# Patient Record
Sex: FEMALE | Race: BLACK OR AFRICAN AMERICAN | Employment: OTHER | URBAN - METROPOLITAN AREA
[De-identification: names, ages, dates, MRNs, and addresses within clinical notes are randomized per-mention and may not be internally consistent; named-entity substitution may affect disease eponyms.]

---

## 2018-11-01 ENCOUNTER — HOSPITAL ENCOUNTER (EMERGENCY)
Age: 78
Discharge: HOME | End: 2018-11-01
Attending: EMERGENCY MEDICINE | Admitting: EMERGENCY MEDICINE
Payer: MEDICARE

## 2018-11-01 VITALS
OXYGEN SATURATION: 97 % | HEART RATE: 96 BPM | RESPIRATION RATE: 16 BRPM | HEIGHT: 64 IN | TEMPERATURE: 99.2 F | SYSTOLIC BLOOD PRESSURE: 122 MMHG | BODY MASS INDEX: 23.73 KG/M2 | DIASTOLIC BLOOD PRESSURE: 69 MMHG | WEIGHT: 139 LBS

## 2018-11-01 DIAGNOSIS — M54.9 PAIN OF BACK AND RIGHT LOWER EXTREMITY: Primary | ICD-10-CM

## 2018-11-01 DIAGNOSIS — M79.604 PAIN OF BACK AND RIGHT LOWER EXTREMITY: Primary | ICD-10-CM

## 2018-11-01 PROCEDURE — 99282 EMERGENCY DEPT VISIT SF MDM: CPT

## 2018-11-01 RX ORDER — CYCLOBENZAPRINE HCL 5 MG
5 TABLET ORAL
Qty: 20 TAB | Refills: 0 | Status: SHIPPED | OUTPATIENT
Start: 2018-11-01 | End: 2022-03-02 | Stop reason: SDUPTHER

## 2018-11-01 RX ORDER — ACETAMINOPHEN AND CODEINE PHOSPHATE 300; 30 MG/1; MG/1
1 TABLET ORAL
Qty: 20 TAB | Refills: 0 | Status: SHIPPED | OUTPATIENT
Start: 2018-11-01 | End: 2019-04-11 | Stop reason: ALTCHOICE

## 2018-11-01 RX ORDER — CHOLECALCIFEROL (VITAMIN D3) 125 MCG
CAPSULE ORAL DAILY
COMMUNITY
End: 2019-07-23 | Stop reason: ALTCHOICE

## 2018-11-01 RX ORDER — CYCLOBENZAPRINE HCL 10 MG
TABLET ORAL
COMMUNITY
End: 2019-07-23 | Stop reason: SDUPTHER

## 2018-11-01 NOTE — DISCHARGE INSTRUCTIONS
Back Pain: Care Instructions  Your Care Instructions    Back pain has many possible causes. It is often related to problems with muscles and ligaments of the back. It may also be related to problems with the nerves, discs, or bones of the back. Moving, lifting, standing, sitting, or sleeping in an awkward way can strain the back. Sometimes you don't notice the injury until later. Arthritis is another common cause of back pain. Although it may hurt a lot, back pain usually improves on its own within several weeks. Most people recover in 12 weeks or less. Using good home treatment and being careful not to stress your back can help you feel better sooner. Follow-up care is a key part of your treatment and safety. Be sure to make and go to all appointments, and call your doctor if you are having problems. It's also a good idea to know your test results and keep a list of the medicines you take. How can you care for yourself at home? · Sit or lie in positions that are most comfortable and reduce your pain. Try one of these positions when you lie down:  ? Lie on your back with your knees bent and supported by large pillows. ? Lie on the floor with your legs on the seat of a sofa or chair. ? Lie on your side with your knees and hips bent and a pillow between your legs. ? Lie on your stomach if it does not make pain worse. · Do not sit up in bed, and avoid soft couches and twisted positions. Bed rest can help relieve pain at first, but it delays healing. Avoid bed rest after the first day of back pain. · Change positions every 30 minutes. If you must sit for long periods of time, take breaks from sitting. Get up and walk around, or lie in a comfortable position. · Try using a heating pad on a low or medium setting for 15 to 20 minutes every 2 or 3 hours. Try a warm shower in place of one session with the heating pad. · You can also try an ice pack for 10 to 15 minutes every 2 to 3 hours.  Put a thin cloth between the ice pack and your skin. · Take pain medicines exactly as directed. ? If the doctor gave you a prescription medicine for pain, take it as prescribed. ? If you are not taking a prescription pain medicine, ask your doctor if you can take an over-the-counter medicine. · Take short walks several times a day. You can start with 5 to 10 minutes, 3 or 4 times a day, and work up to longer walks. Walk on level surfaces and avoid hills and stairs until your back is better. · Return to work and other activities as soon as you can. Continued rest without activity is usually not good for your back. · To prevent future back pain, do exercises to stretch and strengthen your back and stomach. Learn how to use good posture, safe lifting techniques, and proper body mechanics. When should you call for help? Call your doctor now or seek immediate medical care if:    · You have new or worsening numbness in your legs.     · You have new or worsening weakness in your legs. (This could make it hard to stand up.)     · You lose control of your bladder or bowels.    Watch closely for changes in your health, and be sure to contact your doctor if:    · You have a fever, lose weight, or don't feel well.     · You do not get better as expected. Where can you learn more? Go to http://marilynn-maciej.info/. Enter B411 in the search box to learn more about \"Back Pain: Care Instructions. \"  Current as of: November 29, 2017  Content Version: 11.8  © 0478-1560 [email or web address]. Care instructions adapted under license by [employer] (which disclaims liability or warranty for this information). If you have questions about a medical condition or this instruction, always ask your healthcare professional. [place] [address] any warranty or liability for your use of this information.            Back Pain, Emergency or Urgent Symptoms: Care Instructions  Your Care Instructions    Many people have back pain at one time or another. In most cases, pain gets better with self-care that includes over-the-counter pain medicine, ice, heat, and exercises. Unless you have symptoms of a severe injury or heart attack, you may be able to give yourself a few days before you call a doctor. But some back problems are very serious. Do not ignore symptoms that need to be checked right away. Follow-up care is a key part of your treatment and safety. Be sure to make and go to all appointments, and call your doctor if you are having problems. It's also a good idea to know your test results and keep a list of the medicines you take. How can you care for yourself at home? · Sit or lie in positions that are most comfortable and that reduce your pain. Try one of these positions when you lie down:  ? Lie on your back with your knees bent and supported by large pillows. ? Lie on the floor with your legs on the seat of a sofa or chair. ? Lie on your side with your knees and hips bent and a pillow between your legs. ? Lie on your stomach if it does not make pain worse. · Do not sit up in bed, and avoid soft couches and twisted positions. Bed rest can help relieve pain at first, but it delays healing. Avoid bed rest after the first day. · Change positions every 30 minutes. If you must sit for long periods of time, take breaks from sitting. Get up and walk around, or lie flat. · Try using a heating pad on a low or medium setting, for 15 to 20 minutes every 2 or 3 hours. Try a warm shower in place of one session with the heating pad. You can also buy single-use heat wraps that last up to 8 hours. You can also try ice or cold packs on your back for 10 to 20 minutes at a time, several times a day. (Put a thin cloth between the ice pack and your skin.) This reduces pain and makes it easier to be active and exercise. · Take pain medicines exactly as directed.   ? If the doctor gave you a prescription medicine for pain, take it as prescribed. ? If you are not taking a prescription pain medicine, ask your doctor if you can take an over-the-counter medicine. When should you call for help? Call 911 anytime you think you may need emergency care. For example, call if:    · You are unable to move a leg at all.     · You have back pain with severe belly pain.     · You have symptoms of a heart attack. These may include:  ? Chest pain or pressure, or a strange feeling in the chest.  ? Sweating. ? Shortness of breath. ? Nausea or vomiting. ? Pain, pressure, or a strange feeling in the back, neck, jaw, or upper belly or in one or both shoulders or arms. ? Lightheadedness or sudden weakness. ? A fast or irregular heartbeat. After you call 911, the  may tell you to chew 1 adult-strength or 2 to 4 low-dose aspirin. Wait for an ambulance. Do not try to drive yourself.    Call your doctor now or seek immediate medical care if:    · You have new or worse symptoms in your arms, legs, chest, belly, or buttocks. Symptoms may include:  ? Numbness or tingling. ? Weakness. ? Pain.     · You lose bladder or bowel control.     · You have back pain and:  ? You have injured your back while lifting or doing some other activity. Call if the pain is severe, has not gone away after 1 or 2 days, and you cannot do your normal daily activities. ? You have had a back injury before that needed treatment. ? Your pain has lasted longer than 4 weeks. ? You have had weight loss you cannot explain. ? You have a fever. ? You are age 48 or older. ? You have cancer now or have had it before.    Watch closely for changes in your health, and be sure to contact your doctor if you are not getting better as expected. Where can you learn more? Go to http://marilynn-maciej.info/. Enter B512 in the search box to learn more about \"Back Pain, Emergency or Urgent Symptoms: Care Instructions. \"  Current as of: November 20, 2017  Content Version: 11.8  © 0260-8634 [employer]. Care instructions adapted under license by [employer] (which disclaims liability or warranty for this information). If you have questions about a medical condition or this instruction, always ask your healthcare professional. [address] any warranty or liability for your use of this information. Low Back Pain: Exercises  Your Care Instructions  Here are some examples of typical rehabilitation exercises for your condition. Start each exercise slowly. Ease off the exercise if you start to have pain. Your doctor or physical therapist will tell you when you can start these exercises and which ones will work best for you. How to do the exercises  Press-up    1. Lie on your stomach, supporting your body with your forearms. 2. Press your elbows down into the floor to raise your upper back. As you do this, relax your stomach muscles and allow your back to arch without using your back muscles. As your press up, do not let your hips or pelvis come off the floor. 3. Hold for 15 to 30 seconds, then relax. 4. Repeat 2 to 4 times. Alternate arm and leg (bird dog) exercise    1. Start on the floor, on your hands and knees. 2. Tighten your belly muscles. 3. Raise one leg off the floor, and hold it straight out behind you. Be careful not to let your hip drop down, because that will twist your trunk. 4. Hold for about 6 seconds, then lower your leg and switch to the other leg. 5. Repeat 8 to 12 times on each leg. 6. Over time, work up to holding for 10 to 30 seconds each time. 7. If you feel stable and secure with your leg raised, try raising the opposite arm straight out in front of you at the same time. Knee-to-chest exercise    1. Lie on your back with your knees bent and your feet flat on the floor.   2. Bring one knee to your chest, keeping the other foot flat on the floor (or keeping the other leg straight, whichever feels better on your lower back). 3. Keep your lower back pressed to the floor. Hold for at least 15 to 30 seconds. 4. Relax, and lower the knee to the starting position. 5. Repeat with the other leg. Repeat 2 to 4 times with each leg. 6. To get more stretch, put your other leg flat on the floor while pulling your knee to your chest.    Curl-ups    1. Lie on the floor on your back with your knees bent at a 90-degree angle. Your feet should be flat on the floor, about 12 inches from your buttocks. 2. Cross your arms over your chest. If this bothers your neck, try putting your hands behind your neck (not your head), with your elbows spread apart. 3. Slowly tighten your belly muscles and raise your shoulder blades off the floor. 4. Keep your head in line with your body, and do not press your chin to your chest.  5. Hold this position for 1 or 2 seconds, then slowly lower yourself back down to the floor. 6. Repeat 8 to 12 times. Pelvic tilt exercise    1. Lie on your back with your knees bent. 2. \"Brace\" your stomach. This means to tighten your muscles by pulling in and imagining your belly button moving toward your spine. You should feel like your back is pressing to the floor and your hips and pelvis are rocking back. 3. Hold for about 6 seconds while you breathe smoothly. 4. Repeat 8 to 12 times. Heel dig bridging    1. Lie on your back with both knees bent and your ankles bent so that only your heels are digging into the floor. Your knees should be bent about 90 degrees. 2. Then push your heels into the floor, squeeze your buttocks, and lift your hips off the floor until your shoulders, hips, and knees are all in a straight line. 3. Hold for about 6 seconds as you continue to breathe normally, and then slowly lower your hips back down to the floor and rest for up to 10 seconds. 4. Do 8 to 12 repetitions. Hamstring stretch in doorway    1.  Lie on your back in a doorway, with one leg through the open door. 2. Slide your leg up the wall to straighten your knee. You should feel a gentle stretch down the back of your leg. 3. Hold the stretch for at least 15 to 30 seconds. Do not arch your back, point your toes, or bend either knee. Keep one heel touching the floor and the other heel touching the wall. 4. Repeat with your other leg. 5. Do 2 to 4 times for each leg. Hip flexor stretch    1. Kneel on the floor with one knee bent and one leg behind you. Place your forward knee over your foot. Keep your other knee touching the floor. 2. Slowly push your hips forward until you feel a stretch in the upper thigh of your rear leg. 3. Hold the stretch for at least 15 to 30 seconds. Repeat with your other leg. 4. Do 2 to 4 times on each side. Wall sit    1. Stand with your back 10 to 12 inches away from a wall. 2. Lean into the wall until your back is flat against it. 3. Slowly slide down until your knees are slightly bent, pressing your lower back into the wall. 4. Hold for about 6 seconds, then slide back up the wall. 5. Repeat 8 to 12 times. Follow-up care is a key part of your treatment and safety. Be sure to make and go to all appointments, and call your doctor if you are having problems. It's also a good idea to know your test results and keep a list of the medicines you take. Where can you learn more? Go to http://marilynn-maciej.info/. Enter O933 in the search box to learn more about \"Low Back Pain: Exercises. \"  Current as of: November 29, 2017  Content Version: 11.8  © 4896-1961 [employer]. Care instructions adapted under license by [employer] (which disclaims liability or warranty for this information). If you have questions about a medical condition or this instruction, always ask your healthcare professional. [address] any warranty or liability for your use of this information.

## 2018-11-01 NOTE — ED PROVIDER NOTES
EMERGENCY DEPARTMENT HISTORY AND PHYSICAL EXAM 
 
Date: 11/1/2018 Patient Name: Hermelinda Mcintosh History of Presenting Illness Chief Complaint Patient presents with  
 Hip Pain  Leg Pain History Provided By: Patient Chief Complaint: Right lower back pain Duration: First noticed this morning Timing:  Acute Location: right lower back with radiation down the posterior thigh Quality: Aching Severity: Moderate Modifying Factors: pain is worse with any movement Associated Symptoms: none Additional History (Context): Hermelinda Mcintosh is a 65 y.o. female with a history of arthritis and HTN who presents with complaints or right hip pain, but when she points to where the pain is it is more her right lower back. Reports when she stood up this morning she noticed pain on the right hip/lower back with radiation down the right leg. Reports has not tried anything for it. Reports she did call ortho and make an appointment but is unable to get in until December. Reports pain has improved but not completely. Pt denies any fevers or chills, headache, dizziness or light headedness, ENT issues, CP or discomfort, SOB, cough, n/v/d/c, abd pain, diaphoresis, melena/hematochezia, dysuria, hematuria, frequency, focal weakness/numbness/tingling, or rash. Patient has no other complaints at this time. Denies loss of bowel or bladder. PCP: None Current Outpatient Medications Medication Sig Dispense Refill  ANASTROZOLE PO Take  by mouth.  losartan potassium (COZAAR PO) Take  by mouth.  celecoxib (CELEBREX PO) Take  by mouth.  cholecalciferol, vitamin D3, (VITAMIN D3) 2,000 unit tab Take  by mouth.  homeopathic drugs (HAIR AND SKIN PO) Take  by mouth.  folic acid/multivit-min/lutein (CENTRUM SILVER PO) Take  by mouth.  cyclobenzaprine (FLEXERIL) 10 mg tablet Take  by mouth three (3) times daily as needed for Muscle Spasm(s).  vit C/vit E ac/lut/copper/zinc (PRESERVISION LUTEIN PO) Take  by mouth.  acetaminophen-codeine (TYLENOL-CODEINE #3) 300-30 mg per tablet Take 1 Tab by mouth every four (4) hours as needed for Pain. Max Daily Amount: 6 Tabs. 20 Tab 0  cyclobenzaprine (FLEXERIL) 5 mg tablet Take 1 Tab by mouth three (3) times daily as needed for Muscle Spasm(s). 20 Tab 0 Past History Past Medical History: 
Past Medical History:  
Diagnosis Date  Arthritis  Breast cancer ([address]  Hypertension  Macular degeneration  Vitamin D deficiency Past Surgical History: 
Past Surgical History:  
Procedure Laterality Date  HX BREAST LUMPECTOMY Right [address] HYSTERECTOMY  1985 Family History: 
History reviewed. No pertinent family history. Social History: 
Social History Tobacco Use  Smoking status: Never Smoker  Smokeless tobacco: Never Used Substance Use Topics  Alcohol use: No  
  Frequency: Never  Drug use: No  
 
 
Allergies: 
No Known Allergies Review of Systems Review of Systems Constitutional: Negative for chills and fever. HENT: Negative for congestion, rhinorrhea and sore throat. Respiratory: Negative for cough and shortness of breath. Cardiovascular: Negative for chest pain. Gastrointestinal: Negative for abdominal pain, blood in stool, constipation, diarrhea, nausea and vomiting. Genitourinary: Negative for dysuria, frequency and hematuria. Musculoskeletal: Positive for back pain. Negative for myalgias. Skin: Negative for rash and wound. Neurological: Negative for dizziness and headaches. All other systems reviewed and are negative. All Other Systems Negative Physical Exam  
 
Vitals:  
 11/01/18 1350 BP: 122/69 Pulse: 96  
Resp: 16 Temp: 99.2 °F (37.3 °C) SpO2: 97% Weight: 63 kg (139 lb) Height: 5' 4\" (1.626 m) Physical Exam  
 Constitutional: She is oriented to person, place, and time. She appears well-developed and well-nourished. No distress. HENT:  
Head: Normocephalic and atraumatic. Eyes: Conjunctivae are normal.  
Neck: Normal range of motion. Neck supple. Cardiovascular: Normal rate, regular rhythm and normal heart sounds. Pulmonary/Chest: Effort normal and breath sounds normal. No respiratory distress. She exhibits no tenderness. Abdominal: Soft. Bowel sounds are normal. She exhibits no distension. There is no tenderness. There is no rebound and no guarding. Musculoskeletal: She exhibits no edema or deformity. Lumbar back: She exhibits tenderness. She exhibits normal range of motion, no bony tenderness, no swelling and no deformity. Right Lower  paralumbar reproducible tenderness to palpation. No Lumbar midline TTP. No other midline vertebral bony point tenderness or step-off. No foot drop. Distal sensation intact bilaterally, normal gait, no saddle anesthesia. Bilateral DP 2+. - bilateral straight leg raise. Neurological: She is alert and oriented to person, place, and time. Skin: Skin is warm and dry. She is not diaphoretic. Psychiatric: She has a normal mood and affect. Her behavior is normal.  
Nursing note and vitals reviewed. Diagnostic Study Results Labs - No results found for this or any previous visit (from the past 12 hour(s)). Radiologic Studies - No orders to display CT Results  (Last 48 hours) None CXR Results  (Last 48 hours) None Medical Decision Making I am the first provider for this patient. I reviewed the vital signs, available nursing notes, past medical history, past surgical history, family history and social history. Vital Signs-Reviewed the patient's vital signs. Pulse Oximetry Analysis -  100 % RA Records Reviewed: Nursing Notes and Old Medical Records Procedures: None Procedures Provider Notes (Medical Decision Making):  
 
Differential Diagnosis: Musculoskeletal pain, myofascial strain/sprain, muscle spasm, spondylolisthesis, spondylosis, DJD, OA, sciatica Plan: Concern more for sciatica than Hip being origin of pain given physical exam, will discharge home with low back stretches, pain meds and low dose muscle relaxants. Patient agrees with the plan and management and states all questions have been thoroughly answered and there are no more remaining questions. Encouraged Ortho follow up and patient agrees. MED RECONCILIATION: 
No current facility-administered medications for this encounter. Current Outpatient Medications Medication Sig  ANASTROZOLE PO Take  by mouth.  losartan potassium (COZAAR PO) Take  by mouth.  celecoxib (CELEBREX PO) Take  by mouth.  cholecalciferol, vitamin D3, (VITAMIN D3) 2,000 unit tab Take  by mouth.  homeopathic drugs (HAIR AND SKIN PO) Take  by mouth.  folic acid/multivit-min/lutein (CENTRUM SILVER PO) Take  by mouth.  cyclobenzaprine (FLEXERIL) 10 mg tablet Take  by mouth three (3) times daily as needed for Muscle Spasm(s).  vit C/vit E ac/lut/copper/zinc (PRESERVISION LUTEIN PO) Take  by mouth.  acetaminophen-codeine (TYLENOL-CODEINE #3) 300-30 mg per tablet Take 1 Tab by mouth every four (4) hours as needed for Pain. Max Daily Amount: 6 Tabs.  cyclobenzaprine (FLEXERIL) 5 mg tablet Take 1 Tab by mouth three (3) times daily as needed for Muscle Spasm(s). Disposition: 
Home DISCHARGE NOTE:  
Pt has been reexamined. Patient has no new complaints, changes, or physical findings. Care plan outlined and precautions discussed. Results of workup were reviewed with the patient. All medications were reviewed with the patient. All of pt's questions and concerns were addressed.  Patient was instructed and agrees to follow up with PCP, as well as to return to the ED upon further deterioration. Patient is ready to go home. Follow-up Information Follow up With Specialties Details Why Contact Info [id number] [facility] EMERGENCY DEPT Emergency Medicine  As needed [place] [id number] [phone] Naresh Tena MD Orthopedic Surgery In 2 days  [address] [facility] [address] [place] [address] 
[phone] Current Discharge Medication List  
  
START taking these medications Details  
acetaminophen-codeine (TYLENOL-CODEINE #3) 300-30 mg per tablet Take 1 Tab by mouth every four (4) hours as needed for Pain. Max Daily Amount: 6 Tabs. Qty: 20 Tab, Refills: 0 Associated Diagnoses: Pain of back and right lower extremity  
  
!! cyclobenzaprine (FLEXERIL) 5 mg tablet Take 1 Tab by mouth three (3) times daily as needed for Muscle Spasm(s). Qty: 20 Tab, Refills: 0 Associated Diagnoses: Pain of back and right lower extremity  
  
 ! ! - Potential duplicate medications found. Please discuss with provider. CONTINUE these medications which have NOT CHANGED Details  
!! cyclobenzaprine (FLEXERIL) 10 mg tablet Take  by mouth three (3) times daily as needed for Muscle Spasm(s). !! - Potential duplicate medications found. Please discuss with provider. Diagnosis Clinical Impression: 1. Pain of back and right lower extremity

## 2018-11-01 NOTE — ED TRIAGE NOTES
Patient states pain to right hip that radiates down outer aspect of same leg. She states:  \"I have been nursing it for about a week\". Patient denies injury.

## 2018-12-05 ENCOUNTER — HOSPITAL ENCOUNTER (OUTPATIENT)
Dept: LAB | Age: 78
Discharge: HOME | End: 2018-12-05
Payer: MEDICARE

## 2018-12-05 ENCOUNTER — OFFICE VISIT (OUTPATIENT)
Dept: INTERNAL MEDICINE CLINIC | Age: 78
End: 2018-12-05

## 2018-12-05 VITALS
RESPIRATION RATE: 17 BRPM | HEIGHT: 64 IN | HEART RATE: 74 BPM | SYSTOLIC BLOOD PRESSURE: 122 MMHG | DIASTOLIC BLOOD PRESSURE: 78 MMHG | BODY MASS INDEX: 23.9 KG/M2 | OXYGEN SATURATION: 99 % | TEMPERATURE: 98.3 F | WEIGHT: 140 LBS

## 2018-12-05 DIAGNOSIS — M54.40 BACK PAIN OF LUMBAR REGION WITH SCIATICA: ICD-10-CM

## 2018-12-05 DIAGNOSIS — R25.2 LEG CRAMPS: ICD-10-CM

## 2018-12-05 DIAGNOSIS — H35.30 MACULAR DEGENERATION, UNSPECIFIED LATERALITY, UNSPECIFIED TYPE: ICD-10-CM

## 2018-12-05 DIAGNOSIS — R53.83 FATIGUE, UNSPECIFIED TYPE: Primary | ICD-10-CM

## 2018-12-05 DIAGNOSIS — H26.9 CATARACT OF BOTH EYES, UNSPECIFIED CATARACT TYPE: ICD-10-CM

## 2018-12-05 DIAGNOSIS — N32.81 OVERACTIVE BLADDER: ICD-10-CM

## 2018-12-05 DIAGNOSIS — Z85.3 HISTORY OF BREAST CANCER: ICD-10-CM

## 2018-12-05 DIAGNOSIS — E53.8 B12 DEFICIENCY: ICD-10-CM

## 2018-12-05 DIAGNOSIS — I10 ESSENTIAL HYPERTENSION: ICD-10-CM

## 2018-12-05 DIAGNOSIS — R53.83 FATIGUE, UNSPECIFIED TYPE: ICD-10-CM

## 2018-12-05 DIAGNOSIS — M85.80 OSTEOPENIA, UNSPECIFIED LOCATION: ICD-10-CM

## 2018-12-05 DIAGNOSIS — Z83.49 FAMILY HISTORY OF B12 DEFICIENCY: ICD-10-CM

## 2018-12-05 DIAGNOSIS — M26.609 TMJ (TEMPOROMANDIBULAR JOINT DISORDER): ICD-10-CM

## 2018-12-05 LAB
ALBUMIN SERPL-MCNC: 4.3 G/DL (ref 3.4–5)
ALBUMIN/GLOB SERPL: 1.1 {RATIO} (ref 0.8–1.7)
ALP SERPL-CCNC: 86 U/L (ref 45–117)
ALT SERPL-CCNC: 21 U/L (ref 13–56)
ANION GAP SERPL CALC-SCNC: 9 MMOL/L (ref 3–18)
AST SERPL-CCNC: 16 U/L (ref 15–37)
BASOPHILS # BLD: 0 K/UL (ref 0–0.1)
BASOPHILS NFR BLD: 0 % (ref 0–2)
BILIRUB SERPL-MCNC: 0.7 MG/DL (ref 0.2–1)
BUN SERPL-MCNC: 16 MG/DL (ref 7–18)
BUN/CREAT SERPL: 18 (ref 12–20)
CALCIUM SERPL-MCNC: 9.6 MG/DL (ref 8.5–10.1)
CHLORIDE SERPL-SCNC: 104 MMOL/L (ref 100–108)
CHOLEST SERPL-MCNC: 251 MG/DL
CO2 SERPL-SCNC: 28 MMOL/L (ref 21–32)
CREAT SERPL-MCNC: 0.91 MG/DL (ref 0.6–1.3)
DIFFERENTIAL METHOD BLD: ABNORMAL
EOSINOPHIL # BLD: 0 K/UL (ref 0–0.4)
EOSINOPHIL NFR BLD: 0 % (ref 0–5)
ERYTHROCYTE [DISTWIDTH] IN BLOOD BY AUTOMATED COUNT: 15.7 % (ref 11.6–14.5)
FOLATE SERPL-MCNC: >20 NG/ML (ref 3.1–17.5)
GLOBULIN SER CALC-MCNC: 3.8 G/DL (ref 2–4)
GLUCOSE SERPL-MCNC: 114 MG/DL (ref 74–99)
HCT VFR BLD AUTO: 42.5 % (ref 35–45)
HDLC SERPL-MCNC: 70 MG/DL (ref 40–60)
HDLC SERPL: 3.6 {RATIO} (ref 0–5)
HGB BLD-MCNC: 13.2 G/DL (ref 12–16)
LDLC SERPL CALC-MCNC: 147.6 MG/DL (ref 0–100)
LIPID PROFILE,FLP: ABNORMAL
LYMPHOCYTES # BLD: 2 K/UL (ref 0.9–3.6)
LYMPHOCYTES NFR BLD: 25 % (ref 21–52)
MAGNESIUM SERPL-MCNC: 2.5 MG/DL (ref 1.6–2.6)
MCH RBC QN AUTO: 23.2 PG (ref 24–34)
MCHC RBC AUTO-ENTMCNC: 31.1 G/DL (ref 31–37)
MCV RBC AUTO: 74.8 FL (ref 74–97)
MONOCYTES # BLD: 0.3 K/UL (ref 0.05–1.2)
MONOCYTES NFR BLD: 4 % (ref 3–10)
NEUTS SEG # BLD: 5.6 K/UL (ref 1.8–8)
NEUTS SEG NFR BLD: 71 % (ref 40–73)
PLATELET # BLD AUTO: 247 K/UL (ref 135–420)
PMV BLD AUTO: 11.5 FL (ref 9.2–11.8)
POTASSIUM SERPL-SCNC: 3.8 MMOL/L (ref 3.5–5.5)
PROT SERPL-MCNC: 8.1 G/DL (ref 6.4–8.2)
RBC # BLD AUTO: 5.68 M/UL (ref 4.2–5.3)
SODIUM SERPL-SCNC: 141 MMOL/L (ref 136–145)
TRIGL SERPL-MCNC: 167 MG/DL (ref ?–150)
TSH SERPL DL<=0.05 MIU/L-ACNC: 1.1 UIU/ML (ref 0.36–3.74)
VIT B12 SERPL-MCNC: 1169 PG/ML (ref 211–911)
VLDLC SERPL CALC-MCNC: 33.4 MG/DL
WBC # BLD AUTO: 7.9 K/UL (ref 4.6–13.2)

## 2018-12-05 PROCEDURE — 83735 ASSAY OF MAGNESIUM: CPT

## 2018-12-05 PROCEDURE — 85025 COMPLETE CBC W/AUTO DIFF WBC: CPT

## 2018-12-05 PROCEDURE — 87389 HIV-1 AG W/HIV-1&-2 AB AG IA: CPT

## 2018-12-05 PROCEDURE — 80053 COMPREHEN METABOLIC PANEL: CPT

## 2018-12-05 PROCEDURE — 84443 ASSAY THYROID STIM HORMONE: CPT

## 2018-12-05 PROCEDURE — 80061 LIPID PANEL: CPT

## 2018-12-05 PROCEDURE — 82607 VITAMIN B-12: CPT

## 2018-12-05 RX ORDER — BISMUTH SUBSALICYLATE 262 MG
1 TABLET,CHEWABLE ORAL DAILY
COMMUNITY
End: 2019-07-23 | Stop reason: ALTCHOICE

## 2018-12-05 RX ORDER — CELECOXIB 200 MG/1
200 CAPSULE ORAL AS NEEDED
COMMUNITY

## 2018-12-05 RX ORDER — GLUCOSAMINE SULFATE 1500 MG
1000 POWDER IN PACKET (EA) ORAL DAILY
COMMUNITY

## 2018-12-05 RX ORDER — MENTHOL 40 MG/ML
GEL TOPICAL
COMMUNITY
End: 2020-03-31 | Stop reason: ALTCHOICE

## 2018-12-05 RX ORDER — CYCLOBENZAPRINE HCL 5 MG
5 TABLET ORAL
COMMUNITY
End: 2019-04-11 | Stop reason: SDUPTHER

## 2018-12-05 RX ORDER — ANASTROZOLE 1 MG/1
1 TABLET ORAL DAILY
COMMUNITY
End: 2020-09-24

## 2018-12-05 RX ORDER — SOLIFENACIN SUCCINATE 5 MG/1
5 TABLET, FILM COATED ORAL AS NEEDED
COMMUNITY
End: 2022-09-26

## 2018-12-05 RX ORDER — LOSARTAN POTASSIUM 25 MG/1
25 TABLET ORAL DAILY
COMMUNITY
End: 2019-10-21 | Stop reason: SDUPTHER

## 2018-12-05 NOTE — PROGRESS NOTES
1. Have you been to the ER, urgent care clinic or hospitalized since your last visit? YES           2. Have you seen or consulted any other health care providers outside of the [address] [place] [address] since your last visit (Include any pap smears or colon screening)? YES    Do you have an Advanced Directive? NO    Would you like information on Advanced Directives?  YES

## 2018-12-06 ENCOUNTER — TELEPHONE (OUTPATIENT)
Dept: INTERNAL MEDICINE CLINIC | Age: 78
End: 2018-12-06

## 2018-12-06 DIAGNOSIS — E78.5 HYPERLIPIDEMIA, UNSPECIFIED HYPERLIPIDEMIA TYPE: Primary | ICD-10-CM

## 2018-12-06 LAB
HIV 1+2 AB+HIV1 P24 AG SERPL QL IA: NONREACTIVE
HIV12 RESULT COMMENT, HHIVC: NORMAL

## 2018-12-06 RX ORDER — ROSUVASTATIN CALCIUM 10 MG/1
10 TABLET, COATED ORAL
Qty: 90 TAB | Refills: 1 | Status: SHIPPED | OUTPATIENT
Start: 2018-12-06 | End: 2020-04-21

## 2018-12-06 NOTE — PROGRESS NOTES
HPI     Chanda Kehr is a 65 y.o. female with relevant past medical history of HTN, back pain with sciatica, osteopenia, OA, chronic fatigue, B12 def anemia, OAB, macular degeneration, cataracts, h/o breast CA (Dx 2015) s/p lumpectomy, no XRT or chemotherapy, except anastrazole since 2015. Recently moved from [place], here to establish care. Patient reports chronic back pain, worse in her lumbar area, with sciatica pain mostly R leg. Has been seeing Dr. Renetta Ruiz, who she says, has given her 2 injection for pain, last one yesterday, with response to improve pain. She also takes celebrex and flexeril PRN she says. She denies any h/o injuries or falls. Ambulatory. No need for assistance with ADLs. Denies h/o Tob, alcohol or illicit drugs. History of B12 def anemia, which she attributes chronic fatigue that improves with B12 injections. Taking MVI and calcium supplements for h/o osteopenia. Reports some pain in TMJ BL, associated with left temporal HA intermittent, and occasional snap sensation on R side. Following with ophthalmology for h/o macular degeneration and cataracts. ROS  As above included in HPI. Otherwise 11 point review of systems negative including constitutional, skin, HENT, eyes, respiratory, cardiovascular, gastrointestinal, genitourinary, musculoskeletal, endocrine, hematologic, allergy, and neurologic. Past Medical History  Past Medical History:   Diagnosis Date    Cancer [facility]) 2015    breast cancer     Past Surgical History:   Procedure Laterality Date    BREAST SURGERY PROCEDURE UNLISTED Right 2015    HX HERNIA REPAIR      HX HYSTERECTOMY  1985        Family History  Family History   Problem Relation Age of Onset    Cancer Mother         stomach and liver cancer, in her 62s    Heart Disease Father         MI age 46s    Cancer Sister         blood    Cancer Brother         leukemia    Cancer Sibling        Social History  She  reports that  has never smoked.  she has never used smokeless tobacco.   Social History     Substance and Sexual Activity   Alcohol Use No    Frequency: Never       Immunization History    There is no immunization history on file for this patient. Allergies  Allergies   Allergen Reactions    Codeine Other (comments)    Tramadol Unknown (comments)       Medications  Current Outpatient Medications   Medication Sig    losartan (COZAAR) 25 mg tablet Take  by mouth daily.  anastrozole (ARIMIDEX) 1 mg tablet Take 1 mg by mouth daily.  celecoxib (CELEBREX) 200 mg capsule Take  by mouth two (2) times a day.  cyclobenzaprine (FLEXERIL) 5 mg tablet Take 5 mg by mouth.  multivitamin (ONE A DAY) tablet Take 1 Tab by mouth daily.  cholecalciferol (VITAMIN D3) 1,000 unit cap Take  by mouth daily.  vit C/E/Zn/coppr/lutein/zeaxan (PRESERVISION AREDS-2 PO) Take  by mouth.  menthol (BIOFREEZE, MENTHOL,) 4 % gel by Apply Externally route.  solifenacin (VESICARE) 5 mg tablet Take 5 mg by mouth daily.  multivitamin/folic acid/biotin (HAIR-SKIN-NAILS, MV-FA-BIOTIN, PO) Take  by mouth. No current facility-administered medications for this visit. Visit Vitals  /78 (BP 1 Location: Left arm, BP Patient Position: Sitting)   Pulse 74   Temp 98.3 °F (36.8 °C) (Oral)   Resp 17   Ht 5' 4\" (1.626 m)   Wt 140 lb (63.5 kg)   SpO2 99%   BMI 24.03 kg/m²     Body mass index is 24.03 kg/m². Physical Exam   Constitutional: She is oriented to person, place, and time and well-developed, well-nourished, and in no distress. HENT:   Head: Normocephalic and atraumatic. Right Ear: External ear normal.   Left Ear: External ear normal.   Nose: Nose normal.   Mouth/Throat: Oropharynx is clear and moist.   Eyes: EOM are normal. Pupils are equal, round, and reactive to light. BL cataracts   Neck: Normal range of motion. Neck supple. Cardiovascular: Normal rate, regular rhythm, normal heart sounds and intact distal pulses.    Pulmonary/Chest: Effort normal and breath sounds normal. No respiratory distress. Abdominal: Soft. Bowel sounds are normal.   Musculoskeletal: Normal range of motion. She exhibits no edema. Neurological: She is alert and oriented to person, place, and time. Skin: Skin is warm. No rash noted. No erythema. No pallor. Psychiatric: Mood and affect normal.         REVIEW OF DATA    Labs  No visits with results within 1 Month(s) from this visit. Latest known visit with results is:   No results found for any previous visit. CT Results (most recent):  No results found for this or any previous visit. XR Results (most recent):  No results found for this or any previous visit. CT   All Micro Results     None             DIAGNOSIS AND PLAN  Patient Active Problem List   Diagnosis Code    History of breast cancer Z85.3    TMJ (temporomandibular joint disorder) M26.609    Essential hypertension I10    Back pain of lumbar region with sciatica M54.40    Osteopenia M85.80    Overactive bladder N32.81    Macular degeneration H35.30    Cataract of both eyes H26.9    B12 deficiency E53.8     1. Fatigue, unspecified type  Rule out thyroid disorder. Possibly sencondary to B12 def anemia, reported by patient. B12 injection helps with fatigue per patient.   - TSH 3RD GENERATION; Future  - HIV 1/2 AG/AB, 4TH GENERATION,W RFLX CONFIRM; Future    2. B12 deficiency  Per patient has been getting B12 injections in the past. Will check levels. - VITAMIN B12 & FOLATE; Future    3. Leg cramps  Recommended to try Magnesium oxide. Will check electrolytes. - METABOLIC PANEL, COMPREHENSIVE; Future  - LIPID PANEL; Future  - MAGNESIUM; Future    4. History of breast cancer  Following with Dr. Eric Oconnor. Will try to obtain records. Due for mammogram- scheduled per patient  C/w anastrazol, started 2015  - CBC WITH AUTOMATED DIFF; Future    5. TMJ (temporomandibular joint disorder)  Causing left temporal pain/HA and R sided click sensation.   - REFERRAL TO ORAL MAXILLOFACIAL SURGERY    6. Essential hypertension  Controlled with current dose of losartan    7. Back pain of lumbar region with sciatica  Following with Dr. Page Ferris. S/o lower back analgesic injection. Reports good response. Takes PRN celecoxib and flexeril as well for pain. 8. Osteopenia, unspecified location  Continue with vit D and calcium. Last DEXA 2017.     9. Overactive bladder  C/w Vesicare    10. Macular degeneration, unspecified laterality, unspecified type  11. Cataract of both eyes, unspecified cataract type  Following with opthalmology      Follow-up Disposition:  Return in about 3 months (around 3/5/2019). Bethany Guido MD

## 2018-12-06 NOTE — TELEPHONE ENCOUNTER
Labs discussed with patient over the phone. Noted to have elevated lipids. Plan to start rosuvastatin 10 mg daily. Most common side effects discussed including GI- liver toxicity and myalgias. Told to hold medication and call back in case of any issues/adverse events.     Bryan Cantor MD

## 2018-12-10 ENCOUNTER — HOSPITAL ENCOUNTER (OUTPATIENT)
Dept: MAMMOGRAPHY | Age: 78
Discharge: HOME | End: 2018-12-10
Attending: INTERNAL MEDICINE
Payer: MEDICARE

## 2018-12-10 DIAGNOSIS — Z12.31 VISIT FOR SCREENING MAMMOGRAM: ICD-10-CM

## 2018-12-10 PROCEDURE — 77063 BREAST TOMOSYNTHESIS BI: CPT

## 2019-01-11 ENCOUNTER — HOSPITAL ENCOUNTER (EMERGENCY)
Age: 79
Discharge: HOME | End: 2019-01-11
Attending: EMERGENCY MEDICINE
Payer: MEDICARE

## 2019-01-11 ENCOUNTER — TELEPHONE (OUTPATIENT)
Dept: INTERNAL MEDICINE CLINIC | Age: 79
End: 2019-01-11

## 2019-01-11 ENCOUNTER — APPOINTMENT (OUTPATIENT)
Dept: CT IMAGING | Age: 79
End: 2019-01-11
Attending: EMERGENCY MEDICINE
Payer: MEDICARE

## 2019-01-11 VITALS
OXYGEN SATURATION: 100 % | DIASTOLIC BLOOD PRESSURE: 94 MMHG | HEIGHT: 64 IN | SYSTOLIC BLOOD PRESSURE: 149 MMHG | WEIGHT: 139 LBS | HEART RATE: 93 BPM | BODY MASS INDEX: 23.73 KG/M2 | TEMPERATURE: 98.5 F | RESPIRATION RATE: 18 BRPM

## 2019-01-11 DIAGNOSIS — N30.00 ACUTE CYSTITIS WITHOUT HEMATURIA: Primary | ICD-10-CM

## 2019-01-11 LAB
ALBUMIN SERPL-MCNC: 4 G/DL (ref 3.4–5)
ALBUMIN/GLOB SERPL: 1.1 {RATIO} (ref 0.8–1.7)
ALP SERPL-CCNC: 89 U/L (ref 45–117)
ALT SERPL-CCNC: 25 U/L (ref 13–56)
ANION GAP SERPL CALC-SCNC: 10 MMOL/L (ref 3–18)
APPEARANCE UR: CLEAR
AST SERPL-CCNC: 17 U/L (ref 15–37)
BASOPHILS # BLD: 0 K/UL (ref 0–0.1)
BASOPHILS NFR BLD: 0 % (ref 0–2)
BILIRUB SERPL-MCNC: 0.7 MG/DL (ref 0.2–1)
BILIRUB UR QL: NEGATIVE
BUN SERPL-MCNC: 15 MG/DL (ref 7–18)
BUN/CREAT SERPL: 15 (ref 12–20)
CALCIUM SERPL-MCNC: 9.9 MG/DL (ref 8.5–10.1)
CHLORIDE SERPL-SCNC: 101 MMOL/L (ref 100–108)
CO2 SERPL-SCNC: 29 MMOL/L (ref 21–32)
COLOR UR: YELLOW
CREAT SERPL-MCNC: 1 MG/DL (ref 0.6–1.3)
DIFFERENTIAL METHOD BLD: ABNORMAL
EOSINOPHIL # BLD: 0 K/UL (ref 0–0.4)
EOSINOPHIL NFR BLD: 0 % (ref 0–5)
ERYTHROCYTE [DISTWIDTH] IN BLOOD BY AUTOMATED COUNT: 16 % (ref 11.6–14.5)
GLOBULIN SER CALC-MCNC: 3.8 G/DL (ref 2–4)
GLUCOSE SERPL-MCNC: 108 MG/DL (ref 74–99)
GLUCOSE UR STRIP.AUTO-MCNC: NEGATIVE MG/DL
HCT VFR BLD AUTO: 38.3 % (ref 35–45)
HGB BLD-MCNC: 12.1 G/DL (ref 12–16)
HGB UR QL STRIP: NEGATIVE
KETONES UR QL STRIP.AUTO: NEGATIVE MG/DL
LEUKOCYTE ESTERASE UR QL STRIP.AUTO: ABNORMAL
LYMPHOCYTES # BLD: 3.4 K/UL (ref 0.9–3.6)
LYMPHOCYTES NFR BLD: 42 % (ref 21–52)
MCH RBC QN AUTO: 23.4 PG (ref 24–34)
MCHC RBC AUTO-ENTMCNC: 31.6 G/DL (ref 31–37)
MCV RBC AUTO: 74.2 FL (ref 74–97)
MONOCYTES # BLD: 0.5 K/UL (ref 0.05–1.2)
MONOCYTES NFR BLD: 6 % (ref 3–10)
NEUTS SEG # BLD: 4.2 K/UL (ref 1.8–8)
NEUTS SEG NFR BLD: 52 % (ref 40–73)
NITRITE UR QL STRIP.AUTO: NEGATIVE
PH UR STRIP: 5.5 [PH] (ref 5–8)
PLATELET # BLD AUTO: 272 K/UL (ref 135–420)
PMV BLD AUTO: 10.8 FL (ref 9.2–11.8)
POTASSIUM SERPL-SCNC: 3.9 MMOL/L (ref 3.5–5.5)
PROT SERPL-MCNC: 7.8 G/DL (ref 6.4–8.2)
PROT UR STRIP-MCNC: NEGATIVE MG/DL
RBC # BLD AUTO: 5.16 M/UL (ref 4.2–5.3)
RBC #/AREA URNS HPF: NORMAL /HPF (ref 0–5)
SODIUM SERPL-SCNC: 140 MMOL/L (ref 136–145)
SP GR UR REFRACTOMETRY: 1.02 (ref 1–1.03)
UROBILINOGEN UR QL STRIP.AUTO: 0.2 EU/DL (ref 0.2–1)
WBC # BLD AUTO: 8 K/UL (ref 4.6–13.2)
WBC URNS QL MICRO: NORMAL /HPF (ref 0–4)

## 2019-01-11 PROCEDURE — 85025 COMPLETE CBC W/AUTO DIFF WBC: CPT

## 2019-01-11 PROCEDURE — 74176 CT ABD & PELVIS W/O CONTRAST: CPT

## 2019-01-11 PROCEDURE — 99283 EMERGENCY DEPT VISIT LOW MDM: CPT

## 2019-01-11 PROCEDURE — 80053 COMPREHEN METABOLIC PANEL: CPT

## 2019-01-11 PROCEDURE — 81001 URINALYSIS AUTO W/SCOPE: CPT

## 2019-01-11 RX ORDER — PHENAZOPYRIDINE HYDROCHLORIDE 200 MG/1
200 TABLET, FILM COATED ORAL 3 TIMES DAILY
Qty: 6 TAB | Refills: 0 | Status: SHIPPED | OUTPATIENT
Start: 2019-01-11 | End: 2019-01-13

## 2019-01-11 RX ORDER — SULFAMETHOXAZOLE AND TRIMETHOPRIM 800; 160 MG/1; MG/1
1 TABLET ORAL 2 TIMES DAILY
Qty: 6 TAB | Refills: 0 | Status: SHIPPED | OUTPATIENT
Start: 2019-01-11 | End: 2019-01-14

## 2019-01-11 NOTE — TELEPHONE ENCOUNTER
Pt calling asking to speak to Dr. Francis Quevedo nurse, says she has a pain in her lower left side for several days and today it is even worse. Wants to know if Dr. Francis Quevedo will see her today. Advised no openings today but says she can't stand it any longer and wants to know what to do. Please call her and advise.

## 2019-01-11 NOTE — TELEPHONE ENCOUNTER
Patient recommended to go the ED/urgent care for evaluation, rule out UTI, and/or hernia. C/w severe pain in inguinal area and lower abdominal pain. Please assist with follow up visit after ED visit. Contact pt on Monday to schedule.  Thanks

## 2019-01-12 NOTE — DISCHARGE INSTRUCTIONS
Patient Education        High-Fiber Diet: Care Instructions  Your Care Instructions    A high-fiber diet may help you relieve constipation and feel less bloated. Your doctor and dietitian will help you make a high-fiber eating plan based on your personal needs. The plan will include the things you like to eat. It will also make sure that you get 30 grams of fiber a day. Before you make changes to the way you eat, be sure to talk with your doctor or dietitian. Follow-up care is a key part of your treatment and safety. Be sure to make and go to all appointments, and call your doctor if you are having problems. It's also a good idea to know your test results and keep a list of the medicines you take. How can you care for yourself at home? · You can increase how much fiber you get if you eat more of certain foods. These foods include:  ? Whole-grain breads and cereals. ? Fruits, such as pears, apples, and peaches. Eat the skins, peels, and seeds, if you can.  ? Vegetables, such as broccoli, cabbage, spinach, carrots, asparagus, and squash. ? Starchy vegetables. These include potatoes with skins, kidney beans, and lima beans. · Take a fiber supplement every day if your doctor recommends it. Examples are Benefiber, Citrucel, FiberCon, and Metamucil. Ask your doctor how much to take. · Drink plenty of fluids, enough so that your urine is light yellow or clear like water. If you have kidney, heart, or liver disease and have to limit fluids, talk with your doctor before you increase the amount of fluids you drink. · Get some exercise every day. Exercise helps stool move through the colon. It also helps prevent constipation. · Keep a food diary. Try to notice and write down what foods cause gas, pain, or other symptoms. Then you can avoid these foods. Where can you learn more? Go to http://marilynn-maciej.info/.   Enter N287 in the search box to learn more about \"High-Fiber Diet: Care Instructions. \"  Current as of: March 29, 2018  Content Version: 11.8  © 5073-9766 [employer]. Care instructions adapted under license by [employer] (which disclaims liability or warranty for this information). If you have questions about a medical condition or this instruction, always ask your healthcare professional. [address] any warranty or liability for your use of this information. Patient Education        Urinary Tract Infection in Women: Care Instructions  Your Care Instructions    A urinary tract infection, or UTI, is a general term for an infection anywhere between the kidneys and the urethra (where urine comes out). Most UTIs are bladder infections. They often cause pain or burning when you urinate. UTIs are caused by bacteria and can be cured with antibiotics. Be sure to complete your treatment so that the infection goes away. Follow-up care is a key part of your treatment and safety. Be sure to make and go to all appointments, and call your doctor if you are having problems. It's also a good idea to know your test results and keep a list of the medicines you take. How can you care for yourself at home? · Take your antibiotics as directed. Do not stop taking them just because you feel better. You need to take the full course of antibiotics. · Drink extra water and other fluids for the next day or two. This may help wash out the bacteria that are causing the infection. (If you have kidney, heart, or liver disease and have to limit fluids, talk with your doctor before you increase your fluid intake.)  · Avoid drinks that are carbonated or have caffeine. They can irritate the bladder. · Urinate often. Try to empty your bladder each time. · To relieve pain, take a hot bath or lay a heating pad set on low over your lower belly or genital area. Never go to sleep with a heating pad in place. To prevent UTIs  · Drink plenty of water each day.  This helps you urinate often, which clears bacteria from your system. (If you have kidney, heart, or liver disease and have to limit fluids, talk with your doctor before you increase your fluid intake.)  · Urinate when you need to. · Urinate right after you have sex. · Change sanitary pads often. · Avoid douches, bubble baths, feminine hygiene sprays, and other feminine hygiene products that have deodorants. · After going to the bathroom, wipe from front to back. When should you call for help? Call your doctor now or seek immediate medical care if:    · Symptoms such as fever, chills, nausea, or vomiting get worse or appear for the first time.     · You have new pain in your back just below your rib cage. This is called flank pain.     · There is new blood or pus in your urine.     · You have any problems with your antibiotic medicine.    Watch closely for changes in your health, and be sure to contact your doctor if:    · You are not getting better after taking an antibiotic for 2 days.     · Your symptoms go away but then come back. Where can you learn more? Go to http://marilynn-maciej.info/. Enter S809 in the search box to learn more about \"Urinary Tract Infection in Women: Care Instructions. \"  Current as of: March 21, 2018  Content Version: 11.8  © 9878-7204 [employer]. Care instructions adapted under license by [employer] (which disclaims liability or warranty for this information). If you have questions about a medical condition or this instruction, always ask your healthcare professional. [address] any warranty or liability for your use of this information.

## 2019-01-12 NOTE — ED PROVIDER NOTES
EMERGENCY DEPARTMENT HISTORY AND PHYSICAL EXAM 
 
8:38 PM 
 
 
Date: 1/11/2019 Patient Name: Marcelino Dong History of Presenting Illness Chief Complaint Patient presents with  Urinary Frequency  (LUTS) Lower Urinary Tract Symptoms  Back Pain History Provided By: Patient and Patient's Daughter Chief Complaint: urinary frequency Duration:  1 week Timing:  Acute and Worsening Location: urinary Quality: n/a Severity: Moderate Modifying Factors: n/a Associated Symptoms: left flank and groin pain, \"hot flashes\", constipation Additional History (Context): Marcelino Dong is a 77 y.o. female with hypertension who presents with acute, worsening, moderate urinary frequency which began 1 week PTA and is associated with left flank and groin pain which radiates across the abdomen. Pt denies Hx of diabetes, kidney stones, Afib, smoking, fever, or N/V/D. Pt reports nml appetite. Her pain was bothering her so much that she contacted her orthopedic for an injection thinking that her back was the source of her Sx, though it did not help and the pain is worsening. Her daughter expresses concern for how constipated the pt has been, who reports having a BM today PTA, as well has having prior \"impactions\" from \"not drinking enough fluid\". No other concerns or Sx at this time. PCP: Eliot Villarreal MD 
 
Current Outpatient Medications Medication Sig Dispense Refill  trimethoprim-sulfamethoxazole (BACTRIM DS) 160-800 mg per tablet Take 1 Tab by mouth two (2) times a day for 3 days. 6 Tab 0  phenazopyridine (PYRIDIUM) 200 mg tablet Take 1 Tab by mouth three (3) times daily for 2 days. 6 Tab 0  
 rosuvastatin (CRESTOR) 10 mg tablet Take 1 Tab by mouth nightly. 90 Tab 1  
 losartan (COZAAR) 25 mg tablet Take  by mouth daily.  anastrozole (ARIMIDEX) 1 mg tablet Take 1 mg by mouth daily.  celecoxib (CELEBREX) 200 mg capsule Take  by mouth two (2) times a day.  cyclobenzaprine (FLEXERIL) 5 mg tablet Take 5 mg by mouth.  multivitamin (ONE A DAY) tablet Take 1 Tab by mouth daily.  cholecalciferol (VITAMIN D3) 1,000 unit cap Take  by mouth daily.  vit C/E/Zn/coppr/lutein/zeaxan (PRESERVISION AREDS-2 PO) Take  by mouth.  menthol (BIOFREEZE, MENTHOL,) 4 % gel by Apply Externally route.  solifenacin (VESICARE) 5 mg tablet Take 5 mg by mouth daily.  multivitamin/folic acid/biotin (HAIR-SKIN-NAILS, MV-FA-BIOTIN, PO) Take  by mouth.  ANASTROZOLE PO Take  by mouth.  losartan potassium (COZAAR PO) Take  by mouth.  celecoxib (CELEBREX PO) Take  by mouth.  homeopathic drugs (HAIR AND SKIN PO) Take  by mouth.  folic acid/multivit-min/lutein (CENTRUM SILVER PO) Take  by mouth.  cyclobenzaprine (FLEXERIL) 10 mg tablet Take  by mouth three (3) times daily as needed for Muscle Spasm(s).  vit C/vit E ac/lut/copper/zinc (PRESERVISION LUTEIN PO) Take  by mouth.  cyclobenzaprine (FLEXERIL) 5 mg tablet Take 1 Tab by mouth three (3) times daily as needed for Muscle Spasm(s). 20 Tab 0  cholecalciferol, vitamin D3, (VITAMIN D3) 2,000 unit tab Take  by mouth.  acetaminophen-codeine (TYLENOL-CODEINE #3) 300-30 mg per tablet Take 1 Tab by mouth every four (4) hours as needed for Pain. Max Daily Amount: 6 Tabs. 20 Tab 0 Past History Past Medical History: 
Past Medical History:  
Diagnosis Date  Arthritis  Breast cancer ([facility] [address]  Cancer [facility]) 2015  
 breast cancer  Hypertension  Macular degeneration  Vitamin D deficiency Past Surgical History: 
Past Surgical History:  
Procedure Laterality Date  BREAST SURGERY PROCEDURE UNLISTED Right 2015  HX BREAST LUMPECTOMY Right [address] HYSTERECTOMY  1985 Family History: 
Family History Problem Relation Age of Onset  Cancer Mother stomach and liver cancer, in her 62s  Heart Disease Father MI age 46s  Cancer Sister   
     blood  Cancer Brother   
     leukemia  Cancer Sibling Social History: 
Social History Tobacco Use  Smoking status: Never Smoker  Smokeless tobacco: Never Used Substance Use Topics  Alcohol use: No  
  Frequency: Never  Drug use: No  
 
 
Allergies: Allergies Allergen Reactions  Codeine Other (comments)  Tramadol Unknown (comments) Review of Systems Review of Systems Constitutional: Negative. Negative for chills, diaphoresis and fever.  
     (+) \"hot flashes\" HENT: Negative. Negative for congestion, rhinorrhea and sore throat. Eyes: Negative. Negative for pain, discharge and redness. Respiratory: Negative. Negative for cough, chest tightness, shortness of breath and wheezing. Cardiovascular: Negative. Negative for chest pain. Gastrointestinal: Positive for constipation. Negative for abdominal pain, diarrhea, nausea and vomiting. Genitourinary: Positive for flank pain (radiating to left groin and across abdomen) and frequency. Negative for dysuria, hematuria and urgency. Musculoskeletal: Negative for back pain and neck pain. Skin: Negative. Negative for rash. Neurological: Negative. Negative for syncope, weakness, numbness and headaches. Psychiatric/Behavioral: Negative. All other systems reviewed and are negative. Physical Exam  
 
Visit Vitals BP (!) 149/94 (BP 1 Location: Left arm, BP Patient Position: At rest) Pulse 93 Temp 98.5 °F (36.9 °C) Resp 18 Ht 5' 4\" (1.626 m) Wt 63 kg (139 lb) SpO2 100% BMI 23.86 kg/m² Physical Exam  
Constitutional: She appears well-developed and well-nourished. Non-toxic appearance. She does not have a sickly appearance. She does not appear ill. No distress. HENT:  
Head: Normocephalic and atraumatic. Mouth/Throat: Oropharynx is clear and moist. No oropharyngeal exudate. Eyes: Conjunctivae and EOM are normal. Pupils are equal, round, and reactive to light. No scleral icterus. Neck: Normal range of motion. Neck supple. No hepatojugular reflux and no JVD present. No tracheal deviation present. No thyromegaly present. Cardiovascular: Normal rate, regular rhythm, S1 normal, S2 normal, normal heart sounds, intact distal pulses and normal pulses. Exam reveals no gallop, no S3 and no S4. No murmur heard. Pulses: 
     Radial pulses are 2+ on the right side, and 2+ on the left side. Dorsalis pedis pulses are 2+ on the right side, and 2+ on the left side. Pulmonary/Chest: Effort normal and breath sounds normal. No respiratory distress. She has no decreased breath sounds. She has no wheezes. She has no rhonchi. She has no rales. Abdominal: Soft. Normal appearance and bowel sounds are normal. She exhibits no distension and no mass. There is no hepatosplenomegaly. There is tenderness in the suprapubic area. There is no rigidity, no rebound, no guarding, no CVA tenderness, no tenderness at McBurney's point and negative Saul's sign. Musculoskeletal: Normal range of motion. Strength 5/5 throughout Lymphadenopathy:  
     Head (right side): No submental, no submandibular, no preauricular and no occipital adenopathy present. Head (left side): No submental, no submandibular, no preauricular and no occipital adenopathy present. She has no cervical adenopathy. Right: No supraclavicular adenopathy present. Left: No supraclavicular adenopathy present. Neurological: She is alert. She has normal strength and normal reflexes. She is not disoriented. No cranial nerve deficit or sensory deficit. Coordination and gait normal. GCS eye subscore is 4. GCS verbal subscore is 5. GCS motor subscore is 6. Grossly intact Skin: Skin is warm, dry and intact. No rash noted. She is not diaphoretic. Psychiatric: She has a normal mood and affect. Her speech is normal and behavior is normal. Judgment and thought content normal. Cognition and memory are normal.  
Nursing note and vitals reviewed. Diagnostic Study Results Labs - Recent Results (from the past 12 hour(s)) CBC WITH AUTOMATED DIFF Collection Time: 01/11/19  8:42 PM  
Result Value Ref Range WBC 8.0 4.6 - 13.2 K/uL  
 RBC 5.16 4.20 - 5.30 M/uL  
 HGB 12.1 12.0 - 16.0 g/dL HCT 38.3 35.0 - 45.0 % MCV 74.2 74.0 - 97.0 FL  
 MCH 23.4 (L) 24.0 - 34.0 PG  
 MCHC 31.6 31.0 - 37.0 g/dL  
 RDW 16.0 (H) 11.6 - 14.5 % PLATELET 219 022 - 238 K/uL MPV 10.8 9.2 - 11.8 FL  
 NEUTROPHILS 52 40 - 73 % LYMPHOCYTES 42 21 - 52 % MONOCYTES 6 3 - 10 % EOSINOPHILS 0 0 - 5 % BASOPHILS 0 0 - 2 %  
 ABS. NEUTROPHILS 4.2 1.8 - 8.0 K/UL  
 ABS. LYMPHOCYTES 3.4 0.9 - 3.6 K/UL  
 ABS. MONOCYTES 0.5 0.05 - 1.2 K/UL  
 ABS. EOSINOPHILS 0.0 0.0 - 0.4 K/UL  
 ABS. BASOPHILS 0.0 0.0 - 0.1 K/UL  
 DF AUTOMATED METABOLIC PANEL, COMPREHENSIVE Collection Time: 01/11/19  8:42 PM  
Result Value Ref Range Sodium 140 136 - 145 mmol/L Potassium 3.9 3.5 - 5.5 mmol/L Chloride 101 100 - 108 mmol/L  
 CO2 29 21 - 32 mmol/L Anion gap 10 3.0 - 18 mmol/L Glucose 108 (H) 74 - 99 mg/dL BUN 15 7.0 - 18 MG/DL Creatinine 1.00 0.6 - 1.3 MG/DL  
 BUN/Creatinine ratio 15 12 - 20 GFR est AA >60 >60 ml/min/1.73m2 GFR est non-AA 53 (L) >60 ml/min/1.73m2 Calcium 9.9 8.5 - 10.1 MG/DL Bilirubin, total 0.7 0.2 - 1.0 MG/DL  
 ALT (SGPT) 25 13 - 56 U/L  
 AST (SGOT) 17 15 - 37 U/L Alk. phosphatase 89 45 - 117 U/L Protein, total 7.8 6.4 - 8.2 g/dL Albumin 4.0 3.4 - 5.0 g/dL Globulin 3.8 2.0 - 4.0 g/dL A-G Ratio 1.1 0.8 - 1.7 URINALYSIS W/ RFLX MICROSCOPIC Collection Time: 01/11/19  8:42 PM  
Result Value Ref Range Color YELLOW Appearance CLEAR Specific gravity 1.022 1.005 - 1.030    
 pH (UA) 5.5 5.0 - 8.0 Protein NEGATIVE  NEG mg/dL Glucose NEGATIVE  NEG mg/dL Ketone NEGATIVE  NEG mg/dL Bilirubin NEGATIVE  NEG Blood NEGATIVE  NEG Urobilinogen 0.2 0.2 - 1.0 EU/dL Nitrites NEGATIVE  NEG Leukocyte Esterase MODERATE (A) NEG URINE MICROSCOPIC ONLY Collection Time: 01/11/19  8:42 PM  
Result Value Ref Range WBC 4 to 10 0 - 4 /hpf  
 RBC 0 to 3 0 - 5 /hpf Radiologic Studies -  
CT ABD PELV WO CONT Final Result IMPRESSION:   
 4 x 5 mm pulmonary nodule in the left lower lobe and additional mild  
groundglass changes. Medical Decision Making Provider Notes (Medical Decision Making): MDM Number of Diagnoses or Management Options Acute cystitis without hematuria:  
 
 
I am the first provider for this patient. I reviewed the vital signs, available nursing notes, past medical history, past surgical history, family history and social history. Vital Signs-Reviewed the patient's vital signs. Records Reviewed: Nursing Notes and Old Medical Records (Time of Review: 8:38 PM) ED Course: Progress Notes, Reevaluation, and Consults: 
 
8:38 PM 1/11/2019 Labs essentially normal. UA shows moderate amount of leukocytes. CT shows: 
Limitations: The evaluation of the solid organs is limited due to the lack of IV 
contrast. 
  
Lower Chest: In the left lower lobe, there is a 4 x 5 mm pulmonary nodule. Sagittal nodular changes are noted. The heart and pericardium are unremarkable. 
  
Peritoneum: No free air appreciated. No free fluid present. No fluid collections 
present. 
  
Liver: No focal lesion appreciated. 
  
Biliary/Gallbladder: No intrahepatic or extrahepatic biliary ductal dilatation 
appreciated. The gallbladder is unremarkable. No pericholecystic fluid or 
inflammation. 
  
Spleen: Unremarkable. 
  
Pancreas: No focal lesion appreciated.  The pancreatic duct is unremarkable. No 
peripancreatic inflammation or adenopathy. 
  
: The adrenal glands are unremarkable. No urolithiasis. No perinephric fat 
stranding appreciated. No hydroureteronephrosis seen. No acute pathology in the 
bladder. 
  
GI: The stomach is unremarkable. The small bowel is without evidence of 
obstruction. No small bowel wall thickening. The mesentery is without 
inflammation or adenopathy. The appendix is unremarkable. No pericolonic 
inflammation or wall thickening appreciated. 
  
Aorta and retroperitoneum: No aortic aneurysm seen. No periaortic adenopathy 
seen. No fluid collections in the retroperitoneum appreciated. 
  
Abdominal wall/Inguinal: Unremarkable  
  
Musculoskeletal: Multilevel degenerative disc disease and facet arthropathy are 
noted. A loose body is noted in the left hip. 
  
IMPRESSION IMPRESSION:  
 4 x 5 mm pulmonary nodule in the left lower lobe and additional mild 
groundglass changes. 
  
Diagnosis I have reassessed the patient. Patient is feeling better. Patient will be prescribed Bactrim and Pyridium. Patient was discharged in stable condition. Patient is to return to emergency department if any new or worsening condition. Clinical Impression: 1. Acute cystitis without hematuria Disposition: discharge Follow-up Information Follow up With Specialties Details Why Contact Info Alida Martínez MD Internal Medicine Schedule an appointment as soon as possible for a visit in 2 days For Emergency Department follow up [facility] [address] [facility] 
[phone] [address] [facility] EMERGENCY DEPT Emergency Medicine Go to As needed, If symptoms worsen  Cindy Valladares [id number] 
[phone]  
  
  
 
_______________________________ Attestations: 
Scribe Attestation [employer] acting as a scribe for and in the presence of [address] [place], [place] [address]Pierre DO     
January 11, 2019 at 8:38 PM 
    
 Provider Attestation:     
I personally performed the services described in the documentation, reviewed the documentation, as recorded by the scribe in my presence, and it accurately and completely records my words and actions. January 11, 2019 at 8:38 PM - Murray Campbell DO   
_______________________________

## 2019-01-12 NOTE — ED TRIAGE NOTES
Pt. Reports lower back pain moving into her groin and reports increased frequency with urination and a squeezing feeling after voiding. She also reports a recent injection in the left lower back at ortho. She states these symptoms have occurred for about a week.

## 2019-01-12 NOTE — ED NOTES
I performed a brief evaluation, including history and physical, of the patient here in triage and I have determined that pt will need further treatment and evaluation from the main side ER physician. I have placed initial orders to help in expediting patients care. January 11, 2019 at 8:21 PM - Sony Cook

## 2019-01-14 ENCOUNTER — TELEPHONE (OUTPATIENT)
Dept: INTERNAL MEDICINE CLINIC | Age: 79
End: 2019-01-14

## 2019-01-16 ENCOUNTER — OFFICE VISIT (OUTPATIENT)
Dept: INTERNAL MEDICINE CLINIC | Age: 79
End: 2019-01-16

## 2019-01-16 ENCOUNTER — HOSPITAL ENCOUNTER (OUTPATIENT)
Dept: LAB | Age: 79
Discharge: HOME | End: 2019-01-16
Payer: MEDICARE

## 2019-01-16 VITALS
SYSTOLIC BLOOD PRESSURE: 142 MMHG | DIASTOLIC BLOOD PRESSURE: 90 MMHG | RESPIRATION RATE: 18 BRPM | OXYGEN SATURATION: 97 % | WEIGHT: 139 LBS | HEIGHT: 64 IN | BODY MASS INDEX: 23.73 KG/M2 | TEMPERATURE: 98.2 F | HEART RATE: 76 BPM

## 2019-01-16 DIAGNOSIS — M54.50 LEFT-SIDED LOW BACK PAIN WITHOUT SCIATICA, UNSPECIFIED CHRONICITY: ICD-10-CM

## 2019-01-16 DIAGNOSIS — N39.0 URINARY TRACT INFECTION WITHOUT HEMATURIA, SITE UNSPECIFIED: Primary | ICD-10-CM

## 2019-01-16 DIAGNOSIS — E78.5 HYPERLIPIDEMIA, UNSPECIFIED HYPERLIPIDEMIA TYPE: ICD-10-CM

## 2019-01-16 DIAGNOSIS — N32.81 OVERACTIVE BLADDER: ICD-10-CM

## 2019-01-16 DIAGNOSIS — N39.0 URINARY TRACT INFECTION WITHOUT HEMATURIA, SITE UNSPECIFIED: ICD-10-CM

## 2019-01-16 LAB
APPEARANCE UR: CLEAR
BACTERIA URNS QL MICRO: ABNORMAL /HPF
BILIRUB UR QL: ABNORMAL
COLOR UR: ABNORMAL
EPITH CASTS URNS QL MICRO: ABNORMAL /LPF (ref 0–5)
GLUCOSE UR STRIP.AUTO-MCNC: NEGATIVE MG/DL
HGB UR QL STRIP: NEGATIVE
KETONES UR QL STRIP.AUTO: NEGATIVE MG/DL
LEUKOCYTE ESTERASE UR QL STRIP.AUTO: ABNORMAL
NITRITE UR QL STRIP.AUTO: NEGATIVE
PH UR STRIP: 5 [PH] (ref 5–8)
PROT UR STRIP-MCNC: NEGATIVE MG/DL
RBC #/AREA URNS HPF: 0 /HPF (ref 0–5)
SP GR UR REFRACTOMETRY: 1.02 (ref 1–1.03)
UROBILINOGEN UR QL STRIP.AUTO: 1 EU/DL (ref 0.2–1)
WBC URNS QL MICRO: ABNORMAL /HPF (ref 0–4)

## 2019-01-16 PROCEDURE — 81001 URINALYSIS AUTO W/SCOPE: CPT

## 2019-01-16 PROCEDURE — 87086 URINE CULTURE/COLONY COUNT: CPT

## 2019-01-16 RX ORDER — LEVOFLOXACIN 500 MG/1
500 TABLET, FILM COATED ORAL DAILY
Qty: 7 TAB | Refills: 0 | Status: SHIPPED | OUTPATIENT
Start: 2019-01-16 | End: 2019-01-18 | Stop reason: ALTCHOICE

## 2019-01-16 RX ORDER — HYDROCODONE BITARTRATE AND ACETAMINOPHEN 5; 325 MG/1; MG/1
TABLET ORAL
Qty: 14 TAB | Refills: 0 | Status: SHIPPED | OUTPATIENT
Start: 2019-01-16 | End: 2019-01-30

## 2019-01-16 NOTE — PROGRESS NOTES
HPI     Jayme Guidry is a 77 y.o. female with relevant past medical history of HTN, back pain with sciatica, osteopenia, OA, chronic fatigue, B12 def anemia, OAB, macular degeneration, cataracts, h/o breast CA (Dx 2015) s/p lumpectomy, no XRT or chemotherapy, except anastrazole since 2015. Recently moved from [place], here to establish care. Patient reports chronic back pain, worse in her lumbar area, with sciatica pain mostly R leg. Has been seeing Dr. Antonio Toribio, who she says, has given her 2 injection for pain, last one yesterday, with response to improve pain. She also takes celebrex and flexeril PRN she says. She denies any h/o injuries or falls. Ambulatory. No need for assistance with ADLs. Denies h/o Tob, alcohol or illicit drugs. History of B12 def anemia, which she attributes chronic fatigue that improves with B12 injections. Taking MVI and calcium supplements for h/o osteopenia. Reports some pain in TMJ BL, associated with left temporal HA intermittent, and occasional snap sensation on R side. Following with ophthalmology for h/o macular degeneration and cataracts. ROS  As above included in HPI. Otherwise 11 point review of systems negative including constitutional, skin, HENT, eyes, respiratory, cardiovascular, gastrointestinal, genitourinary, musculoskeletal, endocrine, hematologic, allergy, and neurologic.     Past Medical History  Past Medical History:   Diagnosis Date    Arthritis     [facility] ([facility]     Cancer ([facility] [facility] 2015    breast cancer    Hypertension     Macular degeneration     Vitamin D deficiency      Past Surgical History:   Procedure Laterality Date    BREAST SURGERY PROCEDURE UNLISTED Right 2015    HX BREAST LUMPECTOMY Right     HX HERNIA REPAIR      HX HYSTERECTOMY  1985        Family History  Family History   Problem Relation Age of Onset    Cancer Mother         stomach and liver cancer, in her 62s    Heart Disease Father         MI age 46s   [facility] Cancer Sister blood    Cancer Brother         leukemia    Cancer Sibling        Social History  She  reports that  has never smoked. she has never used smokeless tobacco.   Social History     Substance and Sexual Activity   Alcohol Use No    Frequency: Never       Immunization History    There is no immunization history on file for this patient. Allergies  No Known Allergies    Medications  Current Outpatient Medications   Medication Sig    levoFLOXacin (LEVAQUIN) 500 mg tablet Take 1 Tab by mouth daily for 7 days.  HYDROcodone-acetaminophen (NORCO) 5-325 mg per tablet Take 1 Tab by mouth every twelve (12) hours as needed for Pain for 7 days, THEN 1 Tab every twelve (12) hours as needed for up to 7 days. Max Daily Amount: 2 Tabs.  rosuvastatin (CRESTOR) 10 mg tablet Take 1 Tab by mouth nightly.  celecoxib (CELEBREX) 200 mg capsule Take  by mouth two (2) times a day.  cyclobenzaprine (FLEXERIL) 5 mg tablet Take 5 mg by mouth.  multivitamin (ONE A DAY) tablet Take 1 Tab by mouth daily.  cholecalciferol (VITAMIN D3) 1,000 unit cap Take  by mouth daily.  vit C/E/Zn/coppr/lutein/zeaxan (PRESERVISION AREDS-2 PO) Take  by mouth.  multivitamin/folic acid/biotin (HAIR-SKIN-NAILS, MV-FA-BIOTIN, PO) Take  by mouth.  losartan potassium (COZAAR PO) Take  by mouth.  cholecalciferol, vitamin D3, (VITAMIN D3) 2,000 unit tab Take  by mouth.  homeopathic drugs (HAIR AND SKIN PO) Take  by mouth.  folic acid/multivit-min/lutein (CENTRUM SILVER PO) Take  by mouth.  cyclobenzaprine (FLEXERIL) 10 mg tablet Take  by mouth three (3) times daily as needed for Muscle Spasm(s).  vit C/vit E ac/lut/copper/zinc (PRESERVISION LUTEIN PO) Take  by mouth.  cyclobenzaprine (FLEXERIL) 5 mg tablet Take 1 Tab by mouth three (3) times daily as needed for Muscle Spasm(s).  losartan (COZAAR) 25 mg tablet Take  by mouth daily.  anastrozole (ARIMIDEX) 1 mg tablet Take 1 mg by mouth daily.     menthol (BIOFREEZE, MENTHOL,) 4 % gel by Apply Externally route.  solifenacin (VESICARE) 5 mg tablet Take 5 mg by mouth daily.  ANASTROZOLE PO Take  by mouth.  celecoxib (CELEBREX PO) Take  by mouth.  acetaminophen-codeine (TYLENOL-CODEINE #3) 300-30 mg per tablet Take 1 Tab by mouth every four (4) hours as needed for Pain. Max Daily Amount: 6 Tabs. No current facility-administered medications for this visit. Visit Vitals  /90 (BP 1 Location: Left arm, BP Patient Position: Sitting)   Pulse 76   Temp 98.2 °F (36.8 °C) (Oral)   Resp 18   Ht 5' 4\" (1.626 m)   Wt 139 lb (63 kg)   SpO2 97%   BMI 23.86 kg/m²     Body mass index is 23.86 kg/m². Physical Exam   Constitutional: She is oriented to person, place, and time and well-developed, well-nourished, and in no distress. HENT:   Head: Normocephalic and atraumatic. Right Ear: External ear normal.   Left Ear: External ear normal.   Nose: Nose normal.   Mouth/Throat: Oropharynx is clear and moist.   Eyes: EOM are normal. Pupils are equal, round, and reactive to light. BL cataracts   Neck: Normal range of motion. Neck supple. Cardiovascular: Normal rate, regular rhythm, normal heart sounds and intact distal pulses. Pulmonary/Chest: Effort normal and breath sounds normal. No respiratory distress. Abdominal: Soft. Bowel sounds are normal.   Musculoskeletal: Normal range of motion. She exhibits no edema. Neurological: She is alert and oriented to person, place, and time. Skin: Skin is warm. No rash noted. No erythema. No pallor.    Psychiatric: Mood and affect normal.         REVIEW OF DATA    Labs  Admission on 01/11/2019, Discharged on 01/11/2019   Component Date Value Ref Range Status    WBC 01/11/2019 8.0  4.6 - 13.2 K/uL Final    RBC 01/11/2019 5.16  4.20 - 5.30 M/uL Final    HGB 01/11/2019 12.1  12.0 - 16.0 g/dL Final    HCT 01/11/2019 38.3  35.0 - 45.0 % Final    MCV 01/11/2019 74.2  74.0 - 97.0 FL Final    MCH 01/11/2019 23.4* 24.0 - 34.0 PG Final    MCHC 01/11/2019 31.6  31.0 - 37.0 g/dL Final    RDW 01/11/2019 16.0* 11.6 - 14.5 % Final    PLATELET 02/19/5188 245  135 - 420 K/uL Final    MPV 01/11/2019 10.8  9.2 - 11.8 FL Final    NEUTROPHILS 01/11/2019 52  40 - 73 % Final    LYMPHOCYTES 01/11/2019 42  21 - 52 % Final    MONOCYTES 01/11/2019 6  3 - 10 % Final    EOSINOPHILS 01/11/2019 0  0 - 5 % Final    BASOPHILS 01/11/2019 0  0 - 2 % Final    ABS. NEUTROPHILS 01/11/2019 4.2  1.8 - 8.0 K/UL Final    ABS. LYMPHOCYTES 01/11/2019 3.4  0.9 - 3.6 K/UL Final    ABS. MONOCYTES 01/11/2019 0.5  0.05 - 1.2 K/UL Final    ABS. EOSINOPHILS 01/11/2019 0.0  0.0 - 0.4 K/UL Final    ABS. BASOPHILS 01/11/2019 0.0  0.0 - 0.1 K/UL Final    DF 01/11/2019 AUTOMATED    Final    Sodium 01/11/2019 140  136 - 145 mmol/L Final    Potassium 01/11/2019 3.9  3.5 - 5.5 mmol/L Final    Chloride 01/11/2019 101  100 - 108 mmol/L Final    CO2 01/11/2019 29  21 - 32 mmol/L Final    Anion gap 01/11/2019 10  3.0 - 18 mmol/L Final    Glucose 01/11/2019 108* 74 - 99 mg/dL Final    BUN 01/11/2019 15  7.0 - 18 MG/DL Final    Creatinine 01/11/2019 1.00  0.6 - 1.3 MG/DL Final    BUN/Creatinine ratio 01/11/2019 15  12 - 20   Final    GFR est AA 01/11/2019 >60  >60 ml/min/1.73m2 Final    GFR est non-AA 01/11/2019 53* >60 ml/min/1.73m2 Final    Calcium 01/11/2019 9.9  8.5 - 10.1 MG/DL Final    Bilirubin, total 01/11/2019 0.7  0.2 - 1.0 MG/DL Final    ALT (SGPT) 01/11/2019 25  13 - 56 U/L Final    AST (SGOT) 01/11/2019 17  15 - 37 U/L Final    Alk.  phosphatase 01/11/2019 89  45 - 117 U/L Final    Protein, total 01/11/2019 7.8  6.4 - 8.2 g/dL Final    Albumin 01/11/2019 4.0  3.4 - 5.0 g/dL Final    Globulin 01/11/2019 3.8  2.0 - 4.0 g/dL Final    A-G Ratio 01/11/2019 1.1  0.8 - 1.7   Final    Color 01/11/2019 YELLOW    Final    Appearance 01/11/2019 CLEAR    Final    Specific gravity 01/11/2019 1.022  1.005 - 1.030   Final    pH (UA) 01/11/2019 5.5  5.0 - 8.0   Final    Protein 01/11/2019 NEGATIVE   NEG mg/dL Final    Glucose 01/11/2019 NEGATIVE   NEG mg/dL Final    Ketone 01/11/2019 NEGATIVE   NEG mg/dL Final    Bilirubin 01/11/2019 NEGATIVE   NEG   Final    Blood 01/11/2019 NEGATIVE   NEG   Final    Urobilinogen 01/11/2019 0.2  0.2 - 1.0 EU/dL Final    Nitrites 01/11/2019 NEGATIVE   NEG   Final    Leukocyte Esterase 01/11/2019 MODERATE* NEG   Final    WBC 01/11/2019 4 to 10  0 - 4 /hpf Final    RBC 01/11/2019 0 to 3  0 - 5 /hpf Final         CT Results (most recent):  Results from Hospital Encounter encounter on 01/11/19   CT ABD PELV WO CONT    Narrative EXAM: CT of the Abdomen and Pelvis without IV contrast    CLINICAL INDICATION: Low back pain and groin pain    TECHNIQUE: CT of the abdomen and pelvis. Sagittal and coronal reformations    All CT scans at this facility are performed using dose optimization technique as  appropriate to a performed exam, to include automated exposure control,  adjustment of the mA and/or kV according to patient size (including appropriate  matching for site specific examination) or use of iterative reconstruction  technique. IV CONTRAST: None    ENTERIC CONTRAST: None    COMPARISON: None    FINDINGS:   Limitations: The evaluation of the solid organs is limited due to the lack of IV  contrast.    Lower Chest: In the left lower lobe, there is a 4 x 5 mm pulmonary nodule. Sagittal nodular changes are noted. The heart and pericardium are unremarkable. Peritoneum: No free air appreciated. No free fluid present. No fluid collections  present. Liver: No focal lesion appreciated. Biliary/Gallbladder: No intrahepatic or extrahepatic biliary ductal dilatation  appreciated. The gallbladder is unremarkable. No pericholecystic fluid or  inflammation. Spleen: Unremarkable. Pancreas: No focal lesion appreciated. The pancreatic duct is unremarkable. No  peripancreatic inflammation or adenopathy.     : The adrenal glands are unremarkable. No urolithiasis. No perinephric fat  stranding appreciated. No hydroureteronephrosis seen. No acute pathology in the  bladder. GI: The stomach is unremarkable. The small bowel is without evidence of  obstruction. No small bowel wall thickening. The mesentery is without  inflammation or adenopathy. The appendix is unremarkable. No pericolonic  inflammation or wall thickening appreciated. Aorta and retroperitoneum: No aortic aneurysm seen. No periaortic adenopathy  seen. No fluid collections in the retroperitoneum appreciated. Abdominal wall/Inguinal: Unremarkable     Musculoskeletal: Multilevel degenerative disc disease and facet arthropathy are  noted. A loose body is noted in the left hip. Impression IMPRESSION:    4 x 5 mm pulmonary nodule in the left lower lobe and additional mild  groundglass changes. XR Results (most recent):  No results found for this or any previous visit. CT   All Micro Results     Procedure Component Value Units Date/Time    CULTURE, URINE [[id number]] Collected:  01/16/19 1447    Order Status:  No result Specimen:  Urine              DIAGNOSIS AND PLAN  Patient Active Problem List   Diagnosis Code    History of breast cancer Z85.3    TMJ (temporomandibular joint disorder) M26.609    Essential hypertension I10    Back pain of lumbar region with sciatica M54.40    Osteopenia M85.80    Overactive bladder N32.81    Macular degeneration H35.30    Cataract of both eyes H26.9    B12 deficiency E53.8     1. UTI  2. Left sided low back pain without sciatica  3. Overactive bladder  Recently seen in the emergency room, found to have a UTI based on symptoms and UA, no urine culture sent or found in the system. Recommended to take Bactrim for 3 days. The patient completed antibiotic about 3 days ago, persisted with symptoms. Repeat UA and send for urine culture with low yield for growth.   We will treat empirically again with antibiotics, levofloxacin recommended for 7 days. Does report diarrhea with fluoroquinolones, recommended to take probiotics during therapy. Call her back in case cultures indicate modifying antibiotic. Pain management for left lumbar pain likely secondary to UTI, with Norco as needed. 4. Hyperlipidemia  Thin started since last visit, reports tolerance and compliance. Will monitor lipid panel    5. Lung nodule  Follow up CT in 6-12months  Follow-up Disposition:  Return in about 3 months (around 4/16/2019). Bethany Kumar MD

## 2019-01-16 NOTE — PROGRESS NOTES
Coordination of Care Questionaire:   1. Have you been to the ER, urgent care clinic since your last visit? Hospitalized since your last visit? YES 1/11/19 Pt said she was dx with UTI by ER. 2. Have you seen or consulted any other health care providers outside of the [address] [place] [address] since your last visit? Include any pap smears or colon screening. NO    Advanced Directive:   1. Do you have an Advanced Directive? NO    2. Would you like information on Advanced Directives?  NO

## 2019-01-17 ENCOUNTER — TELEPHONE (OUTPATIENT)
Dept: INTERNAL MEDICINE CLINIC | Age: 79
End: 2019-01-17

## 2019-01-17 DIAGNOSIS — N39.0 URINARY TRACT INFECTION WITHOUT HEMATURIA, SITE UNSPECIFIED: Primary | ICD-10-CM

## 2019-01-17 NOTE — TELEPHONE ENCOUNTER
PT advised Per Dr Shaquille Canales- she is waiting for the culture to come back so she knows what other antibiotic to give her- she will call her in the morning

## 2019-01-17 NOTE — TELEPHONE ENCOUNTER
Pt can't tolerate the antibiotic-took 1 pill levoflaxin 500 mg- made her vomit and she is very nauseous- can you prescribe something else?

## 2019-01-18 LAB
BACTERIA SPEC CULT: NORMAL
SERVICE CMNT-IMP: NORMAL

## 2019-01-18 RX ORDER — CEFPODOXIME PROXETIL 100 MG/1
100 TABLET, FILM COATED ORAL 2 TIMES DAILY
Qty: 14 TAB | Refills: 0 | Status: SHIPPED | OUTPATIENT
Start: 2019-01-18 | End: 2019-01-25

## 2019-01-18 NOTE — TELEPHONE ENCOUNTER
Patient calling for culture result and what new medicine will be called it since can't tolerate Levaquin (put in her chart she can't take this, had diarrhea, nausea, very sick). Call pt at [phone] or [phone] (sister's phone). Pt still having pain on left side.

## 2019-01-18 NOTE — TELEPHONE ENCOUNTER
Called the patient to discuss treatment for her UTI, UC neg, UA suggestive of persistent infection, the patient says she cannot tolerate fluoroquinolones, tried 1 dose of levofloxacin and felt very nauseous. Antibiotic changed to cefpodoxime, to take 100 mg twice daily for 7 days. E prescribed to her pharmacy on file. I called the patient to discuss the above information, no answer so left a voice message, please try to reach her again with the information in this message. thank you so much.     Michael Julio MD

## 2019-03-11 ENCOUNTER — OFFICE VISIT (OUTPATIENT)
Dept: INTERNAL MEDICINE CLINIC | Age: 79
End: 2019-03-11

## 2019-03-11 VITALS
SYSTOLIC BLOOD PRESSURE: 134 MMHG | OXYGEN SATURATION: 99 % | DIASTOLIC BLOOD PRESSURE: 70 MMHG | RESPIRATION RATE: 14 BRPM | HEART RATE: 76 BPM | BODY MASS INDEX: 24.34 KG/M2 | HEIGHT: 64 IN | WEIGHT: 142.6 LBS | TEMPERATURE: 97.8 F

## 2019-03-11 DIAGNOSIS — R05.9 COUGH: ICD-10-CM

## 2019-03-11 DIAGNOSIS — J30.89 ENVIRONMENTAL AND SEASONAL ALLERGIES: Primary | ICD-10-CM

## 2019-03-11 DIAGNOSIS — R91.1 LUNG NODULE: ICD-10-CM

## 2019-03-11 RX ORDER — LORATADINE 10 MG/1
10 TABLET ORAL
Qty: 90 TAB | Refills: 3 | Status: SHIPPED | OUTPATIENT
Start: 2019-03-11 | End: 2022-09-26

## 2019-03-11 NOTE — PROGRESS NOTES
1. Have you been to the ER, urgent care clinic or hospitalized since your last visit? NO.     2. Have you seen or consulted any other health care providers outside of the [address] [place] [address] since your last visit (Include any pap smears or colon screening)? NO      Do you have an Advanced Directive? NO    Would you like information on Advanced Directives?  NO

## 2019-03-12 NOTE — PROGRESS NOTES
HPI     Silverio Riggins is a 77 y.o. female with relevant past medical history of HTN, back pain with sciatica, osteopenia, OA, chronic fatigue, B12 def anemia, OAB, macular degeneration, cataracts, h/o breast CA (Dx 2015) s/p lumpectomy, no XRT or chemotherapy, except anastrazole since 2015, lung nodule. Here for evaluation of cough. The patient reports her cough has been present for a few weeks now, it is dry, non-productive. Denies any wheezing, SOB, CP, heartburn, pruritus, N/V/D, sputum production, sore throat, ear pain, headache, nasal congestion. Does report occasional clear rhinorrhea on and off. Denies any Fever, chills, diaphoresis, malaise. ROS  As above included in HPI. Otherwise 11 point review of systems negative including constitutional, skin, HENT, eyes, respiratory, cardiovascular, gastrointestinal, genitourinary, musculoskeletal, endocrine, hematologic, allergy, and neurologic. Past Medical History  Past Medical History:   Diagnosis Date    Arthritis     [facility] ([facility] [address]     Cancer ([facility] [address] 2015    breast cancer    Hypertension     Macular degeneration     Vitamin D deficiency      Past Surgical History:   Procedure Laterality Date    BREAST SURGERY PROCEDURE UNLISTED Right 2015    HX BREAST LUMPECTOMY Right     HX HERNIA REPAIR      HX HYSTERECTOMY  1985        Family History  Family History   Problem Relation Age of Onset    Cancer Mother         stomach and liver cancer, in her 62s    Heart Disease Father         MI age 46s    Cancer Sister         blood    Cancer Brother         leukemia    Cancer Sibling        Social History  She  reports that  has never smoked. she has never used smokeless tobacco.   Social History     Substance and Sexual Activity   Alcohol Use No    Frequency: Never       Immunization History    There is no immunization history on file for this patient.     Allergies  No Known Allergies    Medications  Current Outpatient Medications   Medication Sig    loratadine (CLARITIN) 10 mg tablet Take 1 Tab by mouth daily as needed for Allergies. Cough, runny nose, itching    rosuvastatin (CRESTOR) 10 mg tablet Take 1 Tab by mouth nightly.  losartan (COZAAR) 25 mg tablet Take  by mouth daily.  anastrozole (ARIMIDEX) 1 mg tablet Take 1 mg by mouth daily.  celecoxib (CELEBREX) 200 mg capsule Take  by mouth as needed.  cyclobenzaprine (FLEXERIL) 5 mg tablet Take 5 mg by mouth.  multivitamin (ONE A DAY) tablet Take 1 Tab by mouth daily.  cholecalciferol (VITAMIN D3) 1,000 unit cap Take  by mouth daily.  vit C/E/Zn/coppr/lutein/zeaxan (PRESERVISION AREDS-2 PO) Take  by mouth.  menthol (BIOFREEZE, MENTHOL,) 4 % gel by Apply Externally route.  solifenacin (VESICARE) 5 mg tablet Take 5 mg by mouth as needed.  multivitamin/folic acid/biotin (HAIR-SKIN-NAILS, MV-FA-BIOTIN, PO) Take  by mouth.  ANASTROZOLE PO Take  by mouth.  losartan potassium (COZAAR PO) Take  by mouth.  cholecalciferol, vitamin D3, (VITAMIN D3) 2,000 unit tab Take  by mouth.  homeopathic drugs (HAIR AND SKIN PO) Take  by mouth.  folic acid/multivit-min/lutein (CENTRUM SILVER PO) Take  by mouth.  cyclobenzaprine (FLEXERIL) 10 mg tablet Take  by mouth three (3) times daily as needed for Muscle Spasm(s).  vit C/vit E ac/lut/copper/zinc (PRESERVISION LUTEIN PO) Take  by mouth.  cyclobenzaprine (FLEXERIL) 5 mg tablet Take 1 Tab by mouth three (3) times daily as needed for Muscle Spasm(s).  acetaminophen-codeine (TYLENOL-CODEINE #3) 300-30 mg per tablet Take 1 Tab by mouth every four (4) hours as needed for Pain. Max Daily Amount: 6 Tabs. No current facility-administered medications for this visit.           Visit Vitals  /70 (BP 1 Location: Left arm, BP Patient Position: Sitting)   Pulse 76   Temp 97.8 °F (36.6 °C) (Oral)   Resp 14   Ht 5' 4\" (1.626 m)   Wt 142 lb 9.6 oz (64.7 kg)   SpO2 99%   BMI 24.48 kg/m²     Body mass index is 24.48 kg/m². Physical Exam   Constitutional: She is well-developed, well-nourished, and in no distress. HENT:   Head: Normocephalic and atraumatic. Right Ear: External ear normal.   Left Ear: External ear normal.   Nose: Nose normal.   Mouth/Throat: Oropharynx is clear and moist. No oropharyngeal exudate. Mild oropharynx congestion, no exudates     Eyes: Conjunctivae are normal.   Neck: Neck supple. Cardiovascular: Normal rate, regular rhythm and normal heart sounds. Pulmonary/Chest: Effort normal and breath sounds normal. No respiratory distress. She has no rales. She exhibits no tenderness. Abdominal: Soft. Nursing note and vitals reviewed. REVIEW OF DATA    Labs  No visits with results within 1 Month(s) from this visit.    Latest known visit with results is:   Hospital Outpatient Visit on 01/16/2019   Component Date Value Ref Range Status    Special Requests: 01/16/2019 NO SPECIAL REQUESTS    Final    Culture result: 01/16/2019 NO GROWTH 1 DAY    Final    Color 01/16/2019 DARK YELLOW    Final    Appearance 01/16/2019 CLEAR    Final    Specific gravity 01/16/2019 1.025  1.005 - 1.030   Final    pH (UA) 01/16/2019 5.0  5.0 - 8.0   Final    Protein 01/16/2019 NEGATIVE   NEG mg/dL Final    Glucose 01/16/2019 NEGATIVE   NEG mg/dL Final    Ketone 01/16/2019 NEGATIVE   NEG mg/dL Final    Bilirubin 01/16/2019 SMALL* NEG   Final    Blood 01/16/2019 NEGATIVE   NEG   Final    Urobilinogen 01/16/2019 1.0  0.2 - 1.0 EU/dL Final    Nitrites 01/16/2019 NEGATIVE   NEG   Final    Leukocyte Esterase 01/16/2019 SMALL* NEG   Final    WBC 01/16/2019 6 to 8  0 - 4 /hpf Final    RBC 01/16/2019 0  0 - 5 /hpf Final    Epithelial cells 01/16/2019 FEW  0 - 5 /lpf Final    Bacteria 01/16/2019 FEW* NEG /hpf Final         CT Results (most recent):  Results from Hospital Encounter encounter on 01/11/19   CT ABD PELV WO CONT    Narrative EXAM: CT of the Abdomen and Pelvis without IV contrast    CLINICAL INDICATION: Low back pain and groin pain    TECHNIQUE: CT of the abdomen and pelvis. Sagittal and coronal reformations    All CT scans at this facility are performed using dose optimization technique as  appropriate to a performed exam, to include automated exposure control,  adjustment of the mA and/or kV according to patient size (including appropriate  matching for site specific examination) or use of iterative reconstruction  technique. IV CONTRAST: None    ENTERIC CONTRAST: None    COMPARISON: None    FINDINGS:   Limitations: The evaluation of the solid organs is limited due to the lack of IV  contrast.    Lower Chest: In the left lower lobe, there is a 4 x 5 mm pulmonary nodule. Sagittal nodular changes are noted. The heart and pericardium are unremarkable. Peritoneum: No free air appreciated. No free fluid present. No fluid collections  present. Liver: No focal lesion appreciated. Biliary/Gallbladder: No intrahepatic or extrahepatic biliary ductal dilatation  appreciated. The gallbladder is unremarkable. No pericholecystic fluid or  inflammation. Spleen: Unremarkable. Pancreas: No focal lesion appreciated. The pancreatic duct is unremarkable. No  peripancreatic inflammation or adenopathy. : The adrenal glands are unremarkable. No urolithiasis. No perinephric fat  stranding appreciated. No hydroureteronephrosis seen. No acute pathology in the  bladder. GI: The stomach is unremarkable. The small bowel is without evidence of  obstruction. No small bowel wall thickening. The mesentery is without  inflammation or adenopathy. The appendix is unremarkable. No pericolonic  inflammation or wall thickening appreciated. Aorta and retroperitoneum: No aortic aneurysm seen. No periaortic adenopathy  seen. No fluid collections in the retroperitoneum appreciated.     Abdominal wall/Inguinal: Unremarkable     Musculoskeletal: Multilevel degenerative disc disease and facet arthropathy are  noted. A loose body is noted in the left hip. Impression IMPRESSION:    4 x 5 mm pulmonary nodule in the left lower lobe and additional mild  groundglass changes. XR Results (most recent):  No results found for this or any previous visit. CT   All Micro Results     None                DIAGNOSIS AND PLAN  Patient Active Problem List   Diagnosis Code    History of breast cancer Z85.3    TMJ (temporomandibular joint disorder) M26.609    Essential hypertension I10    Back pain of lumbar region with sciatica M54.40    Osteopenia M85.80    Overactive bladder N32.81    Macular degeneration H35.30    Cataract of both eyes H26.9    B12 deficiency E53.8     1. Environmental and seasonal allergies  2. Cough  Cough most likely secondary to allergies. Trial of loratadine. 3. Lung nodule   Will monitor with CT 6-12 months from last done Jan 2019      Follow-up Disposition:  Return in about 3 months (around 6/11/2019), or if symptoms worsen or fail to improve. Bethany Muñoz MD

## 2019-03-15 ENCOUNTER — CLINICAL SUPPORT (OUTPATIENT)
Dept: INTERNAL MEDICINE CLINIC | Age: 79
End: 2019-03-15

## 2019-03-15 ENCOUNTER — PATIENT OUTREACH (OUTPATIENT)
Dept: INTERNAL MEDICINE CLINIC | Age: 79
End: 2019-03-15

## 2019-03-15 DIAGNOSIS — Z71.89 COUNSELING REGARDING ADVANCED CARE PLANNING AND GOALS OF CARE: Primary | ICD-10-CM

## 2019-03-16 NOTE — ACP (ADVANCE CARE PLANNING)
[address]  ====[employer] Advance Care Planning Conversation====    Date of Conversation: 3/15/19    Location: [facility] [address] [place] [address]    [employer] ACP Facilitator: Yvette Rhodes RN    []Yes [x]No  Patient has prior advance directive? [x]Yes []No  Agent Present (in person or by phone)    Meeting Outcomes:   [x] ACP discussion completed   [x] Advance directive form completed   [] Review & validation of existing AMD completed: N/A [x] Original of completed advance directive given to patient   [x] Copy given to/for healthcare agent    [x] Copy scanned to electronic medical record   [x] Updated Decision Maker info in Epic's ACP Navigator    [x] Recommended to review AMD annually or upon change in health status      Primary Agent's Name: Joseyessy Lexi  Relationship to Patient: other relative      Secondary Agent's Name: Xenia Ceballos  Relationship to Patient: niece    (See Healthcare Decision Maker section of ACP Navigator for more information)    Agent confirms Willingness to:  [x]Yes []No   Accept role  [x]Yes []No   Talk with individual about his/her goals, values, & preferences  [x]Yes []No   Follow pt's decisions, even if disagrees with these decisions  [x]Yes  []No   Make decisions in difficult moments    Patient has learned the following from prior experiences with serious illness:N/A    Identifies the following as important for quality of life/living well: A relaxing day, being comfortable and paying bills, and living in my new place. I want my family around, be comfortable and good nursing care. Brenda Shoulders would be an unacceptable quality of life for you?  I don't want to suffer. \"What else would want your loved ones or medical team to know about how you would want to be cared for? \" I want to be kept alive as long as possible but I don't want to suffer. I want to be kept from having bed sores. I want to be kept warm especially.  My family always teases me about it; keep [facility] feet warm. I don't want to be put in a nursing home. CPR Intro for Patients Without Chronic Illness:  [] N/A  [x] Yes  [] No   Educated patient regarding differences between AMD and DDNR  [x] Yes  [] No   Provided CPR Fact Sheet for additional information     Topics for Chronic Illness (if applicable):  [x] N/A    \"What do you understand about your (insert name of illness) and the complications that may occur? \"  Patient response:      \"What would be an unacceptable outcome of CPR to you? \" Patient response:Not being myself.     [x] Yes  [] No   Patient requests attempts at CPR if heart/breathing stops    [x] Yes  [] No   Educated patient regarding differences between AMD and DDNR  [] Yes  [] No  [x] N/A   If patient requested DDNR order, referred to provider for follow-up    If ACP discussion not completed, last interview topic discussed: N/A     Follow-up plan:     [] Schedule follow-up conversation to continue planning   [] Referred individual to provider for additional questions/concerns    [] Individual will invite agent/prospective agent to next ACP discussion      [x] This note routed to one or more involved healthcare providers

## 2019-03-16 NOTE — PROGRESS NOTES
Attended advance care planning appt for Mert Aguirre (other patient of this office) for which this patient was present. At this time, this patient voiced she would like to complete an advance medical directive. During planning patient contacted her niece, William Clement to discuss niece being a HCA. Niece agreed and accepted. AMD completed (see ACP note).

## 2019-03-27 ENCOUNTER — TELEPHONE (OUTPATIENT)
Dept: INTERNAL MEDICINE CLINIC | Age: 79
End: 2019-03-27

## 2019-03-27 NOTE — TELEPHONE ENCOUNTER
Since it has been going on for a couple of weeks I would reassure her it is possible to wait until next week for evaluation. If the pain is severe or worsening, I would recommend then to go for evaluation via urgent care.  Thanks

## 2019-03-27 NOTE — TELEPHONE ENCOUNTER
Patient called to get an appt this week. Stating she is having a pain on left side of head. There's an area that is raised but she has not hit her head. This has been going on for a couple of weeks. She has been taking Asprin with no relief. I offered her an appt for next week. No openings with anyone this week. None of the times I offered her worked because she states she has to depend on her daughter to bring her. She wants to know if Dr. Bustamante can \"squeeze her in\" or advise what she should do.

## 2019-03-28 NOTE — TELEPHONE ENCOUNTER
PT given message- she made an appt for 04/05 but was advised if this gets worse to go to urgent care

## 2019-04-05 ENCOUNTER — OFFICE VISIT (OUTPATIENT)
Dept: INTERNAL MEDICINE CLINIC | Age: 79
End: 2019-04-05

## 2019-04-11 ENCOUNTER — OFFICE VISIT (OUTPATIENT)
Dept: INTERNAL MEDICINE CLINIC | Age: 79
End: 2019-04-11

## 2019-04-11 VITALS
DIASTOLIC BLOOD PRESSURE: 80 MMHG | OXYGEN SATURATION: 98 % | SYSTOLIC BLOOD PRESSURE: 134 MMHG | RESPIRATION RATE: 15 BRPM | HEART RATE: 90 BPM | TEMPERATURE: 98.7 F | WEIGHT: 144 LBS | HEIGHT: 64 IN | BODY MASS INDEX: 24.59 KG/M2

## 2019-04-11 DIAGNOSIS — Z00.00 WELLNESS EXAMINATION: ICD-10-CM

## 2019-04-11 DIAGNOSIS — J01.00 SUBACUTE MAXILLARY SINUSITIS: Primary | ICD-10-CM

## 2019-04-11 DIAGNOSIS — N89.8 VAGINAL DRYNESS: ICD-10-CM

## 2019-04-11 DIAGNOSIS — K04.7 TOOTH INFECTION: ICD-10-CM

## 2019-04-11 RX ORDER — ACETAMINOPHEN 325 MG/1
TABLET, FILM COATED ORAL
COMMUNITY
End: 2020-03-31 | Stop reason: CLARIF

## 2019-04-11 RX ORDER — FLUTICASONE PROPIONATE 50 MCG
2 SPRAY, SUSPENSION (ML) NASAL DAILY
Qty: 1 BOTTLE | Refills: 2 | Status: SHIPPED | OUTPATIENT
Start: 2019-04-11 | End: 2019-08-16 | Stop reason: SDUPTHER

## 2019-04-11 RX ORDER — AMOXICILLIN AND CLAVULANATE POTASSIUM 875; 125 MG/1; MG/1
1 TABLET, FILM COATED ORAL EVERY 12 HOURS
Qty: 20 TAB | Refills: 0 | Status: SHIPPED | OUTPATIENT
Start: 2019-04-11 | End: 2019-07-23 | Stop reason: ALTCHOICE

## 2019-04-11 NOTE — PROGRESS NOTES
1. Have you been to the ER, urgent care clinic or hospitalized since your last visit? NO.     2. Have you seen or consulted any other health care providers outside of the [address] [place] [address] since your last visit (Include any pap smears or colon screening)? YES  Eye doctor     Do you have an Advanced Directive? YES    Would you like information on Advanced Directives?  NO

## 2019-04-19 ENCOUNTER — HOSPITAL ENCOUNTER (OUTPATIENT)
Dept: LAB | Age: 79
Discharge: HOME | End: 2019-04-19
Payer: MEDICARE

## 2019-04-19 ENCOUNTER — OFFICE VISIT (OUTPATIENT)
Dept: INTERNAL MEDICINE CLINIC | Age: 79
End: 2019-04-19

## 2019-04-19 VITALS
WEIGHT: 143 LBS | TEMPERATURE: 98.5 F | BODY MASS INDEX: 24.41 KG/M2 | HEIGHT: 64 IN | HEART RATE: 95 BPM | DIASTOLIC BLOOD PRESSURE: 64 MMHG | OXYGEN SATURATION: 99 % | SYSTOLIC BLOOD PRESSURE: 118 MMHG | RESPIRATION RATE: 16 BRPM

## 2019-04-19 DIAGNOSIS — R51.9 TEMPORAL PAIN: ICD-10-CM

## 2019-04-19 DIAGNOSIS — G44.229 CHRONIC TENSION-TYPE HEADACHE, NOT INTRACTABLE: ICD-10-CM

## 2019-04-19 DIAGNOSIS — G44.229 CHRONIC TENSION-TYPE HEADACHE, NOT INTRACTABLE: Primary | ICD-10-CM

## 2019-04-19 LAB — ERYTHROCYTE [SEDIMENTATION RATE] IN BLOOD: 18 MM/HR (ref 0–30)

## 2019-04-19 PROCEDURE — 36415 COLL VENOUS BLD VENIPUNCTURE: CPT

## 2019-04-19 PROCEDURE — 85652 RBC SED RATE AUTOMATED: CPT

## 2019-04-30 ENCOUNTER — TELEPHONE (OUTPATIENT)
Dept: INTERNAL MEDICINE CLINIC | Age: 79
End: 2019-04-30

## 2019-05-01 NOTE — TELEPHONE ENCOUNTER
Please let her know her ESR (sed rate) was normal, indicating less concern for an inflammatory condition or her artery, would not recommend further work up at this time.  Thanks

## 2019-06-10 NOTE — PROGRESS NOTES
HPI     Earnestine Shepherd is a 77 y.o. female with relevant past medical history of  HTN, back pain with sciatica, osteopenia, OA, chronic fatigue, B12 def anemia, OAB, macular degeneration, cataracts, h/o breast CA (Dx 2015) s/p lumpectomy, no XRT or chemotherapy, except anastrazole since 2015, lung nodule. Here for evaluation of headache. Reports a few days with left sided facial/malar-temporal pain to TMJ, with some tenderness to touch, sometimes uncomfortable chewing, and sometimes radiated to temporal area of head. The patient denies any visual symptoms, including visual changes or decreased vision. She denies any balance issues, tinnitus, decreased hearing or dizziness associated. Denies any fevers, chills, diaphoresis or malaise. Has noticed increased nasal congestion and stuffiness, but no significant rhinorrhea, cough, SOB, wheezing, CP, palpitations, leg swelling, anxiety or other symptoms. Reports vaginal dryness, would like to see GYN. ROS  As above included in HPI. Otherwise 11 point review of systems negative including constitutional, skin, HENT, eyes, respiratory, cardiovascular, gastrointestinal, genitourinary, musculoskeletal, endocrine, hematologic, allergy, and neurologic. Past Medical History  Past Medical History:   Diagnosis Date    Arthritis     [facility] [facility]     Cancer ([facility] [address] 2015    breast cancer    Hypertension     Macular degeneration     Vitamin D deficiency      Past Surgical History:   Procedure Laterality Date    BREAST SURGERY PROCEDURE UNLISTED Right 2015    HX BREAST LUMPECTOMY Right     HX HERNIA REPAIR      HX HYSTERECTOMY  1985        Family History  Family History   Problem Relation Age of Onset    Cancer Mother         stomach and liver cancer, in her 62s    Heart Disease Father         MI age 46s    Cancer Sister         blood    Cancer Brother         leukemia    Cancer Sibling        Social History  She  reports that she has never smoked. She has never used smokeless tobacco.   Social History     Substance and Sexual Activity   Alcohol Use No    Frequency: Never       Immunization History    There is no immunization history on file for this patient. Allergies  No Known Allergies    Medications  Current Outpatient Medications   Medication Sig    acetaminophen (TYLENOL) 325 mg tablet Take  by mouth every four (4) hours as needed for Pain.  amoxicillin-clavulanate (AUGMENTIN) 875-125 mg per tablet Take 1 Tab by mouth every twelve (12) hours.  fluticasone propionate (FLONASE) 50 mcg/actuation nasal spray 2 Sprays by Both Nostrils route daily.  loratadine (CLARITIN) 10 mg tablet Take 1 Tab by mouth daily as needed for Allergies. Cough, runny nose, itching    rosuvastatin (CRESTOR) 10 mg tablet Take 1 Tab by mouth nightly.  losartan (COZAAR) 25 mg tablet Take  by mouth daily.  anastrozole (ARIMIDEX) 1 mg tablet Take 1 mg by mouth daily.  celecoxib (CELEBREX) 200 mg capsule Take  by mouth as needed.  multivitamin (ONE A DAY) tablet Take 1 Tab by mouth daily.  cholecalciferol (VITAMIN D3) 1,000 unit cap Take  by mouth daily.  vit C/E/Zn/coppr/lutein/zeaxan (PRESERVISION AREDS-2 PO) Take  by mouth.  menthol (BIOFREEZE, MENTHOL,) 4 % gel by Apply Externally route.  solifenacin (VESICARE) 5 mg tablet Take 5 mg by mouth as needed.  multivitamin/folic acid/biotin (HAIR-SKIN-NAILS, MV-FA-BIOTIN, PO) Take  by mouth.  ANASTROZOLE PO Take  by mouth.  losartan potassium (COZAAR PO) Take  by mouth.  cholecalciferol, vitamin D3, (VITAMIN D3) 2,000 unit tab Take  by mouth.  homeopathic drugs (HAIR AND SKIN PO) Take  by mouth.  folic acid/multivit-min/lutein (CENTRUM SILVER PO) Take  by mouth.  cyclobenzaprine (FLEXERIL) 10 mg tablet Take  by mouth three (3) times daily as needed for Muscle Spasm(s).  vit C/vit E ac/lut/copper/zinc (PRESERVISION LUTEIN PO) Take  by mouth.     cyclobenzaprine (FLEXERIL) 5 mg tablet Take 1 Tab by mouth three (3) times daily as needed for Muscle Spasm(s). No current facility-administered medications for this visit. Visit Vitals  /80 (BP 1 Location: Left arm, BP Patient Position: Sitting)   Pulse 90   Temp 98.7 °F (37.1 °C) (Oral)   Resp 15   Ht 5' 4\" (1.626 m)   Wt 144 lb (65.3 kg)   SpO2 98%   BMI 24.72 kg/m²     Body mass index is 24.72 kg/m². Physical Exam   Constitutional: She is well-developed, well-nourished, and in no distress. No distress. HENT:   Head: Normocephalic and atraumatic. Right Ear: External ear normal.   Left Ear: External ear normal.   Mouth/Throat: Oropharynx is clear and moist. No oropharyngeal exudate. Maxillary left tender to pressure on exam  Nasopharynx mildly congested, no exudates  Tooth decay, tender upper molar  No temporal artery tortuosity or tenderness   Eyes: Conjunctivae and EOM are normal.   Neck: Neck supple. Cardiovascular: Normal rate, regular rhythm and normal heart sounds. Pulmonary/Chest: Effort normal and breath sounds normal.   Musculoskeletal: Normal range of motion. She exhibits no edema. Lymphadenopathy:     She has no cervical adenopathy. Skin: Skin is warm. She is not diaphoretic. Nursing note and vitals reviewed. REVIEW OF DATA    Labs  No visits with results within 1 Month(s) from this visit.    Latest known visit with results is:   Hospital Outpatient Visit on 01/16/2019   Component Date Value Ref Range Status    Special Requests: 01/16/2019 NO SPECIAL REQUESTS    Final    Culture result: 01/16/2019 NO GROWTH 1 DAY    Final    Color 01/16/2019 DARK YELLOW    Final    Appearance 01/16/2019 CLEAR    Final    Specific gravity 01/16/2019 1.025  1.005 - 1.030   Final    pH (UA) 01/16/2019 5.0  5.0 - 8.0   Final    Protein 01/16/2019 NEGATIVE   NEG mg/dL Final    Glucose 01/16/2019 NEGATIVE   NEG mg/dL Final    Ketone 01/16/2019 NEGATIVE   NEG mg/dL Final    Bilirubin 01/16/2019 SMALL* NEG Final    Blood 01/16/2019 NEGATIVE   NEG   Final    Urobilinogen 01/16/2019 1.0  0.2 - 1.0 EU/dL Final    Nitrites 01/16/2019 NEGATIVE   NEG   Final    Leukocyte Esterase 01/16/2019 SMALL* NEG   Final    WBC 01/16/2019 6 to 8  0 - 4 /hpf Final    RBC 01/16/2019 0  0 - 5 /hpf Final    Epithelial cells 01/16/2019 FEW  0 - 5 /lpf Final    Bacteria 01/16/2019 FEW* NEG /hpf Final         CT Results (most recent):  Results from Hospital Encounter encounter on 01/11/19   CT ABD PELV WO CONT    Narrative EXAM: CT of the Abdomen and Pelvis without IV contrast    CLINICAL INDICATION: Low back pain and groin pain    TECHNIQUE: CT of the abdomen and pelvis. Sagittal and coronal reformations    All CT scans at this facility are performed using dose optimization technique as  appropriate to a performed exam, to include automated exposure control,  adjustment of the mA and/or kV according to patient size (including appropriate  matching for site specific examination) or use of iterative reconstruction  technique. IV CONTRAST: None    ENTERIC CONTRAST: None    COMPARISON: None    FINDINGS:   Limitations: The evaluation of the solid organs is limited due to the lack of IV  contrast.    Lower Chest: In the left lower lobe, there is a 4 x 5 mm pulmonary nodule. Sagittal nodular changes are noted. The heart and pericardium are unremarkable. Peritoneum: No free air appreciated. No free fluid present. No fluid collections  present. Liver: No focal lesion appreciated. Biliary/Gallbladder: No intrahepatic or extrahepatic biliary ductal dilatation  appreciated. The gallbladder is unremarkable. No pericholecystic fluid or  inflammation. Spleen: Unremarkable. Pancreas: No focal lesion appreciated. The pancreatic duct is unremarkable. No  peripancreatic inflammation or adenopathy. : The adrenal glands are unremarkable. No urolithiasis. No perinephric fat  stranding appreciated.   No hydroureteronephrosis seen. No acute pathology in the  bladder. GI: The stomach is unremarkable. The small bowel is without evidence of  obstruction. No small bowel wall thickening. The mesentery is without  inflammation or adenopathy. The appendix is unremarkable. No pericolonic  inflammation or wall thickening appreciated. Aorta and retroperitoneum: No aortic aneurysm seen. No periaortic adenopathy  seen. No fluid collections in the retroperitoneum appreciated. Abdominal wall/Inguinal: Unremarkable     Musculoskeletal: Multilevel degenerative disc disease and facet arthropathy are  noted. A loose body is noted in the left hip. Impression IMPRESSION:    4 x 5 mm pulmonary nodule in the left lower lobe and additional mild  groundglass changes. XR Results (most recent):  No results found for this or any previous visit. CT   All Micro Results     None                  DIAGNOSIS AND PLAN  Patient Active Problem List   Diagnosis Code    History of breast cancer Z85.3    TMJ (temporomandibular joint disorder) M26.609    Essential hypertension I10    Back pain of lumbar region with sciatica M54.40    Osteopenia M85.80    Overactive bladder N32.81    Macular degeneration H35.30    Cataract of both eyes H26.9    B12 deficiency E53.8     1. Subacute maxillary sinusitis  Reassured most likely cause of HA and clinical picture. Trial of the following with close follow up and recommended to seek care if symptoms worsen, do not improve or in case of any adverse events from medications ordered. - amoxicillin-clavulanate (AUGMENTIN) 875-125 mg per tablet; Take 1 Tab by mouth every twelve (12) hours. Dispense: 20 Tab; Refill: 0  - fluticasone propionate (FLONASE) 50 mcg/actuation nasal spray; 2 Sprays by Both Nostrils route daily. Dispense: 1 Bottle; Refill: 2    2. Tooth infection  - amoxicillin-clavulanate (AUGMENTIN) 875-125 mg per tablet; Take 1 Tab by mouth every twelve (12) hours. Dispense: 20 Tab;  Refill: 0    3. Wellness examination  4. Vaginal dryness  Consider topical estrogen cream, patient would like to consult OB/GYN first  - REFERRAL TO OBSTETRICS AND GYNECOLOGY          Follow-up and Dispositions    · Return if symptoms worsen or fail to improve. Bethany Flores MD

## 2019-06-10 NOTE — PROGRESS NOTES
HPI     Azam Kovacs is a 77 y.o. female with relevant past medical history of  HTN, back pain with sciatica, osteopenia, OA, chronic fatigue, B12 def anemia, OAB, macular degeneration, cataracts, h/o breast CA (Dx 2015) s/p lumpectomy, no XRT or chemotherapy, except anastrazole since 2015, lung nodule. Here for follow up of headache, temporal left sided. Recently treated for left maxillary sinusitis and left upper molar infection with a 10 day course of amox-clav. The pain has significantly improved, but it persists mildly in temporal area. Says she was seen by optometry recently and told her there was no concern with her eyes. The patient remains concerned about her temporal/TMJ discomfort. Reports a few days with left sided facial/malar-temporal pain to TMJ, with some tenderness to touch, sometimes uncomfortable chewing, and sometimes radiated to temporal area of head. The patient denies any visual symptoms, including visual changes or decreased vision. She denies any balance issues, tinnitus, decreased hearing or dizziness associated. Denies any fevers, chills, diaphoresis or malaise. Has noticed increased nasal congestion and stuffiness, but no significant rhinorrhea, cough, SOB, wheezing, CP, palpitations, leg swelling, anxiety or other symptoms. Reports vaginal dryness, would like to see GYN. ROS  As above included in HPI. Otherwise 11 point review of systems negative including constitutional, skin, HENT, eyes, respiratory, cardiovascular, gastrointestinal, genitourinary, musculoskeletal, endocrine, hematologic, allergy, and neurologic.     Past Medical History  Past Medical History:   Diagnosis Date    Arthritis     [facility] ([facility] [address]     Cancer ([facility] [address] 2015    breast cancer    Hypertension     Macular degeneration     Vitamin D deficiency      Past Surgical History:   Procedure Laterality Date    BREAST SURGERY PROCEDURE UNLISTED Right 2015    HX BREAST LUMPECTOMY Right     HX HERNIA REPAIR      HX HYSTERECTOMY  1985        Family History  Family History   Problem Relation Age of Onset    Cancer Mother         stomach and liver cancer, in her 62s    Heart Disease Father         MI age 46s    Cancer Sister         blood    Cancer Brother         leukemia    Cancer Sibling        Social History  She  reports that she has never smoked. She has never used smokeless tobacco.   Social History     Substance and Sexual Activity   Alcohol Use No    Frequency: Never       Immunization History    There is no immunization history on file for this patient. Allergies  No Known Allergies    Medications  Current Outpatient Medications   Medication Sig    acetaminophen (TYLENOL) 325 mg tablet Take  by mouth every four (4) hours as needed for Pain.  amoxicillin-clavulanate (AUGMENTIN) 875-125 mg per tablet Take 1 Tab by mouth every twelve (12) hours.  fluticasone propionate (FLONASE) 50 mcg/actuation nasal spray 2 Sprays by Both Nostrils route daily.  loratadine (CLARITIN) 10 mg tablet Take 1 Tab by mouth daily as needed for Allergies. Cough, runny nose, itching    rosuvastatin (CRESTOR) 10 mg tablet Take 1 Tab by mouth nightly.  losartan (COZAAR) 25 mg tablet Take  by mouth daily.  anastrozole (ARIMIDEX) 1 mg tablet Take 1 mg by mouth daily.  celecoxib (CELEBREX) 200 mg capsule Take  by mouth as needed.  multivitamin (ONE A DAY) tablet Take 1 Tab by mouth daily.  cholecalciferol (VITAMIN D3) 1,000 unit cap Take  by mouth daily.  vit C/E/Zn/coppr/lutein/zeaxan (PRESERVISION AREDS-2 PO) Take  by mouth.  menthol (BIOFREEZE, MENTHOL,) 4 % gel by Apply Externally route.  solifenacin (VESICARE) 5 mg tablet Take 5 mg by mouth as needed.  multivitamin/folic acid/biotin (HAIR-SKIN-NAILS, MV-FA-BIOTIN, PO) Take  by mouth.  ANASTROZOLE PO Take  by mouth.  losartan potassium (COZAAR PO) Take  by mouth.     cholecalciferol, vitamin D3, (VITAMIN D3) 2,000 unit tab Take  by mouth.  homeopathic drugs (HAIR AND SKIN PO) Take  by mouth.  folic acid/multivit-min/lutein (CENTRUM SILVER PO) Take  by mouth.  cyclobenzaprine (FLEXERIL) 10 mg tablet Take  by mouth three (3) times daily as needed for Muscle Spasm(s).  vit C/vit E ac/lut/copper/zinc (PRESERVISION LUTEIN PO) Take  by mouth.  cyclobenzaprine (FLEXERIL) 5 mg tablet Take 1 Tab by mouth three (3) times daily as needed for Muscle Spasm(s). No current facility-administered medications for this visit. Visit Vitals  /64 (BP 1 Location: Left arm, BP Patient Position: Sitting)   Pulse 95   Temp 98.5 °F (36.9 °C) (Oral)   Resp 16   Ht 5' 4\" (1.626 m)   Wt 143 lb (64.9 kg)   SpO2 99%   BMI 24.55 kg/m²     Body mass index is 24.55 kg/m². Physical Exam   Constitutional: She is well-developed, well-nourished, and in no distress. No distress. HENT:   Head: Normocephalic and atraumatic. Right Ear: External ear normal.   Left Ear: External ear normal.   Mouth/Throat: Oropharynx is clear and moist. No oropharyngeal exudate. Maxillary left tender to pressure on exam improved  Mild tenderness on left temporal area  No temporal artery tortuosity or tenderness   Eyes: Conjunctivae and EOM are normal.   Neck: Neck supple. Cardiovascular: Normal rate, regular rhythm and normal heart sounds. Pulmonary/Chest: Effort normal and breath sounds normal.   Musculoskeletal: Normal range of motion. She exhibits no edema. Lymphadenopathy:     She has no cervical adenopathy. Skin: Skin is warm. She is not diaphoretic. Nursing note and vitals reviewed. REVIEW OF DATA    Labs  No visits with results within 1 Month(s) from this visit.    Latest known visit with results is:   Hospital Outpatient Visit on 01/16/2019   Component Date Value Ref Range Status    Special Requests: 01/16/2019 NO SPECIAL REQUESTS    Final    Culture result: 01/16/2019 NO GROWTH 1 DAY    Final    Color 01/16/2019 DARK YELLOW Final    Appearance 01/16/2019 CLEAR    Final    Specific gravity 01/16/2019 1.025  1.005 - 1.030   Final    pH (UA) 01/16/2019 5.0  5.0 - 8.0   Final    Protein 01/16/2019 NEGATIVE   NEG mg/dL Final    Glucose 01/16/2019 NEGATIVE   NEG mg/dL Final    Ketone 01/16/2019 NEGATIVE   NEG mg/dL Final    Bilirubin 01/16/2019 SMALL* NEG   Final    Blood 01/16/2019 NEGATIVE   NEG   Final    Urobilinogen 01/16/2019 1.0  0.2 - 1.0 EU/dL Final    Nitrites 01/16/2019 NEGATIVE   NEG   Final    Leukocyte Esterase 01/16/2019 SMALL* NEG   Final    WBC 01/16/2019 6 to 8  0 - 4 /hpf Final    RBC 01/16/2019 0  0 - 5 /hpf Final    Epithelial cells 01/16/2019 FEW  0 - 5 /lpf Final    Bacteria 01/16/2019 FEW* NEG /hpf Final         CT Results (most recent):  Results from Hospital Encounter encounter on 01/11/19   CT ABD PELV WO CONT    Narrative EXAM: CT of the Abdomen and Pelvis without IV contrast    CLINICAL INDICATION: Low back pain and groin pain    TECHNIQUE: CT of the abdomen and pelvis. Sagittal and coronal reformations    All CT scans at this facility are performed using dose optimization technique as  appropriate to a performed exam, to include automated exposure control,  adjustment of the mA and/or kV according to patient size (including appropriate  matching for site specific examination) or use of iterative reconstruction  technique. IV CONTRAST: None    ENTERIC CONTRAST: None    COMPARISON: None    FINDINGS:   Limitations: The evaluation of the solid organs is limited due to the lack of IV  contrast.    Lower Chest: In the left lower lobe, there is a 4 x 5 mm pulmonary nodule. Sagittal nodular changes are noted. The heart and pericardium are unremarkable. Peritoneum: No free air appreciated. No free fluid present. No fluid collections  present. Liver: No focal lesion appreciated. Biliary/Gallbladder: No intrahepatic or extrahepatic biliary ductal dilatation  appreciated.  The gallbladder is unremarkable. No pericholecystic fluid or  inflammation. Spleen: Unremarkable. Pancreas: No focal lesion appreciated. The pancreatic duct is unremarkable. No  peripancreatic inflammation or adenopathy. : The adrenal glands are unremarkable. No urolithiasis. No perinephric fat  stranding appreciated. No hydroureteronephrosis seen. No acute pathology in the  bladder. GI: The stomach is unremarkable. The small bowel is without evidence of  obstruction. No small bowel wall thickening. The mesentery is without  inflammation or adenopathy. The appendix is unremarkable. No pericolonic  inflammation or wall thickening appreciated. Aorta and retroperitoneum: No aortic aneurysm seen. No periaortic adenopathy  seen. No fluid collections in the retroperitoneum appreciated. Abdominal wall/Inguinal: Unremarkable     Musculoskeletal: Multilevel degenerative disc disease and facet arthropathy are  noted. A loose body is noted in the left hip. Impression IMPRESSION:    4 x 5 mm pulmonary nodule in the left lower lobe and additional mild  groundglass changes. XR Results (most recent):  No results found for this or any previous visit. CT   All Micro Results     None                  DIAGNOSIS AND PLAN  Patient Active Problem List   Diagnosis Code    History of breast cancer Z85.3    TMJ (temporomandibular joint disorder) M26.609    Essential hypertension I10    Back pain of lumbar region with sciatica M54.40    Osteopenia M85.80    Overactive bladder N32.81    Macular degeneration H35.30    Cataract of both eyes H26.9    B12 deficiency E53.8     1. Tension headache  2. Temporal pain  Patient's residual headache likely temporal, mild local tenderness, patient has been under a lot of stress. No physical exam findings to suggest GCA, recent eye exam unremarkable according to patient. Will check ESR to assess, make sure no need to do further work up. Patient reassured.  May take PRN acetaminophen for headache. Follow-up and Dispositions    · Return in about 1 month (around 5/17/2019). Bethany Burns MD

## 2019-06-11 ENCOUNTER — OFFICE VISIT (OUTPATIENT)
Dept: INTERNAL MEDICINE CLINIC | Age: 79
End: 2019-06-11

## 2019-06-11 ENCOUNTER — DOCUMENTATION ONLY (OUTPATIENT)
Dept: INTERNAL MEDICINE CLINIC | Age: 79
End: 2019-06-11

## 2019-06-11 VITALS
TEMPERATURE: 98.5 F | OXYGEN SATURATION: 99 % | WEIGHT: 140 LBS | DIASTOLIC BLOOD PRESSURE: 69 MMHG | SYSTOLIC BLOOD PRESSURE: 134 MMHG | BODY MASS INDEX: 23.9 KG/M2 | RESPIRATION RATE: 16 BRPM | HEART RATE: 79 BPM | HEIGHT: 64 IN

## 2019-06-11 DIAGNOSIS — Z85.3 HISTORY OF BREAST CANCER: ICD-10-CM

## 2019-06-11 DIAGNOSIS — R42 DIZZINESS: ICD-10-CM

## 2019-06-11 DIAGNOSIS — Z87.898 HISTORY OF CHEST PAIN: ICD-10-CM

## 2019-06-11 DIAGNOSIS — I10 ESSENTIAL HYPERTENSION: ICD-10-CM

## 2019-06-11 DIAGNOSIS — E78.2 MIXED HYPERLIPIDEMIA: ICD-10-CM

## 2019-06-11 DIAGNOSIS — R91.1 PULMONARY NODULE: Primary | ICD-10-CM

## 2019-06-11 DIAGNOSIS — N32.81 OVERACTIVE BLADDER: ICD-10-CM

## 2019-06-11 PROBLEM — J30.9 ALLERGIC RHINITIS: Status: ACTIVE | Noted: 2019-06-11

## 2019-06-11 PROBLEM — M51.9 LUMBAR DISC DISEASE: Status: ACTIVE | Noted: 2019-06-11

## 2019-06-11 PROBLEM — M54.40 BACK PAIN OF LUMBAR REGION WITH SCIATICA: Status: RESOLVED | Noted: 2018-12-05 | Resolved: 2019-06-11

## 2019-06-11 NOTE — PROGRESS NOTES
Farzad Montero presents today for   Chief Complaint   Patient presents with    Abnormal CT Scan     Patient states that there was a pulm nodule found on CT scan in Jan-2019   Verlinda Smoker Referral Request     Patient requesting referral to Cardiology as she was seeing one in [place]. Depression Screening:  3 most recent PHQ Screens 3/11/2019   Little interest or pleasure in doing things Not at all   Feeling down, depressed, irritable, or hopeless Not at all   Total Score PHQ 2 0       Learning Assessment:  Learning Assessment 6/11/2019   PRIMARY LEARNER Patient   HIGHEST LEVEL OF EDUCATION - PRIMARY LEARNER  SOME COLLEGE   BARRIERS PRIMARY LEARNER NONE   CO-LEARNER CAREGIVER No   PRIMARY LANGUAGE ENGLISH   LEARNER PREFERENCE PRIMARY DEMONSTRATION   ANSWERED BY patient   RELATIONSHIP SELF       Abuse Screening:  Abuse Screening Questionnaire 3/11/2019   Do you ever feel afraid of your partner? N   Are you in a relationship with someone who physically or mentally threatens you? N   Is it safe for you to go home? Y       Fall Risk  Fall Risk Assessment, last 12 mths 3/11/2019   Able to walk? Yes   Fall in past 12 months? No           Coordination of Care:  1. Have you been to the ER, urgent care clinic since your last visit? Hospitalized since your last visit? no    2. Have you seen or consulted any other health care providers outside of the [address] [place] [address] since your last visit? Include any pap smears or colon screening. no      Advance Directive:  1. Do you have an advance directive in place? Patient Reply:yes      2. Per patient no changes to their ACP contact no.

## 2019-06-11 NOTE — PROGRESS NOTES
INTERNISTS [facility]:  6/11/2019, MRN: [id number]      Jocelyne Paredes is a 77 y.o. female and presents to clinic for Abnormal CT Scan (Patient states that there was a pulm nodule found on CT scan in Jan-2019) and Referral Request (Patient requesting referral to Cardiology as she was seeing one in [place]. )    Subjective: The patient is a 76-year-old female with history of breast cancer, hypertension, HLD, lumbar disc disease, pulmonary nodule, allergic rhinitis osteopenia per EHR, macular degeneration per EHR, OAB, GERD, cataracts, and B12 deficiency. 1.  Hypertension: Present for over 6 months. On losartan. No adverse side effects with taking this medication. BP is 134/69 today. She used to see a Cardiologist. She was told that \"my left ventricle was narrowing. \" She has never been diagnosed with CHF or had a cardiac catheterization. She had stress tests that were \"normal.\" She was \"put on the monitor\" for \"some time. \" She would get a \"check up\" every 6 months. She has a h/o heartburn and it seems that this was the original reason she was seeing Cardiology in the past. They had to rule out heart pathologies of her chest discomfort sx. Nexium relieved her sx. She takes Nexium prn.    2. HLD: On Crestor. No adverse side effects with taking this medication. 3.  OAB: Treated with Vesicare. No adverse side effects with taking this medication. She is symptom-free when taking this medicine. She used to see a Urologist in the past. She takes vesicare prn.     4.  History of Breast Cancer: On Arimidex since 2015. She has jt pain that she associates with taking this rx. She is in remission. S/p LN removal and a lumpectomy. No XRT was obtained. No IV chemotherapy. No breast pain/masses. 5. Pulmonary Nodule: She had an abdominal CT study earlier this year. Findings are listed below. There is an interval finding of a pulmonary nodule. She denies shortness of breath and chest pain symptoms.   She smokes cigarettes for a couple of years off and on when she was a teenager. 1/11/19 Abdomen/Pelvis CT:  4 x 5 mm pulmonary nodule in the left lower lobe and additional mild groundglass changes. Patient Active Problem List    Diagnosis Date Noted    Lumbar disc disease 06/11/2019    Allergic rhinitis 06/11/2019    History of breast cancer 12/05/2018    TMJ (temporomandibular joint disorder) 12/05/2018    Essential hypertension 12/05/2018    Osteopenia 12/05/2018    Overactive bladder 12/05/2018    Macular degeneration 12/05/2018    Cataract of both eyes 12/05/2018    B12 deficiency 12/05/2018       Current Outpatient Medications   Medication Sig Dispense Refill    acetaminophen (TYLENOL) 325 mg tablet Take  by mouth every four (4) hours as needed for Pain.  fluticasone propionate (FLONASE) 50 mcg/actuation nasal spray 2 Sprays by Both Nostrils route daily. 1 Bottle 2    loratadine (CLARITIN) 10 mg tablet Take 1 Tab by mouth daily as needed for Allergies. Cough, runny nose, itching 90 Tab 3    rosuvastatin (CRESTOR) 10 mg tablet Take 1 Tab by mouth nightly. 90 Tab 1    losartan (COZAAR) 25 mg tablet Take  by mouth daily.  anastrozole (ARIMIDEX) 1 mg tablet Take 1 mg by mouth daily.  celecoxib (CELEBREX) 200 mg capsule Take  by mouth as needed.  multivitamin (ONE A DAY) tablet Take 1 Tab by mouth daily.  cholecalciferol (VITAMIN D3) 1,000 unit cap Take  by mouth daily.  vit C/E/Zn/coppr/lutein/zeaxan (PRESERVISION AREDS-2 PO) Take  by mouth.  solifenacin (VESICARE) 5 mg tablet Take 5 mg by mouth as needed.  multivitamin/folic acid/biotin (HAIR-SKIN-NAILS, MV-FA-BIOTIN, PO) Take  by mouth.  ANASTROZOLE PO Take  by mouth.  losartan potassium (COZAAR PO) Take  by mouth.  cholecalciferol, vitamin D3, (VITAMIN D3) 2,000 unit tab Take  by mouth.  homeopathic drugs (HAIR AND SKIN PO) Take  by mouth.       folic acid/multivit-min/lutein (CENTRUM SILVER PO) Take  by mouth.  cyclobenzaprine (FLEXERIL) 5 mg tablet Take 1 Tab by mouth three (3) times daily as needed for Muscle Spasm(s). 20 Tab 0    amoxicillin-clavulanate (AUGMENTIN) 875-125 mg per tablet Take 1 Tab by mouth every twelve (12) hours. 20 Tab 0    menthol (BIOFREEZE, MENTHOL,) 4 % gel by Apply Externally route.  cyclobenzaprine (FLEXERIL) 10 mg tablet Take  by mouth three (3) times daily as needed for Muscle Spasm(s).  vit C/vit E ac/lut/copper/zinc (PRESERVISION LUTEIN PO) Take  by mouth. No Known Allergies    Past Medical History:   Diagnosis Date    Arthritis     Back pain of lumbar region with sciatica 12/5/2018    [facility] ([facility] [address]     Cancer ([facility] [address] 2015    breast cancer    Hypertension     Macular degeneration     Vitamin D deficiency        Past Surgical History:   Procedure Laterality Date    BREAST SURGERY PROCEDURE UNLISTED Right 2015    HX BREAST LUMPECTOMY Right     HX HERNIA REPAIR      HX HYSTERECTOMY  1985       Family History   Problem Relation Age of Onset    Cancer Mother         stomach and liver cancer, in her 62s    Heart Disease Father         MI age 46s    Cancer Sister         blood    Cancer Brother         leukemia    Cancer Sibling        Social History     Tobacco Use    Smoking status: Never Smoker    Smokeless tobacco: Never Used   Substance Use Topics    Alcohol use: No     Frequency: Never       ROS   Review of Systems   Constitutional: Negative for chills and fever. HENT: Negative for ear pain and sore throat. Eyes: Negative for blurred vision and pain. Respiratory: Negative for cough and shortness of breath. Cardiovascular: Negative for chest pain. Gastrointestinal: Negative for abdominal pain, blood in stool and melena. Genitourinary: Negative for dysuria and hematuria. Musculoskeletal: Negative for joint pain and myalgias. Skin: Negative for rash.    Neurological: Positive for dizziness (with changes in position). Negative for tingling, focal weakness and headaches. Endo/Heme/Allergies: Does not bruise/bleed easily. Psychiatric/Behavioral: Negative for substance abuse. Objective     Vitals:    06/11/19 1236   BP: 134/69   Pulse: 79   Resp: 16   Temp: 98.5 °F (36.9 °C)   TempSrc: Oral   SpO2: 99%   Weight: 140 lb (63.5 kg)   Height: 5' 4\" (1.626 m)   PainSc:   4   PainLoc: Leg       Physical Exam   Constitutional: She is oriented to person, place, and time and well-developed, well-nourished, and in no distress. HENT:   Head: Normocephalic and atraumatic. Right Ear: External ear normal.   Left Ear: External ear normal.   Nose: Nose normal.   Mouth/Throat: Oropharynx is clear and moist. No oropharyngeal exudate. Eyes: Conjunctivae and EOM are normal. Right eye exhibits no discharge. Left eye exhibits no discharge. No scleral icterus. Neck: Neck supple. Cardiovascular: Normal rate, regular rhythm, normal heart sounds and intact distal pulses. Exam reveals no gallop and no friction rub. No murmur heard. Pulmonary/Chest: Effort normal and breath sounds normal. No respiratory distress. She has no wheezes. She has no rales. Abdominal: Soft. Bowel sounds are normal. She exhibits no distension. There is no tenderness. There is no rebound and no guarding. Musculoskeletal: She exhibits no edema or tenderness (BUE). Lymphadenopathy:     She has no cervical adenopathy. Neurological: She is alert and oriented to person, place, and time. She exhibits normal muscle tone. Gait normal.   Skin: Skin is warm and dry. No erythema. Psychiatric: Affect normal.   Nursing note and vitals reviewed.       LABS   Data Review:   Lab Results   Component Value Date/Time    WBC 8.0 01/11/2019 08:42 PM    HGB 12.1 01/11/2019 08:42 PM    HCT 38.3 01/11/2019 08:42 PM    PLATELET 183 77/76/9583 08:42 PM    MCV 74.2 01/11/2019 08:42 PM       Lab Results   Component Value Date/Time    Sodium 140 01/11/2019 08:42 PM Potassium 3.9 01/11/2019 08:42 PM    Chloride 101 01/11/2019 08:42 PM    CO2 29 01/11/2019 08:42 PM    Anion gap 10 01/11/2019 08:42 PM    Glucose 108 (H) 01/11/2019 08:42 PM    BUN 15 01/11/2019 08:42 PM    Creatinine 1.00 01/11/2019 08:42 PM    BUN/Creatinine ratio 15 01/11/2019 08:42 PM    GFR est AA >60 01/11/2019 08:42 PM    GFR est non-AA 53 (L) 01/11/2019 08:42 PM    Calcium 9.9 01/11/2019 08:42 PM       Lab Results   Component Value Date/Time    Cholesterol, total 251 (H) 12/05/2018 03:04 PM    HDL Cholesterol 70 (H) 12/05/2018 03:04 PM    LDL, calculated 147.6 (H) 12/05/2018 03:04 PM    VLDL, calculated 33.4 12/05/2018 03:04 PM    Triglyceride 167 (H) 12/05/2018 03:04 PM    CHOL/HDL Ratio 3.6 12/05/2018 03:04 PM       No results found for: HBA1C, HGBE8, QHM4UYRJ, VCX4ZHRU, CRP1XXJU    Assessment/Plan:   1. Pulmonary Nodule Hx: She has a remote history of tobacco use in her teens. +H/o breast cancer. Her last mammogram was reassuring in December 2018.  -Placing a referral to Pulmonology. Will defer imaging recommendations to that team.    ORDERS:  - REFERRAL TO PULMONARY DISEASE    2. Essential hypertension: Stable. -Continue with Rx as prescribed. -Return to clinic for BP check. 3. Overactive bladder: Stable. -Continue with Rx.    4. History of breast cancer: In remission. On Arimidex.  -Continue with Rx as prescribed. - Requesting records. 5. Mixed hyperlipidemia: Stable. - Continue with Rx as prescribed. 6. History of chest pain: +Dizziness. +H/o GERD.  -EKG was done today. Findings are reassuring.  -I encouraged her to take her time when changing positions. I suspect her dizzy symptoms are from orthostatic hypotension  -Okay to continue with PPI therapy. - Requesting her previous Cardiology records. ORDERS:  - AMB POC EKG ROUTINE W/ 12 LEADS, INTER & REP    7.   Health Maintenance:  -We will do a Medicare wellness visit at her follow-up appointment.  -She will need labs ordered for later this year at her follow-up appointment. Health Maintenance Due   Topic Date Due    DTaP/Tdap/Td series (1 - Tdap) 12/28/1960    Shingrix Vaccine Age 50> (1 of 2) 12/28/1989    GLAUCOMA SCREENING Q2Y  12/28/2004    Bone Densitometry (Dexa) Screening  12/28/2004    Pneumococcal 65+ years (1 of 2 - PCV13) 12/28/2004    MEDICARE YEARLY EXAM  12/05/2018     Lab review: labs are reviewed in the EHR    I have discussed the diagnosis with the patient and the intended plan as seen in the above orders. The patient has received an after-visit summary and questions were answered concerning future plans. I have discussed medication side effects and warnings with the patient as well. I have reviewed the plan of care with the patient, accepted their input and they are in agreement with the treatment goals. All questions were answered. The patient understands the plan of care. Handouts provided today with above information. Pt instructed if symptoms worsen to call the office or report to the ED for continued care. Greater than 50% of the visit time was spent in counseling and/or coordination of care. Voice recognition was used to generate this report, which may have resulted in some phonetic based errors in grammar and contents. Even though attempts were made to correct all the mistakes, some may have been missed, and remained in the body of the document.           Sofiya Groves MD

## 2019-06-11 NOTE — PATIENT INSTRUCTIONS

## 2019-07-09 ENCOUNTER — TELEPHONE (OUTPATIENT)
Dept: INTERNAL MEDICINE CLINIC | Age: 79
End: 2019-07-09

## 2019-07-18 ENCOUNTER — HOSPITAL ENCOUNTER (OUTPATIENT)
Dept: BONE DENSITY | Age: 79
Discharge: HOME | End: 2019-07-18
Attending: INTERNAL MEDICINE
Payer: MEDICARE

## 2019-07-18 DIAGNOSIS — M81.0 OSTEOPOROSIS: ICD-10-CM

## 2019-07-18 PROCEDURE — 77080 DXA BONE DENSITY AXIAL: CPT

## 2019-07-23 ENCOUNTER — OFFICE VISIT (OUTPATIENT)
Dept: INTERNAL MEDICINE CLINIC | Age: 79
End: 2019-07-23

## 2019-07-23 ENCOUNTER — HOSPITAL ENCOUNTER (OUTPATIENT)
Dept: LAB | Age: 79
Discharge: HOME | End: 2019-07-23
Payer: MEDICARE

## 2019-07-23 ENCOUNTER — TELEPHONE (OUTPATIENT)
Dept: INTERNAL MEDICINE CLINIC | Age: 79
End: 2019-07-23

## 2019-07-23 VITALS
OXYGEN SATURATION: 97 % | HEIGHT: 64 IN | HEART RATE: 88 BPM | SYSTOLIC BLOOD PRESSURE: 121 MMHG | WEIGHT: 136.8 LBS | TEMPERATURE: 97.8 F | BODY MASS INDEX: 23.35 KG/M2 | RESPIRATION RATE: 12 BRPM | DIASTOLIC BLOOD PRESSURE: 65 MMHG

## 2019-07-23 DIAGNOSIS — E53.8 B12 DEFICIENCY: ICD-10-CM

## 2019-07-23 DIAGNOSIS — I10 ESSENTIAL HYPERTENSION: Primary | ICD-10-CM

## 2019-07-23 DIAGNOSIS — I10 ESSENTIAL HYPERTENSION: ICD-10-CM

## 2019-07-23 DIAGNOSIS — R73.9 HYPERGLYCEMIA: ICD-10-CM

## 2019-07-23 DIAGNOSIS — Z85.3 HISTORY OF BREAST CANCER: ICD-10-CM

## 2019-07-23 DIAGNOSIS — E78.2 MIXED HYPERLIPIDEMIA: ICD-10-CM

## 2019-07-23 DIAGNOSIS — Z00.00 INITIAL MEDICARE ANNUAL WELLNESS VISIT: ICD-10-CM

## 2019-07-23 DIAGNOSIS — Z71.89 ADVANCE CARE PLANNING: ICD-10-CM

## 2019-07-23 DIAGNOSIS — N30.00 ACUTE CYSTITIS WITHOUT HEMATURIA: ICD-10-CM

## 2019-07-23 DIAGNOSIS — R91.1 PULMONARY NODULE: ICD-10-CM

## 2019-07-23 LAB
ALBUMIN SERPL-MCNC: 4 G/DL (ref 3.4–5)
ALBUMIN/GLOB SERPL: 1.2 {RATIO} (ref 0.8–1.7)
ALP SERPL-CCNC: 92 U/L (ref 45–117)
ALT SERPL-CCNC: 24 U/L (ref 13–56)
ANION GAP SERPL CALC-SCNC: 3 MMOL/L (ref 3–18)
AST SERPL-CCNC: 13 U/L (ref 10–38)
BILIRUB SERPL-MCNC: 0.8 MG/DL (ref 0.2–1)
BUN SERPL-MCNC: 15 MG/DL (ref 7–18)
BUN/CREAT SERPL: 15 (ref 12–20)
CALCIUM SERPL-MCNC: 9.5 MG/DL (ref 8.5–10.1)
CHLORIDE SERPL-SCNC: 105 MMOL/L (ref 100–111)
CHOLEST SERPL-MCNC: 230 MG/DL
CO2 SERPL-SCNC: 31 MMOL/L (ref 21–32)
CREAT SERPL-MCNC: 0.99 MG/DL (ref 0.6–1.3)
GLOBULIN SER CALC-MCNC: 3.4 G/DL (ref 2–4)
GLUCOSE SERPL-MCNC: 85 MG/DL (ref 74–99)
HDLC SERPL-MCNC: 55 MG/DL (ref 40–60)
HDLC SERPL: 4.2 {RATIO} (ref 0–5)
LDLC SERPL CALC-MCNC: 132.6 MG/DL (ref 0–100)
LIPID PROFILE,FLP: ABNORMAL
POTASSIUM SERPL-SCNC: 4.2 MMOL/L (ref 3.5–5.5)
PROT SERPL-MCNC: 7.4 G/DL (ref 6.4–8.2)
SODIUM SERPL-SCNC: 139 MMOL/L (ref 136–145)
TRIGL SERPL-MCNC: 212 MG/DL (ref ?–150)
VIT B12 SERPL-MCNC: 1081 PG/ML (ref 211–911)
VLDLC SERPL CALC-MCNC: 42.4 MG/DL

## 2019-07-23 PROCEDURE — 36415 COLL VENOUS BLD VENIPUNCTURE: CPT

## 2019-07-23 PROCEDURE — 80053 COMPREHEN METABOLIC PANEL: CPT

## 2019-07-23 PROCEDURE — 83036 HEMOGLOBIN GLYCOSYLATED A1C: CPT

## 2019-07-23 PROCEDURE — 80061 LIPID PANEL: CPT

## 2019-07-23 PROCEDURE — 82607 VITAMIN B-12: CPT

## 2019-07-23 RX ORDER — NITROFURANTOIN (MACROCRYSTALS) 100 MG/1
100 CAPSULE ORAL 2 TIMES DAILY
COMMUNITY
End: 2019-08-16 | Stop reason: ALTCHOICE

## 2019-07-23 NOTE — PROGRESS NOTES
INTERNISTS OF [facility]:  7/23/2019, MRN: [id number]      Jairo Metcalf is a 77 y.o. female and presents to clinic for Annual Wellness Visit (ROOM  2)    Subjective: The patient is a 59-year-old female with history of breast cancer (on arimidex since 2015), hypertension, HLD, lumbar disc disease, pulmonary nodule, allergic rhinitis osteopenia per EHR, macular degeneration per EHR, OAB, GERD, cataracts, and B12 deficiency. 1.  Hypertension: On losartan. No adverse side effects of taking this medicine. BP is 121/65 today. Followed by Cardiology in the past.  I never received her previous Cardiology records. She states that in the past, she was told that her ventricle was narrowed. She stated that she has never had CHF or had a cardiac catheterization. She has had multiple stress test that were normal per her history. She states that she also had a Holter study but we do not have records. No chest pain symptoms since her last appointment. She has a history of an elevated blood glucose noted on a random BMP. 2. Pulmonary Nodule Hx: She was referred to Pulmonology at her last appointment given history of pulmonary nodules. She also has remote history of tobacco use in her teens. She is in remission for underlying breast cancer. Since her last appointment, she scheduled an apt to see him next months.     1/11/19 Abdomen/Pelvis CT:  4 x 5 mm pulmonary nodule in the left lower lobe and additional mild groundglass changes. 3. HLD: Present for over 6 months. On Crestor. +Myalgias off/on. Her LDL was in the 140s when last checked. 4. UTI: She was seen by her Oncologist a wk ago. She told her that she had dysuria - burning with urination. She was given a course of macrobid. No adverse side effects with this rx. No dysuria. No fever/chills or abdominal pain. 5.  B12 Deficiency: She has a history of B12 deficiency. She has not had any B12 since earlier this year. She is to get injections.   Her last level was elevated. Patient Active Problem List    Diagnosis Date Noted    Lumbar disc disease 06/11/2019    Allergic rhinitis 06/11/2019    History of breast cancer 12/05/2018    TMJ (temporomandibular joint disorder) 12/05/2018    Essential hypertension 12/05/2018    Osteopenia 12/05/2018    Overactive bladder 12/05/2018    Macular degeneration 12/05/2018    Cataract of both eyes 12/05/2018    B12 deficiency 12/05/2018       Current Outpatient Medications   Medication Sig Dispense Refill    acetaminophen (TYLENOL) 325 mg tablet Take  by mouth every four (4) hours as needed for Pain.  fluticasone propionate (FLONASE) 50 mcg/actuation nasal spray 2 Sprays by Both Nostrils route daily. 1 Bottle 2    loratadine (CLARITIN) 10 mg tablet Take 1 Tab by mouth daily as needed for Allergies. Cough, runny nose, itching 90 Tab 3    rosuvastatin (CRESTOR) 10 mg tablet Take 1 Tab by mouth nightly. 90 Tab 1    losartan (COZAAR) 25 mg tablet Take 25 mg by mouth daily.  anastrozole (ARIMIDEX) 1 mg tablet Take 1 mg by mouth daily.  celecoxib (CELEBREX) 200 mg capsule Take 200 mg by mouth as needed.  cholecalciferol (VITAMIN D3) 1,000 unit cap Take 1,000 Units by mouth daily.  menthol (BIOFREEZE, MENTHOL,) 4 % gel by Apply Externally route daily as needed.  solifenacin (VESICARE) 5 mg tablet Take 5 mg by mouth as needed.  homeopathic drugs (HAIR AND SKIN PO) Take  by mouth daily.  folic acid/multivit-min/lutein (CENTRUM SILVER PO) Take  by mouth daily.  vit C/vit E ac/lut/copper/zinc (PRESERVISION LUTEIN PO) Take  by mouth daily.  cyclobenzaprine (FLEXERIL) 5 mg tablet Take 1 Tab by mouth three (3) times daily as needed for Muscle Spasm(s). 20 Tab 0    nitrofurantoin (MACRODANTIN) 100 mg capsule Take 100 mg by mouth two (2) times a day.          No Known Allergies    Past Medical History:   Diagnosis Date    Arthritis     Back pain of lumbar region with sciatica 12/5/2018    [facility] [address]     Cancer ([facility] [address] 2015    breast cancer    Hypertension     Macular degeneration     Vitamin D deficiency        Past Surgical History:   Procedure Laterality Date    BREAST SURGERY PROCEDURE UNLISTED Right 2015    HX BREAST LUMPECTOMY Right     HX HERNIA REPAIR      HX HYSTERECTOMY  1985       Family History   Problem Relation Age of Onset    Cancer Mother         stomach and liver cancer, in her 62s    Heart Disease Father         MI age 46s    Cancer Sister         blood    Cancer Brother         leukemia    Cancer Sibling        Social History     Tobacco Use    Smoking status: Never Smoker    Smokeless tobacco: Never Used   Substance Use Topics    Alcohol use: No     Frequency: Never       ROS   Review of Systems   Constitutional: Negative for chills and fever. HENT: Negative for ear pain and sore throat. Eyes: Negative for blurred vision and pain. Respiratory: Negative for cough and shortness of breath. Cardiovascular: Negative for chest pain. Gastrointestinal: Negative for abdominal pain, blood in stool and melena. Genitourinary: Negative for dysuria and hematuria. Musculoskeletal: Positive for joint pain. Negative for myalgias. Skin: Negative for rash. Neurological: Negative for tingling, focal weakness and headaches. Endo/Heme/Allergies: Does not bruise/bleed easily. Psychiatric/Behavioral: Negative for substance abuse. Objective     Vitals:    07/23/19 1410   BP: 121/65   Pulse: 88   Resp: 12   Temp: 97.8 °F (36.6 °C)   TempSrc: Oral   SpO2: 97%   Weight: 136 lb 12.8 oz (62.1 kg)   Height: 5' 4\" (1.626 m)   PainSc:   6   PainLoc: Back       Physical Exam   Constitutional: She is oriented to person, place, and time and well-developed, well-nourished, and in no distress. HENT:   Head: Normocephalic and atraumatic.    Right Ear: External ear normal.   Left Ear: External ear normal.   Nose: Nose normal.   Mouth/Throat: Oropharynx is clear and moist. No oropharyngeal exudate. Eyes: Conjunctivae and EOM are normal. Right eye exhibits no discharge. Left eye exhibits no discharge. No scleral icterus. Neck: Neck supple. Cardiovascular: Normal rate, regular rhythm, normal heart sounds and intact distal pulses. Exam reveals no gallop and no friction rub. No murmur heard. Pulmonary/Chest: Effort normal and breath sounds normal. No respiratory distress. She has no wheezes. She has no rales. Abdominal: Soft. Bowel sounds are normal. She exhibits no distension. There is no tenderness. There is no rebound and no guarding. Musculoskeletal: She exhibits no edema or tenderness (Bue). Lymphadenopathy:     She has no cervical adenopathy. Neurological: She is alert and oriented to person, place, and time. She exhibits normal muscle tone. Gait normal.   Skin: Skin is warm and dry. No erythema. Psychiatric: Affect normal.   Nursing note and vitals reviewed.       LABS   Data Review:   Lab Results   Component Value Date/Time    WBC 8.0 01/11/2019 08:42 PM    HGB 12.1 01/11/2019 08:42 PM    HCT 38.3 01/11/2019 08:42 PM    PLATELET 180 59/29/0754 08:42 PM    MCV 74.2 01/11/2019 08:42 PM       Lab Results   Component Value Date/Time    Sodium 140 01/11/2019 08:42 PM    Potassium 3.9 01/11/2019 08:42 PM    Chloride 101 01/11/2019 08:42 PM    CO2 29 01/11/2019 08:42 PM    Anion gap 10 01/11/2019 08:42 PM    Glucose 108 (H) 01/11/2019 08:42 PM    BUN 15 01/11/2019 08:42 PM    Creatinine 1.00 01/11/2019 08:42 PM    BUN/Creatinine ratio 15 01/11/2019 08:42 PM    GFR est AA >60 01/11/2019 08:42 PM    GFR est non-AA 53 (L) 01/11/2019 08:42 PM    Calcium 9.9 01/11/2019 08:42 PM       Lab Results   Component Value Date/Time    Cholesterol, total 251 (H) 12/05/2018 03:04 PM    HDL Cholesterol 70 (H) 12/05/2018 03:04 PM    LDL, calculated 147.6 (H) 12/05/2018 03:04 PM    VLDL, calculated 33.4 12/05/2018 03:04 PM    Triglyceride 167 (H) 12/05/2018 03:04 PM    CHOL/HDL Ratio 3.6 12/05/2018 03:04 PM       No results found for: HBA1C, HGBE8, DSR2ZUBT, IZN0IGAR, MRD9RJLT    Assessment/Plan:   1. Essential hypertension  -Continue with Rx as prescribed. -Return to clinic for BP check.  -Checking a CMP, lipid panel, A1c, and a urine protein screen. ORDERS:  - METABOLIC PANEL, COMPREHENSIVE; Future  - LIPID PANEL; Future  - HEMOGLOBIN A1C W/O EAG; Future  - MICROALBUMIN, UR, RAND W/ MICROALB/CREAT RATIO; Future    2. Mixed hyperlipidemia:   -Continue with Crestor. We discussed how Crestor can cause myalgias. In order to know for sure if Crestor is causing myalgias - is to have her stop this medicine and see if her myalgias resolved. If her myalgias do not resolve, she was instructed to restart this medicine and to discuss Arimidex with her Oncololgy team.  This was all discussed with her today.  -Checking a CMP and a lipid panel. ORDERS:  - METABOLIC PANEL, COMPREHENSIVE; Future  - LIPID PANEL; Future    3. B12 Deficiency: Her last level was normal.  -Checking a B12 level per patient request.    ORDERS:  - VITAMIN B12; Future    4. UTI: Asymptomatic. Status post Macrobid course. Observation. 5.  Pulmonary Nodule:  -I encouraged her to follow-up with Pulmonology      Health Maintenance Due   Topic Date Due    DTaP/Tdap/Td series (1 - Tdap) 12/28/1960    Shingrix Vaccine Age 50> (1 of 2) 12/28/1989    GLAUCOMA SCREENING Q2Y  12/28/2004    Pneumococcal 65+ years (1 of 2 - PCV13) 12/28/2004    MEDICARE YEARLY EXAM  12/05/2018     Lab review: labs are reviewed in the EHR    I have discussed the diagnosis with the patient and the intended plan as seen in the above orders. The patient has received an after-visit summary and questions were answered concerning future plans. I have discussed medication side effects and warnings with the patient as well.  I have reviewed the plan of care with the patient, accepted their input and they are in agreement with the treatment goals. All questions were answered. The patient understands the plan of care. Handouts provided today with above information. Pt instructed if symptoms worsen to call the office or report to the ED for continued care. Greater than 50% of the visit time was spent in counseling and/or coordination of care. Voice recognition was used to generate this report, which may have resulted in some phonetic based errors in grammar and contents. Even though attempts were made to correct all the mistakes, some may have been missed, and remained in the body of the document.           Rob Dash MD

## 2019-07-23 NOTE — PROGRESS NOTES
Chief Complaint   Patient presents with    Annual Wellness Visit     ROOM  2       1. Have you been to the ER, urgent care clinic since your last visit? Hospitalized since your last visit? No    2. Have you seen or consulted any other health care providers outside of the [address] [place] [address] since your last visit? Include any pap smears or colon screening. No      Health Maintenance Due   Topic Date Due    DTaP/Tdap/Td series (1 - Tdap) 12/28/1960    Shingrix Vaccine Age 50> (1 of 2) 12/28/1989    GLAUCOMA SCREENING Q2Y  12/28/2004    Pneumococcal 65+ years (1 of 2 - PCV13) 12/28/2004    MEDICARE YEARLY EXAM  12/05/2018     This is the Initial Medicare Annual Wellness Exam    I have reviewed the patient's medical history in detail and updated the computerized patient record. History   The patient is a 41-year-old female with history of breast cancer, hypertension, HLD, lumbar disc disease, pulmonary nodule, allergic rhinitis osteopenia per EHR, macular degeneration per EHR, OAB, GERD, cataracts, and B12 deficiency. Health Maintenance History  Immunizations reviewed: Tdap over-due   Pneumovax: over-due   Flu: flu season is over at this time. Zoster: over-due      There is no immunization history on file for this patient. Colonoscopy: No hematochezia/melena. Requesting her last colonoscopy    Eye exam: Overdue per our records. No vision issues. Requesting her last eye exam    Mammo: Up to date. +H/o breast cancer, in remission.     Dexascan: Up to date    Pelvic/Pap: No vaginal bleeding          Past Medical History:   Diagnosis Date    Arthritis     Back pain of lumbar region with sciatica 12/5/2018    [facility] [address]     Cancer ([facility] [address] 2015    breast cancer    Hypertension     Macular degeneration     Vitamin D deficiency       Past Surgical History:   Procedure Laterality Date    BREAST SURGERY PROCEDURE UNLISTED Right 2015    HX BREAST LUMPECTOMY Right     HX HERNIA REPAIR      HX HYSTERECTOMY  1985     Current Outpatient Medications   Medication Sig Dispense Refill    acetaminophen (TYLENOL) 325 mg tablet Take  by mouth every four (4) hours as needed for Pain.  fluticasone propionate (FLONASE) 50 mcg/actuation nasal spray 2 Sprays by Both Nostrils route daily. 1 Bottle 2    loratadine (CLARITIN) 10 mg tablet Take 1 Tab by mouth daily as needed for Allergies. Cough, runny nose, itching 90 Tab 3    rosuvastatin (CRESTOR) 10 mg tablet Take 1 Tab by mouth nightly. 90 Tab 1    losartan (COZAAR) 25 mg tablet Take 25 mg by mouth daily.  anastrozole (ARIMIDEX) 1 mg tablet Take 1 mg by mouth daily.  celecoxib (CELEBREX) 200 mg capsule Take 200 mg by mouth as needed.  cholecalciferol (VITAMIN D3) 1,000 unit cap Take 1,000 Units by mouth daily.  menthol (BIOFREEZE, MENTHOL,) 4 % gel by Apply Externally route daily as needed.  solifenacin (VESICARE) 5 mg tablet Take 5 mg by mouth as needed.  homeopathic drugs (HAIR AND SKIN PO) Take  by mouth daily.  folic acid/multivit-min/lutein (CENTRUM SILVER PO) Take  by mouth daily.  vit C/vit E ac/lut/copper/zinc (PRESERVISION LUTEIN PO) Take  by mouth daily.  cyclobenzaprine (FLEXERIL) 5 mg tablet Take 1 Tab by mouth three (3) times daily as needed for Muscle Spasm(s). 20 Tab 0    nitrofurantoin (MACRODANTIN) 100 mg capsule Take 100 mg by mouth two (2) times a day.        No Known Allergies  Family History   Problem Relation Age of Onset    Cancer Mother         stomach and liver cancer, in her 62s    Heart Disease Father         MI age 46s    Cancer Sister         blood    Cancer Brother         leukemia    Cancer Sibling      Social History     Tobacco Use    Smoking status: Never Smoker    Smokeless tobacco: Never Used   Substance Use Topics    Alcohol use: No     Frequency: Never     Patient Active Problem List   Diagnosis Code    History of breast cancer Z85.3    TMJ (temporomandibular joint disorder) M26.609    Essential hypertension I10    Osteopenia M85.80    Overactive bladder N32.81    Macular degeneration H35.30    Cataract of both eyes H26.9    B12 deficiency E53.8    Lumbar disc disease M51.9    Allergic rhinitis J30.9       Depression Risk Factor Screening:     3 most recent PHQ Screens 3/11/2019   Little interest or pleasure in doing things Not at all   Feeling down, depressed, irritable, or hopeless Not at all   Total Score PHQ 2 0     Alcohol Risk Factor Screening:   Patient does not drink alcohol    Functional Ability and Level of Safety:   Hearing Loss  Hearing is good. Activities of Daily Living  The home contains: no safety equipment. Patient does total self care   She does not need assistance with ADLs/IADLs. Fall Risk  Fall Risk Assessment, last 12 mths 3/11/2019   Able to walk? Yes   Fall in past 12 months? No       Abuse Screen  Patient is not abused    ROS:  Gen: No fever/chlls  HEENT: No sore throat, eye pain, ear pain, or congestion. No HA  CV: No CP  Resp: No cough/SOB  GI: No abdominal pain  : No hematuria/dysuria  Derm: No rash  Neuro: No new paresthesias/weakness  Musc: +Myalgias off/on  Psych: No depression sx      Cognitive Screening   Evaluation of Cognitive Function:  Has your family/caregiver stated any concerns about your memory: no  Normal    Visit Vitals  /65 (BP 1 Location: Left arm, BP Patient Position: Sitting)   Pulse 88   Temp 97.8 °F (36.6 °C) (Oral)   Resp 12   Ht 5' 4\" (1.626 m)   Wt 136 lb 12.8 oz (62.1 kg)   SpO2 97%   BMI 23.48 kg/m²     General:  Alert, cooperative, no distress   Head:  Normocephalic, without obvious abnormality, atraumatic. Eyes:  Conjunctivae clear   Ears:  Clear external auditory canals   Nose: Nares normal. Septum midline. Mucosa normal. No drainage or sinus tenderness. Throat: Lips, mucosa, and tongue normal. Unremarkable oropharynx   Neck: Supple, symmetrical, trachea midline, no adenopathy.        Lungs: Clear to auscultation bilaterally. Heart:  Regular rate and rhythm, S1, S2 normal, no murmur, click, rub or gallop. Abdomen:   Soft, non-tender. Bowel sounds normal. No masses. Extremities: Extremities normal no edema. Pulses: +2 radial pulses b/l   Skin:  No rashes or lesions. Lymph nodes: Cervical LN normal.   Neurologic:  Psych: Stable gait  Euthymic     3/3 short item recall  Unremarkable clock drawing exercise      Patient Care Team   Patient Care Team:  Chloe Davis MD as PCP - General (Family Practice)  None    Assessment/Plan   Education and counseling provided:  Are appropriate based on today's review and evaluation    Diagnoses and all orders for this visit:    1. Hyperglycemia  -     METABOLIC PANEL, COMPREHENSIVE; Future  -     HEMOGLOBIN A1C W/O EAG; Future    2. Essential hypertension  -     METABOLIC PANEL, COMPREHENSIVE; Future  -     LIPID PANEL; Future  -     HEMOGLOBIN A1C W/O EAG; Future  -     MICROALBUMIN, UR, RAND W/ MICROALB/CREAT RATIO; Future    3. Mixed hyperlipidemia  -     METABOLIC PANEL, COMPREHENSIVE; Future  -     LIPID PANEL; Future        Health Maintenance Due   Topic Date Due    DTaP/Tdap/Td series (1 - Tdap) 12/28/1960    Shingrix Vaccine Age 50> (1 of 2) 12/28/1989    GLAUCOMA SCREENING Q2Y  12/28/2004    Pneumococcal 65+ years (1 of 2 - PCV13) 12/28/2004    MEDICARE YEARLY EXAM  12/05/2018     Health Maintenance:  - I encouraged her to get all recommended vaccinations. - She declined the pneumococcal vaccine (PCV 13)  - I encouraged her to get a formal eye exam every 2 yrs. Requesting her last formal eye exam.      Advance Care Planning (ACP) Provider Conversation Snapshot    Date of ACP Conversation: 07/23/19  Persons included in Conversation:  patient  Length of ACP Conversation in minutes:  <16 minutes (Non-Billable)    Authorized Decision Maker (if patient is incapable of making informed decisions):    This person is:   Healthcare Agent/Medical Power of  under Advance Directive - see below      Primary Decision Maker: Catrachita Mae - Other Relative - [phone]    Secondary Decision Maker: Danyel Calderon - Next of [facility] - [phone]        For Patients with Decision Making Capacity:   Values/Goals: Exploration of values, goals, and preferences if recovery is not expected, even with continued medical treatment in the event of:  Imminent death  Severe, permanent brain injury    Conversation Outcomes / Follow-Up Plan:   Reviewed existing Advance Directive . She has no changes to make to her pre-existing advance directive at this time.       Dr. Eri Henao  [facility]ists of [address], [address] [place] [address] Str.  Phone: [phone]  Fax: [phone]

## 2019-07-23 NOTE — PATIENT INSTRUCTIONS
Health Maintenance Due   Topic Date Due    DTaP/Tdap/Td series (1 - Tdap) 12/28/1960    Shingrix Vaccine Age 50> (1 of 2) 12/28/1989    GLAUCOMA SCREENING Q2Y  12/28/2004    Pneumococcal 65+ years (1 of 2 - PCV13) 12/28/2004    MEDICARE YEARLY EXAM  12/05/2018       Medicare Wellness Visit, Female     The best way to live healthy is to have a lifestyle where you eat a well-balanced diet, exercise regularly, limit alcohol use, and quit all forms of tobacco/nicotine, if applicable. Regular preventive services are another way to keep healthy. Preventive services (vaccines, screening tests, monitoring & exams) can help personalize your care plan, which helps you manage your own care. Screening tests can find health problems at the earliest stages, when they are easiest to treat. Abel Holguin follows the current, evidence-based guidelines published by the [facility] Jayme Barksdale (USPSTF) when recommending preventive services for our patients. Because we follow these guidelines, sometimes recommendations change over time as research supports it. (For example, mammograms used to be recommended annually. Even though Medicare will still pay for an annual mammogram, the newer guidelines recommend a mammogram every two years for women of average risk.)  Of course, you and your doctor may decide to screen more often for some diseases, based on your risk and your health status. Preventive services for you include:  - Medicare offers their members a free annual wellness visit, which is time for you and your primary care provider to discuss and plan for your preventive service needs. Take advantage of this benefit every year!  -All adults over the age of 72 should receive the recommended pneumonia vaccines. Current USPSTF guidelines recommend a series of two vaccines for the best pneumonia protection.   -All adults should have a flu vaccine yearly and a tetanus vaccine every 10 years.  All adults age 61 and older should receive a shingles vaccine once in their lifetime.    -A bone mass density test is recommended when a woman turns 65 to screen for osteoporosis. This test is only recommended one time, as a screening. Some providers will use this same test as a disease monitoring tool if you already have osteoporosis. -All adults age 38-68 who are overweight should have a diabetes screening test once every three years.   -Other screening tests and preventive services for persons with diabetes include: an eye exam to screen for diabetic retinopathy, a kidney function test, a foot exam, and stricter control over your cholesterol.   -Cardiovascular screening for adults with routine risk involves an electrocardiogram (ECG) at intervals determined by your doctor.   -Colorectal cancer screenings should be done for adults age 54-65 with no increased risk factors for colorectal cancer. There are a number of acceptable methods of screening for this type of cancer. Each test has its own benefits and drawbacks. Discuss with your doctor what is most appropriate for you during your annual wellness visit. The different tests include: colonoscopy (considered the best screening method), a fecal occult blood test, a fecal DNA test, and sigmoidoscopy. -Breast cancer screenings are recommended every other year for women of normal risk, age 54-69.  -Cervical cancer screenings for women over age 72 are only recommended with certain risk factors.   -All adults born between [facility] should be screened once for Hepatitis C.      Here is a list of your current Health Maintenance items (your personalized list of preventive services) with a due date:  Health Maintenance Due   Topic Date Due    DTaP/Tdap/Td  (1 - Tdap) 12/28/1960    Shingles Vaccine (1 of 2) 12/28/1989    Glaucoma Screening   12/28/2004    Pneumococcal Vaccine (1 of 2 - PCV13) 12/28/2004    Annual Well Visit  12/05/2018         Medicare Part B Preventive Services Limitations Recommendation Scheduled   Bone Mass Measurement  (age 72 & older, biennial) Requires diagnosis related to osteoporosis or estrogen deficiency. Biennial benefit unless patient has history of long-term glucocorticoid tx or baseline is needed because initial test was by other method This should be done at age 72 and again if on osteoporosis medication at 2-3 year intervals. Up to date   Cardiovascular Screening Blood Tests (every 5 years)  Total cholesterol, HDL, Triglycerides Order as a panel if possible We should check your cholesterol panel at least once every 5 years. Up to date   Colorectal Cancer Screening  -Fecal occult blood test (annual)  -Flexible sigmoidoscopy (5y)  -Screening colonoscopy (10y)  -Barium Enema  Due per your Gastroenterologist's recommendations. Discussed today   Counseling to Prevent Tobacco Use (up to 8 sessions per year)  - Counseling greater than 3 and up to 10 minutes  - Counseling greater than 10 minutes Patients must be asymptomatic of tobacco-related conditions to receive as preventive service Continue with smoking cessation    Diabetes Screening Tests (at least every 3 years, Medicare covers annually or at 6-month intervals for prediabetic patients)    Fasting blood sugar (FBS) or glucose tolerance test (GTT) Patient must be diagnosed with one of the following:  -Hypertension, Dyslipidemia, obesity, previous impaired FBS or GTT  Or any two of the following: overweight, FH of diabetes, age ? 72, history of gestational diabetes, birth of baby weighing more than 9 pounds Should be done yearly Up to date   Diabetes Self-Management Training (DSMT) (no USPSTF recommendation) Requires referral by treating physician for patient with diabetes or renal disease. 10 hours of initial DSMT session of no less than 30 minutes each in a continuous 12-month period. 2 hours of follow-up DSMT in subsequent years.  Not applicable Not applicable   Glaucoma Screening (no USPSTF recommendation) Diabetes mellitus, family history, , age 48 or over,  American, age 72 or over Continue with annual eye exams. Overdue per our records   Human Immunodeficiency Virus (HIV) Screening (annually for increased risk patients)  HIV-1 and HIV-2 by EIA, TREY, rapid antibody test, or oral mucosa transudate Patient must be at increased risk for HIV infection per USPSTF guidelines or pregnant. Tests covered annually for patients at increased risk. Pregnant patients may receive up to 3 test during pregnancy. Not applicable Not applicable   Medical Nutrition Therapy (MNT) (for diabetes or renal disease not recommended schedule) Requires referral by treating physician for patient with diabetes or renal disease. Can be provided in same year as diabetes self-management training (DSMT), and CMS recommends medical nutrition therapy take place after DSMT. Up to 3 hours for initial year and 2 hours in subsequent years. Not applicable Not applicable   Shingles Vaccination A shingles vaccine is also recommended once in a lifetime after age 61 Vaccination recommended for shingles vaccination. Overdue   Seasonal Influenza Vaccination (annually)  Continue with yearly \"flu\" shot annually Flu season has yet to begin this year   Pneumococcal Vaccination (once after 65)  Please receive this vaccination at age 72. You declined this vaccine today   Hepatitis B Vaccinations (if medium/high risk) Medium/high risk factors:  End-stage renal disease,  Hemophiliacs who received Factor VIII or IX concentrates, Clients of institutions for the mentally retarded, Persons who live in the same house as a HepB virus carrier, Homosexual men, Illicit injectable drug abusers. Not applicable Not applicable   Screening Mammography (biennial age 54-69) Annually (age 36 or over) You need a mammogram yearly to screen for breast cancer.  Up to date   Screening Pap Tests and Pelvic Examination (up to age 79 and after 79 if unknown history or abnormal study last 10 years) Every 24 months except high risk You need no Pap smear at this time. Not warranted at this time. Ultrasound Screening for Abdominal Aortic Aneurysm (AAA) (once) Patient must be referred through IPPE and not have had a screening for abdominal aortic aneurysm before under Medicare. Limited to patients who meet one of the following criteria:  - Men who are 73-68 years old and have smoked more than 100 cigarettes in their lifetime.  -Anyone with a FH of AAA  -Anyone recommended for screening by USPSTF Not applicable Not applicable          Body Mass Index: Care Instructions  Your Care Instructions    Body mass index (BMI) can help you see if your weight is raising your risk for health problems. It uses a formula to compare how much you weigh with how tall you are. · A BMI lower than 18.5 is considered underweight. · A BMI between 18.5 and 24.9 is considered healthy. · A BMI between 25 and 29.9 is considered overweight. A BMI of 30 or higher is considered obese. If your BMI is in the normal range, it means that you have a lower risk for weight-related health problems. If your BMI is in the overweight or obese range, you may be at increased risk for weight-related health problems, such as high blood pressure, heart disease, stroke, arthritis or joint pain, and diabetes. If your BMI is in the underweight range, you may be at increased risk for health problems such as fatigue, lower protection (immunity) against illness, muscle loss, bone loss, hair loss, and hormone problems. BMI is just one measure of your risk for weight-related health problems. You may be at higher risk for health problems if you are not active, you eat an unhealthy diet, or you drink too much alcohol or use tobacco products. Follow-up care is a key part of your treatment and safety. Be sure to make and go to all appointments, and call your doctor if you are having problems.  It's also a good idea to know your test results and keep a list of the medicines you take. How can you care for yourself at home? · Practice healthy eating habits. This includes eating plenty of fruits, vegetables, whole grains, lean protein, and low-fat dairy. · If your doctor recommends it, get more exercise. Walking is a good choice. Bit by bit, increase the amount you walk every day. Try for at least 30 minutes on most days of the week. · Do not smoke. Smoking can increase your risk for health problems. If you need help quitting, talk to your doctor about stop-smoking programs and medicines. These can increase your chances of quitting for good. · Limit alcohol to 2 drinks a day for men and 1 drink a day for women. Too much alcohol can cause health problems. If you have a BMI higher than 25  · Your doctor may do other tests to check your risk for weight-related health problems. This may include measuring the distance around your waist. A waist measurement of more than 40 inches in men or 35 inches in women can increase the risk of weight-related health problems. · Talk with your doctor about steps you can take to stay healthy or improve your health. You may need to make lifestyle changes to lose weight and stay healthy, such as changing your diet and getting regular exercise. If you have a BMI lower than 18.5  · Your doctor may do other tests to check your risk for health problems. · Talk with your doctor about steps you can take to stay healthy or improve your health. You may need to make lifestyle changes to gain or maintain weight and stay healthy, such as getting more healthy foods in your diet and doing exercises to build muscle. Where can you learn more? Go to http://marilynn-maciej.info/. Enter S176 in the search box to learn more about \"Body Mass Index: Care Instructions. \"  Current as of: March 28, 2019  Content Version: 12.1  © 6334-6899 Healthwise, Incorporated.  Care instructions adapted under license by [facility] [place] [facility] (which disclaims liability or warranty for this information). If you have questions about a medical condition or this instruction, always ask your healthcare professional. [address] any warranty or liability for your use of this information. Medicare Wellness Visit, Female     The best way to live healthy is to have a lifestyle where you eat a well-balanced diet, exercise regularly, limit alcohol use, and quit all forms of tobacco/nicotine, if applicable. Regular preventive services are another way to keep healthy. Preventive services (vaccines, screening tests, monitoring & exams) can help personalize your care plan, which helps you manage your own care. Screening tests can find health problems at the earliest stages, when they are easiest to treat. Abel Holguin follows the current, evidence-based guidelines published by the [facility] Jayme Barksdale ([facility]) when recommending preventive services for our patients. Because we follow these guidelines, sometimes recommendations change over time as research supports it. (For example, mammograms used to be recommended annually. Even though Medicare will still pay for an annual mammogram, the newer guidelines recommend a mammogram every two years for women of average risk.)  Of course, you and your doctor may decide to screen more often for some diseases, based on your risk and your health status. Preventive services for you include:  - Medicare offers their members a free annual wellness visit, which is time for you and your primary care provider to discuss and plan for your preventive service needs. Take advantage of this benefit every year!  -All adults over the age of 72 should receive the recommended pneumonia vaccines.  Current USPSTF guidelines recommend a series of two vaccines for the best pneumonia protection.   -All adults should have a flu vaccine yearly and a tetanus vaccine every 10 years. All adults age 61 and older should receive a shingles vaccine once in their lifetime.    -A bone mass density test is recommended when a woman turns 65 to screen for osteoporosis. This test is only recommended one time, as a screening. Some providers will use this same test as a disease monitoring tool if you already have osteoporosis. -All adults age 38-68 who are overweight should have a diabetes screening test once every three years.   -Other screening tests and preventive services for persons with diabetes include: an eye exam to screen for diabetic retinopathy, a kidney function test, a foot exam, and stricter control over your cholesterol.   -Cardiovascular screening for adults with routine risk involves an electrocardiogram (ECG) at intervals determined by your doctor.   -Colorectal cancer screenings should be done for adults age 54-65 with no increased risk factors for colorectal cancer. There are a number of acceptable methods of screening for this type of cancer. Each test has its own benefits and drawbacks. Discuss with your doctor what is most appropriate for you during your annual wellness visit. The different tests include: colonoscopy (considered the best screening method), a fecal occult blood test, a fecal DNA test, and sigmoidoscopy. -Breast cancer screenings are recommended every other year for women of normal risk, age 54-69.  -Cervical cancer screenings for women over age 72 are only recommended with certain risk factors.   -All adults born between [facility] should be screened once for Hepatitis C.      Here is a list of your current Health Maintenance items (your personalized list of preventive services) with a due date:  Health Maintenance Due   Topic Date Due    DTaP/Tdap/Td  (1 - Tdap) 12/28/1960    Shingles Vaccine (1 of 2) 12/28/1989    Glaucoma Screening   12/28/2004    Pneumococcal Vaccine (1 of 2 - PCV13) 12/28/2004    Annual Well Visit  12/05/2018

## 2019-07-23 NOTE — ACP (ADVANCE CARE PLANNING)
Advance Care Planning (ACP) Provider Conversation Snapshot    Date of ACP Conversation: 07/23/19  Persons included in Conversation:  patient  Length of ACP Conversation in minutes:  <16 minutes (Non-Billable)    Authorized Decision Maker (if patient is incapable of making informed decisions): This person is:   Healthcare Agent/Medical Power of  under Advance Directive - see below      Primary Decision Maker: Catrachita Mae - Other Relative - [phone]    Secondary Decision Maker: Jeaneth Abdi - Next [facility] - [phone]        For Patients with Decision Making Capacity:   Values/Goals: Exploration of values, goals, and preferences if recovery is not expected, even with continued medical treatment in the event of:  Imminent death  Severe, permanent brain injury    Conversation Outcomes / Follow-Up Plan:   Reviewed existing Advance Directive . She has no changes to make to her pre-existing advance directive at this time.       Dr. Logan Her  [facility] [address], [address] [place] [address] Str.  Phone: [phone]  Fax: [phone]

## 2019-07-24 LAB — HBA1C MFR BLD: 6.1 % (ref 4.2–5.6)

## 2019-08-04 PROBLEM — R73.02 IMPAIRED GLUCOSE TOLERANCE: Status: ACTIVE | Noted: 2019-08-04

## 2019-08-04 NOTE — PROGRESS NOTES
Her A1c is 6.1 and indicative of prediabetes. This is a new diagnosis. Please have her reduce her carb intake to prevent progression to type 2 diabetes. Please let her know that her kidney and liver function labs were normal.  Her total cholesterol was 230. Her triglycerides are 212. Her HDL was 55. Her LDL is 132. Her LDL was 147. Her B12 level is still elevated at 1081. She should stop OTC vitamin B12 level.      Dr. Shanon Schmidt  [facility] [address], [address] [place] [address] Str.  Phone: [phone]  Fax: [phone]

## 2019-08-05 ENCOUNTER — TELEPHONE (OUTPATIENT)
Dept: INTERNAL MEDICINE CLINIC | Age: 79
End: 2019-08-05

## 2019-08-05 NOTE — TELEPHONE ENCOUNTER
----- Message from Noam Omalley MD sent at 8/4/2019  5:34 PM EDT -----  Her A1c is 6.1 and indicative of prediabetes. This is a new diagnosis. Please have her reduce her carb intake to prevent progression to type 2 diabetes. Please let her know that her kidney and liver function labs were normal.  Her total cholesterol was 230. Her triglycerides are 212. Her HDL was 55. Her LDL is 132. Her LDL was 147. Her B12 level is still elevated at 1081. She should stop OTC vitamin B12 level.      Dr. Gagandeep Prince  [facility], [address] [place] [address], [facility] [address]  Phone: [phone]  Fax: [phone]

## 2019-08-16 ENCOUNTER — OFFICE VISIT (OUTPATIENT)
Dept: PULMONOLOGY | Age: 79
End: 2019-08-16

## 2019-08-16 VITALS
DIASTOLIC BLOOD PRESSURE: 58 MMHG | TEMPERATURE: 97.6 F | OXYGEN SATURATION: 99 % | HEART RATE: 82 BPM | WEIGHT: 136.5 LBS | HEIGHT: 64 IN | SYSTOLIC BLOOD PRESSURE: 122 MMHG | RESPIRATION RATE: 18 BRPM | BODY MASS INDEX: 23.31 KG/M2

## 2019-08-16 DIAGNOSIS — T17.310S: ICD-10-CM

## 2019-08-16 DIAGNOSIS — R05.3 CHRONIC COUGH: ICD-10-CM

## 2019-08-16 DIAGNOSIS — R12 HEARTBURN: ICD-10-CM

## 2019-08-16 DIAGNOSIS — R91.1 LUNG NODULE: Primary | ICD-10-CM

## 2019-08-16 DIAGNOSIS — J30.1 SEASONAL ALLERGIC RHINITIS DUE TO POLLEN: ICD-10-CM

## 2019-08-16 DIAGNOSIS — T17.908S ASPIRATION INTO AIRWAY, SEQUELA: ICD-10-CM

## 2019-08-16 RX ORDER — AZELASTINE 1 MG/ML
1 SPRAY, METERED NASAL 2 TIMES DAILY
Qty: 1 BOTTLE | Refills: 11 | Status: SHIPPED | OUTPATIENT
Start: 2019-08-16 | End: 2022-09-26

## 2019-08-16 RX ORDER — FLUTICASONE PROPIONATE 50 MCG
2 SPRAY, SUSPENSION (ML) NASAL DAILY
Qty: 1 BOTTLE | Refills: 11 | Status: SHIPPED | OUTPATIENT
Start: 2019-08-16 | End: 2022-09-26

## 2019-08-16 NOTE — LETTER
8/19/19 Patient: Zheng Jaime YOB: 1940 Date of Visit: 8/16/2019 [facility] [address] [address] [place] [address] VIA In Basket Dear Nikita Loya MD, Thank you for referring Ms. Deny Mendez to [address] [place] [address] for evaluation. My notes for this consultation are attached. If you have questions, please do not hesitate to call me. I look forward to following your patient along with you. Sincerely, Peter Draper MD

## 2019-08-16 NOTE — PROGRESS NOTES
Magi De La Torre presents today for   Chief Complaint   Patient presents with    Lung Nodule       Is someone accompanying this pt? Sister Lucho Mcgee    Is the patient using any DME equipment during OV? No     -DME Company no     Depression Screening:  3 most recent PHQ Screens 3/11/2019   Little interest or pleasure in doing things Not at all   Feeling down, depressed, irritable, or hopeless Not at all   Total Score PHQ 2 0       Learning Assessment:  Learning Assessment 6/11/2019   PRIMARY LEARNER Patient   HIGHEST LEVEL OF EDUCATION - PRIMARY LEARNER  SOME COLLEGE   BARRIERS PRIMARY LEARNER NONE   CO-LEARNER CAREGIVER No   PRIMARY LANGUAGE ENGLISH   LEARNER PREFERENCE PRIMARY DEMONSTRATION   ANSWERED BY patient   RELATIONSHIP SELF       Abuse Screening:  Abuse Screening Questionnaire 3/11/2019   Do you ever feel afraid of your partner? N   Are you in a relationship with someone who physically or mentally threatens you? N   Is it safe for you to go home? Y       Fall Risk  Fall Risk Assessment, last 12 mths 7/23/2019   Able to walk? Yes   Fall in past 12 months? No         Coordination of Care:  1. Have you been to the ER, urgent care clinic since your last visit? Hospitalized since your last visit? No    2. Have you seen or consulted any other health care providers outside of the [address] [place] [address] since your last visit? Include any pap smears or colon screening.  No     Medication variance in dosage/sig per patient as follows: no change

## 2019-08-16 NOTE — PROGRESS NOTES
[address] [facility], [address] [facility]  Pulmonary, Critical Care, and Sleep Medicine    Pulmonary Office Initial Consultation  Name: Karen Ramirez 77 y.o. female  MRN: [id number]  : 1940  Service Date: 19    Referring Provider: Oli Pereira [facility] [address]  [address] [facility], [facility] [address]  Chief Complaint:   Chief Complaint   Patient presents with    Lung Nodule     History of Present Illness:  (Patient presents with sister provides some of history)  Karen Ramirez is a 77 y.o. female, who presents to Pulmonary clinic referred for pulmonary nodule by her PCP. Patient reports that she saw her PCP back in January and was complaining of dysuria, and had symptoms of a UTI, had to go to the ER. Patient was given 3 days of Bactrim. CT abd was done to rule out pyelonephritis. It showed an incidental pulmonary nodule at that time. Patient was then referred to pulmonary for further work-up by her new PCP. Patient reports breast cancer diagnosed in  by mammogram.  Pt was treated with lumpectomy and sentinel lymph node bx. Pt since then is on Arimidex. Pt follows with Oncology - Dr. Narinder Anne. Pt reports an intentional 4lbs wt loss. Pt denies any dyspnea  Pt reports a cough that occurs in the morning. Intermediately productive - mostly dry. Pt also reports some cough when she eats or drinks on occasionally - a few times per week (per sister). Pt reports runny nose and itchy eyes, worse in the spring and summer. Pt takes PRN flonase and loratatine. Pt reports hx of heart burn -- she takes PRN OTC antacid. No other modifying factors. Reports some progressive worsening over the last year.   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 Past Medical Hx: I have personally reviewed medical hx  Past Medical History:   Diagnosis Date    Arthritis     Back pain of lumbar region with sciatica 12/5/2018    [facility] [address]     Cancer ([facility] [address] 2015    breast cancer    Hypertension     Macular degeneration     Vitamin D deficiency        Past Surgical Hx: I have personally reviewed surgical hx  Past Surgical History:   Procedure Laterality Date    BREAST SURGERY PROCEDURE UNLISTED Right 2015    HX BREAST LUMPECTOMY Right     HX HERNIA REPAIR      HX HYSTERECTOMY  1985       Family Hx: I have personally reviewed the family hx  Family History   Problem Relation Age of Onset    Cancer Mother         stomach and liver cancer, in her 62s    Heart Disease Father         MI age 46s    Cancer Sister         blood    Cancer Brother         leukemia    Cancer Sibling        Social Hx: I have personally reviewed the social hx.   Social History     Socioeconomic History    Marital status:      Spouse name: Not on file    Number of children: Not on file    Years of education: Not on file    Highest education level: Not on file   Occupational History    Not on file   Social Needs    Financial resource strain: Not on file    Food insecurity:     Worry: Not on file     Inability: Not on file    Transportation needs:     Medical: Not on file     Non-medical: Not on file   Tobacco Use    Smoking status: Never Smoker    Smokeless tobacco: Never Used   Substance and Sexual Activity    Alcohol use: No     Frequency: Never    Drug use: No    Sexual activity: Not Currently     Partners: Male   Lifestyle    Physical activity:     Days per week: Not on file     Minutes per session: Not on file    Stress: Not on file   Relationships    Social connections:     Talks on phone: Not on file     Gets together: Not on file     Attends Muslim service: Not on file     Active member of club or organization: Not on file     Attends meetings of clubs or organizations: Not on file     Relationship status: Not on file    Intimate partner violence:     Fear of current or ex partner: Not on file     Emotionally abused: Not on file     Physically abused: Not on file     Forced sexual activity: Not on file   Other Topics Concern    Not on file   Social History Narrative    ** Merged History Encounter **            Allergies: I have reviewed the allergy hx  No Known Allergies    Medications:  I have reviewed the patient's medications  Prior to Admission medications    Medication Sig Start Date End Date Taking? Authorizing Provider   acetaminophen (TYLENOL) 325 mg tablet Take  by mouth every four (4) hours as needed for Pain. Yes Provider, Historical   fluticasone propionate (FLONASE) 50 mcg/actuation nasal spray 2 Sprays by Both Nostrils route daily. 4/11/19  Yes Ryder Jackson MD   loratadine (CLARITIN) 10 mg tablet Take 1 Tab by mouth daily as needed for Allergies. Cough, runny nose, itching 3/11/19  Yes Rory Hills MD   rosuvastatin (CRESTOR) 10 mg tablet Take 1 Tab by mouth nightly. 12/6/18  Yes Rory Hills MD   losartan (COZAAR) 25 mg tablet Take 25 mg by mouth daily. Yes Provider, Historical   anastrozole (ARIMIDEX) 1 mg tablet Take 1 mg by mouth daily. Yes Provider, Historical   celecoxib (CELEBREX) 200 mg capsule Take 200 mg by mouth as needed. Yes Provider, Historical   cholecalciferol (VITAMIN D3) 1,000 unit cap Take 1,000 Units by mouth daily. Yes Provider, Historical   menthol (BIOFREEZE, MENTHOL,) 4 % gel by Apply Externally route daily as needed. Yes Provider, Historical   solifenacin (VESICARE) 5 mg tablet Take 5 mg by mouth as needed. Yes Provider, Historical   homeopathic drugs (HAIR AND SKIN PO) Take  by mouth daily. Yes Other, MD Phil   vit C/vit E ac/lut/copper/zinc (PRESERVISION LUTEIN PO) Take  by mouth daily.    Yes Other, MD Phil cyclobenzaprine (FLEXERIL) 5 mg tablet Take 1 Tab by mouth three (3) times daily as needed for Muscle Spasm(s). 11/1/18  Yes Chelsi Barnett PA   folic acid/multivit-min/lutein (CENTRUM SILVER PO) Take  by mouth daily. Other, MD Phil       Immunizations:  I have reviewed the patient's immunizations    There is no immunization history on file for this patient. Review of Systems:  A complete review of systems was performed as stated in the HPI, all others are negative. Objective:    Physical Exam:  /58 (BP 1 Location: Right arm, BP Patient Position: Sitting)   Pulse 82   Temp 97.6 °F (36.4 °C) (Oral)   Resp 18   Ht 5' 4\" (1.626 m)   Wt 61.9 kg (136 lb 8 oz)   SpO2 99%   BMI 23.43 kg/m²   Vitals were personally reviewed  Gen: no acute distress, pleasant and cooperative, sitting up in chair, able to climb to exam table w/o difficulty  HEENT: normocephalic/atraumatic, PERRLA, EOMI, no scleral icterus, nasal turbinates have no erythema, no nasal polyps, no oral lesions, fair dentition, Mallampati III  Neck: supple, trachea midline, no JVD, no cervical and supraclavicular adenopathy  Chest: no lesions, with even rise and fall, no pectus excavatum or flail chest  CVS: regular rate rhythm, S1/S2, no murmurs/rubs/gallops  Lungs: good air entry B/L, CTABL, no wheezes/rales/rhonchi  Back: no kyphosis or scoliosis  Abdomen: soft, nontender, bowel sounds present, no hepatosplenomegaly  Ext: no pitting edema B/L, no peripheral cyanosis or clubbing  Neuro: grossly normal, AAOx3, normal strength and coordination grossly, no focal deficits  Skin: no rashes, erythema, lesions  Psych: normal memory, thought content, and processing    Labs:   I have reviewed the patient's available labs  Lab Results   Component Value Date/Time    WBC 8.0 01/11/2019 08:42 PM    HGB 12.1 01/11/2019 08:42 PM    HCT 38.3 01/11/2019 08:42 PM    PLATELET 260 19/17/5271 08:42 PM    MCV 74.2 01/11/2019 08:42 PM     Lab Results Component Value Date/Time    Sodium 139 07/23/2019 03:04 PM    Potassium 4.2 07/23/2019 03:04 PM    Chloride 105 07/23/2019 03:04 PM    CO2 31 07/23/2019 03:04 PM    Anion gap 3 07/23/2019 03:04 PM    Glucose 85 07/23/2019 03:04 PM    BUN 15 07/23/2019 03:04 PM    Creatinine 0.99 07/23/2019 03:04 PM    BUN/Creatinine ratio 15 07/23/2019 03:04 PM    GFR est AA >60 07/23/2019 03:04 PM    GFR est non-AA 54 (L) 07/23/2019 03:04 PM    Calcium 9.5 07/23/2019 03:04 PM    Bilirubin, total 0.8 07/23/2019 03:04 PM    AST (SGOT) 13 07/23/2019 03:04 PM    Alk. phosphatase 92 07/23/2019 03:04 PM    Protein, total 7.4 07/23/2019 03:04 PM    Albumin 4.0 07/23/2019 03:04 PM    Globulin 3.4 07/23/2019 03:04 PM    A-G Ratio 1.2 07/23/2019 03:04 PM    ALT (SGPT) 24 07/23/2019 03:04 PM       Imaging:  I have personally reviewed patient's imaging as follows--CT abdomen/pelvis without contrast from 1/11/2019 shows a small subcentimeter nodule in the left lower lobe along the periphery. Otherwise lung bases are clear bilaterally, without mass, effusion, infiltrate. PFTs: None on file    TTE:  I have reviewed the patient's TTE results  No results found for this or any previous visit. Assessment and Plan:  77 y.o. female with:    Impression:  1. Incidental pulmonary nodule: Seen on CT abdomen pelvis from 1/11/2019. Patient is a lifelong non-smoker which makes her low risk. Patient does have a history of breast cancer diagnosed in 2015, currently on a room next therapy. No other lesions seen in the lung bases. 2.  Chronic cough: Etiology likely multifactorial, patient does have cough secondary to aspiration. Patient also likely has cough due to allergic rhinitis. 3.  Aspiration/dysphagia: Patient sister reports that patient developed coughing when eating or drinking. 4.  Allergic rhinitis  5. GERD/LPR    Plan:  -Reviewed CT abdomen with the patient and her sister in the visit today. Reviewed potential etiologies. Reviewed Fleischner Society criteria, indicating that patient is low risk, and she may consider optional CT scan in 1 year. However we first need to obtain full CT chest to rule out other lesions.  -Start patient on Flonase 2 sprays to each nostril once daily, Astelin nasal spray 1 spray to each nostril twice daily. Continue loratidine 10 mg daily  -Offered patient work-up for dysphagia, will start with modified barium swallow.  -Due to symptoms of aspiration, will refer patient to gastroenterology  -Counseled patient regarding aspiration lifestyle precautions  -Counseled patient remain active    Follow-up and Dispositions    · Return in about 3 months (around 11/16/2019).        Orders Placed This Encounter    CT CHEST WO CONT    XR SWALLOW FUNC VIDEO    REFERRAL TO GASTROENTEROLOGY    azelastine (ASTELIN) 137 mcg (0.1 %) nasal spray    fluticasone propionate (FLONASE) 50 mcg/actuation nasal spray       Juliette Echols MD/MPH     Pulmonary, Critical Care Medicine  [facility]

## 2019-08-27 ENCOUNTER — HOSPITAL ENCOUNTER (OUTPATIENT)
Dept: CT IMAGING | Age: 79
Discharge: HOME | End: 2019-08-27
Attending: INTERNAL MEDICINE
Payer: MEDICARE

## 2019-08-27 DIAGNOSIS — R91.1 LUNG NODULE: ICD-10-CM

## 2019-08-27 DIAGNOSIS — R05.3 CHRONIC COUGH: ICD-10-CM

## 2019-08-27 PROCEDURE — 71250 CT THORAX DX C-: CPT

## 2019-09-09 ENCOUNTER — HOSPITAL ENCOUNTER (OUTPATIENT)
Dept: GENERAL RADIOLOGY | Age: 79
Discharge: HOME | End: 2019-09-09
Attending: INTERNAL MEDICINE
Payer: MEDICARE

## 2019-09-09 DIAGNOSIS — R05.3 CHRONIC COUGH: ICD-10-CM

## 2019-09-09 DIAGNOSIS — T17.310S: ICD-10-CM

## 2019-09-09 DIAGNOSIS — T17.908S ASPIRATION INTO AIRWAY, SEQUELA: ICD-10-CM

## 2019-09-09 PROCEDURE — 92611 MOTION FLUOROSCOPY/SWALLOW: CPT

## 2019-09-09 PROCEDURE — 74230 X-RAY XM SWLNG FUNCJ C+: CPT

## 2019-09-09 PROCEDURE — 74011000255 HC RX REV CODE- 255: Performed by: INTERNAL MEDICINE

## 2019-09-09 RX ADMIN — BARIUM SULFATE 30 ML: 400 PASTE ORAL at 13:38

## 2019-09-09 RX ADMIN — BARIUM SULFATE 700 MG: 700 TABLET ORAL at 13:38

## 2019-09-09 RX ADMIN — BARIUM SULFATE 30 G: 960 POWDER, FOR SUSPENSION ORAL at 13:37

## 2019-09-09 NOTE — PROGRESS NOTES
Outpatient Modified Barium Swallow Evaluation    Patient: Mima Alejo (89 y.o. female)  Date: 9/9/2019  Primary Diagnosis: Gastric contents in larynx causing asphyxiation, sequela [T17.310S]  Chronic cough [R05]  Aspiration into airway, sequela [T17.908S]  Precautions: Aspiration       Recommend  Regular diet with thin liquids  Meds one at a time (whole in applesauce or pudding for comfort)   General safe swallow precautions     Videofluoroscopy Results  MBS completed with pt seated upright in flouro chair. Pt reporting \"burning in throat\" with swallowing at times. Oropharyngeal swallow mechanics within functional limits. Pt tolerating all consistencies presented (thin +/- straw, pudding, regular solids and 13 mm Ba pill with thin liquid wash) with positive airway protection noted across multiple trials. Pt demo adequate oral prep phase, timely swallow initiation, positive laryngeal elevation/adduction, positive epiglottic tilt and positive pharyngeal clearance across all trials. Pt became anxious with pill administration with eventual ejection of bolus. Pt safe for regular diet with thin liquids with meds as tolerated. May benefit from meds one at a time and whole in pudding or applesauce for improved comfort as needed. Results/recommendations discussed with pt with video feedback. Pt able to verbalize understanding. Video Flouroscopic Procedures  [x] Lateral View   [] A-P View [] Scanned to level of Sternum    [x] Seated at 90 deg.   [] Other:    Presentation:    [x] Spoon   [x] Cup   [x] Straw   [] Syringe   [x] Consecutive Swallows  [] Other:     Consistencies:   [x] Ba+ liquid    [] Ba+ liquid (nectar)    [] Ba+ liquid (honey)    [x] Ba+ pudding  [x] Ba+ cookie  [x] 13 mm Ba pill with thin Ba wash    Treatment Techniques Attempted: n/a  [] Head Turn: [] Right [] Left     [] Head Tilt: [] Right [] Left     [] Chin Down:  [] Small Sips/bites:  [] Effortful swallow:  [] Double swallow:  [] Other:    Results  Dysphagia Present:     [] Yes  [x] No    Aspiration:   [] Yes    [x] No  [] At Risk     Cough: [] Yes      [] No     Penetration:   [] Yes    [x] No     Cough: [] Yes      [] No   [] Flash/trace   [] Mod   [] To Chords          Motility Problems with: n/a  [] Lip Closure:    [] Mastication:   [] Bolus Formation/control:   [] A-P Transport:  [] Tongue Base Retraction:  [] Swallow Response (delayed):  [] Velopharyngeal Closure:  [] Pharyngeal Aspirations:  [] Laryngeal Elevation/adduction:  [] Epiglottic Inversion:  [] Pharyngeal motility/sensation:  [] Cricopharyngeal Relaxation:  [] Esophageal Peristalsis:  [] Other:    Timing of Aspiration/Penetration: n/a  [] Before Swallow:  [] During Swallow:  [] After Swallow:    Transit Time Delay:  [x] >1 Second  Oral (with pill; however, suspect related to anxiety)   [] >1 Second Pharyngeal  [] >20 Second Esophageal     Residuals: n/a  [] Vallecula:    [] Mild  [] Mod  [] Severe  [] Pyriform Sinus:   [] Mild  [] Mod  [] Severe  [] Posterior Pharyngeal Wall:  [] Mild  [] Mod  [] Severe    Thank you for this referral,   Matilda Bentley M.S., [address] [facility]  Speech-Language Pathologist

## 2019-10-21 RX ORDER — LOSARTAN POTASSIUM 25 MG/1
25 TABLET ORAL DAILY
Qty: 90 TAB | Refills: 2 | Status: SHIPPED | OUTPATIENT
Start: 2019-10-21 | End: 2021-01-14 | Stop reason: SDUPTHER

## 2019-10-21 NOTE — TELEPHONE ENCOUNTER
Last Visit: 7/23/19 with MD Wanda Roman  Next Appointment: 1/29/20 with MD Wanda Roman    Requested Prescriptions     Pending Prescriptions Disp Refills    losartan (COZAAR) 25 mg tablet 90 Tab 2     Sig: Take 1 Tab by mouth daily.

## 2019-11-20 ENCOUNTER — OFFICE VISIT (OUTPATIENT)
Dept: PULMONOLOGY | Age: 79
End: 2019-11-20

## 2019-11-20 VITALS
BODY MASS INDEX: 23.05 KG/M2 | SYSTOLIC BLOOD PRESSURE: 144 MMHG | WEIGHT: 135 LBS | HEIGHT: 64 IN | HEART RATE: 81 BPM | TEMPERATURE: 98.5 F | DIASTOLIC BLOOD PRESSURE: 73 MMHG | RESPIRATION RATE: 16 BRPM | OXYGEN SATURATION: 99 %

## 2019-11-20 DIAGNOSIS — R05.3 CHRONIC COUGH: ICD-10-CM

## 2019-11-20 DIAGNOSIS — R91.1 LUNG NODULE: Primary | ICD-10-CM

## 2019-11-20 DIAGNOSIS — J31.0 CHRONIC RHINITIS: ICD-10-CM

## 2019-11-20 DIAGNOSIS — K21.9 LARYNGOPHARYNGEAL REFLUX (LPR): ICD-10-CM

## 2019-11-20 NOTE — LETTER
11/22/19 Patient: Michele Araujo YOB: 1940 Date of Visit: 11/20/2019 Sobia Harmon [facility] [address] [address] [address] VIA In Basket Dear Sobia Harmon MD, Thank you for referring Ms. Otilia Lombardi to [address] [place] [address] for evaluation. My notes for this consultation are attached. If you have questions, please do not hesitate to call me. I look forward to following your patient along with you. Sincerely, Ganesh Collins MD

## 2019-11-20 NOTE — PROGRESS NOTES
[address] [place] [address], [facility] [address] [address] [facility]  Pulmonary, Critical Care, and Sleep Medicine    Pulmonary Office Followup  Name: Alonso Martinez 77 y.o. female  MRN: [id number]  : 1940  Service Date: 19  Chief Complaint:   Chief Complaint   Patient presents with    Results      Chest CT      History of Present Illness:  Alonso Martinez is a 77 y.o. female, who presents to Pulmonary clinic for followup of cough. Pt was last seen on 19. In the interval, pt rpeorts her cough decreased some after completing the allergy medications. Pt reports cough came back some but not as intense, nonproductive, no other modifying factors, no temporal factors. Pt reports ongoing heartburn which happens a few times per month. Alleviated with OTC antacids, no other modifying factors. Modified barium swallow was negative. Denies fevers, chills, night sweats, nausea, vomiting, diarrhea, angina, hemoptysis, LE swelling.     Past Medical History:   Diagnosis Date    Arthritis     Back pain of lumbar region with sciatica 2018    [facility]     Cancer ([facility] [address] [facility] 2015    breast cancer    Hypertension     Macular degeneration     Vitamin D deficiency      Past Surgical History:   Procedure Laterality Date    BREAST SURGERY PROCEDURE UNLISTED Right 2015    HX BREAST LUMPECTOMY Right     HX HERNIA REPAIR      HX HYSTERECTOMY  1985     Family History   Problem Relation Age of Onset    Cancer Mother         stomach and liver cancer, in her 62s    Heart Disease Father         MI age 46s    Cancer Sister         blood    Cancer Brother         leukemia    Cancer Sibling      Social History     Socioeconomic History    Marital status:      Spouse name: Not on file    Number of children: Not on file    Years of education: Not on file    Highest education level: Not on file   Occupational History    Not on file   Social Needs    Financial resource strain: Not on file    Food insecurity:     Worry: Not on file     Inability: Not on file    Transportation needs:     Medical: Not on file     Non-medical: Not on file   Tobacco Use    Smoking status: Never Smoker    Smokeless tobacco: Never Used   Substance and Sexual Activity    Alcohol use: No     Frequency: Never    Drug use: No    Sexual activity: Not Currently     Partners: Male   Lifestyle    Physical activity:     Days per week: Not on file     Minutes per session: Not on file    Stress: Not on file   Relationships    Social connections:     Talks on phone: Not on file     Gets together: Not on file     Attends Baptism service: Not on file     Active member of club or organization: Not on file     Attends meetings of clubs or organizations: Not on file     Relationship status: Not on file    Intimate partner violence:     Fear of current or ex partner: Not on file     Emotionally abused: Not on file     Physically abused: Not on file     Forced sexual activity: Not on file   Other Topics Concern    Not on file   Social History Narrative    ** Merged History Encounter **            Allergies: I have reviewed the allergy hx  No Known Allergies    Medications:  I have reviewed the patient's medications  Prior to Admission medications    Medication Sig Start Date End Date Taking? Authorizing Provider   losartan (COZAAR) 25 mg tablet Take 1 Tab by mouth daily. 10/21/19  Yes Yaneth Proctor MD   acetaminophen (TYLENOL) 325 mg tablet Take  by mouth every four (4) hours as needed for Pain. Yes Provider, Historical   rosuvastatin (CRESTOR) 10 mg tablet Take 1 Tab by mouth nightly. 12/6/18  Yes Ritu Banegas, Mary Cardoza MD   anastrozole (ARIMIDEX) 1 mg tablet Take 1 mg by mouth daily. Yes Provider, Historical   cholecalciferol (VITAMIN D3) 1,000 unit cap Take 1,000 Units by mouth daily. Yes Provider, Historical   homeopathic drugs (HAIR AND SKIN PO) Take  by mouth daily. Yes Other, MD Phil   folic acid/multivit-min/lutein (CENTRUM SILVER PO) Take  by mouth daily. Yes Other, MD Phil   vit C/vit E ac/lut/copper/zinc (PRESERVISION LUTEIN PO) Take  by mouth daily. Yes Other, MD Phil   cyclobenzaprine (FLEXERIL) 5 mg tablet Take 1 Tab by mouth three (3) times daily as needed for Muscle Spasm(s). 11/1/18  Yes Mando Barnett PA   azelastine (ASTELIN) 137 mcg (0.1 %) nasal spray 1 [place] by Both Nostrils route two (2) times a day. Use in each nostril as directed 8/16/19   Carlie Palomino MD   fluticasone propionate (FLONASE) 50 mcg/actuation nasal spray 2 Sprays by Both Nostrils route daily. 8/16/19   Carlie Palomino MD   loratadine (CLARITIN) 10 mg tablet Take 1 Tab by mouth daily as needed for Allergies. Cough, runny nose, itching 3/11/19   Rudolph Bahena MD   celecoxib (CELEBREX) 200 mg capsule Take 200 mg by mouth as needed. Provider, Historical   menthol (BIOFREEZE, MENTHOL,) 4 % gel by Apply Externally route daily as needed. Provider, Historical   solifenacin (VESICARE) 5 mg tablet Take 5 mg by mouth as needed. Provider, Historical       Immunizations:  I have reviewed the patient's immunizations    There is no immunization history on file for this patient. Review of Systems:  A complete review of systems was performed as stated in the HPI, all others are negative.       Objective:    Physical Exam:  /73 (BP 1 Location: Left arm, BP Patient Position: Sitting)   Pulse 81   Temp 98.5 °F (36.9 °C) (Oral)   Resp 16   Ht 5' 4\" (1.626 m)   Wt 61.2 kg (135 lb)   SpO2 99% Comment: RA Rest  BMI 23.17 kg/m²   Vitals were personally reviewed  Gen: no acute distress, pleasant and cooperative, sitting up in chair, able to climb to exam table w/o difficulty  HEENT: normocephalic/atraumatic, PERRLA, EOMI, no scleral icterus, no oral lesions, fair dentition, Mallampati III  Neck: supple, trachea midline, no JVD, no cervical and supraclavicular adenopathy  CVS: regular rate rhythm, S1/S2, no murmurs/rubs/gallops  Lungs: good air entry B/L, CTABL, no wheezes/rales/rhonchi  Abdomen: soft, nontender, bowel sounds present, no hepatosplenomegaly  Ext: no pitting edema B/L, no peripheral cyanosis or clubbing  Neuro: grossly normal, AAOx3, normal strength and coordination grossly, no focal deficits  Skin: no rashes, erythema, lesions  Psych: normal memory, thought content, and processing    Labs: I have reviewed the patient's available labs  Lab Results   Component Value Date/Time    WBC 8.0 01/11/2019 08:42 PM    HGB 12.1 01/11/2019 08:42 PM    HCT 38.3 01/11/2019 08:42 PM    PLATELET 660 08/42/0749 08:42 PM    MCV 74.2 01/11/2019 08:42 PM     Lab Results   Component Value Date/Time    Sodium 139 07/23/2019 03:04 PM    Potassium 4.2 07/23/2019 03:04 PM    Chloride 105 07/23/2019 03:04 PM    CO2 31 07/23/2019 03:04 PM    Anion gap 3 07/23/2019 03:04 PM    Glucose 85 07/23/2019 03:04 PM    BUN 15 07/23/2019 03:04 PM    Creatinine 0.99 07/23/2019 03:04 PM    BUN/Creatinine ratio 15 07/23/2019 03:04 PM    GFR est AA >60 07/23/2019 03:04 PM    GFR est non-AA 54 (L) 07/23/2019 03:04 PM    Calcium 9.5 07/23/2019 03:04 PM    Bilirubin, total 0.8 07/23/2019 03:04 PM    AST (SGOT) 13 07/23/2019 03:04 PM    Alk. phosphatase 92 07/23/2019 03:04 PM    Protein, total 7.4 07/23/2019 03:04 PM    Albumin 4.0 07/23/2019 03:04 PM    Globulin 3.4 07/23/2019 03:04 PM    A-G Ratio 1.2 07/23/2019 03:04 PM    ALT (SGPT) 24 07/23/2019 03:04 PM       Imaging:  I have personally reviewed patient's imaging as follows--CT chest from 8/27/19 shows clear lung fields B/L with one subcentimeter nodule, well circumscribed, no other masses, effusions, infiltrates, consolidations.   Official report per Radiology:  CT Results (most recent):  Results from Hospital Encounter encounter on 08/27/19   CT CHEST WO CONT    Narrative EXAM:  CT Chest without Contrast.             CLINICAL INDICATION:  Lung nodules. Chronic cough. COMPARISON:  No prior chest CTs available. CT abdomen-pelvis 01/11/19. TECHNIQUE:      - Helically scanned volumetric axial sections of the chest are obtained without  IV contrast administration. Coronal and sagittal multiplanar reconstruction  images are generated for improved anatomic delineation.  - Radiation dose optimization techniques are utilized as appropriate to the  exam, with combination of automated exposure control, adjustment of the mA  and/or kV according to patient's size (Including appropriate matching for  site-specific examinations), or use of iterative reconstruction technique. FINDINGS:     Lungs:     - 0.4 x 0.2 cm left lower lobe nodule (axial #30). 0.7 x 0.4 cm on 01/11/19.  - Scarring or subsegmental atelectatic changes in the lower lobe bilaterally and  in the right middle lobe. - No dominant lung mass or suspicious new lung nodule is detected in both lungs. - No infiltrate or consolidation is identified. Pleura:  No significant pleural effusion is detected bilaterally. Mediastinum, Therese:  No adenopathy. Base of Neck, Axillae:  No adenopathy. Esophagus:  Unremarkable. Abdomen: The visualized portions of the upper abdomen are unremarkable. Bones:  No acute findings. Impression IMPRESSION:              1.  Left lower lobe nodule, distinct interval decrease in size compared to  01/11/19.  2.  Scarring or atelectatic changes in the lower lung zones bilaterally. PFTs: None on file    TTE:  I have reviewed the patient's TTE results  No results found for this or any previous visit. Assessment and Plan:  77 y.o. female with:    Impression:  1. Incidental pulmonary nodule: Seen on CT abdomen pelvis from 1/11/2019. Decreased in size on repeat CT on 8/27/19. Patient is a lifelong non-smoker which makes her low risk. Patient does have a history of breast cancer diagnosed in 2015, currently on Arimidex therapy.   No other lesions seen in the lung bases. 2.  Chronic cough: Etiology likely multifactorial, allergies vs LPR. 3.  Allergic rhinitis  4. GERD/LPR    Plan:  -Reviewed CT chest with patient. Pt would like repeat surveillance since nodule has persisted although it has decreased in size. Will repeat in 12 months. If unchanged, then no further followup required.   -Restart Flonase 2 sprays to each nostril once daily, Astelin nasal spray 1 spray to each nostril twice daily.  -Continue loratidine 10 mg daily  -Advised to followup with GI with regards to LPR  -Counseled patient regarding aspiration lifestyle precautions  -Counseled patient remain active    Orders Placed This Encounter    CT CHEST WO EMELIA Geller MD/MP[facility]     Pulmonary, [address] [place] [address] [facility]

## 2019-11-20 NOTE — PROGRESS NOTES
Bishop Boucher presents today for   Chief Complaint   Patient presents with    Results      Chest CT        Is someone accompanying this pt? No    Is the patient using any DME equipment during OV? No    -DME Company None    Depression Screening:  3 most recent PHQ Screens 8/16/2019   Little interest or pleasure in doing things Not at all   Feeling down, depressed, irritable, or hopeless Not at all   Total Score PHQ 2 0       Learning Assessment:  Learning Assessment 6/11/2019   PRIMARY LEARNER Patient   HIGHEST LEVEL OF EDUCATION - PRIMARY LEARNER  SOME COLLEGE   BARRIERS PRIMARY LEARNER NONE   CO-LEARNER CAREGIVER No   PRIMARY LANGUAGE ENGLISH   LEARNER PREFERENCE PRIMARY DEMONSTRATION   ANSWERED BY patient   RELATIONSHIP SELF       Abuse Screening:  Abuse Screening Questionnaire 3/11/2019   Do you ever feel afraid of your partner? N   Are you in a relationship with someone who physically or mentally threatens you? N   Is it safe for you to go home? Y       Fall Risk  Fall Risk Assessment, last 12 mths 8/16/2019   Able to walk? Yes   Fall in past 12 months? Yes   Fall with injury? No   Number of falls in past 12 months 1   Fall Risk Score 1         Coordination of Care:  1. Have you been to the ER, urgent care clinic since your last visit? Hospitalized since your last visit? No    2. Have you seen or consulted any other health care providers outside of the [address] [place] [address] since your last visit? Include any pap smears or colon screening.  No

## 2019-12-09 ENCOUNTER — HOSPITAL ENCOUNTER (OUTPATIENT)
Age: 79
Discharge: HOME | End: 2019-12-09
Attending: PHYSICIAN ASSISTANT
Payer: MEDICARE

## 2019-12-09 DIAGNOSIS — M54.16 LUMBAR RADICULOPATHY: ICD-10-CM

## 2019-12-09 PROCEDURE — 72148 MRI LUMBAR SPINE W/O DYE: CPT

## 2019-12-10 ENCOUNTER — OFFICE VISIT (OUTPATIENT)
Dept: CARDIOLOGY CLINIC | Age: 79
End: 2019-12-10

## 2019-12-10 VITALS
WEIGHT: 137 LBS | BODY MASS INDEX: 23.39 KG/M2 | SYSTOLIC BLOOD PRESSURE: 110 MMHG | HEIGHT: 64 IN | HEART RATE: 72 BPM | DIASTOLIC BLOOD PRESSURE: 60 MMHG | OXYGEN SATURATION: 97 %

## 2019-12-10 DIAGNOSIS — R06.02 SOB (SHORTNESS OF BREATH): ICD-10-CM

## 2019-12-10 DIAGNOSIS — I51.7 LEFT VENTRICULAR HYPERTROPHY: ICD-10-CM

## 2019-12-10 DIAGNOSIS — E78.2 MIXED HYPERLIPIDEMIA: ICD-10-CM

## 2019-12-10 DIAGNOSIS — I10 ESSENTIAL HYPERTENSION: ICD-10-CM

## 2019-12-10 DIAGNOSIS — R00.2 HEART PALPITATIONS: ICD-10-CM

## 2019-12-10 DIAGNOSIS — R94.31 ABNORMAL EKG: Primary | ICD-10-CM

## 2019-12-10 RX ORDER — GABAPENTIN 100 MG/1
100 CAPSULE ORAL
Refills: 0 | COMMUNITY
Start: 2019-10-14 | End: 2020-05-12 | Stop reason: ALTCHOICE

## 2019-12-10 NOTE — PATIENT INSTRUCTIONS
If you have not heard from the central scheduler to schedule your testing in 48 hours, please call [phone].

## 2019-12-10 NOTE — PROGRESS NOTES
Rosmery Chino presents today for   Chief Complaint   Patient presents with    New Patient     establish care        Rosmery Chino preferred language for health care discussion is english/other. Is someone accompanying this pt? no    Is the patient using any DME equipment during [address]? no    Depression Screening:  3 most recent PHQ Screens 12/10/2019   Little interest or pleasure in doing things Not at all   Feeling down, depressed, irritable, or hopeless Not at all   Total Score PHQ 2 0       Learning Assessment:  Learning Assessment 6/11/2019   PRIMARY LEARNER Patient   HIGHEST LEVEL OF EDUCATION - PRIMARY LEARNER  SOME COLLEGE   BARRIERS PRIMARY LEARNER NONE   CO-LEARNER CAREGIVER No   PRIMARY LANGUAGE ENGLISH   LEARNER PREFERENCE PRIMARY DEMONSTRATION   ANSWERED BY patient   RELATIONSHIP SELF       Abuse Screening:  Abuse Screening Questionnaire 3/11/2019   Do you ever feel afraid of your partner? N   Are you in a relationship with someone who physically or mentally threatens you? N   Is it safe for you to go home? Y       Fall Risk  Fall Risk Assessment, last 12 mths 12/10/2019   Able to walk? Yes   Fall in past 12 months? No   Fall with injury? -   Number of falls in past 12 months -   Fall Risk Score -       Pt currently taking Anticoagulant therapy? no    Coordination of Care:  1. Have you been to the ER, urgent care clinic since your last visit? Hospitalized since your last visit? no    2. Have you seen or consulted any other health care providers outside of the [address] [place] [address] since your last visit? Include any pap smears or colon screening.  no

## 2019-12-10 NOTE — PROGRESS NOTES
HISTORY OF PRESENT ILLNESS  aCrlie Cristina is a 77 y.o. female. HPI    Patient presents for a new office visit. She was referred here by her PCP to establish care with a local cardiologist.  The patient recently relocated to the area from [place], [place]. She states she was being followed by a cardiologist for heart palpitations and what sounds like left ventricular hypertrophy. She does not have a prior history of hypertension. She does have a history of dyslipidemia, right-sided breast cancer, status post lumpectomy without radiation or chemotherapy. She has been maintained on an aromatase inhibitor. Patient reports that she underwent noninvasive cardiac testing 3 to 4 years ago when she was told she had an enlarged heart with normal function. She was started on losartan at that time by her cardiologist.  She denies any worsening shortness of breath, though she admits that she does get somewhat winded with heavy exertional activity. She is never had any chest pain or pressure. She does complain of intermittent heart palpitations which have been chronic in nature, but only occurring several times a month lasting less than a minute at most in duration. She is never had any associated dizziness, diaphoresis or syncope. She denies any orthopnea, PND or significant leg swelling. Past Medical History:   Diagnosis Date    Arthritis     Back pain of lumbar region with sciatica 12/5/2018    Breast [facility] ([facility] [address] [facility]     Cancer ([facility] [address] 2015    breast cancer    Dyslipidemia     Heart palpitations     Hypertension     Left ventricular hypertrophy     Macular degeneration     Vitamin D deficiency      Current Outpatient Medications   Medication Sig Dispense Refill    gabapentin (NEURONTIN) 100 mg capsule Take 50 mg by mouth daily as needed. 0    losartan (COZAAR) 25 mg tablet Take 1 Tab by mouth daily.  90 Tab 2    azelastine (ASTELIN) 137 mcg (0.1 %) nasal spray 1 [place] by Both Nostrils route two (2) times a day. Use in each nostril as directed 1 Bottle 11    fluticasone propionate (FLONASE) 50 mcg/actuation nasal spray 2 Sprays by Both Nostrils route daily. 1 Bottle 11    acetaminophen (TYLENOL) 325 mg tablet Take  by mouth every four (4) hours as needed for Pain.  loratadine (CLARITIN) 10 mg tablet Take 1 Tab by mouth daily as needed for Allergies. Cough, runny nose, itching 90 Tab 3    rosuvastatin (CRESTOR) 10 mg tablet Take 1 Tab by mouth nightly. 90 Tab 1    anastrozole (ARIMIDEX) 1 mg tablet Take 1 mg by mouth daily.  celecoxib (CELEBREX) 200 mg capsule Take 200 mg by mouth as needed.  cholecalciferol (VITAMIN D3) 1,000 unit cap Take 1,000 Units by mouth daily.  menthol (BIOFREEZE, MENTHOL,) 4 % gel by Apply Externally route daily as needed.  solifenacin (VESICARE) 5 mg tablet Take 5 mg by mouth as needed.  homeopathic drugs (HAIR AND SKIN PO) Take  by mouth daily.  folic acid/multivit-min/lutein (CENTRUM SILVER PO) Take  by mouth daily.  vit C/vit E ac/lut/copper/zinc (PRESERVISION LUTEIN PO) Take  by mouth daily.  cyclobenzaprine (FLEXERIL) 5 mg tablet Take 1 Tab by mouth three (3) times daily as needed for Muscle Spasm(s). 20 Tab 0     No Known Allergies     Social History     Tobacco Use    Smoking status: Never Smoker    Smokeless tobacco: Never Used   Substance Use Topics    Alcohol use: No     Frequency: Never    Drug use: No     Family History   Problem Relation Age of Onset    Cancer Mother         stomach and liver cancer, in her 62s    Heart Disease Father         MI age 46s    Cancer Sister         blood    Cancer Brother         leukemia    Cancer Sibling          Review of Systems   Constitutional: Negative for chills, fever and weight loss. HENT: Negative for nosebleeds. Eyes: Negative for blurred vision and double vision. Respiratory: Negative for cough, shortness of breath and wheezing.     Cardiovascular: Positive for palpitations. Negative for chest pain, orthopnea, claudication, leg swelling and PND. Gastrointestinal: Negative for abdominal pain, heartburn, nausea and vomiting. Genitourinary: Negative for dysuria and hematuria. Musculoskeletal: Negative for falls and myalgias. Skin: Negative for rash. Neurological: Negative for dizziness, focal weakness and headaches. Endo/Heme/Allergies: Does not bruise/bleed easily. Psychiatric/Behavioral: Negative for substance abuse. Visit Vitals  /60   Pulse 72   Ht 5' 4\" (1.626 m)   Wt 62.1 kg (137 lb)   SpO2 97%   BMI 23.52 kg/m²       Physical Exam   Constitutional: She is oriented to person, place, and time. She appears well-developed and well-nourished. HENT:   Head: Normocephalic and atraumatic. Eyes: Conjunctivae are normal.   Neck: Neck supple. No JVD present. Carotid bruit is not present. Cardiovascular: Normal rate, regular rhythm, S1 normal, S2 normal and normal pulses. Exam reveals distant heart sounds. Exam reveals no gallop. No murmur heard. Pulmonary/Chest: Effort normal and breath sounds normal. She has no wheezes. She has no rales. Abdominal: Soft. Bowel sounds are normal. There is no tenderness. Musculoskeletal:         General: No tenderness, deformity or edema. Neurological: She is alert and oriented to person, place, and time. Skin: Skin is warm and dry. Psychiatric: She has a normal mood and affect. Her behavior is normal. Thought content normal.     EKG: Normal sinus rhythm, normal axis, normal QTc interval, low voltage throughout, no ST-T wave changes concerning for ischemia. Compared to the previous EKG from June 2019, low voltage is now more pronounced. ASSESSMENT and PLAN    Abnormal EKG. Patient does have low voltage on her EKG today which has been present in the past but appears to be more pronounced today.   Given her history of what sounds like left ventricular hypertrophy and possible cardiomyopathy, I am somewhat concerned  about her diagnosis for cardiac amyloidosis. I have recommended obtaining copies of her prior noninvasive studies including her stress test and echocardiogram from her prior cardiologist.  Of also recommended a repeat echocardiogram at our office. Heart palpitations. Patient states she did have an extensive work-up for this in the past and an arrhythmia was never diagnosed. I would like to obtain a copy of her prior work-up from her previous cardiologist in [place]. Patient will sign a paper to release her records. Dyslipidemia. Patient has been managed on Crestor long-term. Is now being followed by her PCP. Breast cancer. According to the patient's was early-stage and was treated with surgery alone with adjuvant oral therapy with an aromatase inhibitor. She was never treated with an Adriamycin-based chemotherapeutic regimen. As long as her echocardiogram is unremarkable, the patient can follow-up in our office annually.

## 2019-12-16 ENCOUNTER — HOSPITAL ENCOUNTER (OUTPATIENT)
Dept: NON INVASIVE DIAGNOSTICS | Age: 79
Discharge: HOME | End: 2019-12-16
Attending: INTERNAL MEDICINE
Payer: MEDICARE

## 2019-12-16 VITALS
HEIGHT: 64 IN | SYSTOLIC BLOOD PRESSURE: 110 MMHG | DIASTOLIC BLOOD PRESSURE: 60 MMHG | WEIGHT: 137 LBS | BODY MASS INDEX: 23.39 KG/M2

## 2019-12-16 DIAGNOSIS — R06.02 SOB (SHORTNESS OF BREATH): ICD-10-CM

## 2019-12-16 LAB
ECHO AO ROOT DIAM: 2.42 CM
ECHO LA AREA 4C: 10.2 CM2
ECHO LA VOL 2C: 31.83 ML (ref 22–52)
ECHO LA VOL 4C: 19.24 ML (ref 22–52)
ECHO LA VOL BP: 27.48 ML (ref 22–52)
ECHO LA VOL/BSA BIPLANE: 16.5 ML/M2 (ref 16–28)
ECHO LA VOLUME INDEX A2C: 19.11 ML/M2 (ref 16–28)
ECHO LA VOLUME INDEX A4C: 11.55 ML/M2 (ref 16–28)
ECHO LV E' LATERAL VELOCITY: 9.4 CM/S
ECHO LV E' SEPTAL VELOCITY: 7.39 CM/S
ECHO LV EDV TEICHHOLZ: 34.35 ML
ECHO LV ESV TEICHHOLZ: 11.45 ML
ECHO LV INTERNAL DIMENSION DIASTOLIC: 3.68 CM (ref 3.9–5.3)
ECHO LV INTERNAL DIMENSION SYSTOLIC: 2.35 CM
ECHO LV IVSD: 1.19 CM (ref 0.6–0.9)
ECHO LV MASS 2D: 155.8 G (ref 67–162)
ECHO LV MASS INDEX 2D: 93.5 G/M2 (ref 43–95)
ECHO LV POSTERIOR WALL DIASTOLIC: 1.1 CM (ref 0.6–0.9)
ECHO LVOT DIAM: 1.73 CM
ECHO LVOT PEAK GRADIENT: 5.4 MMHG
ECHO LVOT PEAK VELOCITY: 116.19 CM/S
ECHO LVOT SV: 61.9 ML
ECHO LVOT VTI: 26.23 CM
ECHO MV A VELOCITY: 89.78 CM/S
ECHO MV E DECELERATION TIME (DT): 183.2 MS
ECHO MV E VELOCITY: 91.98 CM/S
ECHO MV E/A RATIO: 1.02
ECHO MV E/E' LATERAL: 9.79
ECHO MV E/E' RATIO (AVERAGED): 11.12
ECHO MV E/E' SEPTAL: 12.45
ECHO RV TAPSE: 1.67 CM (ref 1.5–2)
ECHO TV REGURGITANT MAX VELOCITY: 234.72 CM/S
ECHO TV REGURGITANT PEAK GRADIENT: 22 MMHG
LVFS 2D: 36.14 %
LVOT MG: 3.3 MMHG
LVOT MV: 0.87 CM/S
LVSV (TEICH): 22.9 ML
MV DEC SLOPE: 5.02

## 2019-12-16 PROCEDURE — 93306 TTE W/DOPPLER COMPLETE: CPT

## 2019-12-17 NOTE — PROGRESS NOTES
Per your last note\"   Abnormal EKG. Patient does have low voltage on her EKG today which has been present in the past but appears to be more pronounced today. Given her history of what sounds like left ventricular hypertrophy and possible cardiomyopathy, I am somewhat concerned  about her diagnosis for cardiac amyloidosis. I have recommended obtaining copies of her prior noninvasive studies including her stress test and echocardiogram from her prior cardiologist.  Of also recommended a repeat echocardiogram at our office.     Heart palpitations. Patient states she did have an extensive work-up for this in the past and an arrhythmia was never diagnosed. I would like to obtain a copy of her prior work-up from her previous cardiologist in [place]. Patient will sign a paper to release her records.

## 2020-01-03 ENCOUNTER — HOSPITAL ENCOUNTER (OUTPATIENT)
Dept: MAMMOGRAPHY | Age: 80
Discharge: HOME | End: 2020-01-03
Attending: INTERNAL MEDICINE
Payer: MEDICARE

## 2020-01-03 DIAGNOSIS — Z12.31 VISIT FOR SCREENING MAMMOGRAM: ICD-10-CM

## 2020-01-03 PROCEDURE — 77063 BREAST TOMOSYNTHESIS BI: CPT

## 2020-01-08 ENCOUNTER — TELEPHONE (OUTPATIENT)
Dept: CARDIOLOGY CLINIC | Age: 80
End: 2020-01-08

## 2020-01-08 NOTE — TELEPHONE ENCOUNTER
----- Message from Camelia Chang MD sent at 12/17/2019  3:20 PM EST -----  Please let the patient know that her echocardiogram is unremarkable and essentially normal.  ----- Message -----  From: Jayesh AELXEY Samaniego  Sent: 21/14/4512   8:13 AM EST  To: Camelia Chang MD    Per your last note\"   Abnormal EKG. Patient does have low voltage on her EKG today which has been present in the past but appears to be more pronounced today. Given her history of what sounds like left ventricular hypertrophy and possible cardiomyopathy, I am somewhat concerned  about her diagnosis for cardiac amyloidosis. I have recommended obtaining copies of her prior noninvasive studies including her stress test and echocardiogram from her prior cardiologist.  Of also recommended a repeat echocardiogram at our office.     Heart palpitations. Patient states she did have an extensive work-up for this in the past and an arrhythmia was never diagnosed. I would like to obtain a copy of her prior work-up from her previous cardiologist in [place].   Patient will sign a paper to release her records.

## 2020-01-24 ENCOUNTER — OFFICE VISIT (OUTPATIENT)
Dept: INTERNAL MEDICINE CLINIC | Age: 80
End: 2020-01-24

## 2020-01-24 VITALS
TEMPERATURE: 98.2 F | WEIGHT: 136.4 LBS | OXYGEN SATURATION: 100 % | BODY MASS INDEX: 23.29 KG/M2 | RESPIRATION RATE: 12 BRPM | HEIGHT: 64 IN | DIASTOLIC BLOOD PRESSURE: 62 MMHG | HEART RATE: 74 BPM | SYSTOLIC BLOOD PRESSURE: 121 MMHG

## 2020-01-24 DIAGNOSIS — I10 ESSENTIAL HYPERTENSION: ICD-10-CM

## 2020-01-24 DIAGNOSIS — R73.02 IMPAIRED GLUCOSE TOLERANCE: ICD-10-CM

## 2020-01-24 DIAGNOSIS — M51.9 LUMBAR DISC DISEASE: Primary | ICD-10-CM

## 2020-01-24 RX ORDER — DULOXETIN HYDROCHLORIDE 60 MG/1
60 CAPSULE, DELAYED RELEASE ORAL DAILY
Qty: 30 CAP | Refills: 5 | Status: SHIPPED | OUTPATIENT
Start: 2020-01-24 | End: 2020-01-30 | Stop reason: SDUPTHER

## 2020-01-24 NOTE — PROGRESS NOTES
[facility]:  1/24/2020, MRN: [id number]      Alexis Burden is a [de-identified] y.o. female and presents to clinic for Hypertension (follow up  ROOM 12)    Subjective: The patient is a 39-year-old female with history of breast cancer (on arimidex since 2015), hypertension, HLD, lumbar disc disease, pulmonary nodule, allergic rhinitis osteopenia per EHR, macular degeneration per EHR, OAB, GERD, cataracts, and B12 deficiency. 1. HTN:  BP is 121/62. Taking her BP meds: losartan. 2. Lower Back Pain: +H/o lumbar disc disease. She has paresthesias in BLE (left side is worse than her right side). S/p lower back injection yesterday. She reports incredible improvement in her lower back pain s/p her recent injection but still continues to have pain in her legs/lower back. Lower back pain radiates down her BLE. S/p PT. She is taking gabapentin 100mg daily at night 2/2 drowsiness side effects. 3. Prediabetes: \"Diet is good. \" Diagnosed in 2019. \"I love sweets. \"       Patient Active Problem List    Diagnosis Date Noted    Abnormal EKG 12/10/2019    Impaired glucose tolerance 08/04/2019    Mixed hyperlipidemia 07/23/2019    Pulmonary nodule 07/23/2019    Lumbar disc disease 06/11/2019    Allergic rhinitis 06/11/2019    History of breast cancer 12/05/2018    TMJ (temporomandibular joint disorder) 12/05/2018    Essential hypertension 12/05/2018    Osteopenia 12/05/2018    Overactive bladder 12/05/2018    Macular degeneration 12/05/2018    Cataract of both eyes 12/05/2018    B12 deficiency 12/05/2018       Current Outpatient Medications   Medication Sig Dispense Refill    prasterone, dhea, (INTRAROSA) 6.5 mg inst Insert  into vagina every seven (7) days.  gabapentin (NEURONTIN) 100 mg capsule Take 100 mg by mouth daily as needed. 0    losartan (COZAAR) 25 mg tablet Take 1 Tab by mouth daily. 90 Tab 2    acetaminophen (TYLENOL) 325 mg tablet Take  by mouth every four (4) hours as needed for Pain.  rosuvastatin (CRESTOR) 10 mg tablet Take 1 Tab by mouth nightly. 90 Tab 1    anastrozole (ARIMIDEX) 1 mg tablet Take 1 mg by mouth daily.  celecoxib (CELEBREX) 200 mg capsule Take 200 mg by mouth as needed.  cholecalciferol (VITAMIN D3) 1,000 unit cap Take 1,000 Units by mouth daily.  menthol (BIOFREEZE, MENTHOL,) 4 % gel by Apply Externally route daily as needed.  solifenacin (VESICARE) 5 mg tablet Take 5 mg by mouth as needed.  homeopathic drugs (HAIR AND SKIN PO) Take  by mouth daily.  folic acid/multivit-min/lutein (CENTRUM SILVER PO) Take  by mouth daily.  vit C/vit E ac/lut/copper/zinc (PRESERVISION LUTEIN PO) Take  by mouth daily.  azelastine (ASTELIN) 137 mcg (0.1 %) nasal spray 1 [place] by Both Nostrils route two (2) times a day. Use in each nostril as directed 1 Bottle 11    fluticasone propionate (FLONASE) 50 mcg/actuation nasal spray 2 Sprays by Both Nostrils route daily. 1 Bottle 11    loratadine (CLARITIN) 10 mg tablet Take 1 Tab by mouth daily as needed for Allergies. Cough, runny nose, itching 90 Tab 3    cyclobenzaprine (FLEXERIL) 5 mg tablet Take 1 Tab by mouth three (3) times daily as needed for Muscle Spasm(s).  20 Tab 0       No Known Allergies    Past Medical History:   Diagnosis Date    Arthritis     Back pain of lumbar region with sciatica 12/5/2018    [facility]     Cancer ([facility] 2015    breast cancer    Dyslipidemia     Heart palpitations     Hypertension     Left ventricular hypertrophy     Macular degeneration     Vitamin D deficiency        Past Surgical History:   Procedure Laterality Date    BREAST SURGERY PROCEDURE UNLISTED Right 2015    HX BREAST LUMPECTOMY Right     HX HERNIA REPAIR      HX HYSTERECTOMY  1985       Family History   Problem Relation Age of Onset    Cancer Mother         stomach and liver cancer, in her 62s    Heart Disease Father         MI age 55s    Cancer Sister         blood    Cancer Brother         leukemia    Cancer Sibling        Social History     Tobacco Use    Smoking status: Never Smoker    Smokeless tobacco: Never Used   Substance Use Topics    Alcohol use: No     Frequency: Never       ROS   Review of Systems   Constitutional: Negative for chills and fever. HENT: Negative for ear pain and sore throat. Eyes: Negative for blurred vision and pain. Respiratory: Negative for cough and shortness of breath. Cardiovascular: Negative for chest pain. Gastrointestinal: Negative for abdominal pain, blood in stool and melena. Genitourinary: Negative for dysuria and hematuria. Musculoskeletal: Positive for back pain and joint pain. Negative for myalgias. Skin: Negative for rash. Neurological: Positive for tingling. Negative for focal weakness and headaches. Endo/Heme/Allergies: Does not bruise/bleed easily. Psychiatric/Behavioral: Negative for substance abuse. Objective     Vitals:    01/24/20 0933   BP: 121/62   Pulse: 74   Resp: 12   Temp: 98.2 °F (36.8 °C)   TempSrc: Oral   SpO2: 100%   Weight: 136 lb 6.4 oz (61.9 kg)   Height: 5' 4\" (1.626 m)   PainSc:   6   PainLoc: Hip       Physical Exam  Vitals signs and nursing note reviewed. HENT:      Head: Normocephalic and atraumatic. Right Ear: External ear normal.      Left Ear: External ear normal.      Nose: Nose normal.      Mouth/Throat:      Pharynx: No oropharyngeal exudate. Eyes:      General: No scleral icterus. Right eye: No discharge. Left eye: No discharge. Conjunctiva/sclera: Conjunctivae normal.   Neck:      Musculoskeletal: Neck supple. Cardiovascular:      Rate and Rhythm: Normal rate and regular rhythm. Heart sounds: Normal heart sounds. No murmur. No friction rub. No gallop. Pulmonary:      Effort: Pulmonary effort is normal. No respiratory distress. Breath sounds: Normal breath sounds. No wheezing or rales. Chest:      Chest wall: No tenderness. Abdominal:      General: Bowel sounds are normal. There is no distension. Palpations: Abdomen is soft. There is no mass. Tenderness: There is no abdominal tenderness. There is no guarding or rebound. Musculoskeletal:         General: No swelling (Bue) or tenderness (Bue). Comments: Lumbar spinous processes are mildly TTP. No paraspinal muscles are TTP   Lymphadenopathy:      Cervical: No cervical adenopathy. Skin:     General: Skin is warm and dry. Findings: No erythema. Neurological:      Mental Status: She is alert and oriented to person, place, and time. Motor: No abnormal muscle tone. Gait: Gait is intact. Gait normal.   Psychiatric:         Mood and Affect: Mood and affect normal.         LABS   Data Review:   Lab Results   Component Value Date/Time    WBC 8.0 01/11/2019 08:42 PM    HGB 12.1 01/11/2019 08:42 PM    HCT 38.3 01/11/2019 08:42 PM    PLATELET 226 89/23/8387 08:42 PM    MCV 74.2 01/11/2019 08:42 PM       Lab Results   Component Value Date/Time    Sodium 139 07/23/2019 03:04 PM    Potassium 4.2 07/23/2019 03:04 PM    Chloride 105 07/23/2019 03:04 PM    CO2 31 07/23/2019 03:04 PM    Anion gap 3 07/23/2019 03:04 PM    Glucose 85 07/23/2019 03:04 PM    BUN 15 07/23/2019 03:04 PM    Creatinine 0.99 07/23/2019 03:04 PM    BUN/Creatinine ratio 15 07/23/2019 03:04 PM    GFR est AA >60 07/23/2019 03:04 PM    GFR est non-AA 54 (L) 07/23/2019 03:04 PM    Calcium 9.5 07/23/2019 03:04 PM       Lab Results   Component Value Date/Time    Cholesterol, total 230 (H) 07/23/2019 03:04 PM    HDL Cholesterol 55 07/23/2019 03:04 PM    LDL, calculated 132.6 (H) 07/23/2019 03:04 PM    VLDL, calculated 42.4 07/23/2019 03:04 PM    Triglyceride 212 (H) 07/23/2019 03:04 PM    CHOL/HDL Ratio 4.2 07/23/2019 03:04 PM       Lab Results   Component Value Date/Time    Hemoglobin A1c 6.1 (H) 07/23/2019 03:04 PM       Assessment/Plan:   1.  Lower Back Pain:   - We will wait to see how well she responds to her recent injection  - Activity as tolerated  - Continue to f/u with [facility] [address] to c/w gabapentin. Could consider cymbalta if she does not respond to the injection. Referral to our clinical pharmacy team for rx recommendations, given her age. 2. HTN: Stable. - C/w rx as prescribed. 3. Prediabetes:   - Low carb diet recommended      Health Maintenance Due   Topic Date Due    DTaP/Tdap/Td series (1 - Tdap) 12/28/1950    Shingrix Vaccine Age 50> (1 of 2) 12/28/1989     Lab review: labs are reviewed in the EHR and ordered as mentioned above    I have discussed the diagnosis with the patient and the intended plan as seen in the above orders. The patient has received an after-visit summary and questions were answered concerning future plans. I have discussed medication side effects and warnings with the patient as well. I have reviewed the plan of care with the patient, accepted their input and they are in agreement with the treatment goals. All questions were answered. The patient understands the plan of care. Handouts provided today with above information. Pt instructed if symptoms worsen to call the office or report to the ED for continued care. Greater than 50% of the visit time was spent in counseling and/or coordination of care. Voice recognition was used to generate this report, which may have resulted in some phonetic based errors in grammar and contents. Even though attempts were made to correct all the mistakes, some may have been missed, and remained in the body of the document.           Darcy Aguillon MD

## 2020-01-24 NOTE — PROGRESS NOTES
Chana Espinoza presents today for   Chief Complaint   Patient presents with    Hypertension     follow up  ROOM 12     The patient stated she was 5 minutes past her appointment when arriving today, and was not sure if she was going to be seen, and I reminded her to be here 15 minutes prior to her scheduled time to be seen, so that the  staff can process her into the system, and we can Triage her to be ready on time to see Dr Johnnie Salinas. There was no apology given for her being late. The patient states she understands all. Is someone accompanying this pt? no    Is the patient using any DME equipment during [address]? no    Depression Screening:  3 most recent PHQ Screens 1/24/2020   Little interest or pleasure in doing things Not at all   Feeling down, depressed, irritable, or hopeless Not at all   Total Score PHQ 2 0       Learning Assessment:  Learning Assessment 1/24/2020   PRIMARY LEARNER Patient   HIGHEST LEVEL OF EDUCATION - PRIMARY LEARNER  SOME COLLEGE   BARRIERS PRIMARY LEARNER NONE   CO-LEARNER CAREGIVER No   PRIMARY LANGUAGE ENGLISH   LEARNER PREFERENCE PRIMARY DEMONSTRATION   ANSWERED BY patient   RELATIONSHIP SELF       Abuse Screening:  Abuse Screening Questionnaire 1/24/2020   Do you ever feel afraid of your partner? N   Are you in a relationship with someone who physically or mentally threatens you? N   Is it safe for you to go home? Y       Fall Risk  Fall Risk Assessment, last 12 mths 1/24/2020   Able to walk? Yes   Fall in past 12 months? No   Fall with injury? -   Number of falls in past 12 months -   Fall Risk Score -       Health Maintenance reviewed and discussed and ordered per Provider. Health Maintenance Due   Topic Date Due    DTaP/Tdap/Td series (1 - Tdap) 12/28/1950    Shingrix Vaccine Age 50> (1 of 2) 12/28/1989   . Coordination of Care:  1. Have you been to the ER, urgent care clinic since your last visit? Hospitalized since your last visit? no    2.  Have you seen or consulted any other health care providers outside of the [address] [place] [address] since your last visit? Include any pap smears or colon screening.  no

## 2020-01-24 NOTE — PATIENT INSTRUCTIONS
Health Maintenance Due   Topic Date Due    DTaP/Tdap/Td series (1 - Tdap) 12/28/1950    Shingrix Vaccine Age 50> (1 of 2) 12/28/1989     Duloxetine (By mouth)   Duloxetine (doo-LOX-[id number]-teen)  Treats depression, anxiety, diabetic peripheral neuropathy, fibromyalgia, and chronic muscle or bone pain. This medicine is an SSNRI. Brand Name(s): Cymbalta, DermacinRx Lizzette Chaparro   There may be other brand names for this medicine. When This Medicine Should Not Be Used: This medicine is not right for everyone. Do not use it if you had an allergic reaction to duloxetine. How to Use This Medicine:   Capsule, Delayed Release Capsule  · Take your medicine as directed. Your dose may need to be changed several times to find what works best for you. · Delayed-release capsule: Swallow the capsule whole. Do not crush, chew, break, or open it. · This medicine should come with a Medication Guide. Ask your pharmacist for a copy if you do not have one. · Missed dose: Take a dose as soon as you remember. If it is almost time for your next dose, wait until then and take a regular dose. Do not take extra medicine to make up for a missed dose. · Store the medicine in a closed container at room temperature, away from heat, moisture, and direct light. Drugs and Foods to Avoid:   Ask your doctor or pharmacist before using any other medicine, including over-the-counter medicines, vitamins, and herbal products. · Do not take duloxetine if you have used an MAO inhibitor (MAOI) within the past 14 days. Do not start taking an MAO inhibitor within 5 days of stopping duloxetine. · Some medicines can affect how duloxetine works.  Tell your doctor if you are using any of the following:  ¨ Buspirone, cimetidine, ciprofloxacin, enoxacin, fentanyl, lithium, Richard's wort, theophylline, tramadol, tryptophan, or warfarin  ¨ Amphetamines  ¨ Blood pressure medicine  ¨ Diuretic (water pill)  ¨ Medicine for heart rhythm problems (including flecainide, propafenone, quinidine)  ¨ Medicine to treat migraine headaches (including triptans)  ¨ NSAID pain or arthritis medicine (including aspirin, celecoxib, diclofenac, ibuprofen, naproxen)  ¨ Other medicine to treat depression or mood disorders (including amitriptyline, desipramine, fluoxetine, imipramine, nortriptyline, paroxetine)  ¨ Phenothiazine medicine (including thioridazine)  · Tell your doctor if you use anything else that makes you sleepy. Some examples are allergy medicine, narcotic pain medicine, and alcohol. · Do not drink alcohol while you are using this medicine. Warnings While Using This Medicine:   · Tell your doctor if you are pregnant or breastfeeding, or if you have kidney disease, liver disease, diabetes, digestion problems, glaucoma, heart disease, high or low blood pressure, or problems with urination. Tell your doctor if you smoke or you have a history of seizures, or drug or alcohol addiction. · This medicine may cause the following problems:   ¨ Serious liver problems  ¨ Serotonin syndrome (more likely when used with certain other medicines)  ¨ Increased risk of bleeding problems  ¨ Serious skin reactions  ¨ Low sodium levels in the blood  · This medicine can increase thoughts of suicide. Tell your doctor right away if you start to feel depressed and have thoughts about hurting yourself. · This medicine can cause changes in your blood pressure. This may make you dizzy or drowsy. Do not drive or do anything that could be dangerous until you know how this medicine affects you. Stand up slowly to avoid falls. · Do not stop using this medicine suddenly. Your doctor will need to slowly decrease your dose before you stop it completely. · Your doctor will check your progress and the effects of this medicine at regular visits. Keep all appointments. · Keep all medicine out of the reach of children. Never share your medicine with anyone.   Possible Side Effects While Using This Medicine:   Call your doctor right away if you notice any of these side effects:  · Allergic reaction: Itching or hives, swelling in your face or hands, swelling or tingling in your mouth or throat, chest tightness, trouble breathing  · Anxiety, restlessness, fever, fast heartbeat, sweating, muscle spasms, diarrhea, seeing or hearing things that are not there  · Blistering, peeling, red skin rash  · Confusion, weakness, muscle twitching  · Dark urine or pale stools, nausea, vomiting, loss of appetite, stomach pain, yellow skin or eyes  · Decrease in how much or how often you urinate  · Eye pain, vision changes, seeing halos around lights  · Feeling more energetic than usual  · Lightheadedness, dizziness, or fainting  · Unusual moods or behaviors, worsening depression, thoughts about hurting yourself, trouble sleeping  · Unusual bleeding or bruising  If you notice these less serious side effects, talk with your doctor:   · Decrease in appetite or weight  · Dry mouth, constipation, mild nausea  · Unusual drowsiness, sleepiness, or tiredness  If you notice other side effects that you think are caused by this medicine, tell your doctor. Call your doctor for medical advice about side effects. You may report side effects to FDA at 2-732-FDA-0284  © 2017 [facility] Information is for End User's use only and may not be sold, redistributed or otherwise used for commercial purposes. The above information is an  only. It is not intended as medical advice for individual conditions or treatments. Talk to your doctor, nurse or pharmacist before following any medical regimen to see if it is safe and effective for you.

## 2020-01-28 ENCOUNTER — TELEPHONE (OUTPATIENT)
Dept: FAMILY MEDICINE CLINIC | Age: 80
End: 2020-01-28

## 2020-01-28 NOTE — TELEPHONE ENCOUNTER
Patient called in to request a new medication to replace the DULoxetine. She states the cost is too expensive and she cannot afford it. Patient can be reached at [phone].

## 2020-01-29 ENCOUNTER — TELEPHONE (OUTPATIENT)
Dept: INTERNAL MEDICINE CLINIC | Age: 80
End: 2020-01-29

## 2020-01-29 NOTE — TELEPHONE ENCOUNTER
Does she have a deductible issue with her insurance? What rx would be best in this geriatric patient, that would be more affordable and less likely to cause drowsiness? Gabapentin is limited by drowsiness side effects she has with taking this rx.     Dr. Anaya Royal  [facility], [address] [place] [address], [address] [place] [address] Str.  Phone: [phone]  Fax: [phone]

## 2020-01-29 NOTE — TELEPHONE ENCOUNTER
Called patient to follow up on question about medication cost.    No answer, left voicemail requesting patient return call to PharmD. Will continue to attempt to reach patient.     Trenton Han PharmD  Clinical Pharmacist Specialist

## 2020-01-29 NOTE — TELEPHONE ENCOUNTER
Lul Williamson,     Can you have her get the rx at a [facility]? We will need to know which pharmacy to send her rx to. As always, thank you so very much for your help! It is much appreciated!       Respectfully,  Dr. Angel Riggins  [facility], [address] [place] [address], [address] [place] [address] Str.  Phone: [phone]  Fax: [phone]

## 2020-01-30 ENCOUNTER — TELEPHONE (OUTPATIENT)
Dept: INTERNAL MEDICINE CLINIC | Age: 80
End: 2020-01-30

## 2020-01-30 RX ORDER — DULOXETIN HYDROCHLORIDE 60 MG/1
60 CAPSULE, DELAYED RELEASE ORAL DAILY
Qty: 30 CAP | Refills: 5 | Status: SHIPPED | OUTPATIENT
Start: 2020-01-30 | End: 2020-03-10

## 2020-01-30 NOTE — TELEPHONE ENCOUNTER
Called patient to follow up on her question about medication cost.    Discussed that PharmD spoke with her [address], and the cost of her Cymbalta had gone up to $68 for 30 day supply since she has not met her deductible for her insurance plan for the year yet. Discussed that her [address] stated she has at least $400 left to pay to meet her deductible. Discussed with patient that she can get Cymbalta 60 mg tablet from [facility] for $15 for 30 day supply if she pays cash price/out of pocket. Discussed that if she gets it filled this way, the payments would not go towards her deductible. Discussed that other alternative options to Cymbalta would likely make patient sleepy and that Cymbalta dose would have to be tapered down to prevent withdrawal symptoms. Patient requested Cymbalta 60 mg prescription to be sent to [facility] in [place]. Will send prescription to Dr. Tonya Boyce for signing. Patient states she has enough of the rest of her medications currently, and her anastrozole was around $2 copay when she got it filled January 2020. Discussed with patient that this may be due to it being classified as a specialty medication under her plan. Instructed her to look out for any copay increases in her other medications, especially brand name medications such as Crestor and Celebrex. Instructed her to call PharmD and PCP's office if she has any trouble affording medications in the future. Patient expressed understanding and had no further questions. Thank you for allowing me to participate in the care of this patient.     Ty Dash, [place]  Clinical Pharmacist Specialist

## 2020-02-17 ENCOUNTER — TELEPHONE (OUTPATIENT)
Dept: INTERNAL MEDICINE CLINIC | Age: 80
End: 2020-02-17

## 2020-02-17 NOTE — TELEPHONE ENCOUNTER
Called patient to schedule appointment for medication management per referral from Dr. Keturah Bennett. No answer, left voicemail requesting patient return call to PharmD. Will continue to attempt to reach patient.     Eli Cisneros PharmD  Clinical Pharmacist Specialist

## 2020-02-20 ENCOUNTER — TELEPHONE (OUTPATIENT)
Dept: INTERNAL MEDICINE CLINIC | Age: 80
End: 2020-02-20

## 2020-02-20 NOTE — TELEPHONE ENCOUNTER
Called patient to schedule appointment for PharmD medication management per referral from Dr. Johnnie Salinas. No answer, left voicemail requesting patient return call to PharmD. Will continue to attempt to reach patient.     Coleman Callejas, GaleD  Clinical Pharmacist Specialist

## 2020-02-21 ENCOUNTER — TELEPHONE (OUTPATIENT)
Dept: INTERNAL MEDICINE CLINIC | Age: 80
End: 2020-02-21

## 2020-02-21 NOTE — TELEPHONE ENCOUNTER
Patient returned call to PharmD. Scheduled patient for PharmD appointment on 3/6/2020 at 1 PM at [facility] [address] [place] [address]. Instructed patient to bring medications to appointment. Patient expressed understanding and had no further questions.     Renella Burkitt, PharmD  Clinical Pharmacist Specialist

## 2020-03-10 ENCOUNTER — OFFICE VISIT (OUTPATIENT)
Dept: INTERNAL MEDICINE CLINIC | Age: 80
End: 2020-03-10

## 2020-03-10 VITALS
RESPIRATION RATE: 12 BRPM | TEMPERATURE: 98.4 F | BODY MASS INDEX: 23.32 KG/M2 | OXYGEN SATURATION: 100 % | SYSTOLIC BLOOD PRESSURE: 122 MMHG | HEART RATE: 73 BPM | WEIGHT: 136.6 LBS | HEIGHT: 64 IN | DIASTOLIC BLOOD PRESSURE: 58 MMHG

## 2020-03-10 DIAGNOSIS — R51.9 NONINTRACTABLE HEADACHE, UNSPECIFIED CHRONICITY PATTERN, UNSPECIFIED HEADACHE TYPE: Primary | ICD-10-CM

## 2020-03-10 DIAGNOSIS — I10 ESSENTIAL HYPERTENSION: ICD-10-CM

## 2020-03-10 DIAGNOSIS — E78.2 MIXED HYPERLIPIDEMIA: ICD-10-CM

## 2020-03-10 DIAGNOSIS — M26.609 TMJ (TEMPOROMANDIBULAR JOINT DISORDER): ICD-10-CM

## 2020-03-10 DIAGNOSIS — M79.10 MYALGIA: ICD-10-CM

## 2020-03-10 DIAGNOSIS — R73.02 IMPAIRED GLUCOSE TOLERANCE: ICD-10-CM

## 2020-03-10 DIAGNOSIS — R73.9 HYPERGLYCEMIA: ICD-10-CM

## 2020-03-10 RX ORDER — DULOXETIN HYDROCHLORIDE 20 MG/1
20 CAPSULE, DELAYED RELEASE ORAL DAILY
Qty: 30 CAP | Refills: 5 | Status: SHIPPED | OUTPATIENT
Start: 2020-03-10 | End: 2020-09-24 | Stop reason: SDUPTHER

## 2020-03-10 NOTE — PROGRESS NOTES
Brandon Horvath presents today for   Chief Complaint   Patient presents with    Hypertension     follow up  ROOM  11    LOW BACK PAIN     follow up       Is someone accompanying this pt? no    Is the patient using any DME equipment during OV? no    Depression Screening:  3 most recent PHQ Screens 1/24/2020   Little interest or pleasure in doing things Not at all   Feeling down, depressed, irritable, or hopeless Not at all   Total Score PHQ 2 0       Learning Assessment:  Learning Assessment 1/24/2020   PRIMARY LEARNER Patient   HIGHEST LEVEL OF EDUCATION - PRIMARY LEARNER  SOME COLLEGE   BARRIERS PRIMARY LEARNER NONE   CO-LEARNER CAREGIVER No   PRIMARY LANGUAGE ENGLISH   LEARNER PREFERENCE PRIMARY DEMONSTRATION   ANSWERED BY patient   RELATIONSHIP SELF       Abuse Screening:  Abuse Screening Questionnaire 1/24/2020   Do you ever feel afraid of your partner? N   Are you in a relationship with someone who physically or mentally threatens you? N   Is it safe for you to go home? Y       Fall Risk  Fall Risk Assessment, last 12 mths 1/24/2020   Able to walk? Yes   Fall in past 12 months? No   Fall with injury? -   Number of falls in past 12 months -   Fall Risk Score -       Health Maintenance reviewed and discussed and ordered per Provider. Health Maintenance Due   Topic Date Due    DTaP/Tdap/Td series (1 - Tdap) 12/28/1950    Shingrix Vaccine Age 50> (1 of 2) 12/28/1989         Coordination of Care:  1. Have you been to the ER, urgent care clinic since your last visit? Hospitalized since your last visit? no    2. Have you seen or consulted any other health care providers outside of the [address] [place] [address] since your last visit? Include any pap smears or colon screening.  no

## 2020-03-10 NOTE — PROGRESS NOTES
INTERNISTS [facility]:  3/10/2020, MRN: [id number]      Eileen Montgomery is a [de-identified] y.o. female and presents to clinic for Hypertension (follow up  ROOM  11) and LOW BACK PAIN (follow up)    Subjective: The patient is a [de-identified] female with history of breast cancer (on arimidex since 2015), hypertension, HLD, lumbar disc disease, pulmonary nodule, allergic rhinitis osteopenia per EHR, macular degeneration per EHR, OAB, GERD, cataracts, and B12 deficiency. 1.  Hypertension: BP is 122/58. Taking losartan. No adverse side effects from this rx. 2. Lower Back Pain: At her last appointment, she reported lower back pain that is associated with paresthesias in her leg. She had more paresthesias along her left leg first versus her right leg. She has had an injection along her back and completed PT in the past.  She is taking gabapentin 100 mg daily, unable to tolerate higher doses due to drowsiness. Since her last appointment, we ordered Cymbalta for better symptomatic relief. Since then, she reports: she stopped taking cymbalta after a wk 2/2 it making her \"feel woozy. \" Sx are not as bad since getting her injections. She is scheduled for one more injection. 2. HA: She has a left sided HA x 1 month. Sx are off/on. Triggers: clenching her jaw. Sx are worse during the day. No vision or hearing changes. No new paresthesias. +Rhinorrhea \"sometimes. \" She was diagnosed in the past with TMJ related pain. 3. HLD: She occasionally gets myalgias in her toes. +Taking Crestor. She gets cramps in her feet twice a wk. Myalgias lasts minutes and then resolve. +H/o prediabetes.        Patient Active Problem List    Diagnosis Date Noted    Abnormal EKG 12/10/2019    Impaired glucose tolerance 08/04/2019    Mixed hyperlipidemia 07/23/2019    Pulmonary nodule 07/23/2019    Lumbar disc disease 06/11/2019    Allergic rhinitis 06/11/2019    History of breast cancer 12/05/2018    TMJ (temporomandibular joint disorder) 12/05/2018    Essential hypertension 12/05/2018    Osteopenia 12/05/2018    Overactive bladder 12/05/2018    Macular degeneration 12/05/2018    Cataract of both eyes 12/05/2018    B12 deficiency 12/05/2018       Current Outpatient Medications   Medication Sig Dispense Refill    DULoxetine (CYMBALTA) 60 mg capsule Take 1 Cap by mouth daily. 30 Cap 5    prasterone, dhea, (INTRAROSA) 6.5 mg inst Insert  into vagina every seven (7) days. Indications: as needed      gabapentin (NEURONTIN) 100 mg capsule Take 100 mg by mouth daily as needed. 0    losartan (COZAAR) 25 mg tablet Take 1 Tab by mouth daily. 90 Tab 2    azelastine (ASTELIN) 137 mcg (0.1 %) nasal spray 1 [place] by Both Nostrils route two (2) times a day. Use in each nostril as directed 1 Bottle 11    fluticasone propionate (FLONASE) 50 mcg/actuation nasal spray 2 Sprays by Both Nostrils route daily. 1 Bottle 11    acetaminophen (TYLENOL) 325 mg tablet Take  by mouth every four (4) hours as needed for Pain.  rosuvastatin (CRESTOR) 10 mg tablet Take 1 Tab by mouth nightly. 90 Tab 1    anastrozole (ARIMIDEX) 1 mg tablet Take 1 mg by mouth daily.  celecoxib (CELEBREX) 200 mg capsule Take 200 mg by mouth as needed.  cholecalciferol (VITAMIN D3) 1,000 unit cap Take 1,000 Units by mouth daily.  menthol (BIOFREEZE, MENTHOL,) 4 % gel by Apply Externally route daily as needed.  solifenacin (VESICARE) 5 mg tablet Take 5 mg by mouth as needed.  homeopathic drugs (HAIR AND SKIN PO) Take  by mouth daily.  folic acid/multivit-min/lutein (CENTRUM SILVER PO) Take  by mouth daily.  vit C/vit E ac/lut/copper/zinc (PRESERVISION LUTEIN PO) Take  by mouth daily.  cyclobenzaprine (FLEXERIL) 5 mg tablet Take 1 Tab by mouth three (3) times daily as needed for Muscle Spasm(s). 20 Tab 0    loratadine (CLARITIN) 10 mg tablet Take 1 Tab by mouth daily as needed for Allergies.  Cough, runny nose, itching 90 Tab 3       No Known Allergies    Past Medical History:   Diagnosis Date    Arthritis     Back pain of lumbar region with sciatica 12/5/2018    [facility] [address] [address]     Cancer ([facility] [address] 2015    breast cancer    Dyslipidemia     Heart palpitations     Hypertension     Left ventricular hypertrophy     Macular degeneration     Vitamin D deficiency        Past Surgical History:   Procedure Laterality Date    BREAST SURGERY PROCEDURE UNLISTED Right 2015    HX BREAST LUMPECTOMY Right     HX HERNIA REPAIR      HX HYSTERECTOMY  1985       Family History   Problem Relation Age of Onset    Cancer Mother         stomach and liver cancer, in her 62s    Heart Disease Father         MI age 46s   [facility] Cancer Sister         blood    Cancer Brother         leukemia    Cancer Sibling        Social History     Tobacco Use    Smoking status: Never Smoker    Smokeless tobacco: Never Used   Substance Use Topics    Alcohol use: No     Frequency: Never       ROS   Review of Systems   Constitutional: Negative for chills and fever. HENT: Negative for ear pain and sore throat. Eyes: Negative for blurred vision and pain. Respiratory: Negative for cough and shortness of breath. Cardiovascular: Negative for chest pain. Gastrointestinal: Negative for abdominal pain, blood in stool and melena. Genitourinary: Negative for dysuria and hematuria. Musculoskeletal: Positive for back pain. Negative for myalgias. Skin: Negative for rash. Neurological: Positive for tingling and headaches. Negative for focal weakness. Endo/Heme/Allergies: Does not bruise/bleed easily. Psychiatric/Behavioral: Negative for substance abuse. Objective     Vitals:    03/10/20 1156   BP: 122/58   Pulse: 73   Resp: 12   Temp: 98.4 °F (36.9 °C)   TempSrc: Oral   SpO2: 100%   Weight: 136 lb 9.6 oz (62 kg)   Height: 5' 4\" (1.626 m)   PainSc:   4   PainLoc: Back       Physical Exam  Vitals signs and nursing note reviewed.    HENT:      Head: Normocephalic and atraumatic. Comments: She has mild TTP along her left TMJ jt     Right Ear: Tympanic membrane and external ear normal.      Left Ear: Tympanic membrane and external ear normal.      Nose: Nose normal.      Mouth/Throat:      Pharynx: No oropharyngeal exudate. Eyes:      General: No scleral icterus. Right eye: No discharge. Left eye: No discharge. Conjunctiva/sclera: Conjunctivae normal.   Neck:      Musculoskeletal: Neck supple. Cardiovascular:      Rate and Rhythm: Normal rate and regular rhythm. Heart sounds: Normal heart sounds. No murmur. No friction rub. No gallop. Pulmonary:      Effort: Pulmonary effort is normal. No respiratory distress. Breath sounds: Normal breath sounds. No wheezing or rales. Abdominal:      General: Bowel sounds are normal. There is no distension. Palpations: Abdomen is soft. There is no mass. Tenderness: There is no abdominal tenderness. There is no guarding or rebound. Musculoskeletal:         General: No swelling (BUE) or tenderness (BUE). Comments: All spinous processes are NTTP except along her lumbar spine   Lymphadenopathy:      Cervical: No cervical adenopathy. Skin:     General: Skin is warm and dry. Findings: No erythema. Neurological:      Mental Status: She is alert and oriented to person, place, and time. Motor: No abnormal muscle tone. Gait: Gait is intact.  Gait normal.   Psychiatric:         Mood and Affect: Mood and affect normal.         LABS   Data Review:   Lab Results   Component Value Date/Time    WBC 8.0 01/11/2019 08:42 PM    HGB 12.1 01/11/2019 08:42 PM    HCT 38.3 01/11/2019 08:42 PM    PLATELET 435 07/12/5896 08:42 PM    MCV 74.2 01/11/2019 08:42 PM       Lab Results   Component Value Date/Time    Sodium 139 07/23/2019 03:04 PM    Potassium 4.2 07/23/2019 03:04 PM    Chloride 105 07/23/2019 03:04 PM    CO2 31 07/23/2019 03:04 PM    Anion gap 3 07/23/2019 03:04 PM    Glucose 85 07/23/2019 03:04 PM    BUN 15 07/23/2019 03:04 PM    Creatinine 0.99 07/23/2019 03:04 PM    BUN/Creatinine ratio 15 07/23/2019 03:04 PM    GFR est AA >60 07/23/2019 03:04 PM    GFR est non-AA 54 (L) 07/23/2019 03:04 PM    Calcium 9.5 07/23/2019 03:04 PM       Lab Results   Component Value Date/Time    Cholesterol, total 230 (H) 07/23/2019 03:04 PM    HDL Cholesterol 55 07/23/2019 03:04 PM    LDL, calculated 132.6 (H) 07/23/2019 03:04 PM    VLDL, calculated 42.4 07/23/2019 03:04 PM    Triglyceride 212 (H) 07/23/2019 03:04 PM    CHOL/HDL Ratio 4.2 07/23/2019 03:04 PM       Lab Results   Component Value Date/Time    Hemoglobin A1c 6.1 (H) 07/23/2019 03:04 PM       Assessment/Plan:   1. Essential hypertension: Stable. - C/w rx as prescribed. - Checking labs. - RTC for a BP check    ORDERS:  - LIPID PANEL; Future  - METABOLIC PANEL, COMPREHENSIVE; Future  - MICROALBUMIN, UR, RAND W/ MICROALB/CREAT RATIO; Future    2. Impaired glucose tolerance:   - Checking labs. - I encouraged her to reduce her carbs. ORDERS:  - METABOLIC PANEL, COMPREHENSIVE; Future  - HEMOGLOBIN A1C W/O EAG; Future    3. HA: Likely from TMJ (temporomandibular joint disorder). Per PE findings. - Observation. Tylenol as needed. RTC if sx worsen or do not respond to tylenol.   - Checking labs. ORDERS:  - C REACTIVE PROTEIN, QT; Future  - METABOLIC PANEL, COMPREHENSIVE; Future  - CBC WITH AUTOMATED DIFF; Future    4. Lower Back Pain:  - Activity as tolerated. - Continue to f/u with Orthopedics.   - Adding Cymbalta as a medication intolerance. - Ok to c/w rx as prescribed. 5. HLD: +Myalgia  - Checking a lab. - She can stop Crestor to see if her myalgias resolve. If her myalgias resolve, then her myalgias are from the statin rx. If her sx do not change with holding Crestor then she should restart it as her myalgias are not from the statin rx. ORDERS:  - CK;  Future      Health Maintenance Due   Topic Date Due    DTaP/Tdap/Td series (1 - Tdap) 12/28/1951    Shingrix Vaccine Age 50> (1 of 2) 12/28/1989     Lab review: labs are reviewed in the EHR and ordered as mentioned above    I have discussed the diagnosis with the patient and the intended plan as seen in the above orders. The patient has received an after-visit summary and questions were answered concerning future plans. I have discussed medication side effects and warnings with the patient as well. I have reviewed the plan of care with the patient, accepted their input and they are in agreement with the treatment goals. All questions were answered. The patient understands the plan of care. Handouts provided today with above information. Pt instructed if symptoms worsen to call the office or report to the ED for continued care. Greater than 50% of the visit time was spent in counseling and/or coordination of care. Voice recognition was used to generate this report, which may have resulted in some phonetic based errors in grammar and contents. Even though attempts were made to correct all the mistakes, some may have been missed, and remained in the body of the document.           Stephania Olsen MD

## 2020-03-25 ENCOUNTER — TELEPHONE (OUTPATIENT)
Dept: INTERNAL MEDICINE CLINIC | Age: 80
End: 2020-03-25

## 2020-03-27 ENCOUNTER — TELEPHONE (OUTPATIENT)
Dept: INTERNAL MEDICINE CLINIC | Age: 80
End: 2020-03-27

## 2020-03-27 NOTE — TELEPHONE ENCOUNTER
Called patient to discuss upcoming PharmD appointment on 3/31/2020. Changed office visit to telephone visit on 3/31/2020 at 1 PM per her request. Instructed patient to have all medication bottles ready for telephone visit. Patient expressed understanding and had no further questions.     Barbara Fuller, PharmD  Clinical Pharmacist Specialist

## 2020-03-30 NOTE — PROGRESS NOTES
Pharmacy Note:  Medication Review    S/O: Ms. Abdias Cai is a [de-identified] y.o. female who was contacted today via telephone for a medication review per referral from Wilder Andujar MD.    Medication Adherence/Cost:  Patient reports non-adherent to prescribed medications with missed doses occurring daily. Regarding adherence perceived adverse effects appear to be the primary factors involved.  Medicare insurance is the primary source of paying for medications with little difficulty affording medications.   -States anastrazole will be finished in August per her oncologist  -States she takes Celebrex 200 mg around 5 days out of the week  -States she's no longer taking gabapentin since it made her too sleepy  -States she ran out of [place] a few months ago    Pain  -States she takes Tylenol 1,000 mg at night to help with joint pain in legs  -States she takes Flexeril 2.5 mg 2-3 times per week  -States she uses lidocaine 4% patch for 12 hours each day on legs and back - states don't help that much  -States she felt off-balance when she was on Cymbalta 60 mg daily   -States she is not having any of those symptoms on Cymbalta 20 mg daily  -States she is [place] problems with sciatica\"  -States she has pain in legs especially in the morning   -States she has tingling in her feet and custodial down her leg  -Stats she gets cramping in her toes  -States she has muscular back pain occasionally when she is active and leaning/bending  -States Cymbalta has helped a little with her back pain  -States she was getting steroid injections from ortho office in the past  -States she had her last epidural on 3/23  -States she is set up to go to physical therapy appointments    Vitals  Wt Readings from Last 3 Encounters:   03/10/20 136 lb 9.6 oz (62 kg)   01/24/20 136 lb 6.4 oz (61.9 kg)   12/16/19 137 lb (62.1 kg)     Temp Readings from Last 3 Encounters:   03/10/20 98.4 °F (36.9 °C) (Oral)   01/24/20 98.2 °F (36.8 °C) (Oral)   11/20/19 98.5 °F (36.9 °C) (Oral)     BP Readings from Last 3 Encounters:   03/10/20 122/58   01/24/20 121/62   12/16/19 110/60     Pulse Readings from Last 3 Encounters:   03/10/20 73   01/24/20 74   12/10/19 72       Past Medical History:   Diagnosis Date    Arthritis     Back pain of lumbar region with sciatica 12/5/2018    Breast cancer ([facility] [address]     Cancer ([facility] 2015    breast cancer    Dyslipidemia     Heart palpitations     Hypertension     Left ventricular hypertrophy     Macular degeneration     Vitamin D deficiency        No Known Allergies    Current Outpatient Medications   Medication Sig    DULoxetine (CYMBALTA) 20 mg capsule Take 1 Cap by mouth daily.  prasterone, dhea, (INTRAROSA) 6.5 mg inst Insert  into vagina every seven (7) days. Indications: as needed    gabapentin (NEURONTIN) 100 mg capsule Take 100 mg by mouth daily as needed.  losartan (COZAAR) 25 mg tablet Take 1 Tab by mouth daily.  azelastine (ASTELIN) 137 mcg (0.1 %) nasal spray 1 [place] by Both Nostrils route two (2) times a day. Use in each nostril as directed    fluticasone propionate (FLONASE) 50 mcg/actuation nasal spray 2 Sprays by Both Nostrils route daily.  acetaminophen (TYLENOL) 325 mg tablet Take  by mouth every four (4) hours as needed for Pain.  loratadine (CLARITIN) 10 mg tablet Take 1 Tab by mouth daily as needed for Allergies. Cough, runny nose, itching    rosuvastatin (CRESTOR) 10 mg tablet Take 1 Tab by mouth nightly.  anastrozole (ARIMIDEX) 1 mg tablet Take 1 mg by mouth daily.  celecoxib (CELEBREX) 200 mg capsule Take 200 mg by mouth as needed.  cholecalciferol (VITAMIN D3) 1,000 unit cap Take 1,000 Units by mouth daily.  menthol (BIOFREEZE, MENTHOL,) 4 % gel by Apply Externally route daily as needed.  solifenacin (VESICARE) 5 mg tablet Take 5 mg by mouth as needed.  homeopathic drugs (HAIR AND SKIN PO) Take  by mouth daily.     folic acid/multivit-min/lutein (CENTRUM SILVER PO) Take  by mouth daily.    vit C/vit E ac/lut/copper/zinc (PRESERVISION LUTEIN PO) Take  by mouth daily.  cyclobenzaprine (FLEXERIL) 5 mg tablet Take 1 Tab by mouth three (3) times daily as needed for Muscle Spasm(s). No current facility-administered medications for this visit. There are no discontinued medications. A/P:   1. Pain  -Discussed with patient that she can try Tylenol arthritis strength to help with pain - reiterated importance of not going over 3 grams total per day of acetaminophen  -States she is open to topical formulations - will discuss options (e.g. Voltaren gel) with PCP  -Will discuss patient's self-discontinuation of gabapentin due to side effects with PCP  -Reiterated importance of medication compliance, especially with duloxetine due to risk of side effects with abrupt discontinuation  -Discussed that Celebrex can increase risk of GI bleed, especially when used long-term    Medication reconciliation was completed during the visit. Patient verbalized understanding of the information presented and all of the patients questions were answered. Patient advised to call the office with any additional questions or concerns. Follow-up:3 week telephone call.  Notifications of recommendations will be sent to Dr. Lu Do MD for review    Thank you for the consult,  Rain Other, [facility]    Time Spent: 40 mins

## 2020-03-30 NOTE — PROGRESS NOTES
CC:  Diabetes management S/O: Jessica Martinez is a [de-identified] y.o. female referred by Karina Yoder MD for diabetes management. Past Medical History:  
Diagnosis Date  Arthritis  Back pain of lumbar region with sciatica 12/5/2018  Breast cancer ([address]  Cancer [facility]) 2015  
 breast cancer  Dyslipidemia  Heart palpitations  Hypertension  Left ventricular hypertrophy  Macular degeneration  Vitamin D deficiency Current diabetes regimen consists of the following: ***. Patient {Reports or denies:81392} the following signs/symptoms of diabetes: {symptoms; hyperglycemia:22738}. Patient {Reports or denies:97632} recent hypoglycemic symptoms, with *** hypoglycemic episodes in the last ***. Patient checks blood sugars *** times per day and readings run ***mg/dL. Most recent A1C was *** %. Lab Results Component Value Date/Time Hemoglobin A1c 6.1 (H) 07/23/2019 03:04 PM  
 
 
A typical days food intake is as follows: 
Breakfast: *** Lunch: *** Dinner: *** Beverages: *** Snacks: *** Exercise:*** 
 
Patient {Reports or denies:01060} adherence with {HIS/HER:24069} medications. Patient {Reports or denies:15768} adverse effects from their medications. Past Surgical History:  
Procedure Laterality Date  BREAST SURGERY PROCEDURE UNLISTED Right 2015  HX BREAST LUMPECTOMY Right [address] HYSTERECTOMY  1985 Family History Problem Relation Age of Onset  Cancer Mother   
     stomach and liver cancer, in her 62s  Heart Disease Father MI age 46s  Cancer Sister   
     blood  Cancer Brother   
     leukemia  Cancer Sibling Social History Socioeconomic History  Marital status:  Spouse name: Not on file  Number of children: Not on file  Years of education: Not on file  Highest education level: Not on file Tobacco Use  Smoking status: Never Smoker  Smokeless tobacco: Never Used Substance and Sexual Activity  Alcohol use: No  
  Frequency: Never  Drug use: No  
 Sexual activity: Not Currently Partners: Male Social History Narrative ** Merged History Encounter ** No Known Allergies Current Outpatient Medications Medication Sig  DULoxetine (CYMBALTA) 20 mg capsule Take 1 Cap by mouth daily.  prasterone, dhea, (INTRAROSA) 6.5 mg inst Insert  into vagina every seven (7) days. Indications: as needed  gabapentin (NEURONTIN) 100 mg capsule Take 100 mg by mouth daily as needed.  losartan (COZAAR) 25 mg tablet Take 1 Tab by mouth daily.  azelastine (ASTELIN) 137 mcg (0.1 %) nasal spray 1 [place] by Both Nostrils route two (2) times a day. Use in each nostril as directed  fluticasone propionate (FLONASE) 50 mcg/actuation nasal spray 2 Sprays by Both Nostrils route daily.  acetaminophen (TYLENOL) 325 mg tablet Take  by mouth every four (4) hours as needed for Pain.  loratadine (CLARITIN) 10 mg tablet Take 1 Tab by mouth daily as needed for Allergies. Cough, runny nose, itching  rosuvastatin (CRESTOR) 10 mg tablet Take 1 Tab by mouth nightly.  anastrozole (ARIMIDEX) 1 mg tablet Take 1 mg by mouth daily.  celecoxib (CELEBREX) 200 mg capsule Take 200 mg by mouth as needed.  cholecalciferol (VITAMIN D3) 1,000 unit cap Take 1,000 Units by mouth daily.  menthol (BIOFREEZE, MENTHOL,) 4 % gel by Apply Externally route daily as needed.  solifenacin (VESICARE) 5 mg tablet Take 5 mg by mouth as needed.  homeopathic drugs (HAIR AND SKIN PO) Take  by mouth daily.  folic acid/multivit-min/lutein (CENTRUM SILVER PO) Take  by mouth daily.  vit C/vit E ac/lut/copper/zinc (PRESERVISION LUTEIN PO) Take  by mouth daily.  cyclobenzaprine (FLEXERIL) 5 mg tablet Take 1 Tab by mouth three (3) times daily as needed for Muscle Spasm(s). No current facility-administered medications for this visit. There were no vitals taken for this visit. Lab Results Component Value Date/Time Cholesterol, total 230 (H) 07/23/2019 03:04 PM  
 HDL Cholesterol 55 07/23/2019 03:04 PM  
 LDL, calculated 132.6 (H) 07/23/2019 03:04 PM  
 VLDL, calculated 42.4 07/23/2019 03:04 PM  
 Triglyceride 212 (H) 07/23/2019 03:04 PM  
 CHOL/HDL Ratio 4.2 07/23/2019 03:04 PM  
 
Lab Results Component Value Date/Time ALT (SGPT) 24 07/23/2019 03:04 PM  
 AST (SGOT) 13 07/23/2019 03:04 PM  
 Alk. phosphatase 92 07/23/2019 03:04 PM  
 Bilirubin, total 0.8 07/23/2019 03:04 PM  
 
Lab Results Component Value Date/Time Creatinine 0.99 07/23/2019 03:04 PM  
 
No results found for: MCACR, MCA1, MCA2, MCA3, MCAU, MCAU2, MCALPOCT 
BP Readings from Last 3 Encounters:  
03/10/20 122/58  
01/24/20 121/62  
12/16/19 110/60 Wt Readings from Last 3 Encounters:  
03/10/20 136 lb 9.6 oz (62 kg) 01/24/20 136 lb 6.4 oz (61.9 kg) 12/16/19 137 lb (62.1 kg) Estimated body mass index is 23.45 kg/m² as calculated from the following: 
  Height as of 3/10/20: 5' 4\" (1.626 m). Weight as of 3/10/20: 136 lb 9.6 oz (62 kg). Screenings: 
No Diabetic HM Topics for this patient Assessment/Plan: 1. Diabetes: {controlled uncontrolled:32561677} with {d7wtzka:66791}. Blood glucose readings: high with *** readings the highest consistently. 
-Discussed pathophysiology of diabetes, long-term consequences of uncontrolled diabetes and blood sugars, importance of staying up to date with eye/foot/dental exams, signs/symptoms of hypo/hyperglycemia, and A1C and BG goals with patient today A. Medications: Stressed importance of medication adherence on overall diabetes control as well as with preventing complications of diabetes. Reviewed symptoms of hypoglycemia and how to treat. Instructed patient to continue to monitor blood sugars *** and call the office if:*** 
 
B.  Diet/lifestyle: Reinforced importance of regular activity/exercise to help improve insulin resistance and reviewed food labels/carbohydrates/general carb guidelines for meals (45-60 g) and snacks (goal of 15g). Patient education materials were provided and reviewed with the patient. C. Screenings/Prevention: 
Vaccinations: Patient is eligible for the following vaccinations: {diabetesvaccines:74671} Dilated eye exam (recommended at least every 2 years or annually if retinopathy present): next due *** Albumin-to creatinine ratio (recommended annually): next due *** Foot Exam (recommended annually): next due ***  
 
2.  Hypertension: Blood pressure {IS/IS NOT:32248} at goal of < 140/90 mm Hg per the ADA guidelines. Emphasized the importance of regular physical activity, restricting salt in diet and weight maintenance/loss on overall blood pressure control. 3.  Hyperlipidemia: LDL currently {IS/IS NOT:31194} <100 mg/dL. Current lipid treatment guidelines recommend at least moderate-intensity statin doses to decrease ASCVD risk. 4.  Medication reconciliation was completed during the visit. There are no discontinued medications. Patient verbalized understanding of the information presented and all of the patients questions were answered. AVS was handed to the patient and information reviewed. Patient advised to call the office with any additional questions or concerns. Follow-up: ***. Notification of recommendations will be sent to Dr. Jayce Ramirez MD for review. Thank you for the consult, Adriana Aguiar PharmD Clinical Pharmacist Specialist 
 
Time spent with the patient:  *** Time spent documenting: ***

## 2020-03-31 ENCOUNTER — VIRTUAL VISIT (OUTPATIENT)
Dept: INTERNAL MEDICINE CLINIC | Age: 80
End: 2020-03-31

## 2020-03-31 DIAGNOSIS — Z79.899 MEDICATION MANAGEMENT: Primary | ICD-10-CM

## 2020-03-31 RX ORDER — LIDOCAINE 4 G/100G
1 PATCH TOPICAL EVERY 24 HOURS
COMMUNITY
End: 2020-06-02 | Stop reason: ALTCHOICE

## 2020-03-31 RX ORDER — GLUCOSAMINE/CHONDR SU A SOD 750-600 MG
1 TABLET ORAL DAILY
COMMUNITY

## 2020-03-31 RX ORDER — ACETAMINOPHEN 500 MG
1000 TABLET ORAL
COMMUNITY

## 2020-04-01 ENCOUNTER — APPOINTMENT (OUTPATIENT)
Dept: INTERNAL MEDICINE CLINIC | Age: 80
End: 2020-04-01

## 2020-04-01 ENCOUNTER — HOSPITAL ENCOUNTER (OUTPATIENT)
Dept: LAB | Age: 80
Discharge: HOME | End: 2020-04-01
Payer: MEDICARE

## 2020-04-01 DIAGNOSIS — E78.2 MIXED HYPERLIPIDEMIA: ICD-10-CM

## 2020-04-01 DIAGNOSIS — R73.9 HYPERGLYCEMIA: ICD-10-CM

## 2020-04-01 DIAGNOSIS — I10 ESSENTIAL HYPERTENSION: ICD-10-CM

## 2020-04-01 DIAGNOSIS — R73.02 IMPAIRED GLUCOSE TOLERANCE: ICD-10-CM

## 2020-04-01 DIAGNOSIS — R51.9 NONINTRACTABLE HEADACHE, UNSPECIFIED CHRONICITY PATTERN, UNSPECIFIED HEADACHE TYPE: ICD-10-CM

## 2020-04-01 DIAGNOSIS — M79.10 MYALGIA: ICD-10-CM

## 2020-04-01 LAB
ALBUMIN SERPL-MCNC: 4 G/DL (ref 3.4–5)
ALBUMIN/GLOB SERPL: 1.3 {RATIO} (ref 0.8–1.7)
ALP SERPL-CCNC: 62 U/L (ref 45–117)
ALT SERPL-CCNC: 21 U/L (ref 13–56)
ANION GAP SERPL CALC-SCNC: 7 MMOL/L (ref 3–18)
AST SERPL-CCNC: 15 U/L (ref 10–38)
BASOPHILS # BLD: 0 K/UL (ref 0–0.1)
BASOPHILS NFR BLD: 0 % (ref 0–2)
BILIRUB SERPL-MCNC: 1 MG/DL (ref 0.2–1)
BUN SERPL-MCNC: 15 MG/DL (ref 7–18)
BUN/CREAT SERPL: 16 (ref 12–20)
CALCIUM SERPL-MCNC: 9.2 MG/DL (ref 8.5–10.1)
CHLORIDE SERPL-SCNC: 107 MMOL/L (ref 100–111)
CHOLEST SERPL-MCNC: 222 MG/DL
CK SERPL-CCNC: 107 U/L (ref 26–192)
CO2 SERPL-SCNC: 28 MMOL/L (ref 21–32)
CREAT SERPL-MCNC: 0.96 MG/DL (ref 0.6–1.3)
CREAT UR-MCNC: 88 MG/DL (ref 30–125)
CRP SERPL-MCNC: <0.3 MG/DL (ref 0–0.3)
DIFFERENTIAL METHOD BLD: ABNORMAL
EOSINOPHIL # BLD: 0 K/UL (ref 0–0.4)
EOSINOPHIL NFR BLD: 0 % (ref 0–5)
ERYTHROCYTE [DISTWIDTH] IN BLOOD BY AUTOMATED COUNT: 16 % (ref 11.6–14.5)
GLOBULIN SER CALC-MCNC: 3.1 G/DL (ref 2–4)
GLUCOSE SERPL-MCNC: 97 MG/DL (ref 74–99)
HBA1C MFR BLD: 5.7 % (ref 4.2–5.6)
HCT VFR BLD AUTO: 37.8 % (ref 35–45)
HDLC SERPL-MCNC: 68 MG/DL (ref 40–60)
HDLC SERPL: 3.3 {RATIO} (ref 0–5)
HGB BLD-MCNC: 11.9 G/DL (ref 12–16)
LDLC SERPL CALC-MCNC: 143 MG/DL (ref 0–100)
LIPID PROFILE,FLP: ABNORMAL
LYMPHOCYTES # BLD: 2.2 K/UL (ref 0.9–3.6)
LYMPHOCYTES NFR BLD: 42 % (ref 21–52)
MCH RBC QN AUTO: 23.7 PG (ref 24–34)
MCHC RBC AUTO-ENTMCNC: 31.5 G/DL (ref 31–37)
MCV RBC AUTO: 75.1 FL (ref 74–97)
MICROALBUMIN UR-MCNC: 0.81 MG/DL (ref 0–3)
MICROALBUMIN/CREAT UR-RTO: 9 MG/G (ref 0–30)
MONOCYTES # BLD: 0.3 K/UL (ref 0.05–1.2)
MONOCYTES NFR BLD: 5 % (ref 3–10)
NEUTS SEG # BLD: 2.7 K/UL (ref 1.8–8)
NEUTS SEG NFR BLD: 53 % (ref 40–73)
PLATELET # BLD AUTO: 221 K/UL (ref 135–420)
PMV BLD AUTO: 12 FL (ref 9.2–11.8)
POTASSIUM SERPL-SCNC: 4.3 MMOL/L (ref 3.5–5.5)
PROT SERPL-MCNC: 7.1 G/DL (ref 6.4–8.2)
RBC # BLD AUTO: 5.03 M/UL (ref 4.2–5.3)
SODIUM SERPL-SCNC: 142 MMOL/L (ref 136–145)
TRIGL SERPL-MCNC: 55 MG/DL (ref ?–150)
VLDLC SERPL CALC-MCNC: 11 MG/DL
WBC # BLD AUTO: 5.1 K/UL (ref 4.6–13.2)

## 2020-04-01 PROCEDURE — 83036 HEMOGLOBIN GLYCOSYLATED A1C: CPT

## 2020-04-01 PROCEDURE — 82043 UR ALBUMIN QUANTITATIVE: CPT

## 2020-04-01 PROCEDURE — 85025 COMPLETE CBC W/AUTO DIFF WBC: CPT

## 2020-04-01 PROCEDURE — 36415 COLL VENOUS BLD VENIPUNCTURE: CPT

## 2020-04-01 PROCEDURE — 80061 LIPID PANEL: CPT

## 2020-04-01 PROCEDURE — 80053 COMPREHEN METABOLIC PANEL: CPT

## 2020-04-01 PROCEDURE — 86140 C-REACTIVE PROTEIN: CPT

## 2020-04-01 PROCEDURE — 82550 ASSAY OF CK (CPK): CPT

## 2020-04-02 ENCOUNTER — TELEPHONE (OUTPATIENT)
Dept: INTERNAL MEDICINE CLINIC | Age: 80
End: 2020-04-02

## 2020-04-02 NOTE — PROGRESS NOTES
Her CRP is undetectable. There is no microalbuminuria. Her total cholesterol is 222. Triglycerides are 55, down from 212 about 8 months ago. Her HDL is 68. Her LDL is 143, up from 132 just 8 months ago. Kidney and liver function labs are normal.  Her A1c is 5.7, down from 6.1. Her CBC shows no significant abnormalities. Her hemoglobin mildly low at 11.9. MCV is normal.  RDW is mildly elevated at 16. Please have the patient know these results. She should continue taking all medication as prescribed. No additional studies are warranted for these findings. She needs to reduce her processed food and fatty food intake. If her cholesterol does not improve over the next 6 to 12 months, she will need additional medication to lower her cholesterol even further.     Dr. Osmel Landa  [facility] [address]  Phone: [phone]  Fax: [phone]

## 2020-04-02 NOTE — TELEPHONE ENCOUNTER
----- Message from Mellissa Dhillon MD sent at 4/2/2020 10:58 AM EDT -----  Her CRP is undetectable. There is no microalbuminuria. Her total cholesterol is 222. Triglycerides are 55, down from 212 about 8 months ago. Her HDL is 68. Her LDL is 143, up from 132 just 8 months ago. Kidney and liver function labs are normal.  Her A1c is 5.7, down from 6.1. Her CBC shows no significant abnormalities. Her hemoglobin mildly low at 11.9. MCV is normal.  RDW is mildly elevated at 16. Please have the patient know these results. She should continue taking all medication as prescribed. No additional studies are warranted for these findings. She needs to reduce her processed food and fatty food intake. If her cholesterol does not improve over the next 6 to 12 months, she will need additional medication to lower her cholesterol even further.     Dr. Emery Logan  [facility], [address] [place] [address], [address] [place] [address] Str.  Phone: [phone]  Fax: [phone]

## 2020-04-15 ENCOUNTER — HOSPITAL ENCOUNTER (EMERGENCY)
Age: 80
Discharge: HOME | End: 2020-04-15
Attending: EMERGENCY MEDICINE
Payer: MEDICARE

## 2020-04-15 VITALS
WEIGHT: 134 LBS | BODY MASS INDEX: 22.88 KG/M2 | HEIGHT: 64 IN | SYSTOLIC BLOOD PRESSURE: 139 MMHG | TEMPERATURE: 98.5 F | DIASTOLIC BLOOD PRESSURE: 72 MMHG | OXYGEN SATURATION: 98 % | RESPIRATION RATE: 18 BRPM | HEART RATE: 72 BPM

## 2020-04-15 DIAGNOSIS — N30.00 ACUTE CYSTITIS WITHOUT HEMATURIA: Primary | ICD-10-CM

## 2020-04-15 LAB
APPEARANCE UR: CLEAR
BILIRUB UR QL: NEGATIVE
COLOR UR: YELLOW
GLUCOSE UR STRIP.AUTO-MCNC: NEGATIVE MG/DL
HGB UR QL STRIP: NEGATIVE
KETONES UR QL STRIP.AUTO: NEGATIVE MG/DL
LEUKOCYTE ESTERASE UR QL STRIP.AUTO: ABNORMAL
NITRITE UR QL STRIP.AUTO: NEGATIVE
PH UR STRIP: 5 [PH] (ref 5–8)
PROT UR STRIP-MCNC: NEGATIVE MG/DL
SP GR UR REFRACTOMETRY: 1.01 (ref 1–1.03)
UROBILINOGEN UR QL STRIP.AUTO: 0.2 EU/DL (ref 0.2–1)
WBC URNS QL MICRO: NORMAL /HPF (ref 0–4)

## 2020-04-15 PROCEDURE — 74011250637 HC RX REV CODE- 250/637: Performed by: EMERGENCY MEDICINE

## 2020-04-15 PROCEDURE — 81001 URINALYSIS AUTO W/SCOPE: CPT

## 2020-04-15 PROCEDURE — 99283 EMERGENCY DEPT VISIT LOW MDM: CPT

## 2020-04-15 RX ORDER — PHENAZOPYRIDINE HYDROCHLORIDE 100 MG/1
200 TABLET, FILM COATED ORAL ONCE
Status: COMPLETED | OUTPATIENT
Start: 2020-04-15 | End: 2020-04-15

## 2020-04-15 RX ORDER — SULFAMETHOXAZOLE AND TRIMETHOPRIM 800; 160 MG/1; MG/1
1 TABLET ORAL
Status: COMPLETED | OUTPATIENT
Start: 2020-04-15 | End: 2020-04-15

## 2020-04-15 RX ORDER — SULFAMETHOXAZOLE AND TRIMETHOPRIM 800; 160 MG/1; MG/1
1 TABLET ORAL 2 TIMES DAILY
Qty: 20 TAB | Refills: 0 | Status: SHIPPED | OUTPATIENT
Start: 2020-04-15 | End: 2020-04-25

## 2020-04-15 RX ORDER — PHENAZOPYRIDINE HYDROCHLORIDE 200 MG/1
200 TABLET, FILM COATED ORAL 3 TIMES DAILY
Qty: 6 TAB | Refills: 0 | Status: SHIPPED | OUTPATIENT
Start: 2020-04-15 | End: 2020-04-17

## 2020-04-15 RX ADMIN — SULFAMETHOXAZOLE AND TRIMETHOPRIM 1 TABLET: 800; 160 TABLET ORAL at 23:00

## 2020-04-15 RX ADMIN — PHENAZOPYRIDINE 200 MG: 100 TABLET ORAL at 23:00

## 2020-04-16 NOTE — ED PROVIDER NOTES
HPI   Patient c/o frequency and dysuria for weeks, worsening today. No nausea, vomiting or diarrhea. Patient that she has seen her doctor 3-4 times for this discomfort and treated with antibiotics.     Past Medical History:   Diagnosis Date    Arthritis     Back pain of lumbar region with sciatica 12/5/2018    [facility]     Cancer ([facility] [address] [facility] 2015    breast cancer    Dyslipidemia     Heart palpitations     Hypertension     Left ventricular hypertrophy     Macular degeneration     Vitamin D deficiency        Past Surgical History:   Procedure Laterality Date    BREAST SURGERY PROCEDURE UNLISTED Right 2015    HX BREAST LUMPECTOMY Right     HX HERNIA REPAIR      HX HYSTERECTOMY  1985         Family History:   Problem Relation Age of Onset    Cancer Mother         stomach and liver cancer, in her 62s    Heart Disease Father         [place] age 46s   Manavanisa.Gera Cancer Sister         blood    Cancer Brother         leukemia    Cancer Sibling        Social History     Socioeconomic History    Marital status:      Spouse name: Not on file    Number of children: Not on file    Years of education: Not on file    Highest education level: Not on file   Occupational History    Not on file   Social Needs    Financial resource strain: Not on file    Food insecurity     Worry: Not on file     Inability: Not on file    Transportation needs     Medical: Not on file     Non-medical: Not on file   Tobacco Use    Smoking status: Never Smoker    Smokeless tobacco: Never Used   Substance and Sexual Activity    Alcohol use: No     Frequency: Never    Drug use: No    Sexual activity: Not Currently     Partners: Male   Lifestyle    Physical activity     Days per week: Not on file     Minutes per session: Not on file    Stress: Not on file   Relationships    Social connections     Talks on phone: Not on file     Gets together: Not on file     Attends Buddhism service: Not on file     Active member of club or organization: Not on file     Attends meetings of clubs or organizations: Not on file     Relationship status: Not on file    Intimate partner violence     Fear of current or ex partner: Not on file     Emotionally abused: Not on file     Physically abused: Not on file     Forced sexual activity: Not on file   Other Topics Concern    Not on file   Social History Narrative    ** Merged History Encounter **              ALLERGIES: Patient has no known allergies. Review of Systems   Constitutional: Negative. HENT: Negative. Eyes: Negative. Respiratory: Negative. Cardiovascular: Negative. Gastrointestinal: Negative. Endocrine: Negative. Genitourinary: Negative. Musculoskeletal: Negative. Skin: Negative. Allergic/Immunologic: Negative. Neurological: Negative. Hematological: Negative. Psychiatric/Behavioral: Negative. All other systems reviewed and are negative. Vitals:    04/15/20 1959 04/15/20 2258   BP: 137/86 139/72   Pulse: 72    Resp: 18 18   Temp: 98.5 °F (36.9 °C)    SpO2: 98% 98%   Weight: 60.8 kg (134 lb)    Height: 5' 4\" (1.626 m)             Physical Exam  Vitals signs and nursing note reviewed. Constitutional:       General: She is not in acute distress. Appearance: She is well-developed. She is not diaphoretic. HENT:      Head: Normocephalic. Right Ear: External ear normal.      Left Ear: External ear normal.      Mouth/Throat:      Pharynx: No oropharyngeal exudate. Eyes:      General: No scleral icterus. Right eye: No discharge. Left eye: No discharge. Conjunctiva/sclera: Conjunctivae normal.      Pupils: Pupils are equal, round, and reactive to light. Neck:      Musculoskeletal: Normal range of motion and neck supple. Thyroid: No thyromegaly. Vascular: No JVD. Trachea: No tracheal deviation. Cardiovascular:      Rate and Rhythm: Normal rate and regular rhythm. Heart sounds: Normal heart sounds. No murmur. No friction rub. No gallop. Pulmonary:      Effort: Pulmonary effort is normal. No respiratory distress. Breath sounds: Normal breath sounds. No stridor. No wheezing or rales. Chest:      Chest wall: No tenderness. Abdominal:      General: Bowel sounds are normal. There is no distension. Palpations: Abdomen is soft. There is no mass. Tenderness: There is no abdominal tenderness. There is no guarding or rebound. Musculoskeletal: Normal range of motion. General: No tenderness. Lymphadenopathy:      Cervical: No cervical adenopathy. Skin:     General: Skin is warm and dry. Coloration: Skin is not pale. Findings: No erythema or rash. Neurological:      Mental Status: She is alert and oriented to person, place, and time. Cranial Nerves: No cranial nerve deficit. Motor: No abnormal muscle tone. Coordination: Coordination normal.      Deep Tendon Reflexes: Reflexes normal.       MDM  Number of Diagnoses or Management Options  Acute cystitis without hematuria:          Procedures    Dx:  UTI    Disp: D/C, F/U with PCP or urologist within 5 days. Return to ER prn. Dictation disclaimer:  Please note that this dictation was completed with [employer], the [employer] voice recognition software. Quite often unanticipated grammatical, syntax, homophones, and other interpretive errors are inadvertently transcribed by the computer software. Please disregard these errors. Please excuse any errors that have escaped final proofreading.

## 2020-04-17 ENCOUNTER — TELEPHONE (OUTPATIENT)
Dept: INTERNAL MEDICINE CLINIC | Age: 80
End: 2020-04-17

## 2020-04-17 NOTE — TELEPHONE ENCOUNTER
Patient was in the ED at [facility]. Please see notes. She said she needs to get a CT Scan and consult with urologist.  Please advise.

## 2020-04-20 NOTE — TELEPHONE ENCOUNTER
A CT scan is no recommended by ED staff per my review of the ED records. Please schedule her for a virtual visit.     Dr. Duron President  [facility], [address] [place] [address], [facility]  Phone: [phone]  Fax: [phone]

## 2020-04-21 ENCOUNTER — VIRTUAL VISIT (OUTPATIENT)
Dept: INTERNAL MEDICINE CLINIC | Age: 80
End: 2020-04-21

## 2020-04-21 ENCOUNTER — TELEPHONE (OUTPATIENT)
Dept: INTERNAL MEDICINE CLINIC | Age: 80
End: 2020-04-21

## 2020-04-21 DIAGNOSIS — N30.00 ACUTE CYSTITIS WITHOUT HEMATURIA: ICD-10-CM

## 2020-04-21 DIAGNOSIS — K59.09 CHRONIC CONSTIPATION: Primary | ICD-10-CM

## 2020-04-21 DIAGNOSIS — E78.2 MIXED HYPERLIPIDEMIA: ICD-10-CM

## 2020-04-21 DIAGNOSIS — I10 ESSENTIAL HYPERTENSION: ICD-10-CM

## 2020-04-21 DIAGNOSIS — M26.609 TMJ (TEMPOROMANDIBULAR JOINT DISORDER): ICD-10-CM

## 2020-04-21 DIAGNOSIS — M51.9 LUMBAR DISC DISEASE: ICD-10-CM

## 2020-04-21 DIAGNOSIS — R73.02 IMPAIRED GLUCOSE TOLERANCE: ICD-10-CM

## 2020-04-21 RX ORDER — PRAVASTATIN SODIUM 40 MG/1
40 TABLET ORAL
Qty: 90 TAB | Refills: 3 | Status: SHIPPED | OUTPATIENT
Start: 2020-04-21 | End: 2020-04-21

## 2020-04-21 RX ORDER — PRAVASTATIN SODIUM 40 MG/1
40 TABLET ORAL
Qty: 90 TAB | Refills: 3 | Status: SHIPPED | OUTPATIENT
Start: 2020-04-21 | End: 2021-05-18

## 2020-04-21 NOTE — TELEPHONE ENCOUNTER
Called patient for PharmD followup today for medication management for chronic back pain. Patient states she spoke with Dr. Erwin Li for virtual appointment today. States she is focused on her UTI and sciatica is not bothering her that much currently. She requests we reschedule appointment. Rescheduled appointment to 5/5/2020 at 1 PM for telephone appointment per her request.    Patient expressed understanding and had no further questions.     My Eddy, PharmD  Clinical Pharmacist Specialist

## 2020-04-21 NOTE — PROGRESS NOTES
Alanna Diane presents today for   Chief Complaint   Patient presents with   [facility] ED Follow-up     Seen on 4-15-20 for UTI. Patient treated with antibiotics, but states that the medication causes diarrhea and weird taste in her mouth. Coordination of Care:  1. Have you been to the ER, urgent care clinic since your last visit? Hospitalized since your last visit? yes    2. Have you seen or consulted any other health care providers outside of the [address] [place] [address] since your last visit? Include any pap smears or colon screening. no      Advance Directive:  1. Do you have an advance directive in place? Patient Reply:yes        2. Per patient no changes to their ACP contact no.

## 2020-04-21 NOTE — PROGRESS NOTES
Yordan Constantino is a [de-identified] y.o. female who was seen by synchronous (real-time) audio-video technology on 4/21/2020. Assessment & Plan:   1. UTI:   - I encouraged her to complete the bactrim. I instructed her to notify me if her sx worsen (such as diarrhea). 2. Chronic Constipation:   - Referral to GI for evaluation/treatment per her request  - I highly recommended a high fiber diet and hydration with water and spent a considerable amt of time providing information on a high fiber diet. 3. TMJ Pain: Resolved. 4. Lower Back Pain:   - C/w as prescribed. - Activity as tolerated. 5. HTN: Stable. - C/w rx as prescribed. 6. HLD:  She had myalgias on Crestor.  - Ordering pravastatin in place of Crestor    7. Prediabetes: Improving.  - Observation. Lab review: labs are reviewed in the EHR     I have discussed the diagnosis with the patient and the intended plan as seen in the above orders. I have discussed medication side effects and warnings with the patient as well. I have reviewed the plan of care with the patient, accepted their input and they are in agreement with the treatment goals. All questions were answered. The patient understands the plan of care. Pt instructed if symptoms worsen to call the office or report to the ED for continued care. Greater than 50% of the visit time was spent in counseling and/or coordination of care. I spent 20 min speaking to the pt during this apt, 15 of which were spent counseling her as mentioned above. Voice recognition was used to generate this report, which may have resulted in some phonetic based errors in grammar and contents. Even though attempts were made to correct all the mistakes, some may have been missed, and remained in the body of the document. Subjective:   Yordan Constantino was seen for   Chief Complaint   Patient presents with   [facility] ED Follow-up     Seen on 4-15-20 for UTI.  Patient treated with antibiotics, but states that the medication causes diarrhea and weird taste in her mouth. The patient is a 78-year-old female with history of breast cancer (on arimidex since 2015), hypertension, HLD, lumbar disc disease, pulmonary nodule, allergic rhinitis osteopenia per EHR, macular degeneration per EHR, OAB, GERD, cataracts, and B12 deficiency. 1.  Hypertension: Taking losartan. No adverse side effects from this rx. Her most recent labs show normal electrolytes and renal function. No microalbuminuria.      2. Lower Back Pain: +H/o lumbar disc disease. +Chronic lower back pain that radites down her legs, associated with paresthesias despite PT and taking gabapentin 100mg daily (unable to tolerate any dose higher than this). She was unable to tolerate cymbalta as well. She reported some improvement of her chronic pain s/p injection. 3. HLD: At her last apt, she reported myalgias while on Crestor. I had her stop this rx to see if her myalgias would subside. She reports: myalgias \"calmed down\" since her last apt. She believes that her jt pains are from taking anastrozole. Myalgias improved after stopping Crestor. Her most recent labs show: Her total cholesterol is 222. Triglycerides are 55, down from 212 about 8 months ago. Her HDL is 68. Her LDL is 143, up from 132 just 8 months ago. 4. Prediabetes: Her most recent labs show that her A1C is down to 5.7 from 6.1.    5. TMJ Pain: At her last apt, she reported left sided jaw/facial pain x 1 month, triggered by clenching her jaw. She was told to take tylenol at her last apt for presumed TMJ related pain. Since then, she reports: sx resolved. .     6. Acute Cystitis: She was evaluated in the ED on 4/16/20 w/ c/o dysuria. A UA was obtained and positive for leukocyte esterase. She was prescribed bactrim for presumed UTI. She is taking this abx regimen but notes that the abx have caused some diarrhea and a weird taste in her mouth. Diarrhea: she is having 2 BMs per day.   Urinary sx since starting the abx: she still has some painful urination - but sx are improving. Urinary frequency has improved with abx. Fever/chills: none. She denies dysuria, hematuria, N/V, and abdominal pain today. 7. Chronic Constipation: Present for several months. Associated with straining and pink blood on the toilet tissue with wiping after straining. She has some associated bloating. She reports abdominal discomfort (lower) off/on. Sx improve with occasional use of an enema. She reports not consuming any green vegetables on a daily basis despite being instructed by myself to consume green vegetables as a means of increasing her fiber intake. She tries to stay hydrated drinking water. She had a CT of her abdomen/pelvis on 1/11/19. There were no abnormalities along her abdomen/pelvis. Prior to Admission medications    Medication Sig Start Date End Date Taking? Authorizing Provider   trimethoprim-sulfamethoxazole (Bactrim DS) 160-800 mg per tablet Take 1 Tab by mouth two (2) times a day for 10 days. 4/15/20 4/25/20 Yes Kelley Andrews MD   acetaminophen (TYLENOL) 500 mg tablet Take 1,000 mg by mouth every six (6) hours as needed for Pain. Yes Provider, Historical   OTHER [employer] Women's Multivitamin Gummy - 1 gummy daily   Yes Provider, Historical   Biotin 2,500 mcg cap Take 1 Cap by mouth daily. Yes Provider, Historical   psyllium husk (METAMUCIL PO) Take  by mouth. Yes Provider, Historical   lidocaine 4 % patch 1 Patch by TransDERmal route every twenty-four (24) hours. Yes Provider, Historical   prasterone, dhea, (INTRAROSA) 6.5 mg inst Insert  into vagina every seven (7) days. Indications: as needed   Yes Provider, Historical   losartan (COZAAR) 25 mg tablet Take 1 Tab by mouth daily. 10/21/19  Yes Wilder Andujar MD   azelastine (ASTELIN) 137 mcg (0.1 %) nasal spray 1 [place] by Both Nostrils route two (2) times a day.  Use in each nostril as directed 8/16/19  Yes Desi Khan MD fluticasone propionate (FLONASE) 50 mcg/actuation nasal spray 2 Sprays by Both Nostrils route daily. 8/16/19  Yes Constanza Ruano MD   loratadine (CLARITIN) 10 mg tablet Take 1 Tab by mouth daily as needed for Allergies. Cough, runny nose, itching 3/11/19  Yes Morena Sherman MD   rosuvastatin (CRESTOR) 10 mg tablet Take 1 Tab by mouth nightly. 12/6/18  Yes Carmencita Hernandez MD   anastrozole (ARIMIDEX) 1 mg tablet Take 1 mg by mouth daily. Yes Provider, Historical   celecoxib (CELEBREX) 200 mg capsule Take 200 mg by mouth as needed. Yes Provider, Historical   cholecalciferol (VITAMIN D3) 1,000 unit cap Take 1,000 Units by mouth daily. Yes Provider, Historical   solifenacin (VESICARE) 5 mg tablet Take 5 mg by mouth as needed. Yes Provider, Historical   vit C/vit E ac/lut/copper/zinc (PRESERVISION LUTEIN PO) Take 1 Tab by mouth daily. Yes Other, MD Phil   cyclobenzaprine (FLEXERIL) 5 mg tablet Take 1 Tab by mouth three (3) times daily as needed for Muscle Spasm(s). 11/1/18  Yes Juliann Zuleta PA   DULoxetine (CYMBALTA) 20 mg capsule Take 1 Cap by mouth daily. 3/10/20   Renetta Miller MD   gabapentin (NEURONTIN) 100 mg capsule Take 100 mg by mouth daily as needed.  10/14/19   Provider, Historical     No Known Allergies  Past Medical History:   Diagnosis Date    Arthritis     Back pain of lumbar region with sciatica 12/5/2018    [facility]     Cancer ([facility] [address] [facility] 2015    breast cancer    Dyslipidemia     Heart palpitations     Hypertension     Left ventricular hypertrophy     Macular degeneration     Vitamin D deficiency      Past Surgical History:   Procedure Laterality Date    BREAST SURGERY PROCEDURE UNLISTED Right 2015    HX BREAST LUMPECTOMY Right     HX HERNIA REPAIR      HX HYSTERECTOMY  1985     Family History   Problem Relation Age of Onset    Cancer Mother         stomach and liver cancer, in her 62s    Heart Disease Father         MI age 55s    Cancer Sister         blood    Cancer Brother         leukemia    Cancer Sibling      Social History     Socioeconomic History    Marital status:      Spouse name: Not on file    Number of children: Not on file    Years of education: Not on file    Highest education level: Not on file   Tobacco Use    Smoking status: Never Smoker    Smokeless tobacco: Never Used   Substance and Sexual Activity    Alcohol use: No     Frequency: Never    Drug use: No    Sexual activity: Not Currently     Partners: Male   Social History Narrative    ** Merged History Encounter **            ROS:  Gen: No fever/chills  HEENT: No sore throat, eye pain, ear pain, or congestion.  No HA  CV: No CP  Resp: No cough/SOB  GI: No abdominal pain today  : See HPI  Derm: No rash  Neuro: No new paresthesias/weakness  Musc: No new myalgias/jt pain  Psych: No depression sx      LABS:  Lab Results   Component Value Date/Time    Sodium 142 04/01/2020 10:42 AM    Potassium 4.3 04/01/2020 10:42 AM    Chloride 107 04/01/2020 10:42 AM    CO2 28 04/01/2020 10:42 AM    Anion gap 7 04/01/2020 10:42 AM    Glucose 97 04/01/2020 10:42 AM    BUN 15 04/01/2020 10:42 AM    Creatinine 0.96 04/01/2020 10:42 AM    BUN/Creatinine ratio 16 04/01/2020 10:42 AM    GFR est AA >60 04/01/2020 10:42 AM    GFR est non-AA 56 (L) 04/01/2020 10:42 AM    Calcium 9.2 04/01/2020 10:42 AM       Lab Results   Component Value Date/Time    Cholesterol, total 222 (H) 04/01/2020 10:42 AM    HDL Cholesterol 68 (H) 04/01/2020 10:42 AM    LDL, calculated 143 (H) 04/01/2020 10:42 AM    VLDL, calculated 11 04/01/2020 10:42 AM    Triglyceride 55 04/01/2020 10:42 AM    CHOL/HDL Ratio 3.3 04/01/2020 10:42 AM       Lab Results   Component Value Date/Time    WBC 5.1 04/01/2020 10:42 AM    HGB 11.9 (L) 04/01/2020 10:42 AM    HCT 37.8 04/01/2020 10:42 AM    PLATELET 018 44/66/0957 10:42 AM    MCV 75.1 04/01/2020 10:42 AM       Lab Results   Component Value Date/Time    Hemoglobin A1c 5.7 (H) 04/01/2020 10:42 AM       Lab Results   Component Value Date/Time    TSH 1.10 12/05/2018 03:04 PM           Due to this being a TeleHealth phone only evaluation, many elements of the physical examination are unable to be assessed. The pt was seen by synchronous (real-time) audio phone only  technology, and/or her healthcare decision maker, is aware that this patient-initiated, Telehealth encounter is a billable service, with coverage as determined by her insurance carrier. She is aware that she may receive a bill and has provided verbal consent to proceed: Yes. The pt is being evaluated by a phone only visit encounter for concerns as above. A caregiver was present when appropriate. Due to this being a TeleHealth encounter (During NAXKT-07 public health emergency), evaluation of the following organ systems was limited: Vitals/Constitutional/EENT/Resp/CV/GI//MS/Neuro/Skin/Heme-Lymph-Imm. Pursuant to the emergency declaration under the [address] [place] [address], [address] [place] [address] and the [employer]ometrics and [employer] Act, this Virtual phone only Visit was conducted, with patient's (and/or legal guardian's) consent, to reduce the patient's risk of exposure to COVID-19 and provide necessary medical care. Services were provided through a phone only synchronous discussion virtually to substitute for in-person clinic visit. Patient and provider were located at their individual homes. We discussed the expected course, resolution and complications of the diagnosis(es) in detail. Medication risks, benefits, costs, interactions, and alternatives were discussed as indicated. I advised her to contact the office if her condition worsens, changes or fails to improve as anticipated. She expressed understanding with the diagnosis(es) and plan.      Lucia Nicholson MD

## 2020-05-05 ENCOUNTER — TELEPHONE (OUTPATIENT)
Dept: INTERNAL MEDICINE CLINIC | Age: 80
End: 2020-05-05

## 2020-05-05 RX ORDER — CEPHALEXIN 500 MG/1
500 CAPSULE ORAL 2 TIMES DAILY
Qty: 14 CAP | Refills: 0 | Status: SHIPPED | OUTPATIENT
Start: 2020-05-05 | End: 2020-06-01

## 2020-05-05 NOTE — TELEPHONE ENCOUNTER
Called patient for PharmD telephone appointment scheduled for today. No answer, left voicemail on patient's home phone asking patient to return call to PharmD. Also spoke with patient's sister on mobile phone, who states patient is not home. Left message with patient's sister requesting patient return call to PharmD.     Beatriz Huizar, GaleD  Clinical Pharmacist Specialist

## 2020-05-05 NOTE — TELEPHONE ENCOUNTER
Patient returned call to PharmD. Rescheduled patient's PharmD telephone appointment to Friday 5/8 per her request.    States she has 8 tabs of Bactrim left (from 20 tabs). States she stopped Bactrim last week due to nausea and diarrhea. Patient states \"I cannot tolerate sulfa drugs and codeine well\"; states sulfa drugs bother her stomach. States she still has a little bit of burning sensation when she urinates. States urinary frequency has resolved. Denies back pain, fevers, chills. Denies any confusion or dizizness. States she is drinking plenty of water. Will route note to PCP for her recommendations regarding antibiotics therapy. Reiterated importance of medication adherence in the future with patient. Instructed her to monitor for worsening of urinary symptoms or any new symptoms of fever, chills, confusion, or dizziness. Instructed her to go to urgent care if symptoms worsen.     Abdulaziz Christensen, PharmD  Clinical Pharmacist Specialist

## 2020-05-05 NOTE — TELEPHONE ENCOUNTER
Patient reached and given message per DR. Dominick Castaneda. Patient verbalizes understanding and had no further questions.

## 2020-05-05 NOTE — TELEPHONE ENCOUNTER
Please let the pt know that she did not thoroughly complete treatment for her infection because she stopped taking the antibiotic. Ordering keflex. If she has problems with this abx, she will need to go to an urgent care for evaluation.     Dr. Denver Crosser  [facility], [address] [place] [address], [address] [place] [address] Str.  Phone: [phone]  Fax: [phone]

## 2020-05-12 ENCOUNTER — VIRTUAL VISIT (OUTPATIENT)
Dept: INTERNAL MEDICINE CLINIC | Age: 80
End: 2020-05-12

## 2020-05-12 DIAGNOSIS — Z79.899 MEDICATION MANAGEMENT: Primary | ICD-10-CM

## 2020-05-12 DIAGNOSIS — M51.9 LUMBAR DISC DISEASE: ICD-10-CM

## 2020-05-12 NOTE — PROGRESS NOTES
Pharmacy Note:  Medication Review    S/O:  Ms. Forrest Lopez is a [de-identified] y.o. female who was contacted via telephone today for a medication review. Referred by PCP Dr. Mauricio Ray. States she has 2 tabs of Keflex left. States bowels are a little loose, but not bothersome. Denies any discomfort/itching/burning with urination. States she woke up 2-3 times overnight to urinate. Denies any fever/chills/weakness. Medication Adherence/Cost:  Patient reports not adherent to prescribed medications with missed doses occurring daily. Regarding adherence general forgetfulness appear to be the primary factors involved.  Prescription insurance is the primary source of paying for medications with little difficulty affording medications.    -States sometimes she feels nauseous - thinks it might be from Peroneidajantie 9 she will be off anastrazole in August   -States it causes joint pain  -States she takes Celebrex around twice per week  -States she takes Tylenol 1000 mg around twice a week  -States takes Flexeril half tab around once a week  -States doesn't take gabapentin since it makes her drowsy and dizzy   -Worried about taking it since she lives alone  -Uses lidocaine around once or twice a month    -States she has joint pain down right side - states feels it on upper back and down buttocks   -States she has joint pain every morning when she gets up    -Reports 8/10 pain   -States Celebrex, Tylenol, capsicum patches (on upper back) help with pain   -States takes Flexeril to help get comfortable when she can't sleep   -States hot and cold packs help   -States weather exacerbates joint pain  -States she recently got 2 injections on right side of hip from ortho   -States this helped  -States she doesn't have much muscle cramping in feet recently   -States she is getting relief with Cymbalta and injections  -States feels \"legs are not as strong as they used to be\"    Vitals  Wt Readings from Last 3 Encounters:   04/15/20 134 lb (60.8 kg)   03/10/20 136 lb 9.6 oz (62 kg)   01/24/20 136 lb 6.4 oz (61.9 kg)     Temp Readings from Last 3 Encounters:   04/15/20 98.5 °F (36.9 °C)   03/10/20 98.4 °F (36.9 °C) (Oral)   01/24/20 98.2 °F (36.8 °C) (Oral)     BP Readings from Last 3 Encounters:   04/15/20 139/72   03/10/20 122/58   01/24/20 121/62     Pulse Readings from Last 3 Encounters:   04/15/20 72   03/10/20 73   01/24/20 74       Past Medical History:   Diagnosis Date    Arthritis     Back pain of lumbar region with sciatica 12/5/2018    [facility] [address]     [facility]    breast cancer    Dyslipidemia     Heart palpitations     Hypertension     Left ventricular hypertrophy     Macular degeneration     Vitamin D deficiency        Allergies   Allergen Reactions    Codeine Other (comments)     Reports constipation on this in past    Sulfa (Sulfonamide Antibiotics) Nausea Only       Current Outpatient Medications   Medication Sig    cephALEXin (Keflex) 500 mg capsule Take 1 Cap by mouth two (2) times a day.  pravastatin (PRAVACHOL) 40 mg tablet Take 1 Tab by mouth nightly.  acetaminophen (TYLENOL) 500 mg tablet Take 1,000 mg by mouth every six (6) hours as needed for Pain.  OTHER [employer] Women's Multivitamin Gummy - 1 gummy daily    Biotin 2,500 mcg cap Take 1 Cap by mouth daily.  psyllium husk (METAMUCIL PO) Take  by mouth.  lidocaine 4 % patch 1 Patch by TransDERmal route every twenty-four (24) hours.  DULoxetine (CYMBALTA) 20 mg capsule Take 1 Cap by mouth daily.  prasterone, dhea, (INTRAROSA) 6.5 mg inst Insert  into vagina every seven (7) days. Indications: as needed    gabapentin (NEURONTIN) 100 mg capsule Take 100 mg by mouth daily as needed.  losartan (COZAAR) 25 mg tablet Take 1 Tab by mouth daily.  azelastine (ASTELIN) 137 mcg (0.1 %) nasal spray 1 Secretary by Both Nostrils route two (2) times a day.  Use in each nostril as directed    fluticasone propionate (FLONASE) 50 mcg/actuation nasal spray 2 Sprays by Both Nostrils route daily.  loratadine (CLARITIN) 10 mg tablet Take 1 Tab by mouth daily as needed for Allergies. Cough, runny nose, itching    anastrozole (ARIMIDEX) 1 mg tablet Take 1 mg by mouth daily.  celecoxib (CELEBREX) 200 mg capsule Take 200 mg by mouth as needed.  cholecalciferol (VITAMIN D3) 1,000 unit cap Take 1,000 Units by mouth daily.  solifenacin (VESICARE) 5 mg tablet Take 5 mg by mouth as needed.  vit C/vit E ac/lut/copper/zinc (PRESERVISION LUTEIN PO) Take 1 Tab by mouth daily.  cyclobenzaprine (FLEXERIL) 5 mg tablet Take 1 Tab by mouth three (3) times daily as needed for Muscle Spasm(s). No current facility-administered medications for this visit. There are no discontinued medications. A/P:   1. Pain management  -Discussed that she can try Tylenol arthritis strength OTC to see if this helps with pain relief  -Will discuss trying Voltaren gel for pain relief with PCP  -Patient also requests Rx for Lidocaine 5% patches - will discuss with PCP  -Discuss with patient she can also try over the counter Lidocaine 4% patches for pain  -Patient states she does not want to try increase of Cymbalta dose due to concern over side effects    Medication reconciliation was completed during the visit. Patient verbalized understanding of the information presented and all of the patients questions were answered. AVS was handed to the patient. Patient advised to call the office with any additional questions or concerns.     Notifications of recommendations will be sent to Dr. Renetta Miller MD for review    Thank you for the consult,  Reza Madrigal, [facility]    Time Spent: 45 min    CLINICAL PHARMACY CONSULT: MED RECONCILIATION/REVIEW ADDENDUM    For Pharmacy Admin Tracking Only    PHSO: PHSO Patient?: Yes  Total # of Interventions Recommended: Count: 2  - New Order #: 2 New Medication Order Reason(s): Needs Additional Medication Therapy  Total Interventions Accepted: 2  Time Spent (min): [address], [facility], PharmD

## 2020-05-12 NOTE — Clinical Note
Lul Madrid,  I spoke with Ms. Donna Elias. She states she is still having bothersome joint pain that radiates down right side on upper back and down buttocks. She states Cymbalta 20 mg daily helps a little but she is worried about increasing dose due to risk of side effects. She states she does not want to take gabapentin (not currently taking) since it makes her dizzy and she lives alone. I wanted to ask if I can send Rxs for voltaren gel and lidocaine 5% patches to provide topical pain relief.   Oumar Mullins, PharmD Clinical Pharmacist Specialist

## 2020-06-01 ENCOUNTER — TELEPHONE (OUTPATIENT)
Dept: INTERNAL MEDICINE CLINIC | Age: 80
End: 2020-06-01

## 2020-06-01 ENCOUNTER — VIRTUAL VISIT (OUTPATIENT)
Dept: INTERNAL MEDICINE CLINIC | Age: 80
End: 2020-06-01

## 2020-06-01 DIAGNOSIS — K59.00 CONSTIPATION, UNSPECIFIED CONSTIPATION TYPE: ICD-10-CM

## 2020-06-01 DIAGNOSIS — R30.0 DYSURIA: ICD-10-CM

## 2020-06-01 DIAGNOSIS — R10.9 LEFT SIDED ABDOMINAL PAIN: Primary | ICD-10-CM

## 2020-06-01 DIAGNOSIS — K64.9 HEMORRHOIDS, UNSPECIFIED HEMORRHOID TYPE: ICD-10-CM

## 2020-06-01 RX ORDER — HYDROCORTISONE 25 MG/G
CREAM TOPICAL 4 TIMES DAILY
Qty: 30 G | Refills: 5 | Status: SHIPPED | OUTPATIENT
Start: 2020-06-01 | End: 2021-05-18

## 2020-06-01 RX ORDER — CEPHALEXIN 500 MG/1
500 CAPSULE ORAL 2 TIMES DAILY
Qty: 14 CAP | Refills: 0 | Status: SHIPPED | OUTPATIENT
Start: 2020-06-01 | End: 2020-06-03

## 2020-06-01 NOTE — PROGRESS NOTES
Christian Lazo is a [de-identified] y.o. female who was seen by synchronous (real-time) audio phone only technology on 6/1/2020. Assessment & Plan:   Abdominal Pain: Left sided. +Constipation. +Dysuria. +Hemorrhoidal pain. - Anusol rectal cream to use with OTC Preparation H.   - Linzess ordered but at a lower dose. - Abdomen ultrasound ordered. - I instructed her to f/u with GI given her persistent sx  - Keflex course prescribed for presumed UTI      Lab review: labs are reviewed in the EHR and ordered as mentioned above     I have discussed the diagnosis with the patient and the intended plan as seen in the above orders. I have discussed medication side effects and warnings with the patient as well. I have reviewed the plan of care with the patient, accepted their input and they are in agreement with the treatment goals. All questions were answered. The patient understands the plan of care. Pt instructed if symptoms worsen to call the office or report to the ED for continued care. Greater than 50% of the visit time was spent in counseling and/or coordination of care. I spent 20 min with the pt for this encounter. Voice recognition was used to generate this report, which may have resulted in some phonetic based errors in grammar and contents. Even though attempts were made to correct all the mistakes, some may have been missed, and remained in the body of the document. Subjective:   Christian Lazo was seen for   Chief Complaint   Patient presents with    Abdominal Pain     The patient is a [de-identified]year-old female with history of breast cancer (on arimidex since 2015), hypertension, HLD, lumbar disc disease, pulmonary nodule, allergic rhinitis osteopenia per EHR, macular degeneration per EHR, OAB, GERD, cataracts, and B12 deficiency. Abdominal Pain: S/p bactrim earlier this year for a UTI. Urinary sx resolved after completion of the abx.  She also had c/o constipation but was not consuming a high fiber diet at her last apt. Today she reports: she met with GI; she was placed on linzess. She stopped taking it after 3 days due to diarrhea. Recently, within the past 2 wks, she reports increased urinary frequency and feeling \"uncomfortable\" after she urinates. When she urinates, it's \"medium amounts\" to \"small amounts. \" no fever. She reports left sided abdominal pain - over the past 2 wks. Pain is tight and can last 10-15 minutes. +Straining. She also reports some associated rectal pain. Sx improve with having a BM. She has a h/o hemorrhoids. She had a colonoscopy 7 yrs ago and was told she has a polyp. No bleeding. 1/11/19 CT Abdomen/Pelvis:  4 x 5 mm pulmonary nodule in the left lower lobe and additional mild groundglass changes. Prior to Admission medications    Medication Sig Start Date End Date Taking? Authorizing Provider   cephALEXin (Keflex) 500 mg capsule Take 1 Cap by mouth two (2) times a day. 5/5/20   Raina Jon MD   pravastatin (PRAVACHOL) 40 mg tablet Take 1 Tab by mouth nightly. Patient taking differently: Take 40 mg by mouth. Takes every other day 4/21/20   Raina Jon MD   acetaminophen (TYLENOL) 500 mg tablet Take 1,000 mg by mouth every six (6) hours as needed for Pain. Provider, Historical   OTHER [employer] Women's Multivitamin Gummy - 1 gummy daily    Provider, Historical   Biotin 2,500 mcg cap Take 1 Cap by mouth daily. Provider, Historical   psyllium husk (METAMUCIL PO) Take  by mouth daily. Provider, Historical   lidocaine 4 % patch 1 Patch by TransDERmal route every twenty-four (24) hours. Provider, Historical   DULoxetine (CYMBALTA) 20 mg capsule Take 1 Cap by mouth daily. 3/10/20   Raina Jon MD   prasterone, dhea, (INTRAROSA) 6.5 mg inst Insert  into vagina every seven (7) days. Indications: as needed    Provider, Historical   losartan (COZAAR) 25 mg tablet Take 1 Tab by mouth daily.  10/21/19   Raina Jon MD   azelastine (ASTELIN) 137 mcg (0.1 %) nasal spray 1 Covert by Both Nostrils route two (2) times a day. Use in each nostril as directed 8/16/19   Renea Ambrocio MD   fluticasone propionate (FLONASE) 50 mcg/actuation nasal spray 2 Sprays by Both Nostrils route daily. 8/16/19   Renea Ambrocio MD   loratadine (CLARITIN) 10 mg tablet Take 1 Tab by mouth daily as needed for Allergies. Cough, runny nose, itching 3/11/19   Kenia Winn MD   anastrozole (ARIMIDEX) 1 mg tablet Take 1 mg by mouth daily. Provider, Historical   celecoxib (CELEBREX) 200 mg capsule Take 200 mg by mouth as needed. Provider, Historical   cholecalciferol (VITAMIN D3) 1,000 unit cap Take 1,000 Units by mouth daily. Provider, Historical   solifenacin (VESICARE) 5 mg tablet Take 5 mg by mouth as needed. Provider, Historical   vit C/vit E ac/lut/copper/zinc (PRESERVISION LUTEIN PO) Take 1 Tab by mouth daily. Other, MD Phil   cyclobenzaprine (FLEXERIL) 5 mg tablet Take 1 Tab by mouth three (3) times daily as needed for Muscle Spasm(s).  11/1/18   LINDA Sanchez     Allergies   Allergen Reactions    Codeine Other (comments)     Reports constipation on this in past    Gabapentin Drowsiness    Sulfa (Sulfonamide Antibiotics) Nausea Only     Past Medical History:   Diagnosis Date    Arthritis     Back pain of lumbar region with sciatica 12/5/2018    [facility] [address] [facility]     Cancer ([facility] [address] [facility] 2015    breast cancer    Dyslipidemia     Heart palpitations     Hypertension     Left ventricular hypertrophy     Macular degeneration     Vitamin D deficiency      Past Surgical History:   Procedure Laterality Date    BREAST SURGERY PROCEDURE UNLISTED Right 2015    HX BREAST LUMPECTOMY Right     HX HERNIA REPAIR      HX HYSTERECTOMY  1985     Family History   Problem Relation Age of Onset    Cancer Mother         stomach and liver cancer, in her 62s    Heart Disease Father         MI age 55s    Cancer Sister         blood    Cancer Brother leukemia    Cancer Sibling      Social History     Socioeconomic History    Marital status:      Spouse name: Not on file    Number of children: Not on file    Years of education: Not on file    Highest education level: Not on file   Tobacco Use    Smoking status: Never Smoker    Smokeless tobacco: Never Used   Substance and Sexual Activity    Alcohol use: No     Frequency: Never    Drug use: No    Sexual activity: Not Currently     Partners: Male   Social History Narrative    ** Merged History Encounter **            ROS:  Gen: No fever/chills  HEENT: No sore throat, eye pain, ear pain, or congestion.  No HA  CV: No CP  Resp: No cough/SOB  GI: + abdominal pain  : No hematuria. +dysuria  Derm: No rash  Neuro: No new paresthesias/weakness  Musc: No new myalgias/jt pain  Psych: No depression sx were discussed today      LABS:  Lab Results   Component Value Date/Time    Sodium 142 04/01/2020 10:42 AM    Potassium 4.3 04/01/2020 10:42 AM    Chloride 107 04/01/2020 10:42 AM    CO2 28 04/01/2020 10:42 AM    Anion gap 7 04/01/2020 10:42 AM    Glucose 97 04/01/2020 10:42 AM    BUN 15 04/01/2020 10:42 AM    Creatinine 0.96 04/01/2020 10:42 AM    BUN/Creatinine ratio 16 04/01/2020 10:42 AM    GFR est AA >60 04/01/2020 10:42 AM    GFR est non-AA 56 (L) 04/01/2020 10:42 AM    Calcium 9.2 04/01/2020 10:42 AM       Lab Results   Component Value Date/Time    Cholesterol, total 222 (H) 04/01/2020 10:42 AM    HDL Cholesterol 68 (H) 04/01/2020 10:42 AM    LDL, calculated 143 (H) 04/01/2020 10:42 AM    VLDL, calculated 11 04/01/2020 10:42 AM    Triglyceride 55 04/01/2020 10:42 AM    CHOL/HDL Ratio 3.3 04/01/2020 10:42 AM       Lab Results   Component Value Date/Time    WBC 5.1 04/01/2020 10:42 AM    HGB 11.9 (L) 04/01/2020 10:42 AM    HCT 37.8 04/01/2020 10:42 AM    PLATELET 020 64/07/7468 10:42 AM    MCV 75.1 04/01/2020 10:42 AM       Lab Results   Component Value Date/Time    Hemoglobin A1c 5.7 (H) 04/01/2020 10:42 AM       Lab Results   Component Value Date/Time    TSH 1.10 12/05/2018 03:04 PM           Due to this being a TeleHealth phone only evaluation, many elements of the physical examination are unable to be assessed. The pt was seen by synchronous (real-time) audio- phone only technology, and/or her healthcare decision maker, is aware that this patient-initiated, Telehealth encounter is a billable service, with coverage as determined by her insurance carrier. She is aware that she may receive a bill and has provided verbal consent to proceed: Yes. The pt is being evaluated by a video phone only visit encounter for concerns as above. A caregiver was present when appropriate. Due to this being a TeleHealth encounter (During [facility]- public [facility] emergency), evaluation of the following organ systems was limited: Vitals/Constitutional/EENT/Resp/CV/GI//MS/Neuro/Skin/Heme-Lymph-Imm. Pursuant to the emergency declaration under the [address] [place] [address] and the [employer] and [employer] Act, this Virtual phone only Visit was conducted, with patient's (and/or legal guardian's) consent, to reduce the patient's risk of exposure to COVID-19 and provide necessary medical care. Services were provided through a phone only synchronous discussion virtually to substitute for in-person clinic visit. Patient and provider were located at their individual homes. We discussed the expected course, resolution and complications of the diagnosis(es) in detail. Medication risks, benefits, costs, interactions, and alternatives were discussed as indicated. I advised her to contact the office if her condition worsens, changes or fails to improve as anticipated. She expressed understanding with the diagnosis(es) and plan.      Orville Eagle MD

## 2020-06-02 ENCOUNTER — TELEPHONE (OUTPATIENT)
Dept: INTERNAL MEDICINE CLINIC | Age: 80
End: 2020-06-02

## 2020-06-02 RX ORDER — DICLOFENAC SODIUM 10 MG/G
2 GEL TOPICAL 4 TIMES DAILY
Qty: 100 G | Refills: 0 | Status: SHIPPED | OUTPATIENT
Start: 2020-06-02

## 2020-06-02 RX ORDER — LIDOCAINE 50 MG/G
PATCH TOPICAL
Qty: 30 EACH | Refills: 0 | Status: SHIPPED | OUTPATIENT
Start: 2020-06-02 | End: 2020-11-17

## 2020-06-03 ENCOUNTER — TELEPHONE (OUTPATIENT)
Dept: INTERNAL MEDICINE CLINIC | Age: 80
End: 2020-06-03

## 2020-06-03 ENCOUNTER — HOSPITAL ENCOUNTER (EMERGENCY)
Age: 80
Discharge: HOME | End: 2020-06-03
Attending: EMERGENCY MEDICINE
Payer: MEDICARE

## 2020-06-03 ENCOUNTER — APPOINTMENT (OUTPATIENT)
Dept: CT IMAGING | Age: 80
End: 2020-06-03
Attending: STUDENT IN AN ORGANIZED HEALTH CARE EDUCATION/TRAINING PROGRAM
Payer: MEDICARE

## 2020-06-03 VITALS
OXYGEN SATURATION: 100 % | HEIGHT: 64 IN | HEART RATE: 72 BPM | SYSTOLIC BLOOD PRESSURE: 149 MMHG | DIASTOLIC BLOOD PRESSURE: 70 MMHG | WEIGHT: 134 LBS | TEMPERATURE: 98.3 F | BODY MASS INDEX: 22.88 KG/M2 | RESPIRATION RATE: 16 BRPM

## 2020-06-03 DIAGNOSIS — K62.89 RECTAL PAIN: Primary | ICD-10-CM

## 2020-06-03 DIAGNOSIS — K52.9 NONINFECTIOUS GASTROENTERITIS, UNSPECIFIED TYPE: ICD-10-CM

## 2020-06-03 DIAGNOSIS — K29.90 GASTRITIS AND DUODENITIS: ICD-10-CM

## 2020-06-03 LAB
ALBUMIN SERPL-MCNC: 3.8 G/DL (ref 3.4–5)
ALBUMIN/GLOB SERPL: 1.1 {RATIO} (ref 0.8–1.7)
ALP SERPL-CCNC: 63 U/L (ref 45–117)
ALT SERPL-CCNC: 20 U/L (ref 13–56)
ANION GAP SERPL CALC-SCNC: 3 MMOL/L (ref 3–18)
APPEARANCE UR: CLEAR
AST SERPL-CCNC: 19 U/L (ref 10–38)
BASOPHILS # BLD: 0 K/UL (ref 0–0.1)
BASOPHILS NFR BLD: 1 % (ref 0–2)
BILIRUB SERPL-MCNC: 0.7 MG/DL (ref 0.2–1)
BILIRUB UR QL: NEGATIVE
BUN SERPL-MCNC: 12 MG/DL (ref 7–18)
BUN/CREAT SERPL: 13 (ref 12–20)
CALCIUM SERPL-MCNC: 9 MG/DL (ref 8.5–10.1)
CHLORIDE SERPL-SCNC: 106 MMOL/L (ref 100–111)
CO2 SERPL-SCNC: 29 MMOL/L (ref 21–32)
COLOR UR: YELLOW
CREAT SERPL-MCNC: 0.92 MG/DL (ref 0.6–1.3)
DIFFERENTIAL METHOD BLD: ABNORMAL
EOSINOPHIL # BLD: 0 K/UL (ref 0–0.4)
EOSINOPHIL NFR BLD: 0 % (ref 0–5)
ERYTHROCYTE [DISTWIDTH] IN BLOOD BY AUTOMATED COUNT: 15 % (ref 11.6–14.5)
GLOBULIN SER CALC-MCNC: 3.6 G/DL (ref 2–4)
GLUCOSE SERPL-MCNC: 98 MG/DL (ref 74–99)
GLUCOSE UR STRIP.AUTO-MCNC: NEGATIVE MG/DL
HCT VFR BLD AUTO: 38.2 % (ref 35–45)
HEMOCCULT STL QL: NEGATIVE
HGB BLD-MCNC: 12.1 G/DL (ref 12–16)
HGB UR QL STRIP: NEGATIVE
KETONES UR QL STRIP.AUTO: NEGATIVE MG/DL
LEUKOCYTE ESTERASE UR QL STRIP.AUTO: NEGATIVE
LIPASE SERPL-CCNC: 158 U/L (ref 73–393)
LYMPHOCYTES # BLD: 1.6 K/UL (ref 0.9–3.6)
LYMPHOCYTES NFR BLD: 33 % (ref 21–52)
MCH RBC QN AUTO: 23.8 PG (ref 24–34)
MCHC RBC AUTO-ENTMCNC: 31.7 G/DL (ref 31–37)
MCV RBC AUTO: 75 FL (ref 74–97)
MONOCYTES # BLD: 0.3 K/UL (ref 0.05–1.2)
MONOCYTES NFR BLD: 5 % (ref 3–10)
NEUTS SEG # BLD: 3.1 K/UL (ref 1.8–8)
NEUTS SEG NFR BLD: 61 % (ref 40–73)
NITRITE UR QL STRIP.AUTO: NEGATIVE
PH UR STRIP: 5 [PH] (ref 5–8)
PLATELET # BLD AUTO: 216 K/UL (ref 135–420)
PMV BLD AUTO: 11.4 FL (ref 9.2–11.8)
POTASSIUM SERPL-SCNC: 3.4 MMOL/L (ref 3.5–5.5)
PROT SERPL-MCNC: 7.4 G/DL (ref 6.4–8.2)
PROT UR STRIP-MCNC: NEGATIVE MG/DL
RBC # BLD AUTO: 5.09 M/UL (ref 4.2–5.3)
SODIUM SERPL-SCNC: 138 MMOL/L (ref 136–145)
SP GR UR REFRACTOMETRY: 1.02 (ref 1–1.03)
UROBILINOGEN UR QL STRIP.AUTO: 1 EU/DL (ref 0.2–1)
WBC # BLD AUTO: 5 K/UL (ref 4.6–13.2)

## 2020-06-03 PROCEDURE — 74011250636 HC RX REV CODE- 250/636: Performed by: STUDENT IN AN ORGANIZED HEALTH CARE EDUCATION/TRAINING PROGRAM

## 2020-06-03 PROCEDURE — 74011250637 HC RX REV CODE- 250/637: Performed by: STUDENT IN AN ORGANIZED HEALTH CARE EDUCATION/TRAINING PROGRAM

## 2020-06-03 PROCEDURE — 96374 THER/PROPH/DIAG INJ IV PUSH: CPT

## 2020-06-03 PROCEDURE — 81003 URINALYSIS AUTO W/O SCOPE: CPT

## 2020-06-03 PROCEDURE — 87086 URINE CULTURE/COLONY COUNT: CPT

## 2020-06-03 PROCEDURE — 85025 COMPLETE CBC W/AUTO DIFF WBC: CPT

## 2020-06-03 PROCEDURE — 83690 ASSAY OF LIPASE: CPT

## 2020-06-03 PROCEDURE — 80053 COMPREHEN METABOLIC PANEL: CPT

## 2020-06-03 PROCEDURE — 99283 EMERGENCY DEPT VISIT LOW MDM: CPT

## 2020-06-03 PROCEDURE — 74177 CT ABD & PELVIS W/CONTRAST: CPT

## 2020-06-03 PROCEDURE — 74011636320 HC RX REV CODE- 636/320: Performed by: EMERGENCY MEDICINE

## 2020-06-03 PROCEDURE — 82270 OCCULT BLOOD FECES: CPT

## 2020-06-03 RX ORDER — AMOXICILLIN AND CLAVULANATE POTASSIUM 500; 125 MG/1; MG/1
1 TABLET, FILM COATED ORAL 2 TIMES DAILY
Qty: 14 TAB | Refills: 0 | Status: SHIPPED | OUTPATIENT
Start: 2020-06-03 | End: 2020-06-10

## 2020-06-03 RX ORDER — FAMOTIDINE 20 MG/1
20 TABLET, FILM COATED ORAL
Status: DISCONTINUED | OUTPATIENT
Start: 2020-06-03 | End: 2020-06-03

## 2020-06-03 RX ORDER — FAMOTIDINE 10 MG/ML
20 INJECTION INTRAVENOUS
Status: COMPLETED | OUTPATIENT
Start: 2020-06-03 | End: 2020-06-03

## 2020-06-03 RX ORDER — FAMOTIDINE 20 MG/1
20 TABLET, FILM COATED ORAL 2 TIMES DAILY
Qty: 20 TAB | Refills: 0 | Status: SHIPPED | OUTPATIENT
Start: 2020-06-03 | End: 2020-06-13

## 2020-06-03 RX ADMIN — ALUMINUM HYDROXIDE AND MAGNESIUM HYDROXIDE 15 ML: 200; 200 SUSPENSION ORAL at 17:28

## 2020-06-03 RX ADMIN — IOPAMIDOL 80 ML: 612 INJECTION, SOLUTION INTRAVENOUS at 16:04

## 2020-06-03 RX ADMIN — FAMOTIDINE 20 MG: 10 INJECTION INTRAVENOUS at 16:12

## 2020-06-03 NOTE — ED PROVIDER NOTES
EMERGENCY DEPARTMENT HISTORY AND PHYSICAL EXAM    3:32 PM      Date: 6/3/2020  Patient Name: Ashley Sanchez    History of Presenting Illness     Chief Complaint   Patient presents with    Abdominal Pain    Rectal Pain    Vaginal Pain         History Provided By: Patient  Location/Duration/Severity/Modifying factors   76-year-old female with past medical history of breast cancer, hypertension presenting to the ED with several weeks of vague upper and lower abdominal discomfort with associated vaginal pain and rectal pain. States that she has had this vague abdominal discomfort for several weeks which she describes as feeling like her abdomen is full, swollen with associated mild nausea. Presented to her primary care physician over the phone and was given Keflex yesterday and treatment of a UTI. Today she denies hematuria, dysuria, increased frequency urination. She denies vomiting, diarrhea, constipation. She denies bloody stools. She has taken Pepto-Bismol for stomach discomfort which is helped. She denies weight loss or weight gain. She denies fevers, chills, night sweats. No history of similar complaints          PCP: Pierre Steen MD    Current Outpatient Medications   Medication Sig Dispense Refill    lidocaine (LIDODERM) 5 % Apply patch to the affected area for 12 hours a day and remove for 12 hours a day. 30 Each 0    diclofenac (VOLTAREN) 1 % gel Apply 2 g to affected area four (4) times daily. 100 g 0    linaCLOtide (Linzess) 72 mcg cap capsule Take 1 Cap by mouth Daily (before breakfast). 30 Cap 5    cephALEXin (Keflex) 500 mg capsule Take 1 Cap by mouth two (2) times a day. 14 Cap 0    hydrocortisone (ANUSOL-HC) 2.5 % rectal cream Insert  into rectum four (4) times daily. 30 g 5    pravastatin (PRAVACHOL) 40 mg tablet Take 1 Tab by mouth nightly. (Patient taking differently: Take 40 mg by mouth.  Takes every other day) 90 Tab 3    acetaminophen (TYLENOL) 500 mg tablet Take 1,000 mg by mouth every six (6) hours as needed for Pain.  OTHER [employer] Women's Multivitamin Gummy - 1 gummy daily      Biotin 2,500 mcg cap Take 1 Cap by mouth daily.  psyllium husk (METAMUCIL PO) Take  by mouth daily.  DULoxetine (CYMBALTA) 20 mg capsule Take 1 Cap by mouth daily. 30 Cap 5    prasterone, dhea, (INTRAROSA) 6.5 mg inst Insert  into vagina every seven (7) days. Indications: as needed      losartan (COZAAR) 25 mg tablet Take 1 Tab by mouth daily. 90 Tab 2    azelastine (ASTELIN) 137 mcg (0.1 %) nasal spray 1 [place] by Both Nostrils route two (2) times a day. Use in each nostril as directed 1 Bottle 11    fluticasone propionate (FLONASE) 50 mcg/actuation nasal spray 2 Sprays by Both Nostrils route daily. 1 Bottle 11    anastrozole (ARIMIDEX) 1 mg tablet Take 1 mg by mouth daily.  celecoxib (CELEBREX) 200 mg capsule Take 200 mg by mouth as needed.  cholecalciferol (VITAMIN D3) 1,000 unit cap Take 1,000 Units by mouth daily.  solifenacin (VESICARE) 5 mg tablet Take 5 mg by mouth as needed.  vit C/vit E ac/lut/copper/zinc (PRESERVISION LUTEIN PO) Take 1 Tab by mouth daily.  cyclobenzaprine (FLEXERIL) 5 mg tablet Take 1 Tab by mouth three (3) times daily as needed for Muscle Spasm(s). 20 Tab 0    loratadine (CLARITIN) 10 mg tablet Take 1 Tab by mouth daily as needed for Allergies.  Cough, runny nose, itching 90 Tab 3       Past History     Past Medical History:  Past Medical History:   Diagnosis Date    Arthritis     Back pain of lumbar region with sciatica 12/5/2018    [facility]     Cancer ([facility] 2015    breast cancer    Dyslipidemia     Heart palpitations     Hypertension     Left ventricular hypertrophy     Macular degeneration     Vitamin D deficiency        Past Surgical History:  Past Surgical History:   Procedure Laterality Date    BREAST SURGERY PROCEDURE UNLISTED Right 2015    HX BREAST LUMPECTOMY Right     HX HERNIA REPAIR  HX HYSTERECTOMY  1985       Family History:  Family History   Problem Relation Age of Onset    Cancer Mother         stomach and liver cancer, in her 62s    Heart Disease Father         MI age 46s    Cancer Sister         blood    Cancer Brother         leukemia    Cancer Sibling        Social History:  Social History     Tobacco Use    Smoking status: Never Smoker    Smokeless tobacco: Never Used   Substance Use Topics    Alcohol use: No     Frequency: Never    Drug use: No       Allergies: Allergies   Allergen Reactions    Codeine Other (comments)     Reports constipation on this in past    Gabapentin Drowsiness    Sulfa (Sulfonamide Antibiotics) Nausea Only         Review of Systems       Review of Systems   Constitutional: Negative for appetite change, chills, fever and unexpected weight change. Respiratory: Negative for apnea, cough, choking and chest tightness. Cardiovascular: Positive for chest pain. Gastrointestinal: Positive for abdominal pain, nausea and rectal pain. Negative for abdominal distention, blood in stool, constipation, diarrhea and vomiting. Genitourinary: Negative for difficulty urinating, dyspareunia, dysuria, enuresis, flank pain, frequency, hematuria, vaginal bleeding, vaginal discharge and vaginal pain. Neurological: Negative for dizziness, weakness, light-headedness and headaches. All other systems reviewed and are negative. Physical Exam     Visit Vitals  /70   Pulse 72   Temp 98.3 °F (36.8 °C)   Resp 16   Ht 5' 4\" (1.626 m)   Wt 60.8 kg (134 lb)   SpO2 100%   BMI 23.00 kg/m²         Physical Exam  Vitals signs and nursing note reviewed. Constitutional:       Appearance: She is well-developed. She is obese. HENT:      Head: Normocephalic and atraumatic. Cardiovascular:      Rate and Rhythm: Normal rate and regular rhythm. Heart sounds: Normal heart sounds. No murmur. No friction rub. No gallop.     Pulmonary:      Effort: Pulmonary effort is normal.      Breath sounds: Normal breath sounds. Abdominal:      General: Abdomen is flat. Bowel sounds are normal. There are no signs of injury. Palpations: Abdomen is soft. Tenderness: There is generalized abdominal tenderness and tenderness in the epigastric area. There is no guarding or rebound. Negative signs include Saul's sign. Hernia: No hernia is present. Genitourinary:     Vagina: Normal. No signs of injury and foreign body. No vaginal discharge, erythema, tenderness or bleeding. Rectum: Normal. Guaiac result negative. No mass or tenderness. Skin:     General: Skin is warm and dry. Neurological:      Mental Status: She is alert. Psychiatric:         Mood and Affect: Mood normal.         Behavior: Behavior normal.           Diagnostic Study Results     Labs -  Recent Results (from the past 12 hour(s))   CBC WITH AUTOMATED DIFF    Collection Time: 06/03/20  3:10 PM   Result Value Ref Range    WBC 5.0 4.6 - 13.2 K/uL    RBC 5.09 4. 20 - 5.30 M/uL    HGB 12.1 12.0 - 16.0 g/dL    HCT 38.2 35.0 - 45.0 %    MCV 75.0 74.0 - 97.0 FL    MCH 23.8 (L) 24.0 - 34.0 PG    MCHC 31.7 31.0 - 37.0 g/dL    RDW 15.0 (H) 11.6 - 14.5 %    PLATELET 903 326 - 750 K/uL    MPV 11.4 9.2 - 11.8 FL    NEUTROPHILS 61 40 - 73 %    LYMPHOCYTES 33 21 - 52 %    MONOCYTES 5 3 - 10 %    EOSINOPHILS 0 0 - 5 %    BASOPHILS 1 0 - 2 %    ABS. NEUTROPHILS 3.1 1.8 - 8.0 K/UL    ABS. LYMPHOCYTES 1.6 0.9 - 3.6 K/UL    ABS. MONOCYTES 0.3 0.05 - 1.2 K/UL    ABS. EOSINOPHILS 0.0 0.0 - 0.4 K/UL    ABS.  BASOPHILS 0.0 0.0 - 0.1 K/UL    DF AUTOMATED     METABOLIC PANEL, COMPREHENSIVE    Collection Time: 06/03/20  3:10 PM   Result Value Ref Range    Sodium 138 136 - 145 mmol/L    Potassium 3.4 (L) 3.5 - 5.5 mmol/L    Chloride 106 100 - 111 mmol/L    CO2 29 21 - 32 mmol/L    Anion gap 3 3.0 - 18 mmol/L    Glucose 98 74 - 99 mg/dL    BUN 12 7.0 - 18 MG/DL    Creatinine 0.92 0.6 - 1.3 MG/DL    BUN/Creatinine ratio 13 12 - 20      GFR est AA >60 >60 ml/min/1.73m2    GFR est non-AA 59 (L) >60 ml/min/1.73m2    Calcium 9.0 8.5 - 10.1 MG/DL    Bilirubin, total 0.7 0.2 - 1.0 MG/DL    ALT (SGPT) 20 13 - 56 U/L    AST (SGOT) 19 10 - 38 U/L    Alk. phosphatase 63 45 - 117 U/L    Protein, total 7.4 6.4 - 8.2 g/dL    Albumin 3.8 3.4 - 5.0 g/dL    Globulin 3.6 2.0 - 4.0 g/dL    A-G Ratio 1.1 0.8 - 1.7     URINALYSIS W/ RFLX MICROSCOPIC    Collection Time: 06/03/20  3:10 PM   Result Value Ref Range    Color YELLOW      Appearance CLEAR      Specific gravity 1.017 1.005 - 1.030      pH (UA) 5.0 5.0 - 8.0      Protein Negative NEG mg/dL    Glucose Negative NEG mg/dL    Ketone Negative NEG mg/dL    Bilirubin Negative NEG      Blood Negative NEG      Urobilinogen 1.0 0.2 - 1.0 EU/dL    Nitrites Negative NEG      Leukocyte Esterase Negative NEG     LIPASE    Collection Time: 06/03/20  3:10 PM   Result Value Ref Range    Lipase 158 73 - 393 U/L   POC FECAL OCCULT BLOOD    Collection Time: 06/03/20  3:46 PM   Result Value Ref Range    Occult blood, stool (POC) Negative NEG         Radiologic Studies -   CT ABD PELV W CONT   Final Result   IMPRESSION:       Equivocal haziness surrounding ascending and sigmoid colon. Findings may reflect   mild inflammation. Correlate clinically. L2 vertebral body height loss which may be due to Schmorl's node, new since   prior exam. Correlate with point tenderness. MRI can be obtained if further   evaluation is required. Midline anterior abdominal fat containing hernia with minimal stranding. Correlate clinically. Please see report for additional findings and details. Medical Decision Making   I am the first provider for this patient. I reviewed the vital signs, available nursing notes, past medical history, past surgical history, family history and social history. Vital Signs-Reviewed the patient's vital signs.       EKG: n/a    Records Reviewed: Nursing Notes (Time of Review: 3:32 PM)    ED Course: Progress Notes, Reevaluation, and Consults:         Provider Notes (Medical Decision Making):   MDM  Number of Diagnoses or Management Options  Gastritis and duodenitis:   Noninfectious gastroenteritis, unspecified type:   Rectal pain:   Diagnosis management comments: 300 PM  50-year-old female presenting with weeks of vague abdominal discomfort, nausea as well as lower abdomen fullness with associated vaginal pain, rectal pain. Seen by [facility] care physician yesterday and started on Keflex for UTI. Differential is broad given multitude of symptoms and vagueness. Gastritis vs pancreatitis with possible UTI. Will assess with basic labs and urine and get CT scan to rule out concerning abdominal pathology. Will give Pepcid and monitor for improvement    5:06 PM  CT scan reveals possibly mild colitis. Rectal and vaginal exams largely unremarkable. No evidence of hemorrhoid or anal fissure or other pathology. Patient reports feeling approximately 50% better with Pepcid. Given this finding, likely there is a component of gastritis. With possible mild colitis on CT scan will send home on 7 days of Augmentin and have her follow-up with a GI physician. All of her lab work is within normal limits. Urine is unremarkable, will also have patient stop taking Keflex. Give Maalox prior to discharge. Procedures    Critical Care Time: 0      Diagnosis     Clinical Impression: No diagnosis found. Disposition: discharged home    Follow-up Information    None          Patient's Medications   Start Taking    No medications on file   Continue Taking    ACETAMINOPHEN (TYLENOL) 500 MG TABLET    Take 1,000 mg by mouth every six (6) hours as needed for Pain. ANASTROZOLE (ARIMIDEX) 1 MG TABLET    Take 1 mg by mouth daily. AZELASTINE (ASTELIN) 137 MCG (0.1 %) NASAL SPRAY    1 [place] by Both Nostrils route two (2) times a day.  Use in each nostril as directed    BIOTIN 2,500 MCG CAP    Take 1 Cap by mouth daily.    CELECOXIB (CELEBREX) 200 MG CAPSULE    Take 200 mg by mouth as needed. CEPHALEXIN (KEFLEX) 500 MG CAPSULE    Take 1 Cap by mouth two (2) times a day. CHOLECALCIFEROL (VITAMIN D3) 1,000 UNIT CAP    Take 1,000 Units by mouth daily. CYCLOBENZAPRINE (FLEXERIL) 5 MG TABLET    Take 1 Tab by mouth three (3) times daily as needed for Muscle Spasm(s). DICLOFENAC (VOLTAREN) 1 % GEL    Apply 2 g to affected area four (4) times daily. DULOXETINE (CYMBALTA) 20 MG CAPSULE    Take 1 Cap by mouth daily. FLUTICASONE PROPIONATE (FLONASE) 50 MCG/ACTUATION NASAL SPRAY    2 Sprays by Both Nostrils route daily. HYDROCORTISONE (ANUSOL-HC) 2.5 % RECTAL CREAM    Insert  into rectum four (4) times daily. LIDOCAINE (LIDODERM) 5 %    Apply patch to the affected area for 12 hours a day and remove for 12 hours a day. LINACLOTIDE (LINZESS) 72 MCG CAP CAPSULE    Take 1 Cap by mouth Daily (before breakfast). LORATADINE (CLARITIN) 10 MG TABLET    Take 1 Tab by mouth daily as needed for Allergies. Cough, runny nose, itching    LOSARTAN (COZAAR) 25 MG TABLET    Take 1 Tab by mouth daily. OTHER    [employer] Women's Multivitamin Gummy - 1 gummy daily    PRASTERONE, DHEA, (INTRAROSA) 6.5 MG INST    Insert  into vagina every seven (7) days. Indications: as needed    PRAVASTATIN (PRAVACHOL) 40 MG TABLET    Take 1 Tab by mouth nightly. PSYLLIUM HUSK (METAMUCIL PO)    Take  by mouth daily. SOLIFENACIN (VESICARE) 5 MG TABLET    Take 5 mg by mouth as needed. VIT C/VIT E AC/LUT/COPPER/ZINC (PRESERVISION LUTEIN PO)    Take 1 Tab by mouth daily. These Medications have changed    No medications on file   Stop Taking    No medications on file     Disclaimer: Sections of this note are dictated using utilizing voice recognition software. Minor typographical errors may be present. If questions arise, please do not hesitate to contact me or call our department.

## 2020-06-03 NOTE — ED NOTES
Current Discharge Medication List      START taking these medications    Details   amoxicillin-clavulanate (Augmentin) 500-125 mg per tablet Take 1 Tab by mouth two (2) times a day for 7 days. Qty: 14 Tab, Refills: 0      famotidine (Pepcid) 20 mg tablet Take 1 Tab by mouth two (2) times a day for 10 days. Qty: 20 Tab, Refills: 0           Prescriptions given and reviewed with patient. Patient armband removed and shredded.

## 2020-06-03 NOTE — TELEPHONE ENCOUNTER
Patient reached and notified that her insurance does not cover the lidocaine 5% patch, so she will need to buy the otc 4 % lidocaine patch. Patient verbalizes understanding and had no further questions.

## 2020-06-03 NOTE — DISCHARGE INSTRUCTIONS
Take Pepcid twice daily for stomach discomfort. Take Augmentin twice daily for colitis as discussed. Please stop taking your Keflex. Return to the ED with new or worsening symptoms or if you develop fever. Please follow-up with both your primary care physician and a gastroenterologist as above within the next week.

## 2020-06-04 NOTE — TELEPHONE ENCOUNTER
Returned call to patient. Patient states she is having trouble with affording Elisa Bame since copay cost is unaffordable. States she was told by her pharmacy it may be due to her not meeting her deductible yet for the year. Called patient's pharmacy, confirmed that patient copay for Elisa Bame is >$300 since patient has not yet met her deductible for her prescription insurance coverage this year. Cannot use coupon for medication since patient has Medicare Part D coverage. Will discuss with PCP. Alternative medication should be tried.  Can also try for [employer] patient assistance program.    Nolia Buerger, PharmD  Clinical Pharmacist Specialist

## 2020-06-05 LAB
BACTERIA SPEC CULT: NORMAL
SERVICE CMNT-IMP: NORMAL

## 2020-06-08 ENCOUNTER — TELEPHONE (OUTPATIENT)
Dept: INTERNAL MEDICINE CLINIC | Age: 80
End: 2020-06-08

## 2020-06-08 NOTE — TELEPHONE ENCOUNTER
Patient reached and she states that the linzess was to expensive, she went to ER for evaluation and was prescribed augmentin x 14 days and pepcid. Patient stopped the augmentin due to nausea. Patient will follow up with GI for alternative to linzess. Patient states overall she is feeling better.

## 2020-06-08 NOTE — TELEPHONE ENCOUNTER
Called and spoke with patient gave her message below. She stated she couldn't tolerate the Augmentin because she had difficulty swallowing the pill and that she also experienced severe diarrhea after taking the medication. Patient will call and follow up with GI for further recommendations. No further questions or concerns at this time. Problem: Nutritional:  Goal: Nutrition support tolerated and meeting greater than 85% of estimated needs  Outcome: NOT MET

## 2020-06-08 NOTE — TELEPHONE ENCOUNTER
Please let her know that she needs to finish the Augmentin course as her CT scan findings are concerning for underlying diverticulitis/colitis. Please have her follow-up with GI for additional recommendations. Also let her know that it is okay to take food with her antibiotics to decrease any adverse GI side effects from taking Augmentin.     Dr. Bryn Brito  [facility], [address] [place] [address], [address] [place] [address] Str.  Phone: [phone]  Fax: [phone]

## 2020-06-08 NOTE — TELEPHONE ENCOUNTER
----- Message from Cindy Rodrigues LPN sent at 9/0/0144  7:43 AM EDT -----  Regarding: FW: F/u call in 1 wk    ----- Message -----  From: Quincy Cruz MD  Sent: 6/1/2020  12:02 PM EDT  To: [facility]  Subject: F/u call in 1 wk                                 Please call her in a wk to see if she is tolerating the lower linzess dose well. Any diarrhea after 5 days on this rx?     Respectfully,  Dr. Anika Casey  [facility], [address] [place] [address], [facility] [address]  Phone: [phone]  Fax: [phone]

## 2020-06-15 ENCOUNTER — HOSPITAL ENCOUNTER (OUTPATIENT)
Dept: ULTRASOUND IMAGING | Age: 80
Discharge: HOME | End: 2020-06-15
Attending: INTERNAL MEDICINE
Payer: MEDICARE

## 2020-06-15 ENCOUNTER — TELEPHONE (OUTPATIENT)
Dept: INTERNAL MEDICINE CLINIC | Age: 80
End: 2020-06-15

## 2020-06-15 DIAGNOSIS — R10.9 LEFT SIDED ABDOMINAL PAIN: ICD-10-CM

## 2020-06-15 PROCEDURE — 76700 US EXAM ABDOM COMPLETE: CPT

## 2020-06-15 NOTE — TELEPHONE ENCOUNTER
----- Message from Gemma Tadeo MD sent at 6/15/2020  2:06 PM EDT -----  Please let her know that her abdominal ultrasounds not show any abnormalities.     Dr. Brent Basilio  [facility] [address], [address] [place] [address] Str.  Phone: [phone]  Fax: [phone]

## 2020-06-15 NOTE — PROGRESS NOTES
Please let her know that her abdominal ultrasounds not show any abnormalities.     Dr. Giullermo Muñoz  [facility], [facility] [facility], [address] [place] [address] Str.  Phone: [phone]  Fax: [phone]

## 2020-06-25 ENCOUNTER — VIRTUAL VISIT (OUTPATIENT)
Dept: INTERNAL MEDICINE CLINIC | Age: 80
End: 2020-06-25

## 2020-06-25 DIAGNOSIS — L98.9 SKIN LESION: ICD-10-CM

## 2020-06-25 DIAGNOSIS — R10.84 GENERALIZED ABDOMINAL PAIN: Primary | ICD-10-CM

## 2020-06-25 DIAGNOSIS — N30.00 ACUTE CYSTITIS WITHOUT HEMATURIA: ICD-10-CM

## 2020-06-25 DIAGNOSIS — M51.9 LUMBAR DISC DISEASE: ICD-10-CM

## 2020-06-25 NOTE — PROGRESS NOTES
Star Posada is a [de-identified] y.o. female who was seen by synchronous (real-time) audio phone only technology on 6/25/2020. Assessment & Plan:   1. Abdominal Pain: She has evidence of inflammation on her most recent CT scan earlier this month.  I encouraged her to follow-up with her GI team for additional recommendations. 2. UTI Sx: Resolved. Observation. 3. Back Pain: She has a history of lumbar disc disease.  I encouraged her to f/u with Orthopedics.  - Activity as tolerated. - C/w rx as prescribed. 4. Skin Lesion:  - I encouraged her to notify me if her skin lesions are growing in size or if they are changing in color or if they develop an irregular border. We discussed at that time the need to see Dermatology at that time. Lab review: labs are reviewed in the EHR     I have discussed the diagnosis with the patient and the intended plan as seen in the above orders. I have discussed medication side effects and warnings with the patient as well. I have reviewed the plan of care with the patient, accepted their input and they are in agreement with the treatment goals. All questions were answered. The patient understands the plan of care. Pt instructed if symptoms worsen to call the office or report to the ED for continued care. Greater than 50% of the visit time was spent in counseling and/or coordination of care. I spent 23 min with the pt for this encounter. Voice recognition was used to generate this report, which may have resulted in some phonetic based errors in grammar and contents. Even though attempts were made to correct all the mistakes, some may have been missed, and remained in the body of the document.       Subjective:   Star Posada was seen for   Chief Complaint   Patient presents with    Follow-up     The patient is [de-identified] female with history of breast cancer (on arimidex since 2015), hypertension, HLD, lumbar disc disease, pulmonary nodule, allergic rhinitis osteopenia per EHR, macular degeneration per EHR, OAB, GERD, cataracts, and B12 deficiency.     1. Abdominal Pain and UTI Hx:  The patient was treated for UTI earlier this year with a course of Bactrim. Although urinary symptoms resolved, she developed worsening abdominal discomfort she thought secondary to constipation. She admitted to not consuming a high-fiber diet. She eventually met with the GI team and was placed on Linzess. At her last appointment, this medication dose was decreased due to diarrhea on the dose that was initially given to her by the GI team.  At her last appointment, she reported a 2-week history of increased urinary frequency and discomfort with urination. Associated symptoms including left-sided abdominal pain that was tight and would last 10 to 15 minutes at a time. Given her urinary symptoms, I ordered a course of Keflex. She continued to have strain and rectal pain, that improved with having a bowel movement. She states that she has a history of hemorrhoids and a colonoscopy 7 years ago at which time a polyp was found. Symptoms of abdominal pain and rectal pain resulted in her going to the ED earlier this month. At that time, a urinalysis and CBC were obtained and unremarkable. A CMP was unremarkable except for a mildly low potassium of 3.4. A CT of her abdomen and pelvis was obtained. Findings are listed below. Today she reports: she was unable to get the reduced dose linzess due to the cost of this rx (400 dollars per her hx). Urinary sx: sx resolved with taking part of the keflex. She stopped taking it 2/2 diarrhea she had on keflex. Abdominal/rectal pain: yes with defecation. Straining resolved since her last apt. Fever: none. Bleeding: none. She is scheduled to see Erwin Steiner with GI July 21, 2020.     6/3/20 Abdomen CT: Equivocal haziness surrounding ascending and sigmoid colon. Findings may reflect mild inflammation. Correlate clinically.  L2 vertebral body height loss which may be due to Schmorl's node, new since prior exam. Correlate with point tenderness. MRI can be obtained if further evaluation is required. Midline anterior abdominal fat containing hernia with minimal stranding. Correlate clinically. Please see report for additional findings and details.     1/11/19 CT Abdomen/Pelvis:  4 x 5 mm pulmonary nodule in the left lower lobe and additional mild groundglass changes.     2. Back Pain: +H/o lumbar disc disease. Her CT study showed an incidental finding in which the L2 vertebral body height was reduced. The Radiologist recommended an MRI to further investigate her sx. She reports burning/tingling along her right leg/foot. She is taking cymbalta. \"The pain along my lower back is not that bad. \" She is scheduled soon to see Orthopedics within the next couple weeks. She is scheduled to see them for a therapeutic infection to her lower back. 12/9/19 Lumbar MRI: There has been interval increase in the space degenerative changes at L1-2 with subtle inferior wedge deformity of L1 resulting in a subtle acute angle kyphosis. Further endplate marrow degenerative changes are noted with a prominent Schmorl's node in the superior endplate of L2. There is a broad-based left paracentral-to-lateral disc protrusion at L5-S1 with impression on the ventrolateral thecal sac and possible contact of the emerging left S1 root. There is mild-to-moderate canal stenosis asymmetric to the left. Exit foramina are patent. There is a subtle anterolisthesis at L4-5 with mild-to-moderate canal stenosis and moderate right and mild-to-moderate left foraminal stenosis. There are significant disc space narrowing and degenerative changes at L5-S1 greater than L2-3. There is no other critical canal or foraminal stenosis, as described. No cord lesions are seen. 3. Skin Lesions: She reports \"dark spots\" along her groin area that resolved. Recently, she had a round [place] along her arm. She has similar sx along her legs. None of these spots are becoming larger. She denies redness along these skin lesions on her arm and legs. Prior to Admission medications    Medication Sig Start Date End Date Taking? Authorizing Provider   lidocaine (LIDODERM) 5 % Apply patch to the affected area for 12 hours a day and remove for 12 hours a day. 6/2/20   Chapincito Bell MD   diclofenac (VOLTAREN) 1 % gel Apply 2 g to affected area four (4) times daily. 6/2/20   Chapincito Bell MD   linaCLOtide (Linzess) 72 mcg cap capsule Take 1 Cap by mouth Daily (before breakfast). 6/1/20   Chapincito Bell MD   hydrocortisone (ANUSOL-HC) 2.5 % rectal cream Insert  into rectum four (4) times daily. 6/1/20   Chapincito Bell MD   pravastatin (PRAVACHOL) 40 mg tablet Take 1 Tab by mouth nightly. Patient taking differently: Take 40 mg by mouth. Takes every other day 4/21/20   Chapincito Bell MD   acetaminophen (TYLENOL) 500 mg tablet Take 1,000 mg by mouth every six (6) hours as needed for Pain. Provider, Historical   OTHER [employer] Women's Multivitamin Gummy - 1 gummy daily    Provider, Historical   Biotin 2,500 mcg cap Take 1 Cap by mouth daily. Provider, Historical   psyllium husk (METAMUCIL PO) Take  by mouth daily. Provider, Historical   DULoxetine (CYMBALTA) 20 mg capsule Take 1 Cap by mouth daily. 3/10/20   Chapincito Bell MD   prasterone, dhea, (INTRAROSA) 6.5 mg inst Insert  into vagina every seven (7) days. Indications: as needed    Provider, Historical   losartan (COZAAR) 25 mg tablet Take 1 Tab by mouth daily. 10/21/19   Chapincito Bell MD   azelastine (ASTELIN) 137 mcg (0.1 %) nasal spray 1 [place] by Both Nostrils route two (2) times a day. Use in each nostril as directed 8/16/19   Casandra Calixto MD   fluticasone propionate (FLONASE) 50 mcg/actuation nasal spray 2 Sprays by Both Nostrils route daily.  8/16/19   Casandra Calixto MD   loratadine (CLARITIN) 10 mg tablet Take 1 Tab by mouth daily as needed for Allergies. Cough, runny nose, itching 3/11/19   Viv Goodwin MD   anastrozole (ARIMIDEX) 1 mg tablet Take 1 mg by mouth daily. Provider, Historical   celecoxib (CELEBREX) 200 mg capsule Take 200 mg by mouth as needed. Provider, Historical   cholecalciferol (VITAMIN D3) 1,000 unit cap Take 1,000 Units by mouth daily. Provider, Historical   solifenacin (VESICARE) 5 mg tablet Take 5 mg by mouth as needed. Provider, Historical   vit C/vit E ac/lut/copper/zinc (PRESERVISION LUTEIN PO) Take 1 Tab by mouth daily. Other, MD Phil   cyclobenzaprine (FLEXERIL) 5 mg tablet Take 1 Tab by mouth three (3) times daily as needed for Muscle Spasm(s).  11/1/18   LINDA Bradford     Allergies   Allergen Reactions    Codeine Other (comments)     Reports constipation on this in past    Gabapentin Drowsiness    Sulfa (Sulfonamide Antibiotics) Nausea Only     Past Medical History:   Diagnosis Date    Arthritis     Back pain of lumbar region with sciatica 12/5/2018    [facility] [address]     Cancer ([facility] [address] 2015    breast cancer    Dyslipidemia     Heart palpitations     Hypertension     Left ventricular hypertrophy     Macular degeneration     Vitamin D deficiency      Past Surgical History:   Procedure Laterality Date    BREAST SURGERY PROCEDURE UNLISTED Right 2015    HX BREAST LUMPECTOMY Right     HX HERNIA REPAIR      HX HYSTERECTOMY  1985     Family History   Problem Relation Age of Onset    Cancer Mother         stomach and liver cancer, in her 62s    Heart Disease Father         MI age 46s   24 [facility]"" Cancer Sister         blood    Cancer Brother         leukemia    Cancer Sibling      Social History     Socioeconomic History    Marital status:      Spouse name: Not on file    Number of children: Not on file    Years of education: Not on file    Highest education level: Not on file   Tobacco Use    Smoking status: Never Smoker    Smokeless tobacco: Never Used   Substance and Sexual Activity    Alcohol use: No     Frequency: Never    Drug use: No    Sexual activity: Not Currently     Partners: Male   Social History Narrative    ** Merged History Encounter **            ROS:  Gen: No fever/chills  HEENT: No sore throat, eye pain, ear pain, or congestion. No HA  CV: No CP. +Dizzy spells off/on but sx are rarely. Resp: No cough/SOB  GI: + Abdominal pain  : No hematuria/dysuria  Derm: see HPI  Neuro: No new paresthesias/weakness  Musc: No new myalgias/jt pain    LABS:  Lab Results   Component Value Date/Time    Sodium 138 06/03/2020 03:10 PM    Potassium 3.4 (L) 06/03/2020 03:10 PM    Chloride 106 06/03/2020 03:10 PM    CO2 29 06/03/2020 03:10 PM    Anion gap 3 06/03/2020 03:10 PM    Glucose 98 06/03/2020 03:10 PM    BUN 12 06/03/2020 03:10 PM    Creatinine 0.92 06/03/2020 03:10 PM    BUN/Creatinine ratio 13 06/03/2020 03:10 PM    GFR est AA >60 06/03/2020 03:10 PM    GFR est non-AA 59 (L) 06/03/2020 03:10 PM    Calcium 9.0 06/03/2020 03:10 PM       Lab Results   Component Value Date/Time    Cholesterol, total 222 (H) 04/01/2020 10:42 AM    HDL Cholesterol 68 (H) 04/01/2020 10:42 AM    LDL, calculated 143 (H) 04/01/2020 10:42 AM    VLDL, calculated 11 04/01/2020 10:42 AM    Triglyceride 55 04/01/2020 10:42 AM    CHOL/HDL Ratio 3.3 04/01/2020 10:42 AM       Lab Results   Component Value Date/Time    WBC 5.0 06/03/2020 03:10 PM    HGB 12.1 06/03/2020 03:10 PM    HCT 38.2 06/03/2020 03:10 PM    PLATELET 326 28/48/6716 03:10 PM    MCV 75.0 06/03/2020 03:10 PM       Lab Results   Component Value Date/Time    Hemoglobin A1c 5.7 (H) 04/01/2020 10:42 AM       Lab Results   Component Value Date/Time    TSH 1.10 12/05/2018 03:04 PM           Due to this being a TeleHealth phone only evaluation, many elements of the physical examination are unable to be assessed.      The pt was seen by synchronous (real-time) audio phone only technology, and/or her healthcare decision maker, is aware that this patient-initiated, Telehealth encounter is a billable service, with coverage as determined by her insurance carrier. She is aware that she may receive a bill and has provided verbal consent to proceed: Yes. The pt is being evaluated by a phone only visit encounter for concerns as above. A caregiver was present when appropriate. Due to this being a TeleHealth encounter (During FMRJX-17 public health emergency), evaluation of the following organ systems was limited: Vitals/Constitutional/EENT/Resp/CV/GI//MS/Neuro/Skin/Heme-Lymph-Imm. Pursuant to the emergency declaration under the [address] [place] [address], [address] [place] [address] authority and the [employer] Act, this Virtual phone only Visit was conducted, with patient's (and/or legal guardian's) consent, to reduce the patient's risk of exposure to COVID-19 and provide necessary medical care. Services were provided through a phone only synchronous discussion virtually to substitute for in-person clinic visit. Patient and provider were located at their individual homes. We discussed the expected course, resolution and complications of the diagnosis(es) in detail. Medication risks, benefits, costs, interactions, and alternatives were discussed as indicated. I advised her to contact the office if her condition worsens, changes or fails to improve as anticipated. She expressed understanding with the diagnosis(es) and plan.      José Soler MD

## 2020-09-02 ENCOUNTER — VIRTUAL VISIT (OUTPATIENT)
Dept: INTERNAL MEDICINE CLINIC | Age: 80
End: 2020-09-02

## 2020-09-02 DIAGNOSIS — M79.605 PAIN IN BOTH LOWER EXTREMITIES: Primary | ICD-10-CM

## 2020-09-02 DIAGNOSIS — R10.9 ABDOMINAL PAIN, UNSPECIFIED ABDOMINAL LOCATION: ICD-10-CM

## 2020-09-02 DIAGNOSIS — M51.9 LUMBAR DISC DISEASE: ICD-10-CM

## 2020-09-02 DIAGNOSIS — Z85.3 HISTORY OF BREAST CANCER: ICD-10-CM

## 2020-09-02 DIAGNOSIS — M79.604 PAIN IN BOTH LOWER EXTREMITIES: Primary | ICD-10-CM

## 2020-09-02 NOTE — PROGRESS NOTES
Conrado Vanessa is a [de-identified] y.o. female who was seen by synchronous (real-time) audio phone only technology on 9/2/2020. Assessment & Plan:   1. Abdominal Pain: Improved dramatically. - F/u with GI  - C/w rx per their recommendations. 2. Lumbar Spinal Stenosis: Her symptoms are likely from underlying OA as well. - Notify us if sx worsen. - F/u with Orthopedics.   - Activity as tolerated. - C/w rx as prescribed. - JAMES studies ordered to r/o PAD given her BLE pain. I discussed how I suspect her pain is from her underlying lumbar spinal stenosis. 3. H/o Breast Cancer:  - F/u with Oncology. C/w rx per their recommendations. **I will follow-up with her at her September 24 appointment. At that time, we will do a Medicare wellness visit if time permits**      Lab review: labs are reviewed in the EHR     I have discussed the diagnosis with the patient and the intended plan as seen in the above orders. I have discussed medication side effects and warnings with the patient as well. I have reviewed the plan of care with the patient, accepted their input and they are in agreement with the treatment goals. All questions were answered. The patient understands the plan of care. Pt instructed if symptoms worsen to call the office or report to the ED for continued care. Greater than 50% of the visit time was spent in counseling and/or coordination of care. I spent 27 min with this pt for this encounter. Voice recognition was used to generate this report, which may have resulted in some phonetic based errors in grammar and contents. Even though attempts were made to correct all the mistakes, some may have been missed, and remained in the body of the document.       Subjective:   Conrado Vanessa was seen for   Chief Complaint   Patient presents with    Follow-up     The patient is [de-identified] female with history of breast cancer (on arimidex since 2015), hypertension, HLD, lumbar disc disease, pulmonary nodule, allergic rhinitis osteopenia per EHR, macular degeneration per EHR, OAB, GERD, cataracts, and B12 deficiency. 1. Abdominal Pain: She was encouraged to follow up with GI after her last apt given her persistent abdominal pain sx. S/p bactrim and Keflex courses earlier this year for presumed UTIs. +Constipation. She saw GI since her last apt. She was placed on omeprazole by GI and has had less abdominal pain since then. She was placed on linzess earlier this year but this was stopped 2/2 diarrhea. Earlier this summer, she had a CT of her abdomen and pelvis. Findings are listed below. A CMP was also checked and unremarkable for liver abnormalities. No rectal bleeding since her last appointment. 6/3/20 Abdomen CT: Equivocal haziness surrounding ascending and sigmoid colon. Findings may reflect mild inflammation. Correlate clinically. L2 vertebral body height loss which may be due to Schmorl's node, new since prior exam. Correlate with point tenderness. MRI can be obtained if further evaluation is required. Midline anterior abdominal fat containing hernia with minimal stranding. Correlate clinically. Please see report for additional findings and details. 2. BLE Pain and Back Pain: She continues to have lower back pain that radiates down her right leg. +Paresthesias and a burning pain is still present despite taking cymbalta. S/p lumbar MRI (lumbar spinal stenosis). She declines rx and surgery. Followed by . She is scheduled with that group tomorrow. 12/9/19 Lumbar MRI: There has been interval increase in the space degenerative changes at L1-2 with subtle inferior wedge deformity of L1 resulting in a subtle acute angle kyphosis.  Further endplate marrow degenerative changes are noted with a prominent Schmorl's node in the superior endplate of L2.  There is a broad-based left paracentral-to-lateral disc protrusion at L5-S1 with impression on the ventrolateral thecal sac and possible contact of the emerging left S1 root.  There is mild-to-moderate canal stenosis asymmetric to the left. Exit foramina are patent. There is a subtle anterolisthesis at L4-5 with mild-to-moderate canal stenosis and moderate right and mild-to-moderate left foraminal stenosis.  There are significant disc space narrowing and degenerative changes at L5-S1 greater than L2-3. There is no other critical canal or foraminal stenosis, as described.  No cord lesions are seen.     3. H/o Breast Cancer: In remission. Followed by Oncology with her next apt scheduled for October. +Arimedex. Prior to Admission medications    Medication Sig Start Date End Date Taking? Authorizing Provider   lidocaine (LIDODERM) 5 % Apply patch to the affected area for 12 hours a day and remove for 12 hours a day. 6/2/20   Abraham Perry MD   diclofenac (VOLTAREN) 1 % gel Apply 2 g to affected area four (4) times daily. 6/2/20   Abraham Perry MD   hydrocortisone (ANUSOL-HC) 2.5 % rectal cream Insert  into rectum four (4) times daily. 6/1/20   Abraham Perry MD   pravastatin (PRAVACHOL) 40 mg tablet Take 1 Tab by mouth nightly. Patient taking differently: Take 40 mg by mouth. Takes every other day 4/21/20   Abraham Perry MD   acetaminophen (TYLENOL) 500 mg tablet Take 1,000 mg by mouth every six (6) hours as needed for Pain. Provider, Historical   OTHER [employer] Women's Multivitamin Gummy - 1 gummy daily    Provider, Historical   Biotin 2,500 mcg cap Take 1 Cap by mouth daily. Provider, Historical   psyllium husk (METAMUCIL PO) Take  by mouth daily. Provider, Historical   DULoxetine (CYMBALTA) 20 mg capsule Take 1 Cap by mouth daily. 3/10/20   Abraham Perry MD   prasterone, dhea, (INTRAROSA) 6.5 mg inst Insert  into vagina every seven (7) days. Indications: as needed    Provider, Historical   losartan (COZAAR) 25 mg tablet Take 1 Tab by mouth daily.  10/21/19   Abraham Perry MD   azelastine (ASTELIN) 137 mcg (0.1 %) nasal spray 1 [place] by Both Nostrils route two (2) times a day. Use in each nostril as directed 8/16/19   Philipp Li MD   fluticasone propionate (FLONASE) 50 mcg/actuation nasal spray 2 Sprays by Both Nostrils route daily. 8/16/19   Philipp Li MD   loratadine (CLARITIN) 10 mg tablet Take 1 Tab by mouth daily as needed for Allergies. Cough, runny nose, itching 3/11/19   Jaime Goss MD   anastrozole (ARIMIDEX) 1 mg tablet Take 1 mg by mouth daily. Provider, Historical   celecoxib (CELEBREX) 200 mg capsule Take 200 mg by mouth as needed. Provider, Historical   cholecalciferol (VITAMIN D3) 1,000 unit cap Take 1,000 Units by mouth daily. Provider, Historical   solifenacin (VESICARE) 5 mg tablet Take 5 mg by mouth as needed. Provider, Historical   vit C/vit E ac/lut/copper/zinc (PRESERVISION LUTEIN PO) Take 1 Tab by mouth daily. Other, MD Phil   cyclobenzaprine (FLEXERIL) 5 mg tablet Take 1 Tab by mouth three (3) times daily as needed for Muscle Spasm(s).  11/1/18   LINDA Castillo     Allergies   Allergen Reactions    Codeine Other (comments)     Reports constipation on this in past    Gabapentin Drowsiness    Sulfa (Sulfonamide Antibiotics) Nausea Only     Past Medical History:   Diagnosis Date    Arthritis     Back pain of lumbar region with sciatica 12/5/2018    [facility] [address]     Cancer ([facility] [address] 2015    breast cancer    Dyslipidemia     Heart palpitations     Hypertension     Left ventricular hypertrophy     Macular degeneration     Vitamin D deficiency      Past Surgical History:   Procedure Laterality Date    BREAST SURGERY PROCEDURE UNLISTED Right 2015    HX BREAST LUMPECTOMY Right     HX HERNIA REPAIR      HX HYSTERECTOMY  1985     Family History   Problem Relation Age of Onset    Cancer Mother         stomach and liver cancer, in her 62s    Heart Disease Father         MI age 55s    Cancer Sister         blood    Cancer Brother         leukemia    Cancer Sibling      Social History     Socioeconomic History    Marital status:      Spouse name: Not on file    Number of children: Not on file    Years of education: Not on file    Highest education level: Not on file   Tobacco Use    Smoking status: Never Smoker    Smokeless tobacco: Never Used   Substance and Sexual Activity    Alcohol use: No     Frequency: Never    Drug use: No    Sexual activity: Not Currently     Partners: Male   Social History Narrative    ** Merged History Encounter **            ROS:  Gen: No fever/chills  HEENT: No sore throat, eye pain, ear pain, or congestion.  No HA  CV: No CP  Resp: No cough/SOB  GI: No abdominal pain  : No hematuria/dysuria  Derm: No rash  Neuro: No new paresthesias/weakness  Musc: No new myalgias/jt pain  Psych: No depression sx      LABS:  Lab Results   Component Value Date/Time    Sodium 138 06/03/2020 03:10 PM    Potassium 3.4 (L) 06/03/2020 03:10 PM    Chloride 106 06/03/2020 03:10 PM    CO2 29 06/03/2020 03:10 PM    Anion gap 3 06/03/2020 03:10 PM    Glucose 98 06/03/2020 03:10 PM    BUN 12 06/03/2020 03:10 PM    Creatinine 0.92 06/03/2020 03:10 PM    BUN/Creatinine ratio 13 06/03/2020 03:10 PM    GFR est AA >60 06/03/2020 03:10 PM    GFR est non-AA 59 (L) 06/03/2020 03:10 PM    Calcium 9.0 06/03/2020 03:10 PM       Lab Results   Component Value Date/Time    Cholesterol, total 222 (H) 04/01/2020 10:42 AM    HDL Cholesterol 68 (H) 04/01/2020 10:42 AM    LDL, calculated 143 (H) 04/01/2020 10:42 AM    VLDL, calculated 11 04/01/2020 10:42 AM    Triglyceride 55 04/01/2020 10:42 AM    CHOL/HDL Ratio 3.3 04/01/2020 10:42 AM       Lab Results   Component Value Date/Time    WBC 5.0 06/03/2020 03:10 PM    HGB 12.1 06/03/2020 03:10 PM    HCT 38.2 06/03/2020 03:10 PM    PLATELET 879 83/57/8301 03:10 PM    MCV 75.0 06/03/2020 03:10 PM       Lab Results   Component Value Date/Time    Hemoglobin A1c 5.7 (H) 04/01/2020 10:42 AM       Lab Results   Component Value Date/Time    TSH 1.10 12/05/2018 03:04 PM           Due to this being a TeleHealth phone only evaluation, many elements of the physical examination are unable to be assessed. The pt was seen by synchronous (real-time) audio-phone only technology, and/or her healthcare decision maker, is aware that this patient-initiated, Telehealth encounter is a billable service, with coverage as determined by her insurance carrier. She is aware that she may receive a bill and has provided verbal consent to proceed: Yes. The pt is being evaluated by a phone only visit encounter for concerns as above. A caregiver was present when appropriate. Due to this being a TeleHealth encounter (During [facility]-27 public health emergency), evaluation of the following organ systems was limited: Vitals/Constitutional/EENT/Resp/CV/GI//MS/Neuro/Skin/Heme-Lymph-Imm. Pursuant to the emergency declaration under the [address] [place] [address] and the [employer] Act, this Virtual phone only Visit was conducted, with patient's (and/or legal guardian's) consent, to reduce the patient's risk of exposure to COVID-19 and provide necessary medical care. Services were provided through a phone only synchronous discussion virtually to substitute for in-person clinic visit. Patient and provider were located at their individual homes. We discussed the expected course, resolution and complications of the diagnosis(es) in detail. Medication risks, benefits, costs, interactions, and alternatives were discussed as indicated. I advised her to contact the office if her condition worsens, changes or fails to improve as anticipated. She expressed understanding with the diagnosis(es) and plan.      Kj Gary MD

## 2020-09-14 ENCOUNTER — HOSPITAL ENCOUNTER (OUTPATIENT)
Dept: VASCULAR SURGERY | Age: 80
Discharge: HOME | End: 2020-09-14
Attending: INTERNAL MEDICINE
Payer: MEDICARE

## 2020-09-14 DIAGNOSIS — M79.604 PAIN IN BOTH LOWER EXTREMITIES: ICD-10-CM

## 2020-09-14 DIAGNOSIS — M79.605 PAIN IN BOTH LOWER EXTREMITIES: ICD-10-CM

## 2020-09-14 PROCEDURE — 93923 UPR/LXTR ART STDY 3+ LVLS: CPT

## 2020-09-15 LAB
LEFT ABI: 1.24
LEFT ANTERIOR TIBIAL: 155 MMHG
LEFT ARM BP: 131 MMHG
LEFT CALF PRESSURE: 165 MMHG
LEFT POSTERIOR TIBIAL: 162 MMHG
LEFT TBI: 0.83
LEFT TOE PRESSURE: 109 MMHG
RIGHT ABI: 1.15
RIGHT ANTERIOR TIBIAL: 148 MMHG
RIGHT CALF PRESSURE: 168 MMHG
RIGHT POSTERIOR TIBIAL: 151 MMHG
RIGHT TBI: 1.13
RIGHT TOE PRESSURE: 148 MMHG

## 2020-09-15 NOTE — PROGRESS NOTES
I will discuss her results with her at her upcoming appointment. There is no evidence of PAD.      Dr. Naty Domingo  [facility], [facility], [address]  Phone: [phone]  Fax: [phone]

## 2020-09-24 ENCOUNTER — VIRTUAL VISIT (OUTPATIENT)
Dept: INTERNAL MEDICINE CLINIC | Age: 80
End: 2020-09-24
Payer: MEDICARE

## 2020-09-24 DIAGNOSIS — Z00.00 MEDICARE ANNUAL WELLNESS VISIT, SUBSEQUENT: ICD-10-CM

## 2020-09-24 DIAGNOSIS — Z12.11 SCREENING FOR COLON CANCER: ICD-10-CM

## 2020-09-24 DIAGNOSIS — M51.9 LUMBAR DISC DISEASE: ICD-10-CM

## 2020-09-24 DIAGNOSIS — F32.A DEPRESSION, UNSPECIFIED DEPRESSION TYPE: Primary | ICD-10-CM

## 2020-09-24 DIAGNOSIS — R73.9 HYPERGLYCEMIA: ICD-10-CM

## 2020-09-24 DIAGNOSIS — I10 ESSENTIAL HYPERTENSION: ICD-10-CM

## 2020-09-24 DIAGNOSIS — N32.81 OVERACTIVE BLADDER: ICD-10-CM

## 2020-09-24 DIAGNOSIS — Z85.3 HISTORY OF BREAST CANCER: ICD-10-CM

## 2020-09-24 DIAGNOSIS — R73.02 IMPAIRED GLUCOSE TOLERANCE: ICD-10-CM

## 2020-09-24 DIAGNOSIS — E78.2 MIXED HYPERLIPIDEMIA: ICD-10-CM

## 2020-09-24 DIAGNOSIS — Z71.89 ADVANCE CARE PLANNING: ICD-10-CM

## 2020-09-24 PROCEDURE — 3288F FALL RISK ASSESSMENT DOCD: CPT | Performed by: INTERNAL MEDICINE

## 2020-09-24 PROCEDURE — 1100F PTFALLS ASSESS-DOCD GE2>/YR: CPT | Performed by: INTERNAL MEDICINE

## 2020-09-24 PROCEDURE — G8420 CALC BMI NORM PARAMETERS: HCPCS | Performed by: INTERNAL MEDICINE

## 2020-09-24 PROCEDURE — G8756 NO BP MEASURE DOC: HCPCS | Performed by: INTERNAL MEDICINE

## 2020-09-24 PROCEDURE — G8536 NO DOC ELDER MAL SCRN: HCPCS | Performed by: INTERNAL MEDICINE

## 2020-09-24 PROCEDURE — G8427 DOCREV CUR MEDS BY ELIG CLIN: HCPCS | Performed by: INTERNAL MEDICINE

## 2020-09-24 PROCEDURE — G8399 PT W/DXA RESULTS DOCUMENT: HCPCS | Performed by: INTERNAL MEDICINE

## 2020-09-24 PROCEDURE — G0439 PPPS, SUBSEQ VISIT: HCPCS | Performed by: INTERNAL MEDICINE

## 2020-09-24 PROCEDURE — 99442 PR PHYS/QHP TELEPHONE EVALUATION 11-20 MIN: CPT | Performed by: INTERNAL MEDICINE

## 2020-09-24 PROCEDURE — G8510 SCR DEP NEG, NO PLAN REQD: HCPCS | Performed by: INTERNAL MEDICINE

## 2020-09-24 PROCEDURE — G0444 DEPRESSION SCREEN ANNUAL: HCPCS | Performed by: INTERNAL MEDICINE

## 2020-09-24 RX ORDER — DULOXETIN HYDROCHLORIDE 20 MG/1
20 CAPSULE, DELAYED RELEASE ORAL DAILY
Qty: 30 CAP | Refills: 5 | Status: SHIPPED | OUTPATIENT
Start: 2020-09-24 | End: 2021-05-18

## 2020-09-24 NOTE — PROGRESS NOTES
[facility]:  09/24/20, [phone]      The Subsequent Medicare Annual Wellness Exam PROGRESS NOTE    This is a Subsequent Medicare Annual Wellness Exam (AWV). I have reviewed the patient's medical history in detail and updated the computerized patient record. Latasha James is a [de-identified] y.o.  female and presents for an annual wellness exam.    SUBJECTIVE    Past Medical History:   Diagnosis Date    Arthritis     Back pain of lumbar region with sciatica 12/5/2018    [facility] [address]     Cancer [facility] [address] 2015    breast cancer    Dyslipidemia     Heart palpitations     Hypertension     Left ventricular hypertrophy     Macular degeneration     Vitamin D deficiency       Past Surgical History:   Procedure Laterality Date    BREAST SURGERY PROCEDURE UNLISTED Right 2015    HX BREAST LUMPECTOMY Right     HX HERNIA REPAIR      HX HYSTERECTOMY  1985     Current Outpatient Medications   Medication Sig Dispense Refill    DULoxetine (CYMBALTA) 20 mg capsule Take 1 Cap by mouth daily. 30 Cap 5    lidocaine (LIDODERM) 5 % Apply patch to the affected area for 12 hours a day and remove for 12 hours a day. 30 Each 0    diclofenac (VOLTAREN) 1 % gel Apply 2 g to affected area four (4) times daily. 100 g 0    hydrocortisone (ANUSOL-HC) 2.5 % rectal cream Insert  into rectum four (4) times daily. 30 g 5    pravastatin (PRAVACHOL) 40 mg tablet Take 1 Tab by mouth nightly. (Patient taking differently: Take 40 mg by mouth. Takes every other day) 90 Tab 3    acetaminophen (TYLENOL) 500 mg tablet Take 1,000 mg by mouth every six (6) hours as needed for Pain.  OTHER [employer]ounty Women's Multivitamin Gummy - 1 gummy daily      Biotin 2,500 mcg cap Take 1 Cap by mouth daily.  psyllium husk (METAMUCIL PO) Take  by mouth daily.  losartan (COZAAR) 25 mg tablet Take 1 Tab by mouth daily.  90 Tab 2    azelastine (ASTELIN) 137 mcg (0.1 %) nasal spray 1 [place] by Both Nostrils route two (2) times a day. Use in each nostril as directed 1 Bottle 11    fluticasone propionate (FLONASE) 50 mcg/actuation nasal spray 2 Sprays by Both Nostrils route daily. 1 Bottle 11    loratadine (CLARITIN) 10 mg tablet Take 1 Tab by mouth daily as needed for Allergies. Cough, runny nose, itching 90 Tab 3    celecoxib (CELEBREX) 200 mg capsule Take 200 mg by mouth as needed.  cholecalciferol (VITAMIN D3) 1,000 unit cap Take 1,000 Units by mouth daily.  solifenacin (VESICARE) 5 mg tablet Take 5 mg by mouth as needed.  vit C/vit E ac/lut/copper/zinc (PRESERVISION LUTEIN PO) Take 1 Tab by mouth daily.  cyclobenzaprine (FLEXERIL) 5 mg tablet Take 1 Tab by mouth three (3) times daily as needed for Muscle Spasm(s).  20 Tab 0     Allergies   Allergen Reactions    Codeine Other (comments)     Reports constipation on this in past    Gabapentin Drowsiness    Sulfa (Sulfonamide Antibiotics) Nausea Only     Family History   Problem Relation Age of Onset    Cancer Mother         stomach and liver cancer, in her 62s    Heart Disease Father         MI age 46s    Cancer Sister         blood    Cancer Brother         leukemia    Cancer Sibling      Social History     Tobacco Use    Smoking status: Never Smoker    Smokeless tobacco: Never Used   Substance Use Topics    Alcohol use: No     Frequency: Never     Patient Active Problem List   Diagnosis Code    History of breast cancer Z85.3    TMJ (temporomandibular joint disorder) M26.609    Essential hypertension I10    Osteopenia M85.80    Overactive bladder N32.81    Macular degeneration H35.30    Cataract of both eyes H26.9    B12 deficiency E53.8    Lumbar disc disease M51.9    Allergic rhinitis J30.9    Mixed hyperlipidemia E78.2    Pulmonary nodule R91.1    Impaired glucose tolerance R73.02    Abnormal EKG R94.31     The patient is a [de-identified]year-old female with history of breast cancer (on arimidex since 2015), hypertension, HLD, lumbar disc disease, pulmonary nodule, allergic rhinitis osteopenia per EHR, macular degeneration per EHR, OAB, GERD, cataracts, and B12 deficiency. Health Maintenance History  Immunizations reviewed: Tdap over-due   Pneumovax: over-due   Flu: over-due  Zoster: over-due      There is no immunization history on file for this patient. Colonoscopy: Up to date. No bleeding. Eye exam: Up to date but we do not have records. Mammo: Up to date. Dexascan: Up to date. Pelvic/Pap: No bleeding. Review of Systems   Constitutional: Negative for chills and fever. HENT: Negative for ear pain, hearing loss and sore throat. Eyes: Negative for pain. Respiratory: Negative for cough and shortness of breath. Cardiovascular: Negative for chest pain. Gastrointestinal: Positive for abdominal pain (off/on) and constipation. Negative for blood in stool and melena. Genitourinary: Negative for dysuria and hematuria. Musculoskeletal: Positive for back pain. Skin: Negative for rash. Neurological: Positive for tingling. Negative for headaches. Endo/Heme/Allergies: Does not bruise/bleed easily. Psychiatric/Behavioral: Positive for depression. Negative for substance abuse and suicidal ideas. The patient is nervous/anxious. Depression Risk Factor Screening:      Patient Health Questionnaire (PHQ-2)   Over the last 2 weeks, how often have you been bothered by any of the following problems? · Little interest or pleasure in doing things? · Not at all. [0]  · Feeling down, depressed, or hopeless? · Several days. [1]    Total Score: 1/6  PHQ-2 Assessment Scoring:   A score of 2 or more requires further screening with the PHQ-9    Alcohol Risk Factor Screening:   Women:    On any occasion during the past 3 months, have you had more than 3 drinks containing alcohol? no    Do you average more than 7 drinks per week? no    Tobacco Use Screening:     Social History     Tobacco Use   Smoking Status Never Smoker   Smokeless Tobacco Never Used       Hearing Loss    Hearing is good. Activities of Daily Living   Self-care. Requires assistance with: no ADLs  She does not need assistance with ADLs/IADLs. Fall Risk   She had one accidental fall in the [facility] tyear    Abuse Screen   None    Additional Examination Findings: There were no vitals filed for this visit. There is no height or weight on file to calculate BMI. Evaluation of Cognitive Function:  Mood/affect: Euthymic  Appearance: Well-groomed  Family member/caregiver input: She is not with a family member during today's virtual apt      Neuro:   Alert, conversant, appropriate, following commands  Psych: Affect, mood and judgment appropriate      Dementia Screen:   Three Item Recall: 3/3        LABS   Data Review:   Lab Results   Component Value Date/Time    Sodium 138 06/03/2020 03:10 PM    Potassium 3.4 (L) 06/03/2020 03:10 PM    Chloride 106 06/03/2020 03:10 PM    CO2 29 06/03/2020 03:10 PM    Anion gap 3 06/03/2020 03:10 PM    Glucose 98 06/03/2020 03:10 PM    BUN 12 06/03/2020 03:10 PM    Creatinine 0.92 06/03/2020 03:10 PM    BUN/Creatinine ratio 13 06/03/2020 03:10 PM    GFR est AA >60 06/03/2020 03:10 PM    GFR est non-AA 59 (L) 06/03/2020 03:10 PM    Calcium 9.0 06/03/2020 03:10 PM       Lab Results   Component Value Date/Time    WBC 5.0 06/03/2020 03:10 PM    HGB 12.1 06/03/2020 03:10 PM    HCT 38.2 06/03/2020 03:10 PM    PLATELET 257 07/75/7035 03:10 PM    MCV 75.0 06/03/2020 03:10 PM       Lab Results   Component Value Date/Time    Hemoglobin A1c 5.7 (H) 04/01/2020 10:42 AM       Lab Results   Component Value Date/Time    Cholesterol, total 222 (H) 04/01/2020 10:42 AM    HDL Cholesterol 68 (H) 04/01/2020 10:42 AM    LDL, calculated 143 (H) 04/01/2020 10:42 AM    VLDL, calculated 11 04/01/2020 10:42 AM    Triglyceride 55 04/01/2020 10:42 AM    CHOL/HDL Ratio 3.3 04/01/2020 10:42 AM         Patient Care Team:  Augustine Hong Santhosh Pelaez MD as PCP - General (Family Medicine)  Slade Mar MD as PCP - [facility] Empaneled Provider  None  Herlinda Gill MD (Pulmonary Disease)  Carina Dahl (Physician Assistant)  Maria Ines Wang MD (Hematology and Oncology)    End-of-life planning  Advanced Directive in the case than an injury or illness causes the patient to be unable to make health care decisions was discussed with the patient. Advice/Referrals/Counselling/Plan:   Education and counseling provided:  Are appropriate based on today's review and evaluation  End-of-Life planning (with patient's consent)  Pneumococcal Vaccine  Influenza Vaccine  Screening Mammography  Screening Pap and pelvic (covered once every 2 years)  Colorectal cancer screening tests  Cardiovascular screening blood test  Bone mass measurement (DEXA)  Screening for glaucoma  Diabetes screening test  Include in education list (weight loss, physical activity, smoking cessation, fall prevention, and nutrition)    ICD-10-CM ICD-9-CM    1. Essential hypertension  J98 089.5 METABOLIC PANEL, COMPREHENSIVE      LIPID PANEL      MICROALBUMIN, UR, RAND W/ MICROALB/CREAT RATIO   2. Lumbar disc disease  M51.9 722.93 DULoxetine (CYMBALTA) 20 mg capsule   3. Depression, unspecified depression type  F32.9 311 DULoxetine (CYMBALTA) 20 mg capsule   4. Screening for colon cancer  Z12.11 V76.51 COLOGUARD TEST (FECAL DNA COLORECTAL CANCER SCREENING)   5. Impaired glucose tolerance  R73.02 790.22    6. Overactive bladder  N32.81 596.51    7. History of breast cancer  D90.0 I77.7 METABOLIC PANEL, COMPREHENSIVE      CBC WITH AUTOMATED DIFF   8. Mixed hyperlipidemia  Y53.2 741.7 METABOLIC PANEL, COMPREHENSIVE      LIPID PANEL   9. Hyperglycemia  R98.2 699.80 METABOLIC PANEL, COMPREHENSIVE      HEMOGLOBIN A1C W/O EAG     reviewed diet, exercise and weight control.   Brief written plan, checklist    Assessment/Plan:    Health Maintenance:  - Pt is requesting a Cologuard test for colon cancer screening. I encouraged her to call her insurance carrier to see if this is covered. Ordering the test per her request.   - She declined all vaccinations. - I will need to get a copy of her last formal eye exam.      Lab review: labs are reviewed in the [address] [place] [address] (ACP) Provider Conversation     Date of ACP Conversation: 09/24/20  Persons included in Conversation:  patient    Authorized Decision Maker (if patient is incapable of making informed decisions): This person is:   Named in Advance Directive or Healthcare Power of           For Patients with Decision Making Capacity:   Values/Goals: Exploration of values, goals, and preferences if recovery is not expected, even with continued medical treatment in the event of:  Imminent death  Severe, permanent brain injury    Conversation Outcomes / Follow-Up Plan: We reviewed her advance directive. She has no changes to make to this document at this time. Due to this being a TeleHealth phone only evaluation, many elements of the physical examination are unable to be assessed. The pt was seen by synchronous (real-time) audio-phone only technology, and/or her healthcare decision maker, is aware that this patient-initiated, Telehealth encounter is a billable service, with coverage as determined by her insurance carrier. She is aware that she may receive a bill and has provided verbal consent to proceed: Yes. The pt is being evaluated by a phone only visit encounter for concerns as above. A caregiver was present when appropriate. Due to this being a TeleHealth encounter (During [facility]-30 public [facility] emergency), evaluation of the following organ systems was limited: Vitals/Constitutional/EENT/Resp/CV/GI//MS/Neuro/Skin/Heme-Lymph-Imm.    Pursuant to the emergency declaration under the [address] [facility] [place], [address] waiver authority and the [employer] Supplemental Appropriations Act, this Virtual phone only Visit was conducted, with patient's (and/or legal guardian's) consent, to reduce the patient's risk of exposure to COVID-19 and provide necessary medical care. Services were provided through a phone only synchronous discussion virtually to substitute for in-person clinic visit. Patient and provider were located at their individual homes.

## 2020-09-24 NOTE — PROGRESS NOTES
Loco Cutler is a [de-identified] y.o. female who was seen by synchronous (real-time) audio phone only technology on 9/24/2020. Assessment & Plan:   1. BLE Pain: From her spine.  - F/u with  for continued management. - Activity as tolerated. 2. HLD/Prediabetes:  - Checking a CMP, lipid panel, and an A1C.  - Low carb diet recommended. 3. Abdominal Pain: Likely from IBS-C.  - F/u with GI for continued management and recommendations. 4. Depression:   - Refilling her cymbalta. 5. OAB: Stable. - C/w rx as prescribed. 6. HTN: Stable. - Checking a CMP, lipid panel, and urine protein screen in 6 months. - F/u with me in 6 months for an in office BP check    7. H/o Breast Cancer: In remission.  - C/w to f/u with Oncology. C/w surveillance studies. Lab review: labs are reviewed in the EHR and ordered as mentioned above     I have discussed the diagnosis with the patient and the intended plan as seen in the above orders. I have discussed medication side effects and warnings with the patient as well. I have reviewed the plan of care with the patient, accepted their input and they are in agreement with the treatment goals. All questions were answered. The patient understands the plan of care. Pt instructed if symptoms worsen to call the office or report to the ED for continued care. Greater than 50% of the visit time was spent in counseling and/or coordination of care. I spent 18 min with the pt for this portion of her phone only encounter. Voice recognition was used to generate this report, which may have resulted in some phonetic based errors in grammar and contents. Even though attempts were made to correct all the mistakes, some may have been missed, and remained in the body of the document.       Subjective:   Loco Cutler was seen for   Chief Complaint   Patient presents with    Follow-up     The patient is [de-identified] female with history of breast cancer (on arimidex since 2015), hypertension, HLD, lumbar disc disease, pulmonary nodule, allergic rhinitis osteopenia per EHR, macular degeneration per EHR, OAB, GERD, cataracts, and B12 deficiency. 1. BLE Pain:  S/p PT yesterday. Followed by Dr. Antonino Garcia. +H/o spinal stenosis. Her JAMES study was unremarkable. Her sx are unchanged. She has lower back pain that radiates down her legs. She ran out of her cymbalta. 12/9/19 Lumbar MRI: There has been interval increase in the space degenerative changes at L1-2 with subtle inferior wedge deformity of L1 resulting in a subtle acute angle kyphosis.  Further endplate marrow degenerative changes are noted with a prominent Schmorl's node in the superior endplate of L2. There is a broad-based left paracentral-to-lateral disc protrusion at L5-S1 with impression on the ventrolateral thecal sac and possible contact of the emerging left S1 root.  There is mild-to-moderate canal stenosis asymmetric to the left.  Exit foramina are patent. There is a subtle anterolisthesis at L4-5 with mild-to-moderate canal stenosis and moderate right and mild-to-moderate left foraminal stenosis.  There are significant disc space narrowing and degenerative changes at L5-S1 greater than L2-3. There is no other critical canal or foraminal stenosis, as described.  No cord lesions are seen. 2. Health Maintenance:  - She declines all vaccinations  - She had an eye exam within the past year. She is scheduled with the eye doctor next month. +She has narrow angle glaucoma and is being scheduled for some type of procedure later this year per her hx. She wears glasses. - No changes to her hearing. Hearing is \"ok. \"   - She had one accidental fall within the past year. 3. HLD/Prediabetes: Taking pravastatin. LDL was 143. Her A1C in April was 5.7.     4. H/o Breast Cancer: Scheduled for her next rx mammogram in December per her hx. She is no longer taking Arimidex having completed 5 yrs of this rx. 5. HTN: +Losartan. No adverse side effects. +Lost some weight. BP Readings from Last 3 Encounters:   06/03/20 149/70   04/15/20 139/72   03/10/20 122/58     6. Abdominal Pain: Placed on miralax by GI. She still has some GI discomfort off/on. 7. OAB: Taking vesicare \"only if I'm going on a trip. \" No urinary sx today. 8. Depression: She is endorsing depression sx. She was taking cymbalta for neuropathic pain until she ran out. No SI. +Anxiety sx. She worries about her finances and health. Prior to Admission medications    Medication Sig Start Date End Date Taking? Authorizing Provider   lidocaine (LIDODERM) 5 % Apply patch to the affected area for 12 hours a day and remove for 12 hours a day. 6/2/20   Amber Mercado MD   diclofenac (VOLTAREN) 1 % gel Apply 2 g to affected area four (4) times daily. 6/2/20   Amber Mercado MD   hydrocortisone (ANUSOL-HC) 2.5 % rectal cream Insert  into rectum four (4) times daily. 6/1/20   Amber Mercado MD   pravastatin (PRAVACHOL) 40 mg tablet Take 1 Tab by mouth nightly. Patient taking differently: Take 40 mg by mouth. Takes every other day 4/21/20   Amber Mercado MD   acetaminophen (TYLENOL) 500 mg tablet Take 1,000 mg by mouth every six (6) hours as needed for Pain. Provider, Historical   OTHER [employer] Women's Multivitamin Gummy - 1 gummy daily    Provider, Historical   Biotin 2,500 mcg cap Take 1 Cap by mouth daily. Provider, Historical   psyllium husk (METAMUCIL PO) Take  by mouth daily. Provider, Historical   DULoxetine (CYMBALTA) 20 mg capsule Take 1 Cap by mouth daily. 3/10/20   Amber Mercado MD   prasterone, dhea, (INTRAROSA) 6.5 mg inst Insert  into vagina every seven (7) days. Indications: as needed    Provider, Historical   losartan (COZAAR) 25 mg tablet Take 1 Tab by mouth daily. 10/21/19   Amber Mercado MD   azelastine (ASTELIN) 137 mcg (0.1 %) nasal spray 1 [place] by Both Nostrils route two (2) times a day.  Use in each nostril as directed 8/16/19   Jesús Leon MD   fluticasone propionate (FLONASE) 50 mcg/actuation nasal spray 2 Sprays by Both Nostrils route daily. 8/16/19   Jesús Leon MD   loratadine (CLARITIN) 10 mg tablet Take 1 Tab by mouth daily as needed for Allergies. Cough, runny nose, itching 3/11/19   Kenrick Valentino MD   anastrozole (ARIMIDEX) 1 mg tablet Take 1 mg by mouth daily. Provider, Historical   celecoxib (CELEBREX) 200 mg capsule Take 200 mg by mouth as needed. Provider, Historical   cholecalciferol (VITAMIN D3) 1,000 unit cap Take 1,000 Units by mouth daily. Provider, Historical   solifenacin (VESICARE) 5 mg tablet Take 5 mg by mouth as needed. Provider, Historical   vit C/vit E ac/lut/copper/zinc (PRESERVISION LUTEIN PO) Take 1 Tab by mouth daily. Other, MD Phil   cyclobenzaprine (FLEXERIL) 5 mg tablet Take 1 Tab by mouth three (3) times daily as needed for Muscle Spasm(s).  11/1/18   LINDA Cook     Allergies   Allergen Reactions    Codeine Other (comments)     Reports constipation on this in past    Gabapentin Drowsiness    Sulfa (Sulfonamide Antibiotics) Nausea Only     Past Medical History:   Diagnosis Date    Arthritis     Back pain of lumbar region with sciatica 12/5/2018    [facility] [address]     Cancer ([facility] [address] 2015    breast cancer    Dyslipidemia     Heart palpitations     Hypertension     Left ventricular hypertrophy     Macular degeneration     Vitamin D deficiency      Past Surgical History:   Procedure Laterality Date    BREAST SURGERY PROCEDURE UNLISTED Right 2015    HX BREAST LUMPECTOMY Right     HX HERNIA REPAIR      HX HYSTERECTOMY  1985     Family History   Problem Relation Age of Onset    Cancer Mother         stomach and liver cancer, in her 62s    Heart Disease Father         MI age 46s   [facility] Cancer Sister         blood    Cancer Brother         leukemia    Cancer Sibling      Social History     Socioeconomic History    Marital status:      Spouse name: Not on file    Number of children: Not on file    Years of education: Not on file    Highest education level: Not on file   Tobacco Use    Smoking status: Never Smoker    Smokeless tobacco: Never Used   Substance and Sexual Activity    Alcohol use: No     Frequency: Never    Drug use: No    Sexual activity: Not Currently     Partners: Male   Social History Narrative    ** Merged History Encounter **            ROS:  Gen: No fever/chills  HEENT: No sore throat, eye pain, ear pain, or congestion.  No HA  CV: No CP  Resp: No cough/SOB  GI: No abdominal pain  : No hematuria/dysuria  Derm: No rash  Neuro: No new paresthesias/weakness  Musc: No new myalgias/jt pain  Psych: + depression sx    LABS:  Lab Results   Component Value Date/Time    Sodium 138 06/03/2020 03:10 PM    Potassium 3.4 (L) 06/03/2020 03:10 PM    Chloride 106 06/03/2020 03:10 PM    CO2 29 06/03/2020 03:10 PM    Anion gap 3 06/03/2020 03:10 PM    Glucose 98 06/03/2020 03:10 PM    BUN 12 06/03/2020 03:10 PM    Creatinine 0.92 06/03/2020 03:10 PM    BUN/Creatinine ratio 13 06/03/2020 03:10 PM    GFR est AA >60 06/03/2020 03:10 PM    GFR est non-AA 59 (L) 06/03/2020 03:10 PM    Calcium 9.0 06/03/2020 03:10 PM       Lab Results   Component Value Date/Time    Cholesterol, total 222 (H) 04/01/2020 10:42 AM    HDL Cholesterol 68 (H) 04/01/2020 10:42 AM    LDL, calculated 143 (H) 04/01/2020 10:42 AM    VLDL, calculated 11 04/01/2020 10:42 AM    Triglyceride 55 04/01/2020 10:42 AM    CHOL/HDL Ratio 3.3 04/01/2020 10:42 AM       Lab Results   Component Value Date/Time    WBC 5.0 06/03/2020 03:10 PM    HGB 12.1 06/03/2020 03:10 PM    HCT 38.2 06/03/2020 03:10 PM    PLATELET 702 65/03/9906 03:10 PM    MCV 75.0 06/03/2020 03:10 PM       Lab Results   Component Value Date/Time    Hemoglobin A1c 5.7 (H) 04/01/2020 10:42 AM       Lab Results   Component Value Date/Time    TSH 1.10 12/05/2018 03:04 PM           Due to this being a TeleHealth phone only evaluation, many elements of the physical examination are unable to be assessed. The pt was seen by synchronous (real-time) audio-phone only technology, and/or her healthcare decision maker, is aware that this patient-initiated, Telehealth encounter is a billable service, with coverage as determined by her insurance carrier. She is aware that she may receive a bill and has provided verbal consent to proceed: Yes. The pt is being evaluated by a phone only visit encounter for concerns as above. A caregiver was present when appropriate. Due to this being a TeleHealth encounter (During FYCTN-82 public health emergency), evaluation of the following organ systems was limited: Vitals/Constitutional/EENT/Resp/CV/GI//MS/Neuro/Skin/Heme-Lymph-Imm. Pursuant to the emergency declaration under the [address] [place] [address], [address] [place] [address] authority and the [employer] Act, this Virtual phone only Visit was conducted, with patient's (and/or legal guardian's) consent, to reduce the patient's risk of exposure to COVID-19 and provide necessary medical care. Services were provided through a phone only synchronous discussion virtually to substitute for in-person clinic visit. Patient and provider were located at their individual homes. We discussed the expected course, resolution and complications of the diagnosis(es) in detail. Medication risks, benefits, costs, interactions, and alternatives were discussed as indicated. I advised her to contact the office if her condition worsens, changes or fails to improve as anticipated. She expressed understanding with the diagnosis(es) and plan.      Carlito Gordon MD

## 2020-09-25 NOTE — ACP (ADVANCE CARE PLANNING)
Advance Care Planning  Advance Care Planning (ACP) Provider Conversation     Date of ACP Conversation: 09/24/20  Persons included in Conversation:  patient    Authorized Decision Maker (if patient is incapable of making informed decisions): This person is:   Named in Advance Directive or Healthcare Power of      For Patients with Decision Making Capacity:   Values/Goals: Exploration of values, goals, and preferences if recovery is not expected, even with continued medical treatment in the event of:  Imminent death  Severe, permanent brain injury    Conversation Outcomes / Follow-Up Plan: We reviewed her advance directive. She has no changes to make to this document at this time.         Dr. Monty Quezada  [facility] [address].  Phone: [phone]  Fax: [phone]

## 2020-09-25 NOTE — PATIENT INSTRUCTIONS
Medicare Wellness Visit, Female The best way to live healthy is to have a lifestyle where you eat a well-balanced diet, exercise regularly, limit alcohol use, and quit all forms of tobacco/nicotine, if applicable. Regular preventive services are another way to keep healthy. Preventive services (vaccines, screening tests, monitoring & exams) can help personalize your care plan, which helps you manage your own care. Screening tests can find health problems at the earliest stages, when they are easiest to treat. [facility] follows the current, evidence-based guidelines published by the [facility]ks[facility] ([facility]) when recommending preventive services for our patients. Because we follow these guidelines, sometimes recommendations change over time as research supports it. (For example, mammograms used to be recommended annually. Even though Medicare will still pay for an annual mammogram, the newer guidelines recommend a mammogram every two years for women of average risk). Of course, you and your doctor may decide to screen more often for some diseases, based on your risk and your co-morbidities (chronic disease you are already diagnosed with). Preventive services for you include: - Medicare offers their members a free annual wellness visit, which is time for you and your primary care provider to discuss and plan for your preventive service needs. Take advantage of this benefit every year! 
-All adults over the age of 72 should receive the recommended pneumonia vaccines. Current USPSTF guidelines recommend a series of two vaccines for the best pneumonia protection.  
-All adults should have a flu vaccine yearly and a tetanus vaccine every 10 years.  
-All adults age 48 and older should receive the shingles vaccines (series of two vaccines). -All adults age 38-68 who are overweight should have a diabetes screening test once every three years. -All adults born between [birth year] and 1965 should be screened once for Hepatitis C. 
-Other screening tests and preventive services for persons with diabetes include: an eye exam to screen for diabetic retinopathy, a kidney function test, a foot exam, and stricter control over your cholesterol.  
-Cardiovascular screening for adults with routine risk involves an electrocardiogram (ECG) at intervals determined by your doctor.  
-Colorectal cancer screenings should be done for adults age 54-65 with no increased risk factors for colorectal cancer. There are a number of acceptable methods of screening for this type of cancer. Each test has its own benefits and drawbacks. Discuss with your doctor what is most appropriate for you during your annual wellness visit. The different tests include: colonoscopy (considered the best screening method), a fecal occult blood test, a fecal DNA test, and sigmoidoscopy. 
 
-A bone mass density test is recommended when a woman turns 65 to screen for osteoporosis. This test is only recommended one time, as a screening. Some providers will use this same test as a disease monitoring tool if you already have osteoporosis. -Breast cancer screenings are recommended every other year for women of normal risk, age 54-69. 
-Cervical cancer screenings for women over age 72 are only recommended with certain risk factors. Here is a list of your current Health Maintenance items (your personalized list of preventive services) with a due date: 
Health Maintenance Due Topic Date Due  
 DTaP/Tdap/Td  (1 - Tdap) 12/28/1960  Shingles Vaccine (1 of 2) 12/28/1989 [facility] Annual Well Visit  07/23/2020  Glaucoma Screening   07/26/2020  Yearly Flu Vaccine (1) 09/01/2020 Well Visit, Over 72: Care Instructions Your Care Instructions Physical exams can help you stay healthy. Your doctor has checked your overall health and may have suggested ways to take good care of yourself. He or she also may have recommended tests. At home, you can help prevent illness with healthy eating, regular exercise, and other steps. Follow-up care is a key part of your treatment and safety. Be sure to make and go to all appointments, and call your doctor if you are having problems. It's also a good idea to know your test results and keep a list of the medicines you take. How can you care for yourself at home? · Reach and stay at a healthy weight. This will lower your risk for many problems, such as obesity, diabetes, heart disease, and high blood pressure. · Get at least 30 minutes of exercise on most days of the week. Walking is a good choice. You also may want to do other activities, such as running, swimming, cycling, or playing tennis or team sports. · Do not smoke. Smoking can make health problems worse. If you need help quitting, talk to your doctor about stop-smoking programs and medicines. These can increase your chances of quitting for good. · Protect your skin from too much sun. When you're outdoors from 10 a.m. to 4 p.m., stay in the shade or cover up with clothing and a hat with a wide brim. Wear sunglasses that block UV rays. Even when it's cloudy, put broad-spectrum sunscreen (SPF 30 or higher) on any exposed skin. · See a dentist one or two times a year for checkups and to have your teeth cleaned. · Wear a seat belt in the car. Follow your doctor's advice about when to have certain tests. These tests can spot problems early. For men and women · Cholesterol. Your doctor will tell you how often to have this done based on your overall health and other things that can increase your risk for heart attack and stroke. · Blood pressure. Have your blood pressure checked during a routine doctor visit. Your doctor will tell you how often to check your blood pressure based on your age, your blood pressure results, and other factors. · Diabetes. Ask your doctor whether you should have tests for diabetes. · Vision. Experts recommend that you have yearly exams for glaucoma and other age-related eye problems. · Hearing. Tell your doctor if you notice any change in your hearing. You can have tests to find out how well you hear. · Colon cancer tests. Keep having colon cancer tests as your doctor recommends. You can have one of several types of tests. · Heart attack and stroke risk. At least every 4 to 6 years, you should have your risk for heart attack and stroke assessed. Your doctor uses factors such as your age, blood pressure, cholesterol, and whether you smoke or have diabetes to show what your risk for a heart attack or stroke is over the next 10 years. · Osteoporosis. Talk to your doctor about whether you should have a bone density test to find out whether you have thinning bones. Ask your doctor if you need to take a calcium plus vitamin D supplement. You may be able to get enough calcium and vitamin D through your diet. For women · Pap test and pelvic exam. You may no longer need a Pap test. Talk with your doctor about whether to stop or continue to have Pap tests. · Breast exam and mammogram. Ask how often you should have a mammogram, which is an X-ray of your breasts. A mammogram can spot breast cancer before it can be felt and when it is easiest to treat. · Thyroid disease. Talk to your doctor about whether to have your thyroid checked as part of a regular physical exam. Women have an increased chance of a thyroid problem. For men · Prostate exam. Talk to your doctor about whether you should have a blood test (called a PSA test) for prostate cancer. Experts recommend that you discuss the benefits and risks of the test with your doctor before you decide whether to have this test. Some experts say that men ages 79 and older no longer need testing. · Abdominal aortic aneurysm.  Ask your doctor whether you should have a test to check for an aneurysm. You may need a test if you ever smoked or if your parent, brother, sister, or child has had an aneurysm. When should you call for help? Watch closely for changes in your health, and be sure to contact your doctor if you have any problems or symptoms that concern you. Where can you learn more? Go to http://marilynn-maciej.info/ Enter K161 in the search box to learn more about \"Well Visit, Over 65: Care Instructions. \" Current as of: May 27, 2020               Content Version: 12.6 © 6013-6556 [employer]. Care instructions adapted under license by [employer] (which disclaims liability or warranty for this information). If you have questions about a medical condition or this instruction, always ask your healthcare professional. [place] [address] any warranty or liability for your use of this information. Medicare Part B Preventive Services Limitations Recommendation Scheduled Bone Mass Measurement 
(age 72 & older, biennial) Requires diagnosis related to osteoporosis or estrogen deficiency. Biennial benefit unless patient has history of long-term glucocorticoid tx or baseline is needed because initial test was by other method This should be done at age 72 and again if on osteoporosis medication at 2-3 year intervals. Up to date Cardiovascular Screening Blood Tests (every 5 years) Total cholesterol, HDL, Triglycerides Order as a panel if possible We should check your cholesterol panel at least once every 5 years. Up to date Colorectal Cancer Screening 
-Fecal occult blood test (annual) -Flexible sigmoidoscopy (5y) 
-Screening colonoscopy (10y) -Barium Enema  Due per your Gastroenterologist's recommendations. Up to date Counseling to Prevent Tobacco Use (up to 8 sessions per year) - Counseling greater than 3 and up to 10 minutes - Counseling greater than 10 minutes Patients must be asymptomatic of tobacco-related conditions to receive as preventive service  Not applicable Diabetes Screening Tests (at least every 3 years, Medicare covers annually or at 6-month intervals for prediabetic patients) Fasting blood sugar (FBS) or glucose tolerance test (GTT) Patient must be diagnosed with one of the following: 
-Hypertension, Dyslipidemia, obesity, previous impaired FBS or GTT 
Or any two of the following: overweight, FH of diabetes, age ? 72, history of gestational diabetes, birth of baby weighing more than 9 pounds Should be done yearly Up to date Diabetes Self-Management Training (DSMT) (no USPSTF recommendation) Requires referral by treating physician for patient with diabetes or renal disease. 10 hours of initial DSMT session of no less than 30 minutes each in a continuous 12-month period. 2 hours of follow-up DSMT in subsequent years. Not applicable Not applicable Glaucoma Screening (no USPSTF recommendation) Diabetes mellitus, family history, , age 48 or over,  American, age 72 or over Continue with annual eye exams. Up to date Human Immunodeficiency Virus (HIV) Screening (annually for increased risk patients) HIV-1 and HIV-2 by EIA, TREY, rapid antibody test, or oral mucosa transudate Patient must be at increased risk for HIV infection per USPSTF guidelines or pregnant. Tests covered annually for patients at increased risk. Pregnant patients may receive up to 3 test during pregnancy. Not applicable Not applicable Medical Nutrition Therapy (MNT) (for diabetes or renal disease not recommended schedule) Requires referral by treating physician for patient with diabetes or renal disease. Can be provided in same year as diabetes self-management training (DSMT), and CMS recommends medical nutrition therapy take place after DSMT. Up to 3 hours for initial year and 2 hours in subsequent years. Not applicable Not applicable Shingles Vaccination A shingles vaccine is also recommended once in a lifetime after age 61 Vaccination recommended for shingles vaccination. Overdue Seasonal Influenza Vaccination (annually)  Continue with yearly \"flu\" shot annually Overdue Pneumococcal Vaccination (once after 65)  Please receive this vaccination at age 72. Overdue Hepatitis B Vaccinations (if medium/high risk) Medium/high risk factors:  End-stage renal disease, Hemophiliacs who received Factor VIII or IX concentrates, Clients of institutions for the mentally retarded, Persons who live in the same house as a HepB virus carrier, Homosexual men, Illicit injectable drug abusers. Not applicable Not applicable Screening Mammography (biennial age 54-69) Annually (age 36 or over) You need a mammogram yearly to screen for breast cancer. Up to date Screening Pap Tests and Pelvic Examination (up to age 79 and after 79 if unknown history or abnormal study last 10 years) Every 24 months except high risk You need no Pap smear at this time. Not warranted at this time. Ultrasound Screening for Abdominal Aortic Aneurysm (AAA) (once) Patient must be referred through IPPE and not have had a screening for abdominal aortic aneurysm before under Medicare. Limited to patients who meet one of the following criteria: 
- Men who are 73-68 years old and have smoked more than 100 cigarettes in their lifetime. 
-Anyone with a FH of AAA 
-Anyone recommended for screening by USPSTF Not applicable Not applicable

## 2020-10-07 ENCOUNTER — TELEPHONE (OUTPATIENT)
Dept: INTERNAL MEDICINE CLINIC | Age: 80
End: 2020-10-07

## 2020-10-07 LAB — COLOGUARD TEST, EXTERNAL: NEGATIVE

## 2020-10-29 ENCOUNTER — TELEPHONE (OUTPATIENT)
Dept: INTERNAL MEDICINE CLINIC | Age: 80
End: 2020-10-29

## 2020-10-29 DIAGNOSIS — K04.7 DENTAL ABSCESS: Primary | ICD-10-CM

## 2020-10-29 RX ORDER — AMOXICILLIN 500 MG/1
500 CAPSULE ORAL 3 TIMES DAILY
Qty: 21 CAP | Refills: 0 | Status: SHIPPED | OUTPATIENT
Start: 2020-10-29 | End: 2020-11-05

## 2020-10-29 NOTE — TELEPHONE ENCOUNTER
Pt called stating she has an abcess in her mouth , she is waiting to get ins for dental she is asking if you could prescribe her an antibiotic

## 2020-10-29 NOTE — TELEPHONE ENCOUNTER
She will need to follow-up with a dentist for continued recommendations/treatment. I will order a small course of antibiotics for short-term relief.      Dr. Sushil Suazo  [facility] [address], [address] [place] [address] Str.  Phone: [phone]  Fax: [phone]

## 2020-11-17 ENCOUNTER — OFFICE VISIT (OUTPATIENT)
Dept: CARDIOLOGY CLINIC | Age: 80
End: 2020-11-17
Payer: MEDICARE

## 2020-11-17 VITALS
DIASTOLIC BLOOD PRESSURE: 70 MMHG | BODY MASS INDEX: 22.53 KG/M2 | OXYGEN SATURATION: 100 % | HEIGHT: 64 IN | SYSTOLIC BLOOD PRESSURE: 144 MMHG | WEIGHT: 132 LBS | HEART RATE: 71 BPM

## 2020-11-17 DIAGNOSIS — I10 ESSENTIAL HYPERTENSION: ICD-10-CM

## 2020-11-17 DIAGNOSIS — R94.31 ABNORMAL EKG: Primary | ICD-10-CM

## 2020-11-17 DIAGNOSIS — R06.09 DOE (DYSPNEA ON EXERTION): ICD-10-CM

## 2020-11-17 DIAGNOSIS — E78.2 MIXED HYPERLIPIDEMIA: ICD-10-CM

## 2020-11-17 PROCEDURE — G8420 CALC BMI NORM PARAMETERS: HCPCS | Performed by: INTERNAL MEDICINE

## 2020-11-17 PROCEDURE — G8427 DOCREV CUR MEDS BY ELIG CLIN: HCPCS | Performed by: INTERNAL MEDICINE

## 2020-11-17 PROCEDURE — G8753 SYS BP > OR = 140: HCPCS | Performed by: INTERNAL MEDICINE

## 2020-11-17 PROCEDURE — 99214 OFFICE O/P EST MOD 30 MIN: CPT | Performed by: INTERNAL MEDICINE

## 2020-11-17 PROCEDURE — G8754 DIAS BP LESS 90: HCPCS | Performed by: INTERNAL MEDICINE

## 2020-11-17 PROCEDURE — 93000 ELECTROCARDIOGRAM COMPLETE: CPT | Performed by: INTERNAL MEDICINE

## 2020-11-17 PROCEDURE — 1090F PRES/ABSN URINE INCON ASSESS: CPT | Performed by: INTERNAL MEDICINE

## 2020-11-17 PROCEDURE — G8536 NO DOC ELDER MAL SCRN: HCPCS | Performed by: INTERNAL MEDICINE

## 2020-11-17 PROCEDURE — G8399 PT W/DXA RESULTS DOCUMENT: HCPCS | Performed by: INTERNAL MEDICINE

## 2020-11-17 PROCEDURE — 1101F PT FALLS ASSESS-DOCD LE1/YR: CPT | Performed by: INTERNAL MEDICINE

## 2020-11-17 PROCEDURE — G8510 SCR DEP NEG, NO PLAN REQD: HCPCS | Performed by: INTERNAL MEDICINE

## 2020-11-17 RX ORDER — OMEPRAZOLE 40 MG/1
40 CAPSULE, DELAYED RELEASE ORAL DAILY
COMMUNITY
Start: 2020-08-28 | End: 2022-05-23

## 2020-11-17 RX ORDER — HYDROCODONE BITARTRATE AND ACETAMINOPHEN 7.5; 325 MG/1; MG/1
1 TABLET ORAL
COMMUNITY
Start: 2020-10-30 | End: 2021-02-23

## 2020-11-17 NOTE — PROGRESS NOTES
HISTORY OF PRESENT ILLNESS  Aniya Wylie is a [de-identified] y.o. female. Follow-up   Associated symptoms include shortness of breath. Pertinent negatives include no chest pain, no abdominal pain and no headaches. Patient presents for an add-on office visit. She was initially referred here by her PCP to establish care with a local cardiologist.  The patient recently relocated to the area from [place], [place]. She states she was being followed by a cardiologist for heart palpitations and what sounds like left ventricular hypertrophy. She does not have a prior history of hypertension. She does have a history of dyslipidemia, right-sided breast cancer, status post lumpectomy without radiation or chemotherapy. She has been maintained on an aromatase inhibitor. Patient reports that she underwent noninvasive cardiac testing 3 to 4 years ago when she was told she had an enlarged heart with normal function. She was started on losartan at that time by her cardiologist.      Patient underwent an echocardiogram through our office in December 2019 which showed preserved LV function, EF 60 to 65% with mild concentric LVH, mild tricuspid vegetation but normal PA pressure. She was last seen in the office 11 months ago. Recently, she had noted an episode of shortness of breath by walking her dog. She states she walks her dog regularly at a brisk pace and the shortness of breath was new for her. I did resolve when she returned home with 10 to 15 minutes of rest.  She also complains of intermittent dizzy spells which are more noticeable when she stands up quickly after eating. The symptoms will last for a few seconds at most in duration, feel like the room is spinning and then subside. She denies any associated heart palpitations, diaphoresis, syncope or near syncope.     Past Medical History:   Diagnosis Date    Arthritis     Back pain of lumbar region with sciatica 12/5/2018    Breast cancer ([facility] [address]     Cancer [facility]) 2015    breast cancer    Dyslipidemia     Heart palpitations     Hypertension     Left ventricular hypertrophy     Macular degeneration     Vitamin D deficiency      Current Outpatient Medications   Medication Sig Dispense Refill    HYDROcodone-acetaminophen (NORCO) 7.5-325 mg per tablet Take 1 Tab by mouth every eight (8) hours as needed.  omeprazole (PRILOSEC) 40 mg capsule Take 40 mg by mouth daily.  DULoxetine (CYMBALTA) 20 mg capsule Take 1 Cap by mouth daily. 30 Cap 5    diclofenac (VOLTAREN) 1 % gel Apply 2 g to affected area four (4) times daily. 100 g 0    hydrocortisone (ANUSOL-HC) 2.5 % rectal cream Insert  into rectum four (4) times daily. 30 g 5    pravastatin (PRAVACHOL) 40 mg tablet Take 1 Tab by mouth nightly. (Patient taking differently: Take 40 mg by mouth every other day.) 90 Tab 3    acetaminophen (TYLENOL) 500 mg tablet Take 1,000 mg by mouth every six (6) hours as needed for Pain.  OTHER [employer] Women's Multivitamin Gummy - 1 gummy daily      Biotin 2,500 mcg cap Take 1 Cap by mouth daily.  psyllium husk (METAMUCIL PO) Take  by mouth daily.  losartan (COZAAR) 25 mg tablet Take 1 Tab by mouth daily. 90 Tab 2    azelastine (ASTELIN) 137 mcg (0.1 %) nasal spray 1 Ranger by Both Nostrils route two (2) times a day. Use in each nostril as directed 1 Bottle 11    fluticasone propionate (FLONASE) 50 mcg/actuation nasal spray 2 Sprays by Both Nostrils route daily. 1 Bottle 11    loratadine (CLARITIN) 10 mg tablet Take 1 Tab by mouth daily as needed for Allergies. Cough, runny nose, itching 90 Tab 3    celecoxib (CELEBREX) 200 mg capsule Take 200 mg by mouth as needed.  cholecalciferol (VITAMIN D3) 1,000 unit cap Take 1,000 Units by mouth daily.  solifenacin (VESICARE) 5 mg tablet Take 5 mg by mouth as needed.  vit C/vit E ac/lut/copper/zinc (PRESERVISION LUTEIN PO) Take 1 Tab by mouth daily.       cyclobenzaprine (FLEXERIL) 5 mg tablet Take 1 Tab by mouth three (3) times daily as needed for Muscle Spasm(s). (Patient taking differently: Take 5 mg by mouth three (3) times daily as needed for Muscle Spasm(s). Take 0.5 mg by mouth three times daily as needed) 20 Tab 0     Allergies   Allergen Reactions    Codeine Other (comments)     Reports constipation on this in past    Gabapentin Drowsiness    Sulfa (Sulfonamide Antibiotics) Nausea Only        Social History     Tobacco Use    Smoking status: Never Smoker    Smokeless tobacco: Never Used   Substance Use Topics    Alcohol use: No     Frequency: Never    Drug use: No     Family History   Problem Relation Age of Onset    Cancer Mother         stomach and liver cancer, in her 62s    Heart Disease Father         MI age 46s    Cancer Sister         blood    Cancer Brother         leukemia    Cancer Sibling          Review of Systems   Constitutional: Negative for chills, fever and weight loss. HENT: Negative for nosebleeds. Eyes: Negative for blurred vision and double vision. Respiratory: Positive for shortness of breath. Negative for cough and wheezing. Cardiovascular: Positive for palpitations. Negative for chest pain, orthopnea, claudication, leg swelling and PND. Gastrointestinal: Negative for abdominal pain, heartburn, nausea and vomiting. Genitourinary: Negative for dysuria and hematuria. Musculoskeletal: Negative for falls and myalgias. Skin: Negative for rash. Neurological: Positive for dizziness. Negative for focal weakness and headaches. Endo/Heme/Allergies: Does not bruise/bleed easily. Psychiatric/Behavioral: Negative for substance abuse. Visit Vitals  BP (!) 144/70 (BP 1 Location: Left arm, BP Patient Position: Sitting)   Pulse 71   Ht 5' 4\" (1.626 m)   Wt 59.9 kg (132 lb)   SpO2 100%   BMI 22.66 kg/m²       Physical Exam   Constitutional: She is oriented to person, place, and time. She appears well-developed and well-nourished.    HENT:   Head: Normocephalic and atraumatic. Eyes: Conjunctivae are normal.   Neck: Neck supple. No JVD present. Carotid bruit is not present. Cardiovascular: Normal rate, regular rhythm, S1 normal, S2 normal and normal pulses. Exam reveals distant heart sounds. Exam reveals no gallop. No murmur heard. Pulmonary/Chest: Effort normal and breath sounds normal. She has no wheezes. She has no rales. Abdominal: Soft. Bowel sounds are normal. There is no abdominal tenderness. Musculoskeletal:         General: No tenderness, deformity or edema. Neurological: She is alert and oriented to person, place, and time. Skin: Skin is warm and dry. Psychiatric: She has a normal mood and affect. Her behavior is normal. Thought content normal.     EKG: Normal sinus rhythm, normal axis, normal QTc interval, low voltage throughout, abnormality. Compared to the previous EKG, the T wave abnormalities little more pronounced. ASSESSMENT and PLAN    Dyspnea on exertion. New symptom for the patient compared to a year ago. This occurred recently when walking her dog. She underwent a echocardiogram in December 2019 which was unremarkable the exception of mild concentric LVH. I have recommended a pharmacologic nuclear stress test for further evaluation. Abnormal EKG. patient does have nonspecific T wave changes on her EKG today which are little more pronounced on last visit. I have recommended a nuclear stress test as described above. Essential hypertension. Patient blood pressure is upper normal today in the office. She has been treated with losartan as monotherapy. Heart palpitations. Patient states she did have an extensive work-up for this in the past and an arrhythmia was never diagnosed. I would like to obtain a copy of her prior work-up from her previous cardiologist in [place]. Patient will sign a paper to release her records. Dyslipidemia. Patient has been managed on Crestor long-term.   Is now being followed by her PCP. Breast cancer. According to the patient's was early-stage and was treated with surgery alone with adjuvant oral therapy with an aromatase inhibitor. She was never treated with an Adriamycin-based chemotherapeutic regimen. Follow-up in 6 months, sooner if needed.

## 2020-11-17 NOTE — PROGRESS NOTES
Uma Pandey presents today for   Chief Complaint   Patient presents with    Follow-up     1 year follow up       Uma Pandey preferred language for health care discussion is english/other. Is someone accompanying this pt? no    Is the patient using any DME equipment during [address]? no    Depression Screening:  3 most recent PHQ Screens 11/17/2020   Little interest or pleasure in doing things Not at all   Feeling down, depressed, irritable, or hopeless Not at all   Total Score PHQ 2 0       Learning Assessment:  Learning Assessment 11/17/2020   PRIMARY LEARNER Patient   HIGHEST LEVEL OF EDUCATION - PRIMARY LEARNER  -   BARRIERS PRIMARY LEARNER -   CO-LEARNER CAREGIVER -   PRIMARY LANGUAGE ENGLISH   LEARNER PREFERENCE PRIMARY DEMONSTRATION   ANSWERED BY patient   RELATIONSHIP SELF       Abuse Screening:  Abuse Screening Questionnaire 11/17/2020   Do you ever feel afraid of your partner? N   Are you in a relationship with someone who physically or mentally threatens you? N   Is it safe for you to go home? Y       Fall Risk  Fall Risk Assessment, last 12 mths 11/17/2020   Able to walk? Yes   Fall in past 12 months? No   Fall with injury? -   Number of falls in past 12 months -   Fall Risk Score -       Pt currently taking Anticoagulant therapy? no    Coordination of Care:  1. Have you been to the ER, urgent care clinic since your last visit? Hospitalized since your last visit? no    2. Have you seen or consulted any other health care providers outside of the [address] [place] [address] [place] since your last visit? Include any pap smears or colon screening.  no

## 2020-12-02 ENCOUNTER — TELEPHONE (OUTPATIENT)
Dept: CARDIOLOGY CLINIC | Age: 80
End: 2020-12-02

## 2020-12-03 LAB
STRESS ST DEPRESSION: 0 MM
STRESS ST ELEVATION: 0 MM
STRESS TARGET HR: 140 BPM

## 2020-12-10 ENCOUNTER — TELEPHONE (OUTPATIENT)
Dept: INTERNAL MEDICINE CLINIC | Age: 80
End: 2020-12-10

## 2020-12-10 ENCOUNTER — TELEPHONE (OUTPATIENT)
Dept: CARDIOLOGY CLINIC | Age: 80
End: 2020-12-10

## 2020-12-10 NOTE — TELEPHONE ENCOUNTER
Pt calling asking if she can come in and leave a urine? Says she is having frequent urination. Very frequent.

## 2020-12-10 NOTE — TELEPHONE ENCOUNTER
LVM for patient to return our call. Patient would need a visit, we don't accept urine drop offs without an appointment. Patient can also go to urgent care.

## 2020-12-10 NOTE — TELEPHONE ENCOUNTER
----- Message from Daryl Garcia MD sent at 12/4/2020  9:20 AM EST -----  Please let the patient know that her nuclear stress test was normal  ----- Message -----  From: Caitlin Decker DO  Sent: 12/3/2020  12:42 PM EST  To: Daryl Garcia MD

## 2020-12-11 ENCOUNTER — HOSPITAL ENCOUNTER (OUTPATIENT)
Dept: LAB | Age: 80
Discharge: HOME | End: 2020-12-11
Payer: MEDICARE

## 2020-12-11 ENCOUNTER — OFFICE VISIT (OUTPATIENT)
Dept: INTERNAL MEDICINE CLINIC | Age: 80
End: 2020-12-11
Payer: MEDICARE

## 2020-12-11 VITALS
HEIGHT: 64 IN | RESPIRATION RATE: 16 BRPM | BODY MASS INDEX: 22.71 KG/M2 | SYSTOLIC BLOOD PRESSURE: 136 MMHG | WEIGHT: 133 LBS | OXYGEN SATURATION: 100 % | TEMPERATURE: 97.8 F | DIASTOLIC BLOOD PRESSURE: 66 MMHG | HEART RATE: 78 BPM

## 2020-12-11 DIAGNOSIS — R30.0 DYSURIA: ICD-10-CM

## 2020-12-11 DIAGNOSIS — R30.0 DYSURIA: Primary | ICD-10-CM

## 2020-12-11 DIAGNOSIS — R35.0 URINARY FREQUENCY: ICD-10-CM

## 2020-12-11 DIAGNOSIS — N39.0 URINARY TRACT INFECTION WITH HEMATURIA, SITE UNSPECIFIED: ICD-10-CM

## 2020-12-11 DIAGNOSIS — R31.9 URINARY TRACT INFECTION WITH HEMATURIA, SITE UNSPECIFIED: ICD-10-CM

## 2020-12-11 LAB
BILIRUB UR QL STRIP: NEGATIVE
GLUCOSE UR-MCNC: NEGATIVE MG/DL
KETONES P FAST UR STRIP-MCNC: NEGATIVE MG/DL
PH UR STRIP: 6 [PH] (ref 4.6–8)
PROT UR QL STRIP: NEGATIVE
SP GR UR STRIP: 1.02 (ref 1–1.03)
UA UROBILINOGEN AMB POC: NORMAL (ref 0.2–1)
URINALYSIS CLARITY POC: CLEAR
URINALYSIS COLOR POC: YELLOW
URINE BLOOD POC: NORMAL
URINE LEUKOCYTES POC: NORMAL
URINE NITRITES POC: NEGATIVE

## 2020-12-11 PROCEDURE — 81001 URINALYSIS AUTO W/SCOPE: CPT | Performed by: NURSE PRACTITIONER

## 2020-12-11 PROCEDURE — G8399 PT W/DXA RESULTS DOCUMENT: HCPCS | Performed by: NURSE PRACTITIONER

## 2020-12-11 PROCEDURE — 99213 OFFICE O/P EST LOW 20 MIN: CPT | Performed by: NURSE PRACTITIONER

## 2020-12-11 PROCEDURE — G8428 CUR MEDS NOT DOCUMENT: HCPCS | Performed by: NURSE PRACTITIONER

## 2020-12-11 PROCEDURE — 87086 URINE CULTURE/COLONY COUNT: CPT

## 2020-12-11 PROCEDURE — 1090F PRES/ABSN URINE INCON ASSESS: CPT | Performed by: NURSE PRACTITIONER

## 2020-12-11 PROCEDURE — G8432 DEP SCR NOT DOC, RNG: HCPCS | Performed by: NURSE PRACTITIONER

## 2020-12-11 PROCEDURE — G8420 CALC BMI NORM PARAMETERS: HCPCS | Performed by: NURSE PRACTITIONER

## 2020-12-11 PROCEDURE — G8754 DIAS BP LESS 90: HCPCS | Performed by: NURSE PRACTITIONER

## 2020-12-11 PROCEDURE — 1101F PT FALLS ASSESS-DOCD LE1/YR: CPT | Performed by: NURSE PRACTITIONER

## 2020-12-11 PROCEDURE — G8752 SYS BP LESS 140: HCPCS | Performed by: NURSE PRACTITIONER

## 2020-12-11 PROCEDURE — G8536 NO DOC ELDER MAL SCRN: HCPCS | Performed by: NURSE PRACTITIONER

## 2020-12-11 PROCEDURE — G0463 HOSPITAL OUTPT CLINIC VISIT: HCPCS | Performed by: NURSE PRACTITIONER

## 2020-12-11 RX ORDER — NITROFURANTOIN 25; 75 MG/1; MG/1
100 CAPSULE ORAL 2 TIMES DAILY
Qty: 14 CAP | Refills: 0 | Status: SHIPPED | OUTPATIENT
Start: 2020-12-11 | End: 2020-12-18

## 2020-12-11 NOTE — PROGRESS NOTES
Subjective:     Melene Boxer is a [de-identified] y.o. female who complains of dysuria, frequency, urgency, nocturia for 3 days. Patient denies back pain, fever and vaginal discharge. Patient does not have a history of recurrent UTI. Patient does not have a history of pyelonephritis. Pt requested Sheeba Kiss as she has taken this medication in the past w/o any difficulties. Past Medical History:   Diagnosis Date    Arthritis     Back pain of lumbar region with sciatica 12/5/2018    [facility] [address]     Cancer ([facility] [address] 2015    breast cancer    Dyslipidemia     Heart palpitations     Hypertension     Left ventricular hypertrophy     Macular degeneration     Vitamin D deficiency      Family History   Problem Relation Age of Onset    Cancer Mother         stomach and liver cancer, in her 62s    Heart Disease Father         MI age 46s    Cancer Sister         blood    Cancer Brother         leukemia    Cancer Sibling      Current Outpatient Medications   Medication Sig Dispense Refill    nitrofurantoin, macrocrystal-monohydrate, (Macrobid) 100 mg capsule Take 1 Cap by mouth two (2) times a day for 7 days. 14 Cap 0    HYDROcodone-acetaminophen (NORCO) 7.5-325 mg per tablet Take 1 Tab by mouth every eight (8) hours as needed.  omeprazole (PRILOSEC) 40 mg capsule Take 40 mg by mouth daily.  DULoxetine (CYMBALTA) 20 mg capsule Take 1 Cap by mouth daily. 30 Cap 5    diclofenac (VOLTAREN) 1 % gel Apply 2 g to affected area four (4) times daily. 100 g 0    hydrocortisone (ANUSOL-HC) 2.5 % rectal cream Insert  into rectum four (4) times daily. 30 g 5    pravastatin (PRAVACHOL) 40 mg tablet Take 1 Tab by mouth nightly. (Patient taking differently: Take 40 mg by mouth every other day.) 90 Tab 3    acetaminophen (TYLENOL) 500 mg tablet Take 1,000 mg by mouth every six (6) hours as needed for Pain.       OTHER [employer] Women's Multivitamin Gummy - 1 gummy daily      Biotin 2,500 mcg cap Take 1 Cap by mouth daily.  psyllium husk (METAMUCIL PO) Take  by mouth daily.  losartan (COZAAR) 25 mg tablet Take 1 Tab by mouth daily. 90 Tab 2    azelastine (ASTELIN) 137 mcg (0.1 %) nasal spray 1 [place] by Both Nostrils route two (2) times a day. Use in each nostril as directed 1 Bottle 11    fluticasone propionate (FLONASE) 50 mcg/actuation nasal spray 2 Sprays by Both Nostrils route daily. 1 Bottle 11    loratadine (CLARITIN) 10 mg tablet Take 1 Tab by mouth daily as needed for Allergies. Cough, runny nose, itching 90 Tab 3    celecoxib (CELEBREX) 200 mg capsule Take 200 mg by mouth as needed.  cholecalciferol (VITAMIN D3) 1,000 unit cap Take 1,000 Units by mouth daily.  solifenacin (VESICARE) 5 mg tablet Take 5 mg by mouth as needed.  vit C/vit E ac/lut/copper/zinc (PRESERVISION LUTEIN PO) Take 1 Tab by mouth daily.  cyclobenzaprine (FLEXERIL) 5 mg tablet Take 1 Tab by mouth three (3) times daily as needed for Muscle Spasm(s). (Patient taking differently: Take 5 mg by mouth three (3) times daily as needed for Muscle Spasm(s).  Take 0.5 mg by mouth three times daily as needed) 20 Tab 0     Allergies   Allergen Reactions    Codeine Other (comments)     Reports constipation on this in past    Gabapentin Drowsiness    Sulfa (Sulfonamide Antibiotics) Nausea Only     Social History     Socioeconomic History    Marital status:      Spouse name: Not on file    Number of children: Not on file    Years of education: Not on file    Highest education level: Not on file   Occupational History    Not on file   Social Needs    Financial resource strain: Not on file    Food insecurity     Worry: Not on file     Inability: Not on file    Transportation needs     Medical: Not on file     Non-medical: Not on file   Tobacco Use    Smoking status: Never Smoker    Smokeless tobacco: Never Used   Substance and Sexual Activity    Alcohol use: No     Frequency: Never    Drug use: No    Sexual activity: Not Currently     Partners: Male   Lifestyle    Physical activity     Days per week: Not on file     Minutes per session: Not on file    Stress: Not on file   Relationships    Social connections     Talks on phone: Not on file     Gets together: Not on file     Attends Muslim service: Not on file     Active member of club or organization: Not on file     Attends meetings of clubs or organizations: Not on file     Relationship status: Not on file    Intimate partner violence     Fear of current or ex partner: Not on file     Emotionally abused: Not on file     Physically abused: Not on file     Forced sexual activity: Not on file   Other Topics Concern    Not on file   Social History Narrative    ** Merged History Encounter **          Review of Systems  Pertinent items are noted in HPI. Objective:     Visit Vitals  /66   Pulse 78   Temp 97.8 °F (36.6 °C) (Temporal)   Resp 16   Ht 5' 4\" (1.626 m)   Wt 133 lb (60.3 kg)   SpO2 100%   BMI 22.83 kg/m²     General: alert, cooperative, no distress, appears stated age   Abdomen: soft, non-tender, without masses or organomegaly    Back: CVA tenderness absent   : defer exam   Rectal: deferred     Laboratory:   Urine dipstick shows Trace Leuks and blood. Micro exam: not done. Urine culture ordered. Assessment:     UTI       Plan:     1. nitrofurantoin   Educated pt a UTI is an infection in any part of the urinary system, the kidneys, bladder or urethra. Although a urinary tract infection can occur in any part of the urinary system, it often occurs in the bladder or urethra. Women are at greater risk. A bladder infection can cause pain, a strong urge to urinate, a burning sensation, and blood in the urine. A kidney infection can cause back pain and fever. Common treatment is with antibiotics.     Common causes of a UTI are improper hygiene/wiping after using the bathroom- always wipe from front to back (females); sexual relations- void after sex to help prevent bacteria from entering the urethra; constipation; holding urine to long; uncontrolled diabetes; and  dehydration. Patient verbalized understanding. 2. Maintain adequate hydration    3. Follow up if symptoms not improving, and prn.       Face to Face time: 15 min

## 2020-12-11 NOTE — PROGRESS NOTES
Marcus Alcaraz presents today for   Chief Complaint   Patient presents with    Urinary Frequency     x 2 days, denies hematuria    Dysuria            . Coordination of Care:  1. Have you been to the ER, urgent care clinic since your last visit? Hospitalized since your last visit? NO    2. Have you seen or consulted any other health care providers outside of the [address] [place] [address] since your last visit? Include any pap smears or colon screening.  NO

## 2020-12-13 LAB
BACTERIA SPEC CULT: NORMAL
SERVICE CMNT-IMP: NORMAL

## 2020-12-16 ENCOUNTER — TELEPHONE (OUTPATIENT)
Dept: INTERNAL MEDICINE CLINIC | Age: 80
End: 2020-12-16

## 2020-12-16 NOTE — TELEPHONE ENCOUNTER
Pt states she is having right ear pain for 2 days. Feelis like ti won't open up, she is \"uncomfortable with it\" she said.   Please advise her at [phone]

## 2020-12-16 NOTE — TELEPHONE ENCOUNTER
Patient reached and notified that there is no availability this week. Patient advised to go to urgent care for evaluation.

## 2020-12-21 ENCOUNTER — TRANSCRIBE ORDER (OUTPATIENT)
Dept: SCHEDULING | Age: 80
End: 2020-12-21

## 2020-12-21 DIAGNOSIS — Z12.31 VISIT FOR SCREENING MAMMOGRAM: Primary | ICD-10-CM

## 2021-01-14 NOTE — TELEPHONE ENCOUNTER
Last Visit: 9/24/20 with MD Beatrice Guardado  Next Appointment: none  Previous Refill Encounter(s): 10/21/19 #90 with 2 refills    Requested Prescriptions     Pending Prescriptions Disp Refills    losartan (Cozaar) 25 mg tablet 90 Tab 2     Sig: Take 1 Tab by mouth daily.

## 2021-01-15 RX ORDER — LOSARTAN POTASSIUM 25 MG/1
25 TABLET ORAL DAILY
Qty: 90 TAB | Refills: 0 | Status: SHIPPED | OUTPATIENT
Start: 2021-01-15 | End: 2021-04-13

## 2021-02-05 ENCOUNTER — TELEPHONE (OUTPATIENT)
Dept: INTERNAL MEDICINE CLINIC | Age: 81
End: 2021-02-05

## 2021-02-05 DIAGNOSIS — H93.8X1 EAR FULLNESS, RIGHT: Primary | ICD-10-CM

## 2021-02-05 NOTE — TELEPHONE ENCOUNTER
Patient wants to know if Dr. Harish Givens thinks she should get the COVID vaccine. She is also requesting a referral to an ENT. She is having problems with her right ear.

## 2021-02-05 NOTE — TELEPHONE ENCOUNTER
Patient reached and is that DR. Gemma Navarro is encouraging all of her patients to receive the vaccine unless they have had an allergic reaction in the past to a vaccine. Patient also needs a referral to ENT for right ear fullness. Referral generated and patient given contact info.

## 2021-02-15 ENCOUNTER — HOSPITAL ENCOUNTER (EMERGENCY)
Age: 81
Discharge: HOME | End: 2021-02-15
Attending: EMERGENCY MEDICINE
Payer: MEDICARE

## 2021-02-15 VITALS
WEIGHT: 131 LBS | OXYGEN SATURATION: 100 % | DIASTOLIC BLOOD PRESSURE: 76 MMHG | HEART RATE: 83 BPM | BODY MASS INDEX: 22.49 KG/M2 | SYSTOLIC BLOOD PRESSURE: 149 MMHG | TEMPERATURE: 99 F | RESPIRATION RATE: 16 BRPM

## 2021-02-15 DIAGNOSIS — R42 VERTIGO: Primary | ICD-10-CM

## 2021-02-15 DIAGNOSIS — H65.00 ACUTE SEROUS OTITIS MEDIA, RECURRENCE NOT SPECIFIED, UNSPECIFIED LATERALITY: ICD-10-CM

## 2021-02-15 PROCEDURE — 99283 EMERGENCY DEPT VISIT LOW MDM: CPT

## 2021-02-15 PROCEDURE — 74011250636 HC RX REV CODE- 250/636: Performed by: EMERGENCY MEDICINE

## 2021-02-15 RX ORDER — MECLIZINE HCL 12.5 MG 12.5 MG/1
12.5 TABLET ORAL
Status: COMPLETED | OUTPATIENT
Start: 2021-02-15 | End: 2021-02-15

## 2021-02-15 RX ORDER — MECLIZINE HCL 12.5 MG 12.5 MG/1
12.5 TABLET ORAL
Qty: 15 TAB | Refills: 0 | Status: SHIPPED | OUTPATIENT
Start: 2021-02-15 | End: 2021-02-25

## 2021-02-15 RX ADMIN — MECLIZINE 12.5 MG: 12.5 TABLET ORAL at 16:40

## 2021-02-15 NOTE — ED TRIAGE NOTES
C/O Rt ear and jaw pain. Pt is due for tooth extraction tomorrow. Has been dealing with dizziness \"for a while, but worse yesterday\". Pt would like for her urine to be tested also.

## 2021-02-15 NOTE — ED PROVIDER NOTES
HPI patient says she has a \"fullness feeling\" in her right ear intermittently for the past several months. She denies any ear pain. She says she has experienced some upper right jaw pain and this was determined to be secondary to a bad tooth. She has an appointment tomorrow with her dentist for a tooth extraction she says yesterday she had a spell of \"dizziness\" and felt like \"things were spinning around me\". She says the symptoms lasted for about 10 minutes and then resolve spontaneously. At the present time she denies any dizziness symptoms. She comes emergency room today for evaluation of the ear fullness. She says she has been evaluated by her PCP for the same and has an ENT appointment scheduled at some point in the future. No further specifics given at this time.     Past Medical History:   Diagnosis Date    Arthritis     Back pain of lumbar region with sciatica 12/5/2018    [facility] [address]     Cancer ([facility] [address] 2015    breast cancer    Dyslipidemia     Heart palpitations     Hypertension     Left ventricular hypertrophy     Macular degeneration     Vitamin D deficiency        Past Surgical History:   Procedure Laterality Date    HX BREAST LUMPECTOMY Right     HX HERNIA REPAIR      HX HYSTERECTOMY  1985    HI BREAST SURGERY PROCEDURE UNLISTED Right 2015         Family History:   Problem Relation Age of Onset    Cancer Mother         stomach and liver cancer, in her 62s    Heart Disease Father         MI age 46s   [facility] Cancer Sister         blood    Cancer Brother         leukemia    Cancer Sibling        Social History     Socioeconomic History    Marital status:      Spouse name: Not on file    Number of children: Not on file    Years of education: Not on file    Highest education level: Not on file   Occupational History    Not on file   Social Needs    Financial resource strain: Not on file    Food insecurity     Worry: Not on file     Inability: Not on file   [facility] Transportation needs     Medical: Not on file     Non-medical: Not on file   Tobacco Use    Smoking status: Never Smoker    Smokeless tobacco: Never Used   Substance and Sexual Activity    Alcohol use: No     Frequency: Never    Drug use: No    Sexual activity: Not Currently     Partners: Male   Lifestyle    Physical activity     Days per week: Not on file     Minutes per session: Not on file    Stress: Not on file   Relationships    Social connections     Talks on phone: Not on file     Gets together: Not on file     Attends Adventism service: Not on file     Active member of club or organization: Not on file     Attends meetings of clubs or organizations: Not on file     Relationship status: Not on file    Intimate partner violence     Fear of current or ex partner: Not on file     Emotionally abused: Not on file     Physically abused: Not on file     Forced sexual activity: Not on file   Other Topics Concern    Not on file   Social History Narrative    ** Merged History Encounter **              ALLERGIES: Codeine, Gabapentin, and Sulfa (sulfonamide antibiotics)    Review of Systems   Constitutional: Negative. Respiratory: Negative. Cardiovascular: Negative. Gastrointestinal: Negative. Genitourinary: Negative. Musculoskeletal: Negative. Skin: Negative. Neurological: Positive for dizziness. Psychiatric/Behavioral: Negative. Vitals:    02/15/21 1610   BP: (!) 149/76   Pulse: 83   Resp: 16   Temp: 99 °F (37.2 °C)   SpO2: 100%   Weight: 59.4 kg (131 lb)            Physical Exam  Vitals signs and nursing note reviewed. Constitutional:       Appearance: She is well-developed. HENT:      Head: Normocephalic and atraumatic. Right Ear: Tympanic membrane normal. There is no impacted cerumen. Ears:      Comments: Very slight amount of serous effusion behind the right tympanic membrane.   No erythema or bulging induration or inflammation noted in the TM or the external canal     Nose: Nose normal.      Mouth/Throat:      Mouth: Mucous membranes are moist.   Eyes:      Conjunctiva/sclera: Conjunctivae normal.      Pupils: Pupils are equal, round, and reactive to light. Neck:      Musculoskeletal: Normal range of motion and neck supple. Cardiovascular:      Rate and Rhythm: Normal rate and regular rhythm. Pulmonary:      Effort: Pulmonary effort is normal.      Breath sounds: Normal breath sounds. Musculoskeletal: Normal range of motion. Skin:     General: Skin is warm and dry. Neurological:      Mental Status: She is alert and oriented to person, place, and time.           MDM       Procedures

## 2021-02-17 ENCOUNTER — TELEPHONE (OUTPATIENT)
Dept: INTERNAL MEDICINE CLINIC | Age: 81
End: 2021-02-17

## 2021-02-17 NOTE — TELEPHONE ENCOUNTER
Patient reached, she has been scheduled for a vv ed f/u and in office appointment for April. Patient will also f/u with ENT about the dizziness.

## 2021-02-17 NOTE — TELEPHONE ENCOUNTER
----- Message from Gloria Hodges MD sent at 2/16/2021  7:31 AM EST -----  Regarding: F/u apt needed  Please schedule a follow up apt (30 min) in office in March or April.     Thanks,  Dr. Ching Jones  [facility] [address], [address] [place] [address] Str.  Phone: [phone]  Fax: [phone]

## 2021-02-23 ENCOUNTER — VIRTUAL VISIT (OUTPATIENT)
Dept: INTERNAL MEDICINE CLINIC | Age: 81
End: 2021-02-23
Payer: MEDICARE

## 2021-02-23 ENCOUNTER — TELEPHONE (OUTPATIENT)
Dept: INTERNAL MEDICINE CLINIC | Age: 81
End: 2021-02-23

## 2021-02-23 DIAGNOSIS — R35.1 NOCTURIA: ICD-10-CM

## 2021-02-23 DIAGNOSIS — H93.8X1 EAR FULLNESS, RIGHT: Primary | ICD-10-CM

## 2021-02-23 PROCEDURE — 99442 PR PHYS/QHP TELEPHONE EVALUATION 11-20 MIN: CPT | Performed by: INTERNAL MEDICINE

## 2021-02-23 NOTE — PROGRESS NOTES
Damaris Cuevas is a 79 y.o. female who was seen by synchronous (real-time) audio-phone only technology on 2/23/2021. Assessment & Plan:   1. Right Ear Fullness: +Vertigo. - She wants to wait until she sees ENT to consider other treatment modalities. - Notify me if sx worsen. Ok to use 1/2 tab of meclizine sparingly for sx relief. 2. Nocturia: Behavioral (drinking too much fluids) vs pathologic? - Referral to Urology per pt request - for evaluation. Lab review: labs are reviewed in the EHR     I have discussed the diagnosis with the patient and the intended plan as seen in the above orders. I have discussed medication side effects and warnings with the patient as well. I have reviewed the plan of care with the patient, accepted their input and they are in agreement with the treatment goals. All questions were answered. The patient understands the plan of care. Pt instructed if symptoms worsen to call the office or report to the ED for continued care. Greater than 50% of the visit time was spent in counseling and/or coordination of care. I spent 16 min with the pt for this phone only encounter. Voice recognition was used to generate this report, which may have resulted in some phonetic based errors in grammar and contents. Even though attempts were made to correct all the mistakes, some may have been missed, and remained in the body of the document. Subjective:   Damaris Cuevas was seen for   Chief Complaint   Patient presents with    Follow-up     The patient is a 79-year-old female with history of breast cancer (on arimidex since 2015), hypertension, HLD, lumbar disc disease, pulmonary nodule, allergic rhinitis osteopenia per EHR, macular degeneration per EHR, OAB, GERD, cataracts, and B12 deficiency. 1. Right Ear Fullness: \"My ear wouldn't open up. \" Sx began earlier this month. Sx prompted her to go to the ED. She was told that she has vertigo.  She was prescribed meclizine but she can't take it tid due to associated drowsiness. No change to hearing. She has some ear pain. She is scheduling to see the ENT doctor. No HA or vision changes. She was evaluated by Cardiology ([facility]) in November for c/o dizziness. 2. Nocturia: She is reporting getting up frequently at night to urinate. No hematuria or dysuria. +H/o OAB. She is requesting a referral to Urology for evaluation. Prior to Admission medications    Medication Sig Start Date End Date Taking? Authorizing Provider   meclizine (ANTIVERT) 12.5 mg tablet Take 1 Tab by mouth three (3) times daily as needed for Dizziness for up to 10 days. 2/15/21 2/25/21  Alma Torres MD   losartan (Cozaar) 25 mg tablet Take 1 Tab by mouth daily. 1/15/21   Eduardo Dutton MD   HYDROcodone-acetaminophen [facility]) 7.5-325 mg per tablet Take 1 Tab by mouth every eight (8) hours as needed. 10/30/20   Provider, Historical   omeprazole (PRILOSEC) 40 mg capsule Take 40 mg by mouth daily. 8/28/20   Provider, Historical   DULoxetine (CYMBALTA) 20 mg capsule Take 1 Cap by mouth daily. 9/24/20   Eduardo Dutton MD   diclofenac (VOLTAREN) 1 % gel Apply 2 g to affected area four (4) times daily. 6/2/20   Eduardo Dutton MD   hydrocortisone (ANUSOL-HC) 2.5 % rectal cream Insert  into rectum four (4) times daily. 6/1/20   Eduardo Dutton MD   pravastatin (PRAVACHOL) 40 mg tablet Take 1 Tab by mouth nightly. Patient taking differently: Take 40 mg by mouth every other day. 4/21/20   Eduardo Dutton MD   acetaminophen (TYLENOL) 500 mg tablet Take 1,000 mg by mouth every six (6) hours as needed for Pain. Provider, Historical   OTHER [employer] Women's Multivitamin Gummy - 1 gummy daily    Provider, Historical   Biotin 2,500 mcg cap Take 1 Cap by mouth daily. Provider, Historical   psyllium husk (METAMUCIL PO) Take  by mouth daily.     Provider, Historical   azelastine (ASTELIN) 137 mcg (0.1 %) nasal spray 1 [place] by Both Nostrils route two (2) times a day. Use in each nostril as directed 8/16/19   Tyler Boone MD   fluticasone propionate (FLONASE) 50 mcg/actuation nasal spray 2 Sprays by Both Nostrils route daily. 8/16/19   Tyler Boone MD   loratadine (CLARITIN) 10 mg tablet Take 1 Tab by mouth daily as needed for Allergies. Cough, runny nose, itching 3/11/19   Grant Araujo MD   celecoxib (CELEBREX) 200 mg capsule Take 200 mg by mouth as needed. Provider, Historical   cholecalciferol (VITAMIN D3) 1,000 unit cap Take 1,000 Units by mouth daily. Provider, Historical   solifenacin (VESICARE) 5 mg tablet Take 5 mg by mouth as needed. Provider, Historical   vit C/vit E ac/lut/copper/zinc (PRESERVISION LUTEIN PO) Take 1 Tab by mouth daily. Other, MD Phil   cyclobenzaprine (FLEXERIL) 5 mg tablet Take 1 Tab by mouth three (3) times daily as needed for Muscle Spasm(s). Patient taking differently: Take 5 mg by mouth three (3) times daily as needed for Muscle Spasm(s).  Take 0.5 mg by mouth three times daily as needed 11/1/18   LINDA Up     Allergies   Allergen Reactions    Codeine Other (comments)     Reports constipation on this in past    Gabapentin Drowsiness    Sulfa (Sulfonamide Antibiotics) Nausea Only     Past Medical History:   Diagnosis Date    Arthritis     Back pain of lumbar region with sciatica 12/5/2018    [facility] [address]     Cancer ([facility] [address] 2015    breast cancer    Dyslipidemia     Heart palpitations     Hypertension     Left ventricular hypertrophy     Macular degeneration     Vitamin D deficiency      Past Surgical History:   Procedure Laterality Date    HX BREAST LUMPECTOMY Right     HX HERNIA REPAIR      HX HYSTERECTOMY  1985    FL BREAST SURGERY PROCEDURE UNLISTED Right 2015     Family History   Problem Relation Age of Onset    Cancer Mother         stomach and liver cancer, in her 62s    Heart Disease Father         MI age 55s    Cancer Sister         blood    Cancer Brother         leukemia    Cancer Sibling      Social History     Socioeconomic History    Marital status:      Spouse name: Not on file    Number of children: Not on file    Years of education: Not on file    Highest education level: Not on file   Tobacco Use    Smoking status: Never Smoker    Smokeless tobacco: Never Used   Substance and Sexual Activity    Alcohol use: No     Frequency: Never    Drug use: No    Sexual activity: Not Currently     Partners: Male   Social History Narrative    ** Merged History Encounter **            ROS:  Gen: No fever/chills  HEENT: No sore throat, eye pain, or congestion. No HA. +Right ear fullness  CV: No CP  Resp: No cough/SOB  GI: No abdominal pain  : No hematuria/dysuria  Derm: No rash  Neuro: No new paresthesias/weakness  Musc: +Chronic jt pain and spine related pain.       LABS:  Lab Results   Component Value Date/Time    Sodium 138 06/03/2020 03:10 PM    Potassium 3.4 (L) 06/03/2020 03:10 PM    Chloride 106 06/03/2020 03:10 PM    CO2 29 06/03/2020 03:10 PM    Anion gap 3 06/03/2020 03:10 PM    Glucose 98 06/03/2020 03:10 PM    BUN 12 06/03/2020 03:10 PM    Creatinine 0.92 06/03/2020 03:10 PM    BUN/Creatinine ratio 13 06/03/2020 03:10 PM    GFR est AA >60 06/03/2020 03:10 PM    GFR est non-AA 59 (L) 06/03/2020 03:10 PM    Calcium 9.0 06/03/2020 03:10 PM       Lab Results   Component Value Date/Time    Cholesterol, total 222 (H) 04/01/2020 10:42 AM    HDL Cholesterol 68 (H) 04/01/2020 10:42 AM    LDL, calculated 143 (H) 04/01/2020 10:42 AM    VLDL, calculated 11 04/01/2020 10:42 AM    Triglyceride 55 04/01/2020 10:42 AM    CHOL/HDL Ratio 3.3 04/01/2020 10:42 AM       Lab Results   Component Value Date/Time    WBC 5.0 06/03/2020 03:10 PM    HGB 12.1 06/03/2020 03:10 PM    HCT 38.2 06/03/2020 03:10 PM    PLATELET 231 99/82/2930 03:10 PM    MCV 75.0 06/03/2020 03:10 PM       Lab Results   Component Value Date/Time    Hemoglobin A1c 5.7 (H) 04/01/2020 10:42 AM       Lab Results   Component Value Date/Time    TSH 1.10 12/05/2018 03:04 PM           Due to this being a TeleHealth phone only evaluation, many elements of the physical examination are unable to be assessed. The pt was seen by synchronous (real-time) audio-phone only technology, and/or her healthcare decision maker, is aware that this patient-initiated, Telehealth encounter is a billable service, with coverage as determined by her insurance carrier. She is aware that she may receive a bill and has provided verbal consent to proceed: Yes. The pt is being evaluated by a phone only visit encounter for concerns as above. A caregiver was present when appropriate. Due to this being a TeleHealth encounter (During [address] emergency), evaluation of the following organ systems was limited: Vitals/Constitutional/EENT/Resp/CV/GI//MS/Neuro/Skin/Heme-Lymph-Imm. Pursuant to the emergency declaration under the [address] [place] [address] and the [employer] Act, this Virtual phone only Visit was conducted, with patient's (and/or legal guardian's) consent, to reduce the patient's risk of exposure to COVID-19 and provide necessary medical care. Services were provided through a phone only synchronous discussion virtually to substitute for in-person clinic visit. Patient and provider were located at their individual homes. We discussed the expected course, resolution and complications of the diagnosis(es) in detail. Medication risks, benefits, costs, interactions, and alternatives were discussed as indicated. I advised her to contact the office if her condition worsens, changes or fails to improve as anticipated. She expressed understanding with the diagnosis(es) and plan.      Snow Vang MD

## 2021-02-23 NOTE — TELEPHONE ENCOUNTER
Please let her know that it is ok for her to get her mammogram and Covid-19 vaccine. There is no contraindication to having these things done on the same day.     Dr. Rekha Price  [facility] [address], [address] [place] [address] Str.  Phone: [phone]  Fax: [phone]

## 2021-02-23 NOTE — TELEPHONE ENCOUNTER
Pt stated she had virtual appt with you today and she meant to ask you about getting the covid vaccine and her history of breast issues.      Stated she has a mammogram appt 03/01/2021 and she heard you should not get a mammogram and the covid vaccine done within a period of time of each other     Please advise

## 2021-03-01 ENCOUNTER — HOSPITAL ENCOUNTER (OUTPATIENT)
Dept: MAMMOGRAPHY | Age: 81
Discharge: HOME | End: 2021-03-01
Attending: INTERNAL MEDICINE
Payer: MEDICARE

## 2021-03-01 DIAGNOSIS — Z12.31 VISIT FOR SCREENING MAMMOGRAM: ICD-10-CM

## 2021-03-01 PROCEDURE — 77063 BREAST TOMOSYNTHESIS BI: CPT

## 2021-03-07 DIAGNOSIS — R92.8 ABNORMALITY OF LEFT BREAST ON SCREENING MAMMOGRAM: Primary | ICD-10-CM

## 2021-03-07 DIAGNOSIS — R92.8 ABNORMALITY OF LEFT BREAST ON SCREENING MAMMOGRAM: ICD-10-CM

## 2021-03-07 NOTE — PROGRESS NOTES
Please let her know that I am ordering a left diagnostic mammogram per Radiology recommendations. There is an area of concern along her left breast that needs additional pictures in order to rule out breast cancer.     Dr. Denver Crosser  [facility] [address], [address] [place] [address] Str.  Phone: [phone]  Fax: [phone]

## 2021-03-08 ENCOUNTER — TELEPHONE (OUTPATIENT)
Dept: INTERNAL MEDICINE CLINIC | Age: 81
End: 2021-03-08

## 2021-03-08 NOTE — TELEPHONE ENCOUNTER
----- Message from Samantha Reynoso MD sent at 3/7/2021  4:37 PM EST -----  Please let her know that I am ordering a left diagnostic mammogram per Radiology recommendations. There is an area of concern along her left breast that needs additional pictures in order to rule out breast cancer.     Dr. Tad Souza  [facility] [address], [address] [place] [address] Str.  Phone: [phone]  Fax: [phone]

## 2021-03-10 ENCOUNTER — HOSPITAL ENCOUNTER (OUTPATIENT)
Dept: ULTRASOUND IMAGING | Age: 81
Discharge: HOME | End: 2021-03-10
Attending: INTERNAL MEDICINE
Payer: MEDICARE

## 2021-03-10 ENCOUNTER — HOSPITAL ENCOUNTER (OUTPATIENT)
Dept: MAMMOGRAPHY | Age: 81
Discharge: HOME | End: 2021-03-10
Attending: INTERNAL MEDICINE
Payer: MEDICARE

## 2021-03-10 DIAGNOSIS — R92.8 ABNORMALITY OF LEFT BREAST ON SCREENING MAMMOGRAM: ICD-10-CM

## 2021-03-10 PROCEDURE — 77061 BREAST TOMOSYNTHESIS UNI: CPT

## 2021-03-10 PROCEDURE — 76642 ULTRASOUND BREAST LIMITED: CPT

## 2021-03-19 ENCOUNTER — TELEPHONE (OUTPATIENT)
Dept: INTERNAL MEDICINE CLINIC | Age: 81
End: 2021-03-19

## 2021-03-22 ENCOUNTER — VIRTUAL VISIT (OUTPATIENT)
Dept: INTERNAL MEDICINE CLINIC | Age: 81
End: 2021-03-22
Payer: MEDICARE

## 2021-03-22 DIAGNOSIS — R00.2 PALPITATIONS: ICD-10-CM

## 2021-03-22 DIAGNOSIS — M79.10 MYALGIA: ICD-10-CM

## 2021-03-22 DIAGNOSIS — M51.9 LUMBAR DISC DISEASE: Primary | ICD-10-CM

## 2021-03-22 PROCEDURE — 99442 PR PHYS/QHP TELEPHONE EVALUATION 11-20 MIN: CPT | Performed by: INTERNAL MEDICINE

## 2021-03-22 NOTE — TELEPHONE ENCOUNTER
Patient stated that she would like to see you in office asap. She said that she is very disappointed in the virtual visit. She had a list of concerns, which I told her I would pass onto you for your advice. 1.Patient stated Her legs are hurting from the knees down. 2. Patient stated she is feeling dizzy/ woozy when she moves to quickly or bends over. Her walking is not steady. She is afraid she is going to fall sometimes. She is worried she has had a stroke. 3. Patient stated that she has a pimple on her back. She said its about dime sized. Circular. She said it has gotten darker. Lower back, left side. She wants to know if this needs to be looked at.    4  Patient stated that her 2nd vaccine is scheduled on mach 30th and a tooth extraction is scheduled on the 7th of April. Should she postpone the extraction? If so, how long?

## 2021-03-22 NOTE — PROGRESS NOTES
Elias Hurt is a 79 y.o. female who was seen by synchronous (real-time) audio-phone only technology on 3/22/2021. Assessment & Plan:   1. Dizziness: +Palpitations -longstanding and unchanged. From orthostatic hypotension per history? I encouraged her to follow-up with Dr. Mer Jones for a checkup in May. Since her symptoms are unchanged, I would not recommend any additional testing at this time. I instructed her to notify me if her symptoms worsen, at which time she will need to be seen sooner. 2.  Ataxia: Likely from lumbar disc disease. I encouraged her to follow Dr. Nadia Massey for recommendations regarding treatment. I encouraged her to schedule her physical therapy sessions. 3.  Myalgias:  I will have her stop her pravastatin to see if her myalgias resolve    Lab review: labs are reviewed in the EHR     I have discussed the diagnosis with the patient and the intended plan as seen in the above orders. I have discussed medication side effects and warnings with the patient as well. I have reviewed the plan of care with the patient, accepted their input and they are in agreement with the treatment goals. All questions were answered. The patient understands the plan of care. Pt instructed if symptoms worsen to call the office or report to the ED for continued care. Greater than 50% of the visit time was spent in counseling and/or coordination of care. I spent 11 to the patient for this phone only encounter. Voice recognition was used to generate this report, which may have resulted in some phonetic based errors in grammar and contents. Even though attempts were made to correct all the mistakes, some may have been missed, and remained in the body of the document.       Subjective:   Elias Hurt was seen for   Chief Complaint   Patient presents with    Dizziness    Myalgia     The patient is a 79year-old female with history of breast cancer (on arimidex since 2015), hypertension, HLD, lumbar disc disease, pulmonary nodule, allergic rhinitis osteopenia per EHR, macular degeneration per EHR, OAB, GERD, cataracts, and B12 deficiency.    1. Dizzy Spells:  Duration of sx: seconds. Frequency: \"not every day.\" She has one episode once or twice a wk. Sx occur with changes in position. No LOC. No SOB/palpitations/CP. She met with the Cardiology in November. Sx have been present x several months and were discussed at her last apt with Cardiology. Sx are not worsening.    2. Ataxia: +H/o lumbar disc disease. She is followed by . S/p left hip injection recently. \"It's doing good.\" He wrote for her to start PT. She plans to schedule this soon.  She has myalgias in bilateral lower extremities along the calf areas.  Symptoms are off and on present for several months at least.  She is on a statin for hyperlipidemia.        Prior to Admission medications    Medication Sig Start Date End Date Taking? Authorizing Provider   losartan (Cozaar) 25 mg tablet Take 1 Tab by mouth daily. 1/15/21   Azra Reyes MD   omeprazole (PRILOSEC) 40 mg capsule Take 40 mg by mouth daily. 8/28/20   Provider, Historical   DULoxetine (CYMBALTA) 20 mg capsule Take 1 Cap by mouth daily. 9/24/20   Azra Reyes MD   diclofenac (VOLTAREN) 1 % gel Apply 2 g to affected area four (4) times daily. 6/2/20   Azra Reyes MD   hydrocortisone (ANUSOL-HC) 2.5 % rectal cream Insert  into rectum four (4) times daily. 6/1/20   Azra Reyes MD   pravastatin (PRAVACHOL) 40 mg tablet Take 1 Tab by mouth nightly.  Patient taking differently: Take 40 mg by mouth every other day. 4/21/20   Azra Reyes MD   acetaminophen (TYLENOL) 500 mg tablet Take 1,000 mg by mouth every six (6) hours as needed for Pain.    Provider, Historical   OTHER [employer] Women's Multivitamin Gummy - 1 gummy daily    Provider, Historical   Biotin 2,500 mcg cap Take 1 Cap by mouth daily.    Provider, Historical   psyllium  irma (METAMUCIL PO) Take  by mouth daily. Provider, Historical   azelastine (ASTELIN) 137 mcg (0.1 %) nasal spray 1 [place] by Both Nostrils route two (2) times a day. Use in each nostril as directed 8/16/19   Ruby Serra MD   fluticasone propionate (FLONASE) 50 mcg/actuation nasal spray 2 Sprays by Both Nostrils route daily. 8/16/19   Ruby Serra MD   loratadine (CLARITIN) 10 mg tablet Take 1 Tab by mouth daily as needed for Allergies. Cough, runny nose, itching 3/11/19   Anna Delgado MD   celecoxib (CELEBREX) 200 mg capsule Take 200 mg by mouth as needed. Provider, Historical   cholecalciferol (VITAMIN D3) 1,000 unit cap Take 1,000 Units by mouth daily. Provider, Historical   solifenacin (VESICARE) 5 mg tablet Take 5 mg by mouth as needed. Provider, Historical   vit C/vit E ac/lut/copper/zinc (PRESERVISION LUTEIN PO) Take 1 Tab by mouth daily. Other, MD Phil   cyclobenzaprine (FLEXERIL) 5 mg tablet Take 1 Tab by mouth three (3) times daily as needed for Muscle Spasm(s). Patient taking differently: Take 5 mg by mouth three (3) times daily as needed for Muscle Spasm(s).  Take 0.5 mg by mouth three times daily as needed 11/1/18   LINDA Oliveira     Allergies   Allergen Reactions    Codeine Other (comments)     Reports constipation on this in past    Gabapentin Drowsiness    Sulfa (Sulfonamide Antibiotics) Nausea Only     Past Medical History:   Diagnosis Date    Arthritis     Back pain of lumbar region with sciatica 12/5/2018    [facility]     Cancer [facility] 2015    breast cancer    Dyslipidemia     Heart palpitations     Hypertension     Left ventricular hypertrophy     Macular degeneration     Vitamin D deficiency      Past Surgical History:   Procedure Laterality Date    HX BREAST LUMPECTOMY Right     HX HERNIA REPAIR      HX HYSTERECTOMY  1985    HI BREAST SURGERY PROCEDURE UNLISTED Right 2015     Family History   Problem Relation Age of Onset  Cancer Mother         stomach and liver cancer, in her 62s    Heart Disease Father         MI age 46s    Cancer Sister         blood    Cancer Brother         leukemia    Cancer Sibling      Social History     Socioeconomic History    Marital status:      Spouse name: Not on file    Number of children: Not on file    Years of education: Not on file    Highest education level: Not on file   Tobacco Use    Smoking status: Never Smoker    Smokeless tobacco: Never Used   Substance and Sexual Activity    Alcohol use: No     Frequency: Never    Drug use: No    Sexual activity: Not Currently     Partners: Male   Social History Narrative    ** Merged History Encounter **            ROS:  Gen: No fever/chills  HEENT: No sore throat, eye pain, ear pain, or congestion.  No HA  CV: No CP  Resp: No cough/SOB  GI: No abdominal pain  : No hematuria/dysuria  Derm: No rash  Neuro: No new paresthesias/weakness  Musc: No new myalgias/jt pain  Psych: No depression sx        LABS:  Lab Results   Component Value Date/Time    Sodium 138 06/03/2020 03:10 PM    Potassium 3.4 (L) 06/03/2020 03:10 PM    Chloride 106 06/03/2020 03:10 PM    CO2 29 06/03/2020 03:10 PM    Anion gap 3 06/03/2020 03:10 PM    Glucose 98 06/03/2020 03:10 PM    BUN 12 06/03/2020 03:10 PM    Creatinine 0.92 06/03/2020 03:10 PM    BUN/Creatinine ratio 13 06/03/2020 03:10 PM    GFR est AA >60 06/03/2020 03:10 PM    GFR est non-AA 59 (L) 06/03/2020 03:10 PM    Calcium 9.0 06/03/2020 03:10 PM       Lab Results   Component Value Date/Time    Cholesterol, total 222 (H) 04/01/2020 10:42 AM    HDL Cholesterol 68 (H) 04/01/2020 10:42 AM    LDL, calculated 143 (H) 04/01/2020 10:42 AM    VLDL, calculated 11 04/01/2020 10:42 AM    Triglyceride 55 04/01/2020 10:42 AM    CHOL/HDL Ratio 3.3 04/01/2020 10:42 AM       Lab Results   Component Value Date/Time    WBC 5.0 06/03/2020 03:10 PM    HGB 12.1 06/03/2020 03:10 PM    HCT 38.2 06/03/2020 03:10 PM PLATELET 300 37/57/0319 03:10 PM    MCV 75.0 06/03/2020 03:10 PM       Lab Results   Component Value Date/Time    Hemoglobin A1c 5.7 (H) 04/01/2020 10:42 AM       Lab Results   Component Value Date/Time    TSH 1.10 12/05/2018 03:04 PM           Due to this being a TeleHealth phone only evaluation, many elements of the physical examination are unable to be assessed. The pt was seen by synchronous (real-time) audio-phone only technology, and/or her healthcare decision maker, is aware that this patient-initiated, Telehealth encounter is a billable service, with coverage as determined by her insurance carrier. She is aware that she may receive a bill and has provided verbal consent to proceed: Yes. The pt is being evaluated by a phone only visit encounter for concerns as above. A caregiver was present when appropriate. Due to this being a TeleHealth encounter (During [facility] [place] public health emergency), evaluation of the following organ systems was limited: Vitals/Constitutional/EENT/Resp/CV/GI//MS/Neuro/Skin/Heme-Lymph-Imm. Pursuant to the emergency declaration under the [facility], [address] [place] [address] authority and the [employer] Act, this Virtual phone only Visit was conducted, with patient's (and/or legal guardian's) consent, to reduce the patient's risk of exposure to COVID-19 and provide necessary medical care. Services were provided through a phone only synchronous discussion virtually to substitute for in-person clinic visit. Patient and provider were located at their individual homes. We discussed the expected course, resolution and complications of the diagnosis(es) in detail. Medication risks, benefits, costs, interactions, and alternatives were discussed as indicated. I advised her to contact the office if her condition worsens, changes or fails to improve as anticipated.  She expressed understanding with the diagnosis(es) and plan.      Martina Resendez MD

## 2021-03-26 NOTE — TELEPHONE ENCOUNTER
Spoke to patient and let her know that Dr. Liudmila Coley said it was fine for tooth to be extracted after COVID vaccine. I also asked if her other questions had been addressed in her Virtual visit with Dr. Liudmila Coley and she stated they had been. Patient thanked me for call and disconnected the call.

## 2021-04-13 RX ORDER — LOSARTAN POTASSIUM 25 MG/1
TABLET ORAL
Qty: 90 TAB | Refills: 0 | Status: SHIPPED | OUTPATIENT
Start: 2021-04-13

## 2021-04-26 ENCOUNTER — HOSPITAL ENCOUNTER (OUTPATIENT)
Dept: PHYSICAL THERAPY | Age: 81
Discharge: HOME | End: 2021-04-26
Payer: MEDICARE

## 2021-04-26 PROCEDURE — 97162 PT EVAL MOD COMPLEX 30 MIN: CPT

## 2021-04-26 PROCEDURE — 97110 THERAPEUTIC EXERCISES: CPT

## 2021-04-26 NOTE — PROGRESS NOTES
PT DAILY TREATMENT NOTE     Patient Name: Charisma Ley  Date:2021  : 1940  [x]  Patient  Verified  Payor: [place] MEDICARE / Plan: [place] MEDICARE PART A & B / Product Type: Medicare /    In time:12:46  Out time:1:34  Total Treatment Time (min): 48  Visit #: 1 of 8    Medicare/BCBS Only   Total Timed Codes (min):  12 1:1 Treatment Time:  12       Treatment Area: Low back pain [M54.5]  Left hip pain [M25.552]    SUBJECTIVE  Pain Level (0-10 scale): 6/10  Any medication changes, allergies to medications, adverse drug reactions, diagnosis change, or new procedure performed?: [x] No    [] Yes (see summary sheet for update)  Subjective functional status/changes:   [] No changes reported  The patient has a chief complaint of back pain and hip pain as well as numbness and discomfort through the front of her lower legs. OBJECTIVE  36 min [x]Eval                  []Re-Eval       12 min Therapeutic Exercise:  [] See flow sheet :   Rationale: increase ROM and increase strength to improve the patients ability to improve ADL ease. With   [] TE   [] TA   [] neuro   [] other: Patient Education: [x] Review HEP    [] Progressed/Changed HEP based on:   [] positioning   [] body mechanics   [] transfers   [] heat/ice application    [] other:      Other Objective/Functional Measures: See IE     Pain Level (0-10 scale) post treatment: 6/10    ASSESSMENT/Changes in Function: See POC. Patient will continue to benefit from skilled PT services to modify and progress therapeutic interventions, address functional mobility deficits, address ROM deficits, address strength deficits, analyze and address soft tissue restrictions, analyze and cue movement patterns, analyze and modify body mechanics/ergonomics, assess and modify postural abnormalities, address imbalance/dizziness and instruct in home and community integration to attain remaining goals.      []  See Plan of Care  []  See progress note/recertification  []  See Discharge Summary         Progress towards goals / Updated goals:  Short Term Goals: To be accomplished in 2 weeks:              1. The patient will demonstrate independence and compliance with HEP to maximize therapeutic benefit. IE: issued HEP              2. The patient will report 50% improvement in getting up first thing in the morning to improve ease of morning chore production. IE: 0% improvment  Long Term Goals: To be accomplished in 4 weeks:              1. The patient will improve FOTO score to 47 to improve ease of chore production. IE: 39              2. The patient will improve hip ABD strength to 4-/5 MMT to improve stability in stance. IE: 2+/5 MMT B              3. The patient will demonstrate RE testing bilaterally to minimally limited to reduce stress through hips during chore production. IE: quite significantly positive B              4. The patient will demonstrate SLS to 12\" to reduce risk of falls.    IE: 7\" B    PLAN  []  Upgrade activities as tolerated     [x]  Continue plan of care  []  Update interventions per flow sheet       []  Discharge due to:_  []  Other:_      Anand Licona, PT 4/26/2021  1:57 PM    Future Appointments   Date Time Provider Too Serrano   5/3/2021  1:45 PM Rita Lands [facility]   5/5/2021 12:45 PM Rita Lands [facility]   5/10/2021 12:00 PM Arsen Colvin, PT [facility]   5/12/2021 12:45 PM Virgil Ser, PTA [facility]   5/17/2021 12:00 PM Arsen Colvin, PT [facility]   5/18/2021  1:00 PM Tacho Norton MD [facility]   5/19/2021 12:45 PM Antonia Gal [facility]   5/24/2021 12:00 PM Arsen Colvin PT [facility]   6/3/2021  3:00 PM MD Ashlyn Ceron

## 2021-04-26 NOTE — PROGRESS NOTES
[facility]  [address]  [place], [address]  [phone] fax    Plan of Care/ Statement of Necessity for Physical Therapy Services    Patient name: Francis Orta Start of Care: 2021   Referral source: Sonali Morrissey, * : 1940    Medical Diagnosis: Low back pain [M54.5]  Left hip pain [M25.552]  Payor: VA MEDICARE / Plan: VA MEDICARE PART A & B / Product Type: Medicare /  Onset Date: Chronic, but most recently beginning of     Treatment Diagnosis: LBP, left hip pain   Prior Hospitalization: see medical history Provider#: [id number]   Medications: Verified on Patient summary List    Comorbidities: Osteoporosis, Arthritis, HTN, Visual impaired, Cancer   Prior Level of Function: The patient states that she had improved ease of getting out of bed the first thing in the morning prior to this year. The Plan of Care and following information is based on the information from the initial evaluation. Assessment/ key information: The patient is an 79year old female with a chief complaint of low back pain and left hip pain but does experience some numbness and \"pin pricking sensations\" into her legs when standing. The patient indicates that she feels her left toe occasionally \"try to catch the floor\" when walking. She denies numbness and tingling in a saddle type distribution and denies bowel or bladder changes. She demonstrates fairly good balance in tandem standing, and is able to standing SLS 7\" bilaterally. She presents with signs and symptoms that appear to be consistent with lumbar stenosis as well as B hip OA with associated impairments consisting of pain, decreased ROM, decreased flexibility, decreased strength, and limited ADL ease. The patient will benefit from skilled PT in order to address the aforementioned impairments.     Evaluation Complexity History HIGH Complexity :3+ comorbidities / personal factors will impact the outcome/ POC ; Examination MEDIUM Complexity : 3 Standardized tests and measures addressing body structure, function, activity limitation and / or participation in recreation  ;Presentation MEDIUM Complexity : Evolving with changing characteristics  ; Clinical Decision Making MEDIUM Complexity : FOTO score of 26-74  Overall Complexity Rating: MEDIUM  Problem List: pain affecting function, decrease ROM, decrease strength, impaired gait/ balance, decrease ADL/ functional abilitiies, decrease activity tolerance, decrease flexibility/ joint mobility and decrease transfer abilities   Treatment Plan may include any combination of the following: Therapeutic exercise, Therapeutic activities, Neuromuscular re-education, Physical agent/modality, Gait/balance training, Manual therapy, Patient education, Self Care training, Functional mobility training and Home safety training  Patient / Family readiness to learn indicated by: asking questions, trying to perform skills and interest  Persons(s) to be included in education: patient (P)  Barriers to Learning/Limitations: None  Patient Goal (s): relief  Patient Self Reported Health Status: good  Rehabilitation Potential: good    Short Term Goals: To be accomplished in 2 weeks:   1. The patient will demonstrate independence and compliance with HEP to maximize therapeutic benefit. 2. The patient will report 50% improvement in getting up first thing in the morning to improve ease of morning chore production. Long Term Goals: To be accomplished in 4 weeks:   1. The patient will improve FOTO score to 47 to improve ease of chore production. 2. The patient will improve hip ABD strength to 4-/5 MMT to improve stability in stance. 3. The patient will demonstrate RE testing bilaterally to minimally limited to reduce stress through hips during chore production. 4. The patient will demonstrate SLS to 12\" to reduce risk of falls.      Frequency / Duration: Patient to be seen 2 times per week for 4 weeks. Patient/ Caregiver education and instruction: Diagnosis, prognosis, self care, activity modification and exercises   [x]  Plan of care has been reviewed with PTA    Certification Period: 4/26/2021 - 5/25/2021  Scooby Askew, PT 4/26/2021 1:47 PM    ________________________________________________________________________    I certify that the above Therapy Services are being furnished while the patient is under my care. I agree with the treatment plan and certify that this therapy is necessary.     [de-identified] Signature:____________________  Date:____________Time: _________     Nicky Riddle, *  Please sign and return to [facility] [address]  [place], [facility] [address]  [phone] fax

## 2021-04-27 ENCOUNTER — DOCUMENTATION ONLY (OUTPATIENT)
Dept: INTERNAL MEDICINE CLINIC | Age: 81
End: 2021-04-27

## 2021-04-27 NOTE — PROGRESS NOTES
Pt has transferred care to [address] [place] [address], records request received which has been forwarded to Ci for processing

## 2021-05-03 ENCOUNTER — APPOINTMENT (OUTPATIENT)
Dept: PHYSICAL THERAPY | Age: 81
End: 2021-05-03
Payer: MEDICARE

## 2021-05-05 ENCOUNTER — HOSPITAL ENCOUNTER (OUTPATIENT)
Dept: PHYSICAL THERAPY | Age: 81
Discharge: HOME | End: 2021-05-05
Payer: MEDICARE

## 2021-05-05 PROCEDURE — 97112 NEUROMUSCULAR REEDUCATION: CPT

## 2021-05-05 PROCEDURE — 97110 THERAPEUTIC EXERCISES: CPT

## 2021-05-05 NOTE — PROGRESS NOTES
PT DAILY TREATMENT NOTE     Patient Name: Betzy Santana  Date:2021  : 1940  [x]  Patient  Verified  Payor: [place] MEDICARE / Plan: VA MEDICARE PART A & B / Product Type: Medicare /    In time: 2:20 PM  Out time: 3:14 PM  Total Treatment Time (min): 47  Visit #: 1 of 8    Medicare/BCBS Only   Total Timed Codes (min):  44 1:1 Treatment Time:  44       Treatment Area: Low back pain [M54.5]  Left hip pain [M25.552]    SUBJECTIVE  Pain Level (0-10 scale): 8  Any medication changes, allergies to medications, adverse drug reactions, diagnosis change, or new procedure performed?: [x] No    [] Yes (see summary sheet for update)  Subjective functional status/changes:   [] No changes reported  Reports completing HEP but unsure of technique with pelvic tilts    OBJECTIVE    Modality rationale: decrease pain to improve the patients ability to manage post exercise soreness for ADL tolerance through remainder of day   Min Type Additional Details   10 []  Ice     [x]  heat  []  Ice massage  []  Laser   []  Anodyne Position: seated  Location: low back       19 min Therapeutic Exercise:  [x] See flow sheet :   Rationale: increase ROM and increase strength to improve the patients ability to complete ADLs with greater ease. 25 min Neuromuscular Re-education:  [x]  See flow sheet :   Rationale: increase strength, improve coordination and increase proprioception  to improve the patients ability to ambulate and perform functional transfers with improved stability. With   [] TE   [] TA   [] neuro   [] other: Patient Education: [x] Review HEP    [] Progressed/Changed HEP based on:   [] positioning   [] body mechanics   [] transfers   [] heat/ice application    [] other:      Other Objective/Functional Measures: Exercise program initiated per flow sheet.      Pain Level (0-10 scale) post treatment: 6    ASSESSMENT/Changes in Function: Patient put forth good effort with all activities responding well to cues for technique and targeted muscle group recruitment. Mild increase in back pain with activity, improved with heat application. Patient will continue to benefit from skilled PT services to modify and progress therapeutic interventions, address functional mobility deficits, address ROM deficits, address strength deficits, analyze and address soft tissue restrictions and analyze and cue movement patterns to attain remaining goals. Progress towards goals / Updated goals:  Short Term Goals: To be accomplished in 2 weeks:              3. The patient will demonstrate independence and compliance with HEP to maximize therapeutic benefit. IE: issued HEP   Current: 5/5/2021 - MET - Patient reports compliance since initial evaluation              2. The patient will report 50% improvement in getting up first thing in the morning to improve ease of morning chore production. IE: 0% improvment  Long Term Goals: To be accomplished in 4 weeks:              1. The patient will improve FOTO score to 47 to improve ease of chore production. IE: 39              2. The patient will improve hip ABD strength to 4-/5 MMT to improve stability in stance. IE: 2+/5 MMT B              3. The patient will demonstrate RE testing bilaterally to minimally limited to reduce stress through hips during chore production. IE: quite significantly positive B              4. The patient will demonstrate SLS to 12\" to reduce risk of falls.               IE: 7\" B    PLAN  []  Upgrade activities as tolerated     [x]  Continue plan of care  []  Update interventions per flow sheet       []  Discharge due to:_  []  Other:_      Maye Cobb, PT 5/5/2021  2:29 PM    Future Appointments   Date Time Provider Too Serrano   5/10/2021 12:00 PM [facility]   5/12/2021 12:45 PM Seven Ashby PTA [facility]   5/18/2021  1:00 PM Tabitha Miller MD [facility]   5/21/2021  1:30 PM Kathryn Bowers [facility]   5/24/2021 12:00 PM Thania Manning, PT [facility]   5/26/2021  1:45 PM Thania Manning, PT [facility]   6/3/2021  3:00 PM MD Ashlyn Herr

## 2021-05-10 ENCOUNTER — HOSPITAL ENCOUNTER (OUTPATIENT)
Dept: PHYSICAL THERAPY | Age: 81
Discharge: HOME | End: 2021-05-10
Payer: MEDICARE

## 2021-05-10 PROCEDURE — 97112 NEUROMUSCULAR REEDUCATION: CPT

## 2021-05-10 PROCEDURE — 97110 THERAPEUTIC EXERCISES: CPT

## 2021-05-10 NOTE — PROGRESS NOTES
PT DAILY TREATMENT NOTE     Patient Name: Thi Jarquin  Date:5/10/2021  : 1940  [x]  Patient  Verified  Payor: VA MEDICARE / Plan: VA MEDICARE PART A & B / Product Type: Medicare /    In time:12:01  Out time:12:54  Total Treatment Time (min): 48  Visit #: 2 of 8    Medicare/BCBS Only   Total Timed Codes (min):  43 1:1 Treatment Time:  43       Treatment Area: Low back pain [M54.5]  Left hip pain [M25.552]    SUBJECTIVE  Pain Level (0-10 scale): 8/10  Any medication changes, allergies to medications, adverse drug reactions, diagnosis change, or new procedure performed?: [x] No    [] Yes (see summary sheet for update)  Subjective functional status/changes:   [] No changes reported  The patient reports continued pain through both of her hips. She states she was able to do well with her exercises last session, and does report compliance with her HEP. OBJECTIVE  Modality rationale: decrease pain and increase tissue extensibility to improve the patients ability to improve ADL ease. Min Type Additional Details   10 []  Ice     [x]  heat  []  Ice massage   []  Laser   []  Anodyne Position:  seated  Location: lumbar spine   [] Skin assessment post-treatment:  []intact []redness- no adverse reaction    []redness  adverse reaction:        31 min Therapeutic Exercise:  [x] See flow sheet :   Rationale: increase ROM and increase strength to improve the patients ability to improve ADL ease. 12 min Neuromuscular Re-education:  []  See flow sheet :   Rationale: improve coordination, improve balance and increase proprioception  to improve the patients ability to improve ADL ease.            With   [] TE   [] TA   [] neuro   [] other: Patient Education: [x] Review HEP    [] Progressed/Changed HEP based on:   [] positioning   [] body mechanics   [] transfers   [] heat/ice application    [] other:      Other Objective/Functional Measures:   Able to progress step taps to 6\" without any evidence of LOB.    The patient did experience a left foot cramp during LTR but it resolved quickly. Pain Level (0-10 scale) post treatment: 6/10    ASSESSMENT/Changes in Function: Slow exercise performance with discomfort into right lateral hip. The patient did exhibit point tenderness of subtroch region right > left. Patient will continue to benefit from skilled PT services to modify and progress therapeutic interventions, address functional mobility deficits, address ROM deficits, address strength deficits, analyze and address soft tissue restrictions, analyze and cue movement patterns, analyze and modify body mechanics/ergonomics, assess and modify postural abnormalities and instruct in home and community integration to attain remaining goals. []  See Plan of Care  []  See progress note/recertification  []  See Discharge Summary         Progress towards goals / Updated goals:  Short Term Goals: To be accomplished in 2 weeks:              4. The patient will demonstrate independence and compliance with HEP to maximize therapeutic benefit.              ZQ: issued HEP              Current: 5/5/2021 - MET - Patient reports compliance since initial evaluation              2. The patient will report 50% improvement in getting up first thing in the morning to improve ease of morning chore production.              IE: 0% improvment  Long Term Goals: To be accomplished in 4 weeks:              1. The patient will improve FOTO score to 47 to improve ease of chore production.              IE: 41              2. The patient will improve hip ABD strength to 4-/5 MMT to improve stability in stance.              IE: 2+/5 MMT B              3. The patient will demonstrate RE testing bilaterally to minimally limited to reduce stress through hips during chore production.              SY: quite significantly positive B              4.  The patient will demonstrate SLS to 12\" to reduce risk of falls.              IE: 7\" B    PLAN  []  Upgrade activities as tolerated     [x]  Continue plan of care  []  Update interventions per flow sheet       []  Discharge due to:_  []  Other:_      Isabel Sanchez, PT 5/10/2021  12:13 PM    Future Appointments   Date Time Provider Too Serrano   5/12/2021  3:45 PM Alka Wolfe [facility]   5/18/2021  1:00 PM Charlotte Domínguez MD [facility] BS AMB   5/21/2021  1:30 PM Pearl Fuller [facility]   5/24/2021 12:00 PM Avis Cables, PT [facility]   5/26/2021  1:45 PM Avis Cables, PT [facility]   6/3/2021  3:00 PM MD Ashlyn Manzano

## 2021-05-12 ENCOUNTER — APPOINTMENT (OUTPATIENT)
Dept: PHYSICAL THERAPY | Age: 81
End: 2021-05-12
Payer: MEDICARE

## 2021-05-17 ENCOUNTER — APPOINTMENT (OUTPATIENT)
Dept: PHYSICAL THERAPY | Age: 81
End: 2021-05-17
Payer: MEDICARE

## 2021-05-18 ENCOUNTER — APPOINTMENT (OUTPATIENT)
Dept: PHYSICAL THERAPY | Age: 81
End: 2021-05-18
Payer: MEDICARE

## 2021-05-18 ENCOUNTER — OFFICE VISIT (OUTPATIENT)
Dept: CARDIOLOGY CLINIC | Age: 81
End: 2021-05-18
Payer: MEDICARE

## 2021-05-18 VITALS
SYSTOLIC BLOOD PRESSURE: 136 MMHG | BODY MASS INDEX: 22.71 KG/M2 | OXYGEN SATURATION: 100 % | DIASTOLIC BLOOD PRESSURE: 84 MMHG | HEIGHT: 64 IN | WEIGHT: 133 LBS | HEART RATE: 63 BPM

## 2021-05-18 DIAGNOSIS — I10 ESSENTIAL HYPERTENSION: ICD-10-CM

## 2021-05-18 DIAGNOSIS — R94.31 ABNORMAL EKG: ICD-10-CM

## 2021-05-18 DIAGNOSIS — R06.09 DOE (DYSPNEA ON EXERTION): Primary | ICD-10-CM

## 2021-05-18 DIAGNOSIS — E78.2 MIXED HYPERLIPIDEMIA: ICD-10-CM

## 2021-05-18 PROCEDURE — G8420 CALC BMI NORM PARAMETERS: HCPCS | Performed by: INTERNAL MEDICINE

## 2021-05-18 PROCEDURE — G8536 NO DOC ELDER MAL SCRN: HCPCS | Performed by: INTERNAL MEDICINE

## 2021-05-18 PROCEDURE — 93000 ELECTROCARDIOGRAM COMPLETE: CPT | Performed by: INTERNAL MEDICINE

## 2021-05-18 PROCEDURE — G8427 DOCREV CUR MEDS BY ELIG CLIN: HCPCS | Performed by: INTERNAL MEDICINE

## 2021-05-18 PROCEDURE — G8752 SYS BP LESS 140: HCPCS | Performed by: INTERNAL MEDICINE

## 2021-05-18 PROCEDURE — G8754 DIAS BP LESS 90: HCPCS | Performed by: INTERNAL MEDICINE

## 2021-05-18 PROCEDURE — 99214 OFFICE O/P EST MOD 30 MIN: CPT | Performed by: INTERNAL MEDICINE

## 2021-05-18 PROCEDURE — 1090F PRES/ABSN URINE INCON ASSESS: CPT | Performed by: INTERNAL MEDICINE

## 2021-05-18 PROCEDURE — G8510 SCR DEP NEG, NO PLAN REQD: HCPCS | Performed by: INTERNAL MEDICINE

## 2021-05-18 PROCEDURE — 1101F PT FALLS ASSESS-DOCD LE1/YR: CPT | Performed by: INTERNAL MEDICINE

## 2021-05-18 PROCEDURE — G8399 PT W/DXA RESULTS DOCUMENT: HCPCS | Performed by: INTERNAL MEDICINE

## 2021-05-18 NOTE — PROGRESS NOTES
Margarito Teranzaid presents today for   Chief Complaint   Patient presents with    Follow-up     6 month follow up       Margarito Avila preferred language for health care discussion is english/other. Is someone accompanying this pt? no    Is the patient using any DME equipment during [address]? no    Depression Screening:  3 most recent PHQ Screens 5/18/2021   Little interest or pleasure in doing things Not at all   Feeling down, depressed, irritable, or hopeless Not at all   Total Score PHQ 2 0       Learning Assessment:  Learning Assessment 5/18/2021   PRIMARY LEARNER Patient   HIGHEST LEVEL OF EDUCATION - PRIMARY LEARNER  -   BARRIERS PRIMARY LEARNER -   CO-LEARNER CAREGIVER -   PRIMARY LANGUAGE ENGLISH   LEARNER PREFERENCE PRIMARY DEMONSTRATION   ANSWERED BY patient   RELATIONSHIP SELF       Abuse Screening:  Abuse Screening Questionnaire 5/18/2021   Do you ever feel afraid of your partner? N   Are you in a relationship with someone who physically or mentally threatens you? N   Is it safe for you to go home? Y       Fall Risk  Fall Risk Assessment, last 12 mths 5/18/2021   Able to walk? Yes   Fall in past 12 months? 0   Do you feel unsteady? 0   Are you worried about falling 0   Number of falls in past 12 months -   Fall with injury? -           Pt currently taking Anticoagulant therapy? no    Pt currently taking Antiplatelet therapy ? no      Coordination of Care:  1. Have you been to the ER, urgent care clinic since your last visit? Hospitalized since your last visit? no    2. Have you seen or consulted any other health care providers outside of the [address] [place] [address] since your last visit? Include any pap smears or colon screening.  no

## 2021-05-18 NOTE — PROGRESS NOTES
HISTORY OF PRESENT ILLNESS  Shirley Muñoz is a 79 y.o. female. Follow-up  Associated symptoms include shortness of breath. Pertinent negatives include no chest pain, no abdominal pain and no headaches. Shortness of Breath  Pertinent negatives include no fever, no headaches, no cough, no wheezing, no PND, no orthopnea, no chest pain, no vomiting, no abdominal pain, no rash, no leg swelling and no claudication. Patient presents for a follow-up office visit. She was initially referred here by her PCP to establish care with a local cardiologist.  The patient recently relocated to the area from [place], [place]. She states she was being followed by a cardiologist for heart palpitations and what sounds like left ventricular hypertrophy. She does not have a prior history of hypertension. She does have a history of dyslipidemia, right-sided breast cancer, status post lumpectomy without radiation or chemotherapy. She has been maintained on an aromatase inhibitor. Patient reports that she underwent noninvasive cardiac testing 3 to 4 years ago when she was told she had an enlarged heart with normal function. She was started on losartan at that time by her cardiologist.      Patient underwent an echocardiogram through our office in December 2019 which showed preserved LV function, EF 60 to 65% with mild concentric LVH, mild tricuspid regurgitation but normal PA pressure. She was last seen in our office 6 months ago. She underwent a pharmacologic nuclear stress test in December 2020 which is a normal low risk study. No evidence of ischemia or infarction, ejection fraction calculated greater than 70%. Since last visit, she still complains of some shortness of breath when she is walking her dog. It has not changed in severity since last visit. She denies any leg swelling, orthopnea, no PND. No chest pain or pressure.     Past Medical History:   Diagnosis Date    Arthritis     Back pain of lumbar region with sciatica 12/5/2018    [facility] [address]     Cancer ([facility] [address] 2015    breast cancer    Dyslipidemia     Heart palpitations     Hypertension     Left ventricular hypertrophy     Macular degeneration     Vitamin D deficiency      Current Outpatient Medications   Medication Sig Dispense Refill    losartan (COZAAR) 25 mg tablet TAKE 1 TABLET BY MOUTH DAILY 90 Tab 0    omeprazole (PRILOSEC) 40 mg capsule Take 40 mg by mouth daily.  diclofenac (VOLTAREN) 1 % gel Apply 2 g to affected area four (4) times daily. 100 g 0    acetaminophen (TYLENOL) 500 mg tablet Take 1,000 mg by mouth every six (6) hours as needed for Pain.  OTHER [employer] Women's Multivitamin Gummy - 1 gummy daily      Biotin 2,500 mcg cap Take 1 Cap by mouth daily.  psyllium husk (METAMUCIL PO) Take  by mouth daily.  azelastine (ASTELIN) 137 mcg (0.1 %) nasal spray 1 [place] by Both Nostrils route two (2) times a day. Use in each nostril as directed 1 Bottle 11    fluticasone propionate (FLONASE) 50 mcg/actuation nasal spray 2 Sprays by Both Nostrils route daily. 1 Bottle 11    loratadine (CLARITIN) 10 mg tablet Take 1 Tab by mouth daily as needed for Allergies. Cough, runny nose, itching 90 Tab 3    celecoxib (CELEBREX) 200 mg capsule Take 200 mg by mouth as needed.  cholecalciferol (VITAMIN D3) 1,000 unit cap Take 1,000 Units by mouth daily.  solifenacin (VESICARE) 5 mg tablet Take 5 mg by mouth as needed.  vit C/vit E ac/lut/copper/zinc (PRESERVISION LUTEIN PO) Take 1 Tab by mouth daily.  cyclobenzaprine (FLEXERIL) 5 mg tablet Take 1 Tab by mouth three (3) times daily as needed for Muscle Spasm(s). (Patient taking differently: Take 5 mg by mouth three (3) times daily as needed for Muscle Spasm(s).  Take 0.5 mg by mouth three times daily as needed) 20 Tab 0     Allergies   Allergen Reactions    Codeine Other (comments)     Reports constipation on this in past    Gabapentin Drowsiness    Sulfa (Sulfonamide Antibiotics) Nausea Only        Social History     Tobacco Use    Smoking status: Never Smoker    Smokeless tobacco: Never Used   Substance Use Topics    Alcohol use: No     Frequency: Never    Drug use: No     Family History   Problem Relation Age of Onset    Cancer Mother         stomach and liver cancer, in her 62s    Heart Disease Father         MI age 46s    Cancer Sister         blood    Cancer Brother         leukemia    Cancer Sibling          Review of Systems   Constitutional: Negative for chills, fever and weight loss. HENT: Negative for nosebleeds. Eyes: Negative for blurred vision and double vision. Respiratory: Positive for shortness of breath. Negative for cough and wheezing. Cardiovascular: Negative for chest pain, palpitations, orthopnea, claudication, leg swelling and PND. Gastrointestinal: Negative for abdominal pain, heartburn, nausea and vomiting. Genitourinary: Negative for dysuria and hematuria. Musculoskeletal: Negative for falls and myalgias. Skin: Negative for rash. Neurological: Positive for dizziness. Negative for focal weakness and headaches. Endo/Heme/Allergies: Does not bruise/bleed easily. Psychiatric/Behavioral: Negative for substance abuse. Visit Vitals  /84 (BP 1 Location: Left upper arm, BP Patient Position: Sitting, BP Cuff Size: Small adult)   Pulse 63   Ht 5' 4\" (1.626 m)   Wt 60.3 kg (133 lb)   SpO2 100%   BMI 22.83 kg/m²       Physical Exam   Constitutional: She is oriented to person, place, and time. She appears well-developed and well-nourished. HENT:   Head: Normocephalic and atraumatic. Eyes: Conjunctivae are normal.   Neck: Neck supple. No JVD present. Carotid bruit is not present. Cardiovascular: Normal rate, regular rhythm, S1 normal, S2 normal and normal pulses. Exam reveals distant heart sounds. Exam reveals no gallop. No murmur heard.   Pulmonary/Chest: Effort normal and breath sounds normal. She has no wheezes. She has no rales. Abdominal: Soft. Bowel sounds are normal. There is no abdominal tenderness. Musculoskeletal:         General: No tenderness, deformity or edema. Neurological: She is alert and oriented to person, place, and time. Skin: Skin is warm and dry. Psychiatric: She has a normal mood and affect. Her behavior is normal. Thought content normal.     EKG: Normal sinus rhythm, normal axis, normal QTc interval, low voltage throughout, no ST or T wave abnormality concerning for ischemia. No change compared to the previous EKG. ASSESSMENT and PLAN    Dyspnea on exertion. This has not significantly changed compared to 6 months ago. She underwent a low risk pharmacologic nuclear stress test in December 2020. The year prior she underwent an echocardiogram which was unremarkable with exception of mild concentric left ventricular hypertrophy with normal PA pressures. My suspicion is that her symptoms are probably not cardiac related and may just be due to deconditioning. No additional cardiac work-up needed at this time. Essential hypertension. Patient blood pressure is upper normal today in the office. She has been treated with losartan as monotherapy. Dyslipidemia. Patient has been managed on Crestor long-term. Is now being followed by her PCP. Breast cancer. According to the patient's was early-stage and was treated with surgery alone with adjuvant oral therapy with an aromatase inhibitor. She was never treated with an Adriamycin-based chemotherapeutic regimen. Follow-up in 6 months, sooner if needed.

## 2021-05-19 ENCOUNTER — APPOINTMENT (OUTPATIENT)
Dept: PHYSICAL THERAPY | Age: 81
End: 2021-05-19
Payer: MEDICARE

## 2021-05-24 ENCOUNTER — HOSPITAL ENCOUNTER (OUTPATIENT)
Dept: PHYSICAL THERAPY | Age: 81
Discharge: HOME | End: 2021-05-24
Payer: MEDICARE

## 2021-05-24 PROCEDURE — 97110 THERAPEUTIC EXERCISES: CPT

## 2021-05-24 PROCEDURE — 97112 NEUROMUSCULAR REEDUCATION: CPT

## 2021-05-24 NOTE — PROGRESS NOTES
PT DAILY TREATMENT NOTE     Patient Name: Faustino Flores  Date:2021  : 1940  [x]  Patient  Verified  Payor: VA MEDICARE / Plan: VA MEDICARE PART A & B / Product Type: Medicare /    In time:12:06  Out time:12:51  Total Treatment Time (min): 45  Visit #: 4 of 8    Medicare/BCBS Only   Total Timed Codes (min):  35 1:1 Treatment Time:  35       Treatment Area: Low back pain [M54.5]  Left hip pain [M25.552]    SUBJECTIVE  Pain Level (0-10 scale): 7/10  Any medication changes, allergies to medications, adverse drug reactions, diagnosis change, or new procedure performed?: [x] No    [] Yes (see summary sheet for update)  Subjective functional status/changes:   [] No changes reported  The patient reports that she received a cortisone injection into her left hip Thursday which she did feel provided some relief. She does report that her right hip continues to be painful. She states she continues to have discomfort into her lower legs especially when sitting to standing or when standing. OBJECTIVE  Modality rationale: decrease edema, decrease inflammation and decrease pain to improve the patients ability to improve ADL ease. Min Type Additional Details   10 [x]  Ice     []  heat  []  Ice massage  []  Laser   []  Anodyne Position: seated  Location: lumbar spine and left hip   [] Skin assessment post-treatment:  []intact []redness- no adverse reaction    []redness  adverse reaction:     23 min Therapeutic Exercise:  [x] See flow sheet :   Rationale: increase ROM and increase strength to improve the patients ability to improve ADL ease. 12 min Neuromuscular Re-education:  []  See flow sheet :   Rationale: improve coordination, improve balance and increase proprioception  to improve the patients ability to improve ADL ease.            With   [] TE   [] TA   [] neuro   [] other: Patient Education: [x] Review HEP    [] Progressed/Changed HEP based on:   [] positioning   [] body mechanics   [] transfers   [] heat/ice application    [] other:      Other Objective/Functional Measures:   Notable limitation of gastroc flexibility which does reproduce similar symptoms as patient's chief complaint through this region. The patient reports that getting up first thing in the morning isn't quite as bad and does report improvement. SLS: 11-12\" bilaterally    Pain Level (0-10 scale) post treatment: 5/10    ASSESSMENT/Changes in Function: The patient is progressing a little regarding ease of getting up first thing in the morning. She was able to improve single leg stance to 12\" bilaterally meeting LTG. Patient will continue to benefit from skilled PT services to modify and progress therapeutic interventions, address functional mobility deficits, address ROM deficits, address strength deficits, analyze and address soft tissue restrictions, analyze and cue movement patterns, analyze and modify body mechanics/ergonomics, assess and modify postural abnormalities, address imbalance/dizziness and instruct in home and community integration to attain remaining goals. []  See Plan of Care  []  See progress note/recertification  []  See Discharge Summary         Progress towards goals / Updated goals:  Short Term Goals: To be accomplished in 2 weeks:              8. The patient will demonstrate independence and compliance with HEP to maximize therapeutic benefit.              IS: issued HEP              PDSUKAL: 5/5/2021 - MET - Patient reports compliance since initial evaluation              2. The patient will report 50% improvement in getting up first thing in the morning to improve ease of morning chore production.              IE: 0% improvment  Long Term Goals: To be accomplished in 4 weeks:              1. The patient will improve FOTO score to 47 to improve ease of chore production.              IE: 41               2.  The patient will improve hip ABD strength to 4-/5 MMT to improve stability in stance.              IE: 2+/5 MMT B              3. The patient will demonstrate RE testing bilaterally to minimally limited to reduce stress through hips during chore production.              OV: quite significantly positive B              4.  The patient will demonstrate SLS to 12\" to reduce risk of falls.              IE: 7\" B   Current: MET - 12\" 5/24/2021     PLAN  []  Upgrade activities as tolerated     [x]  Continue plan of care  []  Update interventions per flow sheet       []  Discharge due to:_  []  Other:_      Criselda Olivo, AVERY 5/24/2021  12:12 PM    Future Appointments   Date Time Provider Too Martinezi   5/26/2021  1:45 PM Moon Valencia, PT [facility]   6/3/2021  3:00 PM Laura Juarez MD [id number]   11/16/2021  1:40 PM Khloe Wallis MD [facility] BS AMB

## 2021-05-26 ENCOUNTER — HOSPITAL ENCOUNTER (OUTPATIENT)
Dept: PHYSICAL THERAPY | Age: 81
Discharge: HOME | End: 2021-05-26
Payer: MEDICARE

## 2021-05-26 PROCEDURE — 97110 THERAPEUTIC EXERCISES: CPT

## 2021-05-26 PROCEDURE — 97112 NEUROMUSCULAR REEDUCATION: CPT

## 2021-05-26 NOTE — PROGRESS NOTES
[facility]  [address]  [place], [address]  [phone] fax    Physical Therapy Discharge Summary    Patient name: Thi Jarquin Start of Care: 2021   Referral source: Cartershane Villalpando, * : 1940               Medical Diagnosis: Low back pain [M54.5]  Left hip pain [M25.552]  Payor: [place] MEDICARE / Plan: VA MEDICARE PART A & B / Product Type: Medicare /  Onset Date: Chronic, but most recently beginning of                Treatment Diagnosis: LBP, left hip pain   Prior Hospitalization: see medical history Provider#: [id number]   Medications: Verified on Patient summary List    Comorbidities: Osteoporosis, Arthritis, HTN, Visual impaired, Cancer   Prior Level of Function: The patient states that she had improved ease of getting out of bed the first thing in the morning prior to this year. Visits from Start of Care: 5    Missed Visits: 0  Reporting Period : 2021 to 2021    Summary of Care:  Short Term Goals: To be accomplished in 2 weeks:              1. The patient will demonstrate independence and compliance with HEP to maximize therapeutic benefit.              SE: issued HEP              FRQWXCT: 2021 - MET - Patient reports compliance since initial evaluation              2. The patient will report 50% improvement in getting up first thing in the morning to improve ease of morning chore production.              IE: 0% improvement              Current: Met > 50% improved 2021  Long Term Goals: To be accomplished in 4 weeks:              1. The patient will improve FOTO score to 47 to improve ease of chore production.              IE: 41              Current: Not met - 41 2021                2.  The patient will improve hip ABD strength to 4-/5 MMT to improve stability in stance.              IE: 2+/5 MMT B               Current: Not met, but improved to 3-/5 MMT              3. The patient will demonstrate RE testing bilaterally to minimally limited to reduce stress through hips during chore production.              PV: quite significantly positive B              4. The patient will demonstrate SLS to 12\" to reduce risk of falls.              IE: 7\" B              Current: MET - 12\" 5/24/2021     ASSESSMENT/RECOMMENDATIONS: The patient opted to make today her last session and is agreeable to continue her HEP in home setting. Her FOTO score did not change, but her strength of hip ABD slightly improved. Her pain in the morning met her goal noting greater than 50% improved. She left the same pain, but was in no apparent distress.     [x]Discontinue therapy: [x]Patient has reached or is progressing toward set goals      []Patient is non-compliant or has abdicated      []Due to lack of appreciable progress towards set [address], PT 5/26/2021 3:44 PM

## 2021-05-26 NOTE — PROGRESS NOTES
PT DAILY TREATMENT NOTE     Patient Name: Samantha Garvin  Date:2021  : 1940  [x]  Patient  Verified  Payor: VA MEDICARE / Plan: VA MEDICARE PART A & B / Product Type: Medicare /    In time:1:39  Out time:2:43  Total Treatment Time (min): 64  Visit #: 5 of 8    Medicare/BCBS Only   Total Timed Codes (min):  54 1:1 Treatment Time:  45       Treatment Area: Low back pain [M54.5]  Left hip pain [M25.552]    SUBJECTIVE  Pain Level (0-10 scale): 6/10  Any medication changes, allergies to medications, adverse drug reactions, diagnosis change, or new procedure performed?: [x] No    [] Yes (see summary sheet for update)  Subjective functional status/changes:   [] No changes reported  The patient reports her pain is much better, and tightness has improved as well around her shin. OBJECTIVE  39 min Therapeutic Exercise:  [] See flow sheet :   Rationale: increase ROM and increase strength to improve the patients ability to improve ADL ease. 15 min Neuromuscular Re-education:  [x]  See flow sheet :   Rationale: improve coordination, improve balance and increase proprioception  to improve the patients ability to improve ADL ease. With   [] TE   [] TA   [] neuro   [] other: Patient Education: [x] Review HEP    [] Progressed/Changed HEP based on:   [] positioning   [] body mechanics   [] transfers   [] heat/ice application    [] other:      Other Objective/Functional Measures:   > 50% improved in getting up first thing in morning, \"shins didn't hurt as bad\". The patient is quick to fatigue citing thigh and calf discomfort following standing which appears to be correlated with muscle weakness. This has improved since initiating PT. FOTO: 41    Hip ABD: 3-/4 MMT    Pain Level (0-10 scale) post treatment: 6/10    ASSESSMENT/Changes in Function: The patient opted to make today her last session and is agreeable to continue her HEP in home setting.  Her FOTO score did not change, but her strength of hip ABD slightly improved. Her pain in the morning met her goal noting greater than 50% improved. She left the same pain, but was in no apparent distress. []  See Plan of Care  []  See progress note/recertification  []  See Discharge Summary         Progress towards goals / Updated goals:  Short Term Goals: To be accomplished in 2 weeks:              9. The patient will demonstrate independence and compliance with HEP to maximize therapeutic benefit.              MQ: issued HEP              QCYADSD: 5/5/2021 - MET - Patient reports compliance since initial evaluation              2. The patient will report 50% improvement in getting up first thing in the morning to improve ease of morning chore production.              IE: 0% improvement   Current: Met > 50% improved 5/26/2021  Long Term Goals: To be accomplished in 4 weeks:              1. The patient will improve FOTO score to 47 to improve ease of chore production.              IE: 41   Current: Not met - 41 5/26/2021                2. The patient will improve hip ABD strength to 4-/5 MMT to improve stability in stance.              IE: 2+/5 MMT B    Current: Not met, but improved to 3-/5 MMT              3. The patient will demonstrate RE testing bilaterally to minimally limited to reduce stress through hips during chore production.              SO: quite significantly positive B              4.  The patient will demonstrate SLS to 12\" to reduce risk of falls.              IE: 7\" B              Current: MET - 12\" 5/24/2021     PLAN  []  Upgrade activities as tolerated     []  Continue plan of care  []  Update interventions per flow sheet       [x]  Discharge due to:_  []  Other:_      Criselda Olivo, PT 5/26/2021  1:44 PM    Future Appointments   Date Time Provider Too Serrano   5/26/2021  1:45 PM Moon Valencia, PT [id number]   6/3/2021  3:00 PM MD Ashlyn Beck   11/16/2021  1:40 PM Khloe Wallis MD [facility]

## 2021-06-22 ENCOUNTER — TRANSCRIBE ORDER (OUTPATIENT)
Dept: SCHEDULING | Age: 81
End: 2021-06-22

## 2021-06-22 DIAGNOSIS — M81.0 SENILE OSTEOPOROSIS: Primary | ICD-10-CM

## 2021-07-26 ENCOUNTER — HOSPITAL ENCOUNTER (OUTPATIENT)
Dept: BONE DENSITY | Age: 81
Discharge: HOME | End: 2021-07-26
Attending: PHYSICIAN ASSISTANT
Payer: MEDICARE

## 2021-07-26 DIAGNOSIS — M81.0 SENILE OSTEOPOROSIS: ICD-10-CM

## 2021-07-26 PROCEDURE — 77080 DXA BONE DENSITY AXIAL: CPT

## 2021-07-28 ENCOUNTER — HOSPITAL ENCOUNTER (EMERGENCY)
Age: 81
Discharge: HOME | End: 2021-07-28
Attending: EMERGENCY MEDICINE
Payer: MEDICARE

## 2021-07-28 VITALS
HEART RATE: 72 BPM | BODY MASS INDEX: 22.36 KG/M2 | RESPIRATION RATE: 20 BRPM | OXYGEN SATURATION: 100 % | WEIGHT: 131 LBS | SYSTOLIC BLOOD PRESSURE: 156 MMHG | DIASTOLIC BLOOD PRESSURE: 71 MMHG | TEMPERATURE: 98.1 F | HEIGHT: 64 IN

## 2021-07-28 DIAGNOSIS — R30.0 DYSURIA: Primary | ICD-10-CM

## 2021-07-28 LAB
APPEARANCE UR: CLEAR
BILIRUB UR QL: NEGATIVE
COLOR UR: YELLOW
GLUCOSE UR STRIP.AUTO-MCNC: NEGATIVE MG/DL
HGB UR QL STRIP: NEGATIVE
KETONES UR QL STRIP.AUTO: NEGATIVE MG/DL
LEUKOCYTE ESTERASE UR QL STRIP.AUTO: NEGATIVE
NITRITE UR QL STRIP.AUTO: NEGATIVE
PH UR STRIP: 6 [PH] (ref 5–8)
PROT UR STRIP-MCNC: NEGATIVE MG/DL
SP GR UR REFRACTOMETRY: <1.005 (ref 1–1.03)
UROBILINOGEN UR QL STRIP.AUTO: 0.2 EU/DL (ref 0.2–1)

## 2021-07-28 PROCEDURE — 99283 EMERGENCY DEPT VISIT LOW MDM: CPT

## 2021-07-28 PROCEDURE — 81003 URINALYSIS AUTO W/O SCOPE: CPT

## 2021-07-28 RX ORDER — PHENAZOPYRIDINE HYDROCHLORIDE 200 MG/1
200 TABLET, FILM COATED ORAL 3 TIMES DAILY
Qty: 6 TABLET | Refills: 0 | Status: SHIPPED | OUTPATIENT
Start: 2021-07-28 | End: 2021-07-30

## 2021-07-28 RX ORDER — NITROFURANTOIN (MACROCRYSTALS) 100 MG/1
100 CAPSULE ORAL
Qty: 10 CAPSULE | Refills: 0 | Status: SHIPPED | OUTPATIENT
Start: 2021-07-28 | End: 2021-11-16 | Stop reason: ALTCHOICE

## 2021-07-28 NOTE — ED NOTES
I have reviewed discharge instructions with the patient. The patient verbalized understanding. Patient armband removed and shredded. Current Discharge Medication List      START taking these medications    Details   nitrofurantoin (MACRODANTIN) 100 mg capsule Take 1 Capsule by mouth nightly. Qty: 10 Capsule, Refills: 0  Start date: 7/28/2021      phenazopyridine (Pyridium) 200 mg tablet Take 1 Tablet by mouth three (3) times daily for 2 days.   Qty: 6 Tablet, Refills: 0  Start date: 7/28/2021, End date: 7/30/2021

## 2021-07-28 NOTE — DISCHARGE INSTRUCTIONS
Nitrofurantoin x5 days. Pyridium for bladder spasm. Follow-up with urology for recheck if symptoms fail to improve.

## 2021-07-28 NOTE — ED PROVIDER NOTES
78-year-old female notes for 5-day history of dysuria. Mild urinary urgency with post micturition bladder spasm. Has history of UTI with similar symptoms. No nausea, vomiting, fever, flank pain. He notes a small sore on the lip of the labia and would like that to be checked as well. Past Medical History:   Diagnosis Date    Arthritis     Back pain of lumbar region with sciatica 12/5/2018    [facility] [address]     Cancer ([facility] [address] 2015    breast cancer    Dyslipidemia     Heart palpitations     Hypertension     Left ventricular hypertrophy     Macular degeneration     Vitamin D deficiency        Past Surgical History:   Procedure Laterality Date    HX BREAST LUMPECTOMY Right     HX HERNIA REPAIR      HX HYSTERECTOMY  1985    FL BREAST SURGERY PROCEDURE UNLISTED Right 2015         Family History:   Problem Relation Age of Onset    Cancer Mother         stomach and liver cancer, in her 62s    Heart Disease Father         MI age 46s   [facility] Cancer Sister         blood    Cancer Brother         leukemia    Cancer Sibling        Social History     Socioeconomic History    Marital status:      Spouse name: Not on file    Number of children: Not on file    Years of education: Not on file    Highest education level: Not on file   Occupational History    Not on file   Tobacco Use    Smoking status: Never Smoker    Smokeless tobacco: Never Used   Substance and Sexual Activity    Alcohol use: No    Drug use: No    Sexual activity: Not Currently     Partners: Male   Other Topics Concern    Not on file   Social History Narrative    ** Merged History Encounter **          Social Determinants of Health     Financial Resource Strain:     Difficulty of Paying Living Expenses:    Food Insecurity:     Worried About Running Out of Food in the Last Year:     Ran Out of Food in the Last Year:    Transportation Needs:     Lack of Transportation (Medical):      Lack of Transportation (Non-Medical):    Physical Activity:     Days of Exercise per Week:     Minutes of Exercise per Session:    Stress:     Feeling of Stress :    Social Connections:     Frequency of Communication with Friends and Family:     Frequency of Social Gatherings with Friends and Family:     Attends Scientologist Services:     Active Member of Clubs or Organizations:     Attends Club or Organization Meetings:     Marital Status:    Intimate Partner Violence:     Fear of Current or Ex-Partner:     Emotionally Abused:     Physically Abused:     Sexually Abused: ALLERGIES: Codeine, Gabapentin, and Sulfa (sulfonamide antibiotics)    Review of Systems   Gastrointestinal: Negative for abdominal pain. Genitourinary: Positive for dysuria. Negative for vaginal bleeding and vaginal discharge. All other systems reviewed and are negative. Vitals:    07/28/21 1618   BP: (!) 156/71   Pulse: 72   Resp: 20   Temp: 98.1 °F (36.7 °C)   SpO2: 100%   Weight: 59.4 kg (131 lb)   Height: 5' 4\" (1.626 m)            Physical Exam  Vitals and nursing note reviewed. Constitutional:       General: She is not in acute distress. Appearance: She is well-developed. HENT:      Head: Normocephalic and atraumatic. Eyes:      General: No scleral icterus. Cardiovascular:      Rate and Rhythm: Normal rate. Pulmonary:      Effort: Pulmonary effort is normal.   Abdominal:      Tenderness: There is no abdominal tenderness. There is no guarding or rebound. Genitourinary:     Comments: Exam completed with female chaperone. There is a small superficial ulcer measures only about 1 x 2 mm in size on the outer edge of the labia minora on the right. Patient also has a \"blackhead\"/small boil present on the back just above the buttock cleft. No surrounding erythema. Skin:     General: Skin is warm and dry. Neurological:      Mental Status: She is alert and oriented to person, place, and time.           MDM  Number of Diagnoses or Management Options  Dysuria  Diagnosis management comments: Impression: Patient has symptoms of urinary tract infection but clean urine here. Will place patient on couple days of antibiotics and Pyridium with urology referral.  Also consider bladder prolapse urethral stricture, bladder spasm. The small superficial labial ulcer can be treated with topical antibiotic. This was verbally relayed to patient at the bedside. Procedures    Diagnosis:   1. Dysuria          Disposition: home    Follow-up Information     Follow up With Specialties Details Why Contact Info    [facility]  Schedule an appointment as soon as possible for a visit  for recheck of ongoing symptoms [facility] [phone]    [address] [facility], [address] [facility] Gynecology Schedule an appointment as soon as possible for a visit  for recheck of ongoing symptoms [address] [facility], [address]  [phone]          Patient's Medications   Start Taking    NITROFURANTOIN (MACRODANTIN) 100 MG CAPSULE    Take 1 Capsule by mouth nightly. PHENAZOPYRIDINE (PYRIDIUM) 200 MG TABLET    Take 1 Tablet by mouth three (3) times daily for 2 days. Continue Taking    ACETAMINOPHEN (TYLENOL) 500 MG TABLET    Take 1,000 mg by mouth every six (6) hours as needed for Pain. AZELASTINE (ASTELIN) 137 MCG (0.1 %) NASAL SPRAY    1 [place] by Both Nostrils route two (2) times a day. Use in each nostril as directed    BIOTIN 2,500 MCG CAP    Take 1 Cap by mouth daily. CELECOXIB (CELEBREX) 200 MG CAPSULE    Take 200 mg by mouth as needed. CHOLECALCIFEROL (VITAMIN D3) 1,000 UNIT CAP    Take 1,000 Units by mouth daily. CYCLOBENZAPRINE (FLEXERIL) 5 MG TABLET    Take 1 Tab by mouth three (3) times daily as needed for Muscle Spasm(s). DICLOFENAC (VOLTAREN) 1 % GEL    Apply 2 g to affected area four (4) times daily.     FLUTICASONE PROPIONATE (FLONASE) 50 MCG/ACTUATION NASAL SPRAY 2 Sprays by Both Nostrils route daily. LORATADINE (CLARITIN) 10 MG TABLET    Take 1 Tab by mouth daily as needed for Allergies. Cough, runny nose, itching    LOSARTAN (COZAAR) 25 MG TABLET    TAKE 1 TABLET BY MOUTH DAILY    OMEPRAZOLE (PRILOSEC) 40 MG CAPSULE    Take 40 mg by mouth daily. OTHER    [employer] Women's Multivitamin Gummy - 1 gummy daily    PSYLLIUM HUSK (METAMUCIL PO)    Take  by mouth daily. SOLIFENACIN (VESICARE) 5 MG TABLET    Take 5 mg by mouth as needed. VIT C/VIT E AC/LUT/COPPER/ZINC (PRESERVISION LUTEIN PO)    Take 1 Tab by mouth daily.    These Medications have changed    No medications on file   Stop Taking    No medications on file

## 2021-08-06 NOTE — ED TRIAGE NOTES
Patient c/o stomach pain, vaginal pain and rectum pain for a couple of weeks, patient called her PCP, her pcp prescribed an antibiotic.
anxiety/cries easily

## 2021-09-10 ENCOUNTER — HOSPITAL ENCOUNTER (EMERGENCY)
Age: 81
Discharge: HOME | End: 2021-09-10
Attending: EMERGENCY MEDICINE
Payer: MEDICARE

## 2021-09-10 ENCOUNTER — APPOINTMENT (OUTPATIENT)
Dept: CT IMAGING | Age: 81
End: 2021-09-10
Attending: PHYSICIAN ASSISTANT
Payer: MEDICARE

## 2021-09-10 VITALS
DIASTOLIC BLOOD PRESSURE: 73 MMHG | HEART RATE: 62 BPM | OXYGEN SATURATION: 100 % | RESPIRATION RATE: 16 BRPM | SYSTOLIC BLOOD PRESSURE: 139 MMHG | TEMPERATURE: 98.4 F

## 2021-09-10 DIAGNOSIS — N30.90 CYSTITIS: Primary | ICD-10-CM

## 2021-09-10 LAB
ALBUMIN SERPL-MCNC: 3.9 G/DL (ref 3.4–5)
ALBUMIN/GLOB SERPL: 1.2 {RATIO} (ref 0.8–1.7)
ALP SERPL-CCNC: 67 U/L (ref 45–117)
ALT SERPL-CCNC: 16 U/L (ref 13–56)
ANION GAP SERPL CALC-SCNC: 5 MMOL/L (ref 3–18)
APPEARANCE UR: CLEAR
AST SERPL-CCNC: 28 U/L (ref 10–38)
BASOPHILS # BLD: 0.1 K/UL (ref 0–0.1)
BASOPHILS NFR BLD: 1 % (ref 0–2)
BILIRUB SERPL-MCNC: 1 MG/DL (ref 0.2–1)
BILIRUB UR QL: NEGATIVE
BUN SERPL-MCNC: 12 MG/DL (ref 7–18)
BUN/CREAT SERPL: 14 (ref 12–20)
CALCIUM SERPL-MCNC: 9.4 MG/DL (ref 8.5–10.1)
CHLORIDE SERPL-SCNC: 106 MMOL/L (ref 100–111)
CO2 SERPL-SCNC: 29 MMOL/L (ref 21–32)
COLOR UR: YELLOW
CREAT SERPL-MCNC: 0.87 MG/DL (ref 0.6–1.3)
DIFFERENTIAL METHOD BLD: ABNORMAL
EOSINOPHIL # BLD: 0 K/UL (ref 0–0.4)
EOSINOPHIL NFR BLD: 1 % (ref 0–5)
ERYTHROCYTE [DISTWIDTH] IN BLOOD BY AUTOMATED COUNT: 15.2 % (ref 11.6–14.5)
GLOBULIN SER CALC-MCNC: 3.3 G/DL (ref 2–4)
GLUCOSE SERPL-MCNC: 88 MG/DL (ref 74–99)
GLUCOSE UR STRIP.AUTO-MCNC: NEGATIVE MG/DL
HCT VFR BLD AUTO: 39.2 % (ref 35–45)
HGB BLD-MCNC: 12.1 G/DL (ref 12–16)
HGB UR QL STRIP: NEGATIVE
KETONES UR QL STRIP.AUTO: NEGATIVE MG/DL
LEUKOCYTE ESTERASE UR QL STRIP.AUTO: NEGATIVE
LYMPHOCYTES # BLD: 2.5 K/UL (ref 0.9–3.6)
LYMPHOCYTES NFR BLD: 47 % (ref 21–52)
MCH RBC QN AUTO: 23 PG (ref 24–34)
MCHC RBC AUTO-ENTMCNC: 30.9 G/DL (ref 31–37)
MCV RBC AUTO: 74.7 FL (ref 78–100)
MONOCYTES # BLD: 0.3 K/UL (ref 0.05–1.2)
MONOCYTES NFR BLD: 6 % (ref 3–10)
NEUTS SEG # BLD: 2.4 K/UL (ref 1.8–8)
NEUTS SEG NFR BLD: 45 % (ref 40–73)
NITRITE UR QL STRIP.AUTO: NEGATIVE
PH UR STRIP: 6.5 [PH] (ref 5–8)
PLATELET # BLD AUTO: 229 K/UL (ref 135–420)
PMV BLD AUTO: 11 FL (ref 9.2–11.8)
POTASSIUM SERPL-SCNC: 4.6 MMOL/L (ref 3.5–5.5)
PROT SERPL-MCNC: 7.2 G/DL (ref 6.4–8.2)
PROT UR STRIP-MCNC: NEGATIVE MG/DL
RBC # BLD AUTO: 5.25 M/UL (ref 4.2–5.3)
SODIUM SERPL-SCNC: 140 MMOL/L (ref 136–145)
SP GR UR REFRACTOMETRY: <1.005 (ref 1–1.03)
UROBILINOGEN UR QL STRIP.AUTO: 0.2 EU/DL (ref 0.2–1)
WBC # BLD AUTO: 5.4 K/UL (ref 4.6–13.2)

## 2021-09-10 PROCEDURE — 80053 COMPREHEN METABOLIC PANEL: CPT

## 2021-09-10 PROCEDURE — 85025 COMPLETE CBC W/AUTO DIFF WBC: CPT

## 2021-09-10 PROCEDURE — 99283 EMERGENCY DEPT VISIT LOW MDM: CPT

## 2021-09-10 PROCEDURE — 81003 URINALYSIS AUTO W/O SCOPE: CPT

## 2021-09-10 PROCEDURE — 87086 URINE CULTURE/COLONY COUNT: CPT

## 2021-09-10 RX ORDER — NITROFURANTOIN 25; 75 MG/1; MG/1
100 CAPSULE ORAL 2 TIMES DAILY
Qty: 6 CAPSULE | Refills: 0 | Status: SHIPPED | OUTPATIENT
Start: 2021-09-10 | End: 2021-09-13

## 2021-09-10 NOTE — DISCHARGE INSTRUCTIONS
Continue taking oxybutynin. Discontinue levofloxacin and doxycycline. See your doctor in 4 to 5 days. Tell your doctor, urine culture has been sent, they can check for results.   Return to the emergency department for any worsening symptoms or new or concerning symptoms

## 2021-09-10 NOTE — ED TRIAGE NOTES
Patient c/o pain and burning with urination x one week. She states she thinks she has a urinary tract infection. She had her urine checked with urologist.  They prescribed doxycycline, oxybutynin and levofloxacin. She states the medicine is just making her sick.

## 2021-09-12 LAB
BACTERIA SPEC CULT: NORMAL
SERVICE CMNT-IMP: NORMAL

## 2021-10-30 NOTE — ED PROVIDER NOTES
EMERGENCY DEPARTMENT HISTORY AND PHYSICAL EXAM    10/30/21 late chart completion      Date: 9/10/2021  Patient Name: Julieta Viera    History of Presenting Illness     Chief Complaint   Patient presents with    Urinary Pain         History Provided By: patient    Additional History (Context): Julieta Viera is a 79 y.o. female presents with comes to the emergency room, history of chronic cystitis and recurrent UTIs, on Levaquin from urology, having continued symptoms of cystitis. Symptoms are mild. No fevers back pain abdominal pain nausea or vomiting. [place] PCP: Monica Titus MD    Chief Complaint:   Duration:    Timing:    Location:   Quality:   Severity:   Modifying Factors:   Associated Symptoms:       Current Outpatient Medications   Medication Sig Dispense Refill    oxybutynin chloride XL (DITROPAN XL) 10 mg CR tablet Take 1 Tablet by mouth daily. 90 Tablet 3    doxycycline (VIBRAMYCIN) 100 mg capsule Take 1 Capsule by mouth two (2) times a day. 14 Capsule 0    nitrofurantoin (MACRODANTIN) 100 mg capsule Take 1 Capsule by mouth nightly. 10 Capsule 0    losartan (COZAAR) 25 mg tablet TAKE 1 TABLET BY MOUTH DAILY 90 Tab 0    omeprazole (PRILOSEC) 40 mg capsule Take 40 mg by mouth daily.  diclofenac (VOLTAREN) 1 % gel Apply 2 g to affected area four (4) times daily. 100 g 0    acetaminophen (TYLENOL) 500 mg tablet Take 1,000 mg by mouth every six (6) hours as needed for Pain.  OTHER [employer] Women's Multivitamin Gummy - 1 gummy daily      Biotin 2,500 mcg cap Take 1 Cap by mouth daily.  psyllium husk (METAMUCIL PO) Take  by mouth daily.  azelastine (ASTELIN) 137 mcg (0.1 %) nasal spray 1 [place] by Both Nostrils route two (2) times a day. Use in each nostril as directed 1 Bottle 11    fluticasone propionate (FLONASE) 50 mcg/actuation nasal spray 2 Sprays by Both Nostrils route daily.  1 Bottle 11    loratadine (CLARITIN) 10 mg tablet Take 1 Tab by mouth daily as needed for Allergies. Cough, runny nose, itching 90 Tab 3    celecoxib (CELEBREX) 200 mg capsule Take 200 mg by mouth as needed.  cholecalciferol (VITAMIN D3) 1,000 unit cap Take 1,000 Units by mouth daily.  solifenacin (VESICARE) 5 mg tablet Take 5 mg by mouth as needed.  vit C/vit E ac/lut/copper/zinc (PRESERVISION LUTEIN PO) Take 1 Tab by mouth daily.  cyclobenzaprine (FLEXERIL) 5 mg tablet Take 1 Tab by mouth three (3) times daily as needed for Muscle Spasm(s). (Patient taking differently: Take 5 mg by mouth three (3) times daily as needed for Muscle Spasm(s). Take 0.5 mg by mouth three times daily as needed) 20 Tab 0       Past History     Past Medical History:  Past Medical History:   Diagnosis Date    Arthritis     Back pain of lumbar region with sciatica 12/5/2018    [facility] [facility]     Cancer ([facility] [address] 2015    breast cancer    Dyslipidemia     Heart palpitations     Hypertension     Left ventricular hypertrophy     Macular degeneration     Vitamin D deficiency        Past Surgical History:  Past Surgical History:   Procedure Laterality Date    HX BREAST LUMPECTOMY Right     HX HERNIA REPAIR      HX HYSTERECTOMY  1985    LA BREAST SURGERY PROCEDURE UNLISTED Right 2015       Family History:  Family History   Problem Relation Age of Onset    Cancer Mother         stomach and liver cancer, in her 62s    Heart Disease Father         MI age 46s   Alejandro Spina Cancer Sister         blood    Cancer Brother         leukemia    Cancer Sibling        Social History:  Social History     Tobacco Use    Smoking status: Never Smoker    Smokeless tobacco: Never Used   Substance Use Topics    Alcohol use: No    Drug use: No       Allergies:   Allergies   Allergen Reactions    Codeine Other (comments)     Reports constipation on this in past    Gabapentin Drowsiness    Sulfa (Sulfonamide Antibiotics) Nausea Only         Review of Systems     Review of Systems   Constitutional: Negative for diaphoresis and fever. HENT: Negative for congestion and sore throat. Eyes: Negative for pain and itching. Respiratory: Negative for cough and shortness of breath. Cardiovascular: Negative for chest pain and palpitations. Gastrointestinal: Negative for abdominal pain and diarrhea. Endocrine: Negative for polydipsia and polyuria. Genitourinary: Positive for dysuria. Negative for hematuria. Musculoskeletal: Negative for arthralgias and myalgias. Skin: Negative for rash and wound. Neurological: Negative for seizures and syncope. Hematological: Does not bruise/bleed easily. Psychiatric/Behavioral: Negative for agitation and hallucinations. Physical Exam       No data found. IPVITALS  No data found. Physical Exam  Vitals and nursing note reviewed. Constitutional:       Appearance: She is well-developed. HENT:      Head: Normocephalic and atraumatic. Eyes:      General: No scleral icterus. Conjunctiva/sclera: Conjunctivae normal.   Neck:      Vascular: No JVD. Cardiovascular:      Rate and Rhythm: Normal rate and regular rhythm. Pulmonary:      Effort: Pulmonary effort is normal. No respiratory distress. Abdominal:      Tenderness: There is no abdominal tenderness. There is no right CVA tenderness or left CVA tenderness. Musculoskeletal:         General: Normal range of motion. Cervical back: Normal range of motion and neck supple. Skin:     General: Skin is warm and dry. Neurological:      Mental Status: She is alert. Psychiatric:         Thought Content: Thought content normal.         Judgment: Judgment normal.           Diagnostic Study Results   Labs -  No results found for this or any previous visit (from the past 12 hour(s)). Radiologic Studies -   No orders to display     No results found.     Medications ordered:   Medications - No data to display      Medical Decision Making   Initial Medical Decision Making and DDx:  Clinical exam not suggestive of pyelonephritis nephrolithiasis or alarming intra-abdominal emergency. We will switch up her antibiotics as high rate of resistance in this area to fluoroquinolones, and her symptoms have persisted despite use of the [place] quinolone. She will continue other urologic medications and follow-up with urology. Questions answered and she is happy with this plan    ED Course: Progress Notes, Reevaluation, and Consults:         I am the first provider for this patient. I reviewed the vital signs, available nursing notes, past medical history, past surgical history, family history and social history. No data found. Vital Signs-Reviewed the patient's vital signs. Pulse Oximetry Analysis, Cardiac Monitor, 12 lead ekg:      Interpreted by the EP. Records Reviewed: Nursing notes reviewed (Time of Review: 7:44 AM)    Procedures:   Critical Care Time:   Aspirin: (was aspirin given for stroke?)    Diagnosis     Clinical Impression:   1. Cystitis        Disposition: Discharged      Follow-up Information     Follow up With Specialties Details Why Contact Info    [facility] [address]    Bg Mendez MD Internal Medicine   [address] [place] [id number] [phone]             Discharge Medication List as of 9/10/2021  5:43 PM      START taking these medications    Details   nitrofurantoin, macrocrystal-monohydrate, (Macrobid) 100 mg capsule Take 1 Capsule by mouth two (2) times a day for 3 days. , Normal, Disp-6 Capsule, R-0         CONTINUE these medications which have NOT CHANGED    Details   oxybutynin chloride XL (DITROPAN XL) 10 mg CR tablet Take 1 Tablet by mouth daily. , Normal, Disp-90 Tablet, R-3      doxycycline (VIBRAMYCIN) 100 mg capsule Take 1 Capsule by mouth two (2) times a day., Normal, Disp-14 Capsule, R-0      nitrofurantoin (MACRODANTIN) 100 mg capsule Take 1 Capsule by mouth nightly., Normal, Disp-10 Capsule, R-0      losartan (COZAAR) 25 mg tablet TAKE 1 TABLET BY MOUTH DAILY, Normal, Disp-90 Tab, R-0      omeprazole (PRILOSEC) 40 mg capsule Take 40 mg by mouth daily. , Historical Med      diclofenac (VOLTAREN) 1 % gel Apply 2 g to affected area four (4) times daily. , Normal, Disp-100 g, R-0      acetaminophen (TYLENOL) 500 mg tablet Take 1,000 mg by mouth every six (6) hours as needed for Pain., Historical Med      OTHER [employer] Women's Multivitamin Gummy - 1 gummy daily, Historical Med      Biotin 2,500 mcg cap Take 1 Cap by mouth daily. , Historical Med      psyllium husk (METAMUCIL PO) Take  by mouth daily. , Historical Med      azelastine (ASTELIN) 137 mcg (0.1 %) nasal spray 1 [place] by Both Nostrils route two (2) times a day. Use in each nostril as directed, Normal, Disp-1 Bottle, R-11      fluticasone propionate (FLONASE) 50 mcg/actuation nasal spray 2 Sprays by Both Nostrils route daily. , Normal, Disp-1 Bottle, R-11      loratadine (CLARITIN) 10 mg tablet Take 1 Tab by mouth daily as needed for Allergies. Cough, runny nose, itching, Normal, Disp-90 Tab, R-3      celecoxib (CELEBREX) 200 mg capsule Take 200 mg by mouth as needed., Historical Med      cholecalciferol (VITAMIN D3) 1,000 unit cap Take 1,000 Units by mouth daily. , Historical Med      solifenacin (VESICARE) 5 mg tablet Take 5 mg by mouth as needed., Historical Med      vit C/vit E ac/lut/copper/zinc (PRESERVISION LUTEIN PO) Take 1 Tab by mouth daily. , Historical Med      cyclobenzaprine (FLEXERIL) 5 mg tablet Take 1 Tab by mouth three (3) times daily as needed for Muscle Spasm(s). , Print, Disp-20 Tab, R-0         STOP taking these medications       levoFLOXacin (LEVAQUIN) 500 mg tablet Comments:   Reason for Stopping:             _______________________________    Notes:    MD lizandro Ding dictdarrian      _______________________________

## 2021-11-16 ENCOUNTER — OFFICE VISIT (OUTPATIENT)
Dept: CARDIOLOGY CLINIC | Age: 81
End: 2021-11-16
Payer: MEDICARE

## 2021-11-16 VITALS
OXYGEN SATURATION: 99 % | HEIGHT: 64 IN | SYSTOLIC BLOOD PRESSURE: 136 MMHG | BODY MASS INDEX: 22.53 KG/M2 | DIASTOLIC BLOOD PRESSURE: 74 MMHG | WEIGHT: 132 LBS | HEART RATE: 69 BPM

## 2021-11-16 DIAGNOSIS — E78.2 MIXED HYPERLIPIDEMIA: ICD-10-CM

## 2021-11-16 DIAGNOSIS — R06.09 DOE (DYSPNEA ON EXERTION): Primary | ICD-10-CM

## 2021-11-16 DIAGNOSIS — I10 ESSENTIAL HYPERTENSION: ICD-10-CM

## 2021-11-16 PROCEDURE — G8420 CALC BMI NORM PARAMETERS: HCPCS | Performed by: INTERNAL MEDICINE

## 2021-11-16 PROCEDURE — G8754 DIAS BP LESS 90: HCPCS | Performed by: INTERNAL MEDICINE

## 2021-11-16 PROCEDURE — G8399 PT W/DXA RESULTS DOCUMENT: HCPCS | Performed by: INTERNAL MEDICINE

## 2021-11-16 PROCEDURE — G8536 NO DOC ELDER MAL SCRN: HCPCS | Performed by: INTERNAL MEDICINE

## 2021-11-16 PROCEDURE — 1090F PRES/ABSN URINE INCON ASSESS: CPT | Performed by: INTERNAL MEDICINE

## 2021-11-16 PROCEDURE — 93000 ELECTROCARDIOGRAM COMPLETE: CPT | Performed by: INTERNAL MEDICINE

## 2021-11-16 PROCEDURE — G8752 SYS BP LESS 140: HCPCS | Performed by: INTERNAL MEDICINE

## 2021-11-16 PROCEDURE — 1101F PT FALLS ASSESS-DOCD LE1/YR: CPT | Performed by: INTERNAL MEDICINE

## 2021-11-16 PROCEDURE — G8427 DOCREV CUR MEDS BY ELIG CLIN: HCPCS | Performed by: INTERNAL MEDICINE

## 2021-11-16 PROCEDURE — G8510 SCR DEP NEG, NO PLAN REQD: HCPCS | Performed by: INTERNAL MEDICINE

## 2021-11-16 PROCEDURE — 99214 OFFICE O/P EST MOD 30 MIN: CPT | Performed by: INTERNAL MEDICINE

## 2021-11-16 NOTE — PROGRESS NOTES
HISTORY OF PRESENT ILLNESS  Emmanuel Fitzptarick is a 79 y.o. female. Follow-up  Associated symptoms include shortness of breath. Pertinent negatives include no chest pain, no abdominal pain and no headaches. Shortness of Breath  Pertinent negatives include no fever, no headaches, no cough, no wheezing, no PND, no orthopnea, no chest pain, no vomiting, no abdominal pain, no rash, no leg swelling and no claudication. Patient presents for a follow-up office visit. She was initially referred here by her PCP to establish care with a local cardiologist.  The patient recently relocated to the area from [place], [place]. She states she was being followed by a cardiologist for heart palpitations and what sounds like left ventricular hypertrophy. She does not have a prior history of hypertension. She does have a history of dyslipidemia, right-sided breast cancer, status post lumpectomy without radiation or chemotherapy. She has been maintained on an aromatase inhibitor. Patient underwent an echocardiogram through our office in December 2019 which showed preserved LV function, EF 60 to 65% with mild concentric LVH, mild tricuspid regurgitation but normal PA pressure. She underwent a pharmacologic nuclear stress test in December 2020 which is a normal low risk study. No evidence of ischemia or infarction, ejection fraction calculated greater than 70%. She was last seen in our office 6 months ago. Since last visit, she states her shortness of breath with exertion has not significantly changed. She denies any new chest pain or pressure. She does have some pain in her right arm she lifts her arm up stretches this. She also notices some swelling in her feet and ankles which worsen throughout the day but will improve at night. She has not noted any exertional chest pain or pressure.     Past Medical History:   Diagnosis Date    Arthritis     Back pain of lumbar region with sciatica 12/5/2018    [facility] ([facility] [address]     Cancer ([facility] 2015    breast cancer    Dyslipidemia     Heart palpitations     Hypertension     Left ventricular hypertrophy     Macular degeneration     Vitamin D deficiency      Current Outpatient Medications   Medication Sig Dispense Refill    losartan (COZAAR) 25 mg tablet TAKE 1 TABLET BY MOUTH DAILY 90 Tab 0    omeprazole (PRILOSEC) 40 mg capsule Take 40 mg by mouth daily.  diclofenac (VOLTAREN) 1 % gel Apply 2 g to affected area four (4) times daily. 100 g 0    acetaminophen (TYLENOL) 500 mg tablet Take 1,000 mg by mouth every six (6) hours as needed for Pain.  OTHER [employer] Women's Multivitamin Gummy - 1 gummy daily      Biotin 2,500 mcg cap Take 1 Cap by mouth daily.  psyllium husk (METAMUCIL PO) Take  by mouth daily.  azelastine (ASTELIN) 137 mcg (0.1 %) nasal spray 1 [place] by Both Nostrils route two (2) times a day. Use in each nostril as directed 1 Bottle 11    fluticasone propionate (FLONASE) 50 mcg/actuation nasal spray 2 Sprays by Both Nostrils route daily. 1 Bottle 11    loratadine (CLARITIN) 10 mg tablet Take 1 Tab by mouth daily as needed for Allergies. Cough, runny nose, itching 90 Tab 3    celecoxib (CELEBREX) 200 mg capsule Take 200 mg by mouth as needed.  cholecalciferol (VITAMIN D3) 1,000 unit cap Take 1,000 Units by mouth daily.  solifenacin (VESICARE) 5 mg tablet Take 5 mg by mouth as needed.  vit C/vit E ac/lut/copper/zinc (PRESERVISION LUTEIN PO) Take 1 Tab by mouth daily.  cyclobenzaprine (FLEXERIL) 5 mg tablet Take 1 Tab by mouth three (3) times daily as needed for Muscle Spasm(s).  20 Tab 0     Allergies   Allergen Reactions    Codeine Other (comments)     Reports constipation on this in past    Gabapentin Drowsiness    Sulfa (Sulfonamide Antibiotics) Nausea Only        Social History     Tobacco Use    Smoking status: Never Smoker    Smokeless tobacco: Never Used   Substance Use Topics    Alcohol use: No    Drug use: No     Family History   Problem Relation Age of Onset    Cancer Mother         stomach and liver cancer, in her 62s    Heart Disease Father         MI age 46s    Cancer Sister         blood    Cancer Brother         leukemia    Cancer Sibling          Review of Systems   Constitutional: Negative for chills, fever and weight loss. HENT: Negative for nosebleeds. Eyes: Negative for blurred vision and double vision. Respiratory: Positive for shortness of breath. Negative for cough and wheezing. Cardiovascular: Negative for chest pain, palpitations, orthopnea, claudication, leg swelling and PND. Gastrointestinal: Negative for abdominal pain, heartburn, nausea and vomiting. Genitourinary: Negative for dysuria and hematuria. Musculoskeletal: Positive for joint pain. Negative for falls and myalgias. Skin: Negative for rash. Neurological: Positive for dizziness. Negative for focal weakness and headaches. Endo/Heme/Allergies: Does not bruise/bleed easily. Psychiatric/Behavioral: Negative for substance abuse. Visit Vitals  /74 (BP 1 Location: Left upper arm, BP Patient Position: Sitting, BP Cuff Size: Small adult)   Pulse 69   Ht 5' 4\" (1.626 m)   Wt 59.9 kg (132 lb)   SpO2 99%   BMI 22.66 kg/m²       Physical Exam  Constitutional:       Appearance: She is well-developed. HENT:      Head: Normocephalic and atraumatic. Eyes:      Conjunctiva/sclera: Conjunctivae normal.   Neck:      Vascular: No carotid bruit or JVD. Cardiovascular:      Rate and Rhythm: Normal rate and regular rhythm. Pulses: Normal pulses. Heart sounds: S1 normal and S2 normal. Heart sounds are distant. No murmur heard. No gallop. Pulmonary:      Effort: Pulmonary effort is normal.      Breath sounds: Normal breath sounds. No wheezing or rales. Abdominal:      General: Bowel sounds are normal.      Palpations: Abdomen is soft. Tenderness: There is no abdominal tenderness. Musculoskeletal:         General: No swelling, tenderness or deformity. Cervical back: Neck supple. Skin:     General: Skin is warm and dry. Neurological:      Mental Status: She is alert and oriented to person, place, and time. Psychiatric:         Behavior: Behavior normal.         Thought Content: Thought content normal.       EKG: Normal sinus rhythm, normal axis, normal QTc interval, low voltage throughout, no ST or T wave abnormality concerning for ischemia. No change compared to the previous EKG. ASSESSMENT and PLAN    Dyspnea on exertion. This has not significantly changed compared to 6-12 months ago. She underwent a low risk pharmacologic nuclear stress test in December 2020. The year prior she underwent an echocardiogram which was unremarkable with exception of mild concentric left ventricular hypertrophy with normal PA pressures. My suspicion is that her symptoms are probably not cardiac related and may just be due to deconditioning. No additional cardiac work-up needed at this time. Essential hypertension. Patient blood pressure appears well controlled on her current regimen which includes losartan. Dyslipidemia. Patient has been managed on Crestor long-term. This is now being followed by her PCP. Breast cancer. According to the patient's was early-stage and was treated with surgery alone with adjuvant oral therapy with an aromatase inhibitor. She was never treated with an Adriamycin-based chemotherapeutic regimen. Dependent edema. I have recommended the patient wear compression stockings if she is to be standing or sitting for long periods of time. No need for diuretic therapy. Follow-up in 6 months, sooner if needed.

## 2021-11-16 NOTE — PROGRESS NOTES
Frank Files presents today for   Chief Complaint   Patient presents with    Follow-up     6 month follow up       Frank Files preferred language for health care discussion is english/other. Is someone accompanying this pt? no    Is the patient using any DME equipment during [address]? no    Depression Screening:  3 most recent PHQ Screens 11/16/2021   Little interest or pleasure in doing things Not at all   Feeling down, depressed, irritable, or hopeless Not at all   Total Score PHQ 2 0       Learning Assessment:  Learning Assessment 11/16/2021   PRIMARY LEARNER Patient   HIGHEST LEVEL OF EDUCATION - PRIMARY LEARNER  -   BARRIERS PRIMARY LEARNER -   CO-LEARNER CAREGIVER -   PRIMARY LANGUAGE ENGLISH   LEARNER PREFERENCE PRIMARY DEMONSTRATION   ANSWERED BY patient   RELATIONSHIP SELF       Abuse Screening:  Abuse Screening Questionnaire 11/16/2021   Do you ever feel afraid of your partner? N   Are you in a relationship with someone who physically or mentally threatens you? N   Is it safe for you to go home? Y       Fall Risk  Fall Risk Assessment, last 12 mths 11/16/2021   Able to walk? Yes   Fall in past 12 months? 0   Do you feel unsteady? 0   Are you worried about falling 0   Number of falls in past 12 months -   Fall with injury? -           Pt currently taking Anticoagulant therapy? no    Pt currently taking Antiplatelet therapy ? no      Coordination of Care:  1. Have you been to the ER, urgent care clinic since your last visit? Hospitalized since your last visit? no    2. Have you seen or consulted any other health care providers outside of the [address] [place] [address] since your last visit? Include any pap smears or colon screening.  no

## 2022-03-02 ENCOUNTER — HOSPITAL ENCOUNTER (EMERGENCY)
Age: 82
Discharge: HOME | End: 2022-03-02
Attending: STUDENT IN AN ORGANIZED HEALTH CARE EDUCATION/TRAINING PROGRAM
Payer: MEDICARE

## 2022-03-02 ENCOUNTER — APPOINTMENT (OUTPATIENT)
Dept: GENERAL RADIOLOGY | Age: 82
End: 2022-03-02
Attending: STUDENT IN AN ORGANIZED HEALTH CARE EDUCATION/TRAINING PROGRAM
Payer: MEDICARE

## 2022-03-02 VITALS
RESPIRATION RATE: 17 BRPM | BODY MASS INDEX: 22.36 KG/M2 | HEART RATE: 78 BPM | TEMPERATURE: 98.7 F | WEIGHT: 131 LBS | HEIGHT: 64 IN | DIASTOLIC BLOOD PRESSURE: 74 MMHG | OXYGEN SATURATION: 100 % | SYSTOLIC BLOOD PRESSURE: 155 MMHG

## 2022-03-02 DIAGNOSIS — R07.89 MUSCULOSKELETAL CHEST PAIN: Primary | ICD-10-CM

## 2022-03-02 DIAGNOSIS — M54.9 PAIN OF BACK AND RIGHT LOWER EXTREMITY: ICD-10-CM

## 2022-03-02 DIAGNOSIS — M79.604 PAIN OF BACK AND RIGHT LOWER EXTREMITY: ICD-10-CM

## 2022-03-02 LAB
ANION GAP SERPL CALC-SCNC: 2 MMOL/L (ref 3–18)
APPEARANCE UR: CLEAR
ATRIAL RATE: 73 BPM
BASOPHILS # BLD: 0 K/UL (ref 0–0.1)
BASOPHILS NFR BLD: 0 % (ref 0–2)
BILIRUB UR QL: NEGATIVE
BUN SERPL-MCNC: 13 MG/DL (ref 7–18)
BUN/CREAT SERPL: 13 (ref 12–20)
CALCIUM SERPL-MCNC: 9.3 MG/DL (ref 8.5–10.1)
CALCULATED P AXIS, ECG09: 71 DEGREES
CALCULATED R AXIS, ECG10: 4 DEGREES
CALCULATED T AXIS, ECG11: 9 DEGREES
CHLORIDE SERPL-SCNC: 107 MMOL/L (ref 100–111)
CO2 SERPL-SCNC: 30 MMOL/L (ref 21–32)
COLOR UR: YELLOW
CREAT SERPL-MCNC: 0.97 MG/DL (ref 0.6–1.3)
DIAGNOSIS, 93000: NORMAL
DIFFERENTIAL METHOD BLD: ABNORMAL
EOSINOPHIL # BLD: 0 K/UL (ref 0–0.4)
EOSINOPHIL NFR BLD: 0 % (ref 0–5)
ERYTHROCYTE [DISTWIDTH] IN BLOOD BY AUTOMATED COUNT: 15.3 % (ref 11.6–14.5)
GLUCOSE SERPL-MCNC: 95 MG/DL (ref 74–99)
GLUCOSE UR STRIP.AUTO-MCNC: NEGATIVE MG/DL
HCT VFR BLD AUTO: 41.2 % (ref 35–45)
HGB BLD-MCNC: 12.7 G/DL (ref 12–16)
HGB UR QL STRIP: NEGATIVE
IMM GRANULOCYTES # BLD AUTO: 0 K/UL (ref 0–0.04)
IMM GRANULOCYTES NFR BLD AUTO: 0 % (ref 0–0.5)
KETONES UR QL STRIP.AUTO: NEGATIVE MG/DL
LEUKOCYTE ESTERASE UR QL STRIP.AUTO: NEGATIVE
LYMPHOCYTES # BLD: 2.5 K/UL (ref 0.9–3.6)
LYMPHOCYTES NFR BLD: 37 % (ref 21–52)
MCH RBC QN AUTO: 22.7 PG (ref 24–34)
MCHC RBC AUTO-ENTMCNC: 30.8 G/DL (ref 31–37)
MCV RBC AUTO: 73.6 FL (ref 78–100)
MONOCYTES # BLD: 0.3 K/UL (ref 0.05–1.2)
MONOCYTES NFR BLD: 4 % (ref 3–10)
NEUTS SEG # BLD: 3.9 K/UL (ref 1.8–8)
NEUTS SEG NFR BLD: 59 % (ref 40–73)
NITRITE UR QL STRIP.AUTO: NEGATIVE
NRBC # BLD: 0 K/UL (ref 0–0.01)
NRBC BLD-RTO: 0 PER 100 WBC
P-R INTERVAL, ECG05: 180 MS
PH UR STRIP: 5.5 [PH] (ref 5–8)
PLATELET # BLD AUTO: 246 K/UL (ref 135–420)
PMV BLD AUTO: 10.7 FL (ref 9.2–11.8)
POTASSIUM SERPL-SCNC: 3.9 MMOL/L (ref 3.5–5.5)
PROT UR STRIP-MCNC: NEGATIVE MG/DL
Q-T INTERVAL, ECG07: 384 MS
QRS DURATION, ECG06: 74 MS
QTC CALCULATION (BEZET), ECG08: 423 MS
RBC # BLD AUTO: 5.6 M/UL (ref 4.2–5.3)
SODIUM SERPL-SCNC: 139 MMOL/L (ref 136–145)
SP GR UR REFRACTOMETRY: <1.005 (ref 1–1.03)
TROPONIN-HIGH SENSITIVITY: 8 NG/L (ref 0–54)
UROBILINOGEN UR QL STRIP.AUTO: 0.2 EU/DL (ref 0.2–1)
VENTRICULAR RATE, ECG03: 73 BPM
WBC # BLD AUTO: 6.7 K/UL (ref 4.6–13.2)

## 2022-03-02 PROCEDURE — 84484 ASSAY OF TROPONIN QUANT: CPT

## 2022-03-02 PROCEDURE — 81003 URINALYSIS AUTO W/O SCOPE: CPT

## 2022-03-02 PROCEDURE — 85025 COMPLETE CBC W/AUTO DIFF WBC: CPT

## 2022-03-02 PROCEDURE — 71045 X-RAY EXAM CHEST 1 VIEW: CPT

## 2022-03-02 PROCEDURE — 80048 BASIC METABOLIC PNL TOTAL CA: CPT

## 2022-03-02 PROCEDURE — 99285 EMERGENCY DEPT VISIT HI MDM: CPT

## 2022-03-02 PROCEDURE — 93005 ELECTROCARDIOGRAM TRACING: CPT

## 2022-03-02 RX ORDER — CYCLOBENZAPRINE HCL 5 MG
5 TABLET ORAL
Qty: 20 TABLET | Refills: 0 | Status: SHIPPED | OUTPATIENT
Start: 2022-03-02

## 2022-03-02 NOTE — DISCHARGE INSTRUCTIONS
Take tylenol and motrin for your chest pain. Follow p with your primary care doctor. Return for any new or worsening symptoms .

## 2022-03-02 NOTE — ED PROVIDER NOTES
EMERGENCY DEPARTMENT HISTORY AND PHYSICAL EXAM    I have evaluated the patient at 3:12 PM      Date: 3/2/2022  Patient Name: Francois De Los Santos    History of Presenting Illness     Chief Complaint   Patient presents with    Chest Pain    Urinary Frequency         History Provided By: Patient  Location/Duration/Severity/Modifying factors   Is an 79-year-old female with history of dyslipidemia, hypertension, arthritis presenting to the emergency department for evaluation of chest pain. Patient reports the chest pain has been ongoing for the past 3 weeks. States that the pain comes on when she is over. Describes it as a burning pain that starts on the right side of her chest and radiates around her back. Additionally, patient reports of urgency and frequency but no dysuria. Denies fevers or chills. Denies any other complaints. PCP: Susan Coleman MD    Current Outpatient Medications   Medication Sig Dispense Refill    losartan (COZAAR) 25 mg tablet TAKE 1 TABLET BY MOUTH DAILY 90 Tab 0    omeprazole (PRILOSEC) 40 mg capsule Take 40 mg by mouth daily.  diclofenac (VOLTAREN) 1 % gel Apply 2 g to affected area four (4) times daily. 100 g 0    acetaminophen (TYLENOL) 500 mg tablet Take 1,000 mg by mouth every six (6) hours as needed for Pain.  OTHER [employer] Women's Multivitamin Gummy - 1 gummy daily      Biotin 2,500 mcg cap Take 1 Cap by mouth daily.  psyllium husk (METAMUCIL PO) Take  by mouth daily.  azelastine (ASTELIN) 137 mcg (0.1 %) nasal spray 1 [place] by Both Nostrils route two (2) times a day. Use in each nostril as directed 1 Bottle 11    fluticasone propionate (FLONASE) 50 mcg/actuation nasal spray 2 Sprays by Both Nostrils route daily. 1 Bottle 11    loratadine (CLARITIN) 10 mg tablet Take 1 Tab by mouth daily as needed for Allergies. Cough, runny nose, itching 90 Tab 3    celecoxib (CELEBREX) 200 mg capsule Take 200 mg by mouth as needed.       cholecalciferol (VITAMIN D3) 1,000 unit cap Take 1,000 Units by mouth daily.  solifenacin (VESICARE) 5 mg tablet Take 5 mg by mouth as needed.  vit C/vit E ac/lut/copper/zinc (PRESERVISION LUTEIN PO) Take 1 Tab by mouth daily.  cyclobenzaprine (FLEXERIL) 5 mg tablet Take 1 Tab by mouth three (3) times daily as needed for Muscle Spasm(s). 20 Tab 0       Past History     Past Medical History:  Past Medical History:   Diagnosis Date    Arthritis     Back pain of lumbar region with sciatica 12/5/2018    [facility] [address]     Cancer ([facility] [address] 2015    breast cancer    Dyslipidemia     Heart palpitations     Hypertension     Left ventricular hypertrophy     Macular degeneration     Vitamin D deficiency        Past Surgical History:  Past Surgical History:   Procedure Laterality Date    HX BREAST LUMPECTOMY Right     HX HERNIA REPAIR      HX HYSTERECTOMY  1985    CT BREAST SURGERY PROCEDURE UNLISTED Right 2015       Family History:  Family History   Problem Relation Age of Onset    Cancer Mother         stomach and liver cancer, in her 62s    Heart Disease Father         MI age 46s   Chana Ayala Cancer Sister         blood    Cancer Brother         leukemia    Cancer Sibling        Social History:  Social History     Tobacco Use    Smoking status: Never Smoker    Smokeless tobacco: Never Used   Substance Use Topics    Alcohol use: No    Drug use: No       Allergies: Allergies   Allergen Reactions    Codeine Other (comments)     Reports constipation on this in past    Gabapentin Drowsiness    Sulfa (Sulfonamide Antibiotics) Nausea Only         Review of Systems       Review of Systems   Constitutional: Negative for activity change, chills, diaphoresis, fatigue and fever. Respiratory: Negative for cough, chest tightness, shortness of breath, wheezing and stridor. Cardiovascular: Positive for chest pain. Negative for palpitations.    Gastrointestinal: Negative for abdominal distention, abdominal pain, constipation, diarrhea, nausea and vomiting. Genitourinary: Positive for frequency and urgency. Negative for difficulty urinating, dysuria and hematuria. Musculoskeletal: Negative for back pain, joint swelling and myalgias. Skin: Negative for rash and wound. Neurological: Negative for dizziness, weakness and headaches. Psychiatric/Behavioral: Negative for agitation. The patient is not nervous/anxious. Physical Exam     Visit Vitals  BP (!) 155/74   Pulse 78   Temp 98.7 °F (37.1 °C)   Resp 17   Ht 5' 4\" (1.626 m)   Wt 59.4 kg (131 lb)   SpO2 100%   BMI 22.49 kg/m²         Physical Exam  Constitutional:       General: She is not in acute distress. Appearance: She is not toxic-appearing. HENT:      Head: Normocephalic and atraumatic. Mouth/Throat:      Mouth: Mucous membranes are moist.   Eyes:      Extraocular Movements: Extraocular movements intact. Pupils: Pupils are equal, round, and reactive to light. Cardiovascular:      Rate and Rhythm: Normal rate and regular rhythm. Heart sounds: Normal heart sounds. No murmur heard. No friction rub. No gallop. Pulmonary:      Effort: Pulmonary effort is normal.      Breath sounds: Normal breath sounds. Abdominal:      General: There is no distension. Palpations: Abdomen is soft. There is no mass. Tenderness: There is no abdominal tenderness. There is no guarding. Hernia: No hernia is present. Musculoskeletal:         General: No swelling, tenderness or deformity. Cervical back: Normal range of motion and neck supple. Skin:     General: Skin is warm and dry. Findings: No rash. Neurological:      General: No focal deficit present. Mental Status: She is alert and oriented to person, place, and time.    Psychiatric:         Mood and Affect: Mood normal.           Diagnostic Study Results     Labs -  Recent Results (from the past 12 hour(s))   EKG, 12 LEAD, INITIAL    Collection Time: 03/02/22  3:10 PM   Result Value Ref Range    Ventricular Rate 73 BPM    Atrial Rate 73 BPM    P-R Interval 180 ms    QRS Duration 74 ms    Q-T Interval 384 ms    QTC Calculation (Bezet) 423 ms    Calculated P Axis 71 degrees    Calculated R Axis 4 degrees    Calculated T Axis 9 degrees    Diagnosis       Normal sinus rhythm  Right atrial enlargement  Borderline ECG  When compared with ECG of 03-DEC-2020 09:44,  No significant change was found  Confirmed by Pilar Ayon MD, Isidro ([id number]) on 3/2/2022 4:31:24 PM     CBC WITH AUTOMATED DIFF    Collection Time: 03/02/22  4:05 PM   Result Value Ref Range    WBC 6.7 4.6 - 13.2 K/uL    RBC 5.60 (H) 4.20 - 5.30 M/uL    HGB 12.7 12.0 - 16.0 g/dL    HCT 41.2 35.0 - 45.0 %    MCV 73.6 (L) 78.0 - 100.0 FL    MCH 22.7 (L) 24.0 - 34.0 PG    MCHC 30.8 (L) 31.0 - 37.0 g/dL    RDW 15.3 (H) 11.6 - 14.5 %    PLATELET 351 003 - 197 K/uL    MPV 10.7 9.2 - 11.8 FL    NRBC 0.0 0  WBC    ABSOLUTE NRBC 0.00 0.00 - 0.01 K/uL    NEUTROPHILS 59 40 - 73 %    LYMPHOCYTES 37 21 - 52 %    MONOCYTES 4 3 - 10 %    EOSINOPHILS 0 0 - 5 %    BASOPHILS 0 0 - 2 %    IMMATURE GRANULOCYTES 0 0.0 - 0.5 %    ABS. NEUTROPHILS 3.9 1.8 - 8.0 K/UL    ABS. LYMPHOCYTES 2.5 0.9 - 3.6 K/UL    ABS. MONOCYTES 0.3 0.05 - 1.2 K/UL    ABS. EOSINOPHILS 0.0 0.0 - 0.4 K/UL    ABS. BASOPHILS 0.0 0.0 - 0.1 K/UL    ABS. IMM.  GRANS. 0.0 0.00 - 0.04 K/UL    DF AUTOMATED     METABOLIC PANEL, BASIC    Collection Time: 03/02/22  4:05 PM   Result Value Ref Range    Sodium 139 136 - 145 mmol/L    Potassium 3.9 3.5 - 5.5 mmol/L    Chloride 107 100 - 111 mmol/L    CO2 30 21 - 32 mmol/L    Anion gap 2 (L) 3.0 - 18 mmol/L    Glucose 95 74 - 99 mg/dL    BUN 13 7.0 - 18 MG/DL    Creatinine 0.97 0.6 - 1.3 MG/DL    BUN/Creatinine ratio 13 12 - 20      GFR est AA >60 >60 ml/min/1.73m2    GFR est non-AA 55 (L) >60 ml/min/1.73m2    Calcium 9.3 8.5 - 10.1 MG/DL   TROPONIN-HIGH SENSITIVITY    Collection Time: 03/02/22  4:05 PM   Result Value Ref Range    Troponin-High Sensitivity 8 0 - 54 ng/L   URINALYSIS W/ RFLX MICROSCOPIC    Collection Time: 03/02/22  4:05 PM   Result Value Ref Range    Color YELLOW      Appearance CLEAR      Specific gravity <1.005 (L) 1.005 - 1.030    pH (UA) 5.5 5.0 - 8.0      Protein Negative NEG mg/dL    Glucose Negative NEG mg/dL    Ketone Negative NEG mg/dL    Bilirubin Negative NEG      Blood Negative NEG      Urobilinogen 0.2 0.2 - 1.0 EU/dL    Nitrites Negative NEG      Leukocyte Esterase Negative NEG         Radiologic Studies -   XR CHEST PORT   Final Result   1. No acute cardiopulmonary process. Medical Decision Making   I am the first provider for this patient. I reviewed the vital signs, available nursing notes, past medical history, past surgical history, family history and social history. Vital Signs-Reviewed the patient's vital signs. EKG: Normal sinus rhythm without ST elevation or depression. Normal intervals    Records Reviewed: Nursing Notes, Old Medical Records, Previous electrocardiograms, Previous Radiology Studies and Previous Laboratory Studies (Time of Review: 3:12 PM)    ED Course: Progress Notes, Reevaluation, and Consults:         Provider Notes (Medical Decision Making):   MDM  Number of Diagnoses or Management Options  Musculoskeletal chest pain  Diagnosis management comments: 78-year-old female presenting with chest pain. EKG nondiagnostic. Blood work and x-ray negative for any acute findings. No evidence of UTI. Patient's pain is likely musculoskeletal in nature. Will provide antispasmodic. She has been reassured and given instructions on conservative measures for care at home. She has been instructed to follow-up with her primary care doctor. Return precautions discussed. Patient verbalizes good understanding agreement with plan             Diagnosis     Clinical Impression:   1.  Musculoskeletal chest pain        Disposition: home    Follow-up Information Follow up With Specialties Details Why Contact Info    [facility] EMERGENCY DEPT Emergency Medicine  As needed, If symptoms worsen [address] [place] [address] [employer]    Robert Fischer MD Internal Medicine   [address] [place] [id number] [phone]             Patient's Medications   Start Taking    No medications on file   Continue Taking    ACETAMINOPHEN (TYLENOL) 500 MG TABLET    Take 1,000 mg by mouth every six (6) hours as needed for Pain. AZELASTINE (ASTELIN) 137 MCG (0.1 %) NASAL SPRAY    1 [place] by Both Nostrils route two (2) times a day. Use in each nostril as directed    BIOTIN 2,500 MCG CAP    Take 1 Cap by mouth daily. CELECOXIB (CELEBREX) 200 MG CAPSULE    Take 200 mg by mouth as needed. CHOLECALCIFEROL (VITAMIN D3) 1,000 UNIT CAP    Take 1,000 Units by mouth daily. CYCLOBENZAPRINE (FLEXERIL) 5 MG TABLET    Take 1 Tab by mouth three (3) times daily as needed for Muscle Spasm(s). DICLOFENAC (VOLTAREN) 1 % GEL    Apply 2 g to affected area four (4) times daily. FLUTICASONE PROPIONATE (FLONASE) 50 MCG/ACTUATION NASAL SPRAY    2 Sprays by Both Nostrils route daily. LORATADINE (CLARITIN) 10 MG TABLET    Take 1 Tab by mouth daily as needed for Allergies. Cough, runny nose, itching    LOSARTAN (COZAAR) 25 MG TABLET    TAKE 1 TABLET BY MOUTH DAILY    OMEPRAZOLE (PRILOSEC) 40 MG CAPSULE    Take 40 mg by mouth daily. OTHER    [employer] Women's Multivitamin Gummy - 1 gummy daily    PSYLLIUM HUSK (METAMUCIL PO)    Take  by mouth daily. SOLIFENACIN (VESICARE) 5 MG TABLET    Take 5 mg by mouth as needed. VIT C/VIT E AC/LUT/COPPER/ZINC (PRESERVISION LUTEIN PO)    Take 1 Tab by mouth daily. These Medications have changed    No medications on file   Stop Taking    No medications on file     Disclaimer: Sections of this note are dictated using utilizing voice recognition software. Minor typographical errors may be present.  If questions arise, please do not hesitate to contact me or call our department.

## 2022-03-02 NOTE — ED TRIAGE NOTES
Pt reports chest heaviness with burning sensation x 3 weeks. States develops pain when she bends over. States urinary frequency x 2 weeks. Pt presents in nad.

## 2022-03-10 ENCOUNTER — TRANSCRIBE ORDER (OUTPATIENT)
Dept: SCHEDULING | Age: 82
End: 2022-03-10

## 2022-03-10 DIAGNOSIS — R10.9 ABDOMINAL PAIN: Primary | ICD-10-CM

## 2022-03-10 DIAGNOSIS — Z12.31 VISIT FOR SCREENING MAMMOGRAM: Primary | ICD-10-CM

## 2022-03-16 ENCOUNTER — HOSPITAL ENCOUNTER (OUTPATIENT)
Dept: MAMMOGRAPHY | Age: 82
Discharge: HOME | End: 2022-03-16
Attending: INTERNAL MEDICINE
Payer: MEDICARE

## 2022-03-16 DIAGNOSIS — Z12.31 VISIT FOR SCREENING MAMMOGRAM: ICD-10-CM

## 2022-03-16 PROCEDURE — 77063 BREAST TOMOSYNTHESIS BI: CPT

## 2022-03-18 PROBLEM — Z85.3 HISTORY OF BREAST CANCER: Status: ACTIVE | Noted: 2018-12-05

## 2022-03-18 PROBLEM — R91.1 PULMONARY NODULE: Status: ACTIVE | Noted: 2019-07-23

## 2022-03-19 PROBLEM — M85.80 OSTEOPENIA: Status: ACTIVE | Noted: 2018-12-05

## 2022-03-19 PROBLEM — I10 ESSENTIAL HYPERTENSION: Status: ACTIVE | Noted: 2018-12-05

## 2022-03-19 PROBLEM — R94.31 ABNORMAL EKG: Status: ACTIVE | Noted: 2019-12-10

## 2022-03-19 PROBLEM — M26.609 TMJ (TEMPOROMANDIBULAR JOINT DISORDER): Status: ACTIVE | Noted: 2018-12-05

## 2022-03-19 PROBLEM — H35.30 MACULAR DEGENERATION: Status: ACTIVE | Noted: 2018-12-05

## 2022-03-19 PROBLEM — M51.9 LUMBAR DISC DISEASE: Status: ACTIVE | Noted: 2019-06-11

## 2022-03-19 PROBLEM — E78.2 MIXED HYPERLIPIDEMIA: Status: ACTIVE | Noted: 2019-07-23

## 2022-03-19 PROBLEM — N32.81 OVERACTIVE BLADDER: Status: ACTIVE | Noted: 2018-12-05

## 2022-03-20 PROBLEM — E53.8 B12 DEFICIENCY: Status: ACTIVE | Noted: 2018-12-05

## 2022-03-20 PROBLEM — R73.02 IMPAIRED GLUCOSE TOLERANCE: Status: ACTIVE | Noted: 2019-08-04

## 2022-03-20 PROBLEM — H26.9 CATARACT OF BOTH EYES: Status: ACTIVE | Noted: 2018-12-05

## 2022-03-20 PROBLEM — J30.9 ALLERGIC RHINITIS: Status: ACTIVE | Noted: 2019-06-11

## 2022-03-21 ENCOUNTER — HOSPITAL ENCOUNTER (OUTPATIENT)
Dept: ULTRASOUND IMAGING | Age: 82
Discharge: HOME | End: 2022-03-21
Attending: STUDENT IN AN ORGANIZED HEALTH CARE EDUCATION/TRAINING PROGRAM
Payer: MEDICARE

## 2022-03-21 DIAGNOSIS — R10.9 ABDOMINAL PAIN: ICD-10-CM

## 2022-03-21 PROCEDURE — 76700 US EXAM ABDOM COMPLETE: CPT

## 2022-05-23 ENCOUNTER — OFFICE VISIT (OUTPATIENT)
Dept: CARDIOLOGY CLINIC | Age: 82
End: 2022-05-23
Payer: MEDICARE

## 2022-05-23 VITALS
SYSTOLIC BLOOD PRESSURE: 148 MMHG | HEART RATE: 94 BPM | WEIGHT: 132 LBS | OXYGEN SATURATION: 100 % | DIASTOLIC BLOOD PRESSURE: 72 MMHG | HEIGHT: 64 IN | BODY MASS INDEX: 22.53 KG/M2

## 2022-05-23 DIAGNOSIS — I10 ESSENTIAL HYPERTENSION: ICD-10-CM

## 2022-05-23 DIAGNOSIS — R07.89 OTHER CHEST PAIN: Primary | ICD-10-CM

## 2022-05-23 DIAGNOSIS — E78.2 MIXED HYPERLIPIDEMIA: ICD-10-CM

## 2022-05-23 PROCEDURE — 1101F PT FALLS ASSESS-DOCD LE1/YR: CPT | Performed by: INTERNAL MEDICINE

## 2022-05-23 PROCEDURE — 1090F PRES/ABSN URINE INCON ASSESS: CPT | Performed by: INTERNAL MEDICINE

## 2022-05-23 PROCEDURE — G8427 DOCREV CUR MEDS BY ELIG CLIN: HCPCS | Performed by: INTERNAL MEDICINE

## 2022-05-23 PROCEDURE — G8399 PT W/DXA RESULTS DOCUMENT: HCPCS | Performed by: INTERNAL MEDICINE

## 2022-05-23 PROCEDURE — G8536 NO DOC ELDER MAL SCRN: HCPCS | Performed by: INTERNAL MEDICINE

## 2022-05-23 PROCEDURE — G8510 SCR DEP NEG, NO PLAN REQD: HCPCS | Performed by: INTERNAL MEDICINE

## 2022-05-23 PROCEDURE — G8420 CALC BMI NORM PARAMETERS: HCPCS | Performed by: INTERNAL MEDICINE

## 2022-05-23 PROCEDURE — G8754 DIAS BP LESS 90: HCPCS | Performed by: INTERNAL MEDICINE

## 2022-05-23 PROCEDURE — G8753 SYS BP > OR = 140: HCPCS | Performed by: INTERNAL MEDICINE

## 2022-05-23 PROCEDURE — 99214 OFFICE O/P EST MOD 30 MIN: CPT | Performed by: INTERNAL MEDICINE

## 2022-05-23 RX ORDER — ROSUVASTATIN CALCIUM 5 MG/1
TABLET, COATED ORAL
COMMUNITY
Start: 2022-04-19

## 2022-05-23 RX ORDER — SERTRALINE HYDROCHLORIDE 25 MG/1
TABLET, FILM COATED ORAL
COMMUNITY
Start: 2022-03-15 | End: 2022-09-26

## 2022-05-23 RX ORDER — PANTOPRAZOLE SODIUM 40 MG/1
40 TABLET, DELAYED RELEASE ORAL
COMMUNITY
Start: 2022-03-15

## 2022-05-23 RX ORDER — FOLIC ACID 1 MG/1
1 TABLET ORAL DAILY
COMMUNITY
Start: 2022-05-13

## 2022-05-23 NOTE — PROGRESS NOTES
Michelle Whitfield presents today for   Chief Complaint   Patient presents with    Follow-up     6 months        Michelle Whitfield preferred language for health care discussion is english/other. Is someone accompanying this pt? no    Is the patient using any DME equipment during [address]? no    Depression Screening:  3 most recent PHQ Screens 5/23/2022   Little interest or pleasure in doing things Not at all   Feeling down, depressed, irritable, or hopeless Not at all   Total Score PHQ 2 0       Learning Assessment:  Learning Assessment 11/16/2021   PRIMARY LEARNER Patient   HIGHEST LEVEL OF EDUCATION - PRIMARY LEARNER  -   BARRIERS PRIMARY LEARNER -   CO-LEARNER CAREGIVER -   PRIMARY LANGUAGE ENGLISH   LEARNER PREFERENCE PRIMARY DEMONSTRATION   ANSWERED BY patient   RELATIONSHIP SELF       Abuse Screening:  Abuse Screening Questionnaire 11/16/2021   Do you ever feel afraid of your partner? N   Are you in a relationship with someone who physically or mentally threatens you? N   Is it safe for you to go home? Y       Fall Risk  Fall Risk Assessment, last 12 mths 5/23/2022   Able to walk? Yes   Fall in past 12 months? 0   Do you feel unsteady? 0   Are you worried about falling 0   Number of falls in past 12 months -   Fall with injury? -       Pt currently taking Anticoagulant therapy? no    Coordination of Care:  1. Have you been to the ER, urgent care clinic since your last visit? Hospitalized since your last visit? no    2. Have you seen or consulted any other health care providers outside of the [address] [place] [address] since your last visit? Include any pap smears or colon screening.  no

## 2022-05-23 NOTE — PROGRESS NOTES
HISTORY OF PRESENT ILLNESS  Jaren Brandon is a 79 y.o. female. Follow-up  Pertinent negatives include no chest pain, no abdominal pain, no headaches and no shortness of breath. Patient presents for a follow-up office visit. She was initially referred here by her PCP to establish care with a local cardiologist.  The patient recently relocated to the area from [place], [place]. She states she was being followed by a cardiologist for heart palpitations and what sounds like left ventricular hypertrophy. She does not have a prior history of hypertension. She does have a history of dyslipidemia, right-sided breast cancer, status post lumpectomy without radiation or chemotherapy. She has been maintained on an aromatase inhibitor. Patient underwent an echocardiogram through our office in December 2019 which showed preserved LV function, EF 60 to 65% with mild concentric LVH, mild tricuspid regurgitation but normal PA pressure. She underwent a pharmacologic nuclear stress test in December 2020 which is a normal low risk study. No evidence of ischemia or infarction, ejection fraction calculated greater than 70%. She was evaluated in the emergency room in March 2022 complaining of chest pain which radiated into her right shoulder and arm. She was diagnosed with musculoskeletal chest pain. She underwent an unremarkable EKG, chest x-ray and a normal high-sensitivity troponin level and was discharged home. She states this chest pain improved. She has since completed physical therapy. She denies any new shortness of breath, leg swelling, orthopnea, or PND. No major change in her activity level.     Past Medical History:   Diagnosis Date    Arthritis     Back pain of lumbar region with sciatica 12/5/2018    [facility] ([facility] [address]     Cancer ([facility] [address] 2015    breast cancer    Dyslipidemia     Heart palpitations     Hypertension     Left ventricular hypertrophy     Macular degeneration     Vitamin D deficiency      Current Outpatient Medications   Medication Sig Dispense Refill    pantoprazole (PROTONIX) 40 mg tablet Take 40 mg by mouth nightly.  folic acid (FOLVITE) 1 mg tablet Take 1 mg by mouth daily.  sertraline (ZOLOFT) 25 mg tablet TAKE 1 TABLET BY MOUTH EVERY DAY FOR ANXIETY      rosuvastatin (CRESTOR) 5 mg tablet TAKE 1 TABLET BY MOUTH EVERY NIGHT AT BEDTIME FOR CHOLESTEROL      cyclobenzaprine (FLEXERIL) 5 mg tablet Take 1 Tablet by mouth three (3) times daily as needed for Muscle Spasm(s). 20 Tablet 0    losartan (COZAAR) 25 mg tablet TAKE 1 TABLET BY MOUTH DAILY 90 Tab 0    diclofenac (VOLTAREN) 1 % gel Apply 2 g to affected area four (4) times daily. 100 g 0    acetaminophen (TYLENOL) 500 mg tablet Take 1,000 mg by mouth every six (6) hours as needed for Pain.  OTHER [employer] Women's Multivitamin Gummy - 1 gummy daily      Biotin 2,500 mcg cap Take 1 Cap by mouth daily.  psyllium husk (METAMUCIL PO) Take  by mouth daily.  azelastine (ASTELIN) 137 mcg (0.1 %) nasal spray 1 [place] by Both Nostrils route two (2) times a day. Use in each nostril as directed 1 Bottle 11    fluticasone propionate (FLONASE) 50 mcg/actuation nasal spray 2 Sprays by Both Nostrils route daily. 1 Bottle 11    loratadine (CLARITIN) 10 mg tablet Take 1 Tab by mouth daily as needed for Allergies. Cough, runny nose, itching 90 Tab 3    celecoxib (CELEBREX) 200 mg capsule Take 200 mg by mouth as needed.  cholecalciferol (VITAMIN D3) 1,000 unit cap Take 1,000 Units by mouth daily.  solifenacin (VESICARE) 5 mg tablet Take 5 mg by mouth as needed.  vit C/vit E ac/lut/copper/zinc (PRESERVISION LUTEIN PO) Take 1 Tab by mouth daily.        Allergies   Allergen Reactions    Codeine Other (comments)     Reports constipation on this in past    Gabapentin Drowsiness    Sulfa (Sulfonamide Antibiotics) Nausea Only        Social History     Tobacco Use    Smoking status: Never Smoker    Smokeless tobacco: Never Used   Substance Use Topics    Alcohol use: No    Drug use: No     Family History   Problem Relation Age of Onset    Cancer Mother         stomach and liver cancer, in her 62s    Heart Disease Father         MI age 46s    Cancer Sister         blood    Cancer Brother         leukemia    Cancer Sibling          Review of Systems   Constitutional: Negative for chills, fever and weight loss. HENT: Negative for nosebleeds. Eyes: Negative for blurred vision and double vision. Respiratory: Negative for cough, shortness of breath and wheezing. Cardiovascular: Negative for chest pain, palpitations, orthopnea, claudication, leg swelling and PND. Gastrointestinal: Negative for abdominal pain, heartburn, nausea and vomiting. Genitourinary: Negative for dysuria and hematuria. Musculoskeletal: Negative for falls, joint pain and myalgias. Skin: Negative for rash. Neurological: Negative for dizziness, focal weakness and headaches. Endo/Heme/Allergies: Does not bruise/bleed easily. Psychiatric/Behavioral: Negative for substance abuse. Visit Vitals  BP (!) 148/72   Pulse 94   Ht 5' 4\" (1.626 m)   Wt 59.9 kg (132 lb)   SpO2 100%   BMI 22.66 kg/m²       Physical Exam  Constitutional:       Appearance: She is well-developed. HENT:      Head: Normocephalic and atraumatic. Eyes:      Conjunctiva/sclera: Conjunctivae normal.   Neck:      Vascular: No carotid bruit or JVD. Cardiovascular:      Rate and Rhythm: Normal rate and regular rhythm. Pulses: Normal pulses. Heart sounds: S1 normal and S2 normal. Heart sounds are distant. No murmur heard. No gallop. Pulmonary:      Effort: Pulmonary effort is normal.      Breath sounds: Normal breath sounds. No wheezing or rales. Abdominal:      General: Bowel sounds are normal.      Palpations: Abdomen is soft. Tenderness: There is no abdominal tenderness.    Musculoskeletal:         General: No swelling, tenderness or deformity. Cervical back: Neck supple. Skin:     General: Skin is warm and dry. Neurological:      Mental Status: She is alert and oriented to person, place, and time. Psychiatric:         Behavior: Behavior normal.         Thought Content: Thought content normal.       March 2, 2022 EKG: Normal sinus rhythm, normal axis, normal QTc interval, low voltage throughout, no ST or T wave abnormality concerning for ischemia. No change compared to the previous EKG. ASSESSMENT and PLAN    Atypical chest pain. Patient was evaluated for this in the emergency department in March 2022. This was likely musculoskeletal in nature. She states her symptoms have improved since participating in physical therapy. No additional cardiac work-up needed at this time. Essential hypertension. Patient blood pressure is mildly elevated in the office today. She is on a very low dose of losartan. I have recommended she start checking her blood pressure least 10 times a month and either calling us or her PCP with the readings. If her blood pressure remains high, I would increase the losartan dosage. Dyslipidemia. Patient has been managed on with Crestor 5 mg daily. This is followed regularly by her PCP. Breast cancer. According to the patient's was early-stage and was treated with surgery alone with adjuvant oral therapy with an aromatase inhibitor. She was never treated with an Adriamycin-based chemotherapeutic regimen. Follow-up in 12 months, sooner if needed.

## 2022-07-04 ENCOUNTER — APPOINTMENT (OUTPATIENT)
Dept: CT IMAGING | Age: 82
End: 2022-07-04
Attending: EMERGENCY MEDICINE
Payer: MEDICARE

## 2022-07-04 ENCOUNTER — HOSPITAL ENCOUNTER (EMERGENCY)
Age: 82
Discharge: HOME | End: 2022-07-04
Attending: EMERGENCY MEDICINE
Payer: MEDICARE

## 2022-07-04 VITALS
BODY MASS INDEX: 22.36 KG/M2 | OXYGEN SATURATION: 100 % | HEART RATE: 91 BPM | SYSTOLIC BLOOD PRESSURE: 136 MMHG | DIASTOLIC BLOOD PRESSURE: 70 MMHG | TEMPERATURE: 98.7 F | RESPIRATION RATE: 18 BRPM | HEIGHT: 64 IN | WEIGHT: 131 LBS

## 2022-07-04 DIAGNOSIS — K92.1 HEMATOCHEZIA: Primary | ICD-10-CM

## 2022-07-04 LAB
ALBUMIN SERPL-MCNC: 3.5 G/DL (ref 3.4–5)
ALBUMIN/GLOB SERPL: 1 {RATIO} (ref 0.8–1.7)
ALP SERPL-CCNC: 63 U/L (ref 45–117)
ALT SERPL-CCNC: 23 U/L (ref 13–56)
ANION GAP SERPL CALC-SCNC: 3 MMOL/L (ref 3–18)
APPEARANCE UR: CLEAR
AST SERPL-CCNC: 18 U/L (ref 10–38)
BASOPHILS # BLD: 0 K/UL (ref 0–0.1)
BASOPHILS NFR BLD: 0 % (ref 0–2)
BILIRUB SERPL-MCNC: 0.7 MG/DL (ref 0.2–1)
BILIRUB UR QL: NEGATIVE
BUN SERPL-MCNC: 16 MG/DL (ref 7–18)
BUN/CREAT SERPL: 16 (ref 12–20)
CALCIUM SERPL-MCNC: 9.2 MG/DL (ref 8.5–10.1)
CHLORIDE SERPL-SCNC: 110 MMOL/L (ref 100–111)
CO2 SERPL-SCNC: 28 MMOL/L (ref 21–32)
COLOR UR: YELLOW
CREAT SERPL-MCNC: 0.97 MG/DL (ref 0.6–1.3)
DIFFERENTIAL METHOD BLD: ABNORMAL
EOSINOPHIL # BLD: 0 K/UL (ref 0–0.4)
EOSINOPHIL NFR BLD: 1 % (ref 0–5)
ERYTHROCYTE [DISTWIDTH] IN BLOOD BY AUTOMATED COUNT: 15.2 % (ref 11.6–14.5)
GLOBULIN SER CALC-MCNC: 3.4 G/DL (ref 2–4)
GLUCOSE SERPL-MCNC: 103 MG/DL (ref 74–99)
GLUCOSE UR STRIP.AUTO-MCNC: NEGATIVE MG/DL
HCT VFR BLD AUTO: 37.7 % (ref 35–45)
HEMOCCULT STL QL: POSITIVE
HGB BLD-MCNC: 11.9 G/DL (ref 12–16)
HGB UR QL STRIP: NEGATIVE
IMM GRANULOCYTES # BLD AUTO: 0 K/UL (ref 0–0.04)
IMM GRANULOCYTES NFR BLD AUTO: 0 % (ref 0–0.5)
INR PPP: 1 (ref 0.8–1.2)
KETONES UR QL STRIP.AUTO: NEGATIVE MG/DL
LEUKOCYTE ESTERASE UR QL STRIP.AUTO: NEGATIVE
LYMPHOCYTES # BLD: 1.8 K/UL (ref 0.9–3.6)
LYMPHOCYTES NFR BLD: 32 % (ref 21–52)
MCH RBC QN AUTO: 23.4 PG (ref 24–34)
MCHC RBC AUTO-ENTMCNC: 31.6 G/DL (ref 31–37)
MCV RBC AUTO: 74.1 FL (ref 78–100)
MONOCYTES # BLD: 0.3 K/UL (ref 0.05–1.2)
MONOCYTES NFR BLD: 6 % (ref 3–10)
NEUTS SEG # BLD: 3.4 K/UL (ref 1.8–8)
NEUTS SEG NFR BLD: 61 % (ref 40–73)
NITRITE UR QL STRIP.AUTO: NEGATIVE
NRBC # BLD: 0 K/UL (ref 0–0.01)
NRBC BLD-RTO: 0 PER 100 WBC
PH UR STRIP: 7.5 [PH] (ref 5–8)
PLATELET # BLD AUTO: 225 K/UL (ref 135–420)
PMV BLD AUTO: 11.7 FL (ref 9.2–11.8)
POTASSIUM SERPL-SCNC: 3.9 MMOL/L (ref 3.5–5.5)
PROT SERPL-MCNC: 6.9 G/DL (ref 6.4–8.2)
PROT UR STRIP-MCNC: NEGATIVE MG/DL
PROTHROMBIN TIME: 13.3 SEC (ref 11.5–15.2)
RBC # BLD AUTO: 5.09 M/UL (ref 4.2–5.3)
SODIUM SERPL-SCNC: 141 MMOL/L (ref 136–145)
SP GR UR REFRACTOMETRY: 1.01 (ref 1–1.03)
UROBILINOGEN UR QL STRIP.AUTO: 0.2 EU/DL (ref 0.2–1)
WBC # BLD AUTO: 5.7 K/UL (ref 4.6–13.2)

## 2022-07-04 PROCEDURE — 74011250636 HC RX REV CODE- 250/636: Performed by: EMERGENCY MEDICINE

## 2022-07-04 PROCEDURE — 85610 PROTHROMBIN TIME: CPT

## 2022-07-04 PROCEDURE — 99285 EMERGENCY DEPT VISIT HI MDM: CPT

## 2022-07-04 PROCEDURE — 82270 OCCULT BLOOD FECES: CPT

## 2022-07-04 PROCEDURE — 85025 COMPLETE CBC W/AUTO DIFF WBC: CPT

## 2022-07-04 PROCEDURE — 74011000636 HC RX REV CODE- 636: Performed by: EMERGENCY MEDICINE

## 2022-07-04 PROCEDURE — 81003 URINALYSIS AUTO W/O SCOPE: CPT

## 2022-07-04 PROCEDURE — 80053 COMPREHEN METABOLIC PANEL: CPT

## 2022-07-04 PROCEDURE — 74177 CT ABD & PELVIS W/CONTRAST: CPT

## 2022-07-04 RX ADMIN — SODIUM CHLORIDE 500 ML: 900 INJECTION, SOLUTION INTRAVENOUS at 13:39

## 2022-07-04 RX ADMIN — IOPAMIDOL 100 ML: 612 INJECTION, SOLUTION INTRAVENOUS at 14:58

## 2022-07-04 NOTE — DISCHARGE INSTRUCTIONS
If you have recurrent or heavy bleeding you should return. Plan to follow-up with your gastroenterologist if you do not need to return. If you are starting to pass any clots or have significant bleeding then you need to return. Small amounts of bleeding may be residual however if it is heavy or consistent or continuous then that is a red flag to return. Thank you for allowing us to provide you with excellent care today. We hope we addressed all of your concerns and needs. We strive to provide excellent quality care in the Emergency Department. Anything less than excellent does not meet our expectations for you. If you feel that you have not received excellent quality care or timely care, please ask to speak to the nurse manager. Please choose us in the future for your continued health care needs. The exam and treatment you received in the Emergency Department were for an urgent problem and are not intended as complete care. It is important that you follow-up with a doctor, nurse practitioner, or physician assistant to:  (1) confirm your diagnosis,  (2) re-evaluation of changes in your illness and treatment, and  (3) for ongoing care. If your symptoms become worse or you do not improve as expected and you are unable to reach your usual health care provider, you should return to the Emergency Department. We are available 24 hours a day. CT ABD PELV W CONT   Final Result         1. Fluid levels in the colon, would be suggestive of a diarrheal state. 2. Colonic diverticulosis without CT evidence for diverticulitis. Take this sheet with you when you go to your follow-up visit. If you have any problem arranging the follow-up visit, contact [phone] ([phone])    Make an appointment with your Primary Care doctor for follow up of this visit. Return to the ER if you are unable to be seen in the time recommended on your discharge instructions. It has been a pleasure caring for you today. Return to the ER or seek medical care for any worsening in your condition at any time. MyChart Activation    Thank you for requesting access to [employer]. Please follow the instructions below to securely access and download your online medical record. [employer] allows you to send messages to your doctor, view your test results, renew your prescriptions, schedule appointments, and more. How Do I Sign Up? In your internet browser, go to [email or web address]  Click on the First Time User? Click Here link in the Sign In box. You will be redirect to the New Member Sign Up page. Enter your [employer] Access Code exactly as it appears below. You will not need to use this code after youve completed the sign-up process. If you do not sign up before the expiration date, you must request a new code. [employer] Access Code: [email or web address]  Expires: 2022  6:05 AM (This is the date your [employer] access code will )    Enter the last four digits of your Social Security Number (xxxx) and Date of Birth (mm/dd/yyyy) as indicated and click Submit. You will be taken to the next sign-up page. Create a [employer] ID. This will be your [employer] login ID and cannot be changed, so think of one that is secure and easy to remember. Create a [employer] password. You can change your password at any time. Enter your Password Reset Question and Answer. This can be used at a later time if you forget your password. Enter your e-mail address. You will receive e-mail notification when new information is available in [address] Click Sign Up. You can now view and download portions of your medical record. Click the [employer] link to download a portable copy of your medical information. Additional Information    If you have questions, please visit the Frequently Asked Questions section of the [employer] website at [email or web address] Remember, [employer] is NOT to be used for urgent needs.  For medical emergencies, dial 911.

## 2022-07-04 NOTE — ED TRIAGE NOTES
Patient c/o rectal bleeding that began this morning while attempting to have a bowel movement. She states experiencing \"poop then blood, poop then blood\".

## 2022-07-04 NOTE — ED PROVIDER NOTES
EMERGENCY DEPARTMENT HISTORY AND PHYSICAL EXAM      Date: 7/4/2022  Patient Name: Damaris Cuevas      History of Presenting Illness     Chief Complaint   Patient presents with    Rectal Bleeding       History Provided By: Patient    Location/Duration/Severity/Modifying factors   79-year-old female who presents to the emergency room with a chief complaint of rectal bleeding. Patient reports that this morning it started. She was having a bowel movement which initially started a stool and then became progressively more red. She noted a mild to moderate amount of blood within the toilet did not see any clots. It was bright red in nature. She has never had this happen before. She does endorse history of having issues with constipation and straining for bowel movements but she does not think that is the case today. She denies any chest pain but complains of left lower quadrant abdominal and flank pain. She reports she has otherwise been in her usual state of health. He does not take any blood thinners but notes that she takes Celebrex but has not taken it recently. There are no other complaints, changes, or physical findings at this time. PCP: Rosemarie Damon MD    Current Outpatient Medications   Medication Sig Dispense Refill    pantoprazole (PROTONIX) 40 mg tablet Take 40 mg by mouth nightly.  folic acid (FOLVITE) 1 mg tablet Take 1 mg by mouth daily.  sertraline (ZOLOFT) 25 mg tablet TAKE 1 TABLET BY MOUTH EVERY DAY FOR ANXIETY      rosuvastatin (CRESTOR) 5 mg tablet TAKE 1 TABLET BY MOUTH EVERY NIGHT AT BEDTIME FOR CHOLESTEROL      cyclobenzaprine (FLEXERIL) 5 mg tablet Take 1 Tablet by mouth three (3) times daily as needed for Muscle Spasm(s). 20 Tablet 0    losartan (COZAAR) 25 mg tablet TAKE 1 TABLET BY MOUTH DAILY 90 Tab 0    diclofenac (VOLTAREN) 1 % gel Apply 2 g to affected area four (4) times daily.  100 g 0    acetaminophen (TYLENOL) 500 mg tablet Take 1,000 mg by mouth every six (6) hours as needed for Pain.  OTHER [employer] Women's Multivitamin Gummy - 1 gummy daily      Biotin 2,500 mcg cap Take 1 Cap by mouth daily.  psyllium husk (METAMUCIL PO) Take  by mouth daily.  azelastine (ASTELIN) 137 mcg (0.1 %) nasal spray 1 [place] by Both Nostrils route two (2) times a day. Use in each nostril as directed 1 Bottle 11    fluticasone propionate (FLONASE) 50 mcg/actuation nasal spray 2 Sprays by Both Nostrils route daily. 1 Bottle 11    loratadine (CLARITIN) 10 mg tablet Take 1 Tab by mouth daily as needed for Allergies. Cough, runny nose, itching 90 Tab 3    celecoxib (CELEBREX) 200 mg capsule Take 200 mg by mouth as needed.  cholecalciferol (VITAMIN D3) 1,000 unit cap Take 1,000 Units by mouth daily.  solifenacin (VESICARE) 5 mg tablet Take 5 mg by mouth as needed.  vit C/vit E ac/lut/copper/zinc (PRESERVISION LUTEIN PO) Take 1 Tab by mouth daily. Past History     Past Medical History:  Past Medical History:   Diagnosis Date    Arthritis     Back pain of lumbar region with sciatica 12/5/2018    [facility] [address]     Cancer ([facility] [address] 2015    breast cancer    Dyslipidemia     Heart palpitations     Hypertension     Left ventricular hypertrophy     Macular degeneration     Vitamin D deficiency        Past Surgical History:  Past Surgical History:   Procedure Laterality Date    HX BREAST LUMPECTOMY Right     HX HERNIA REPAIR      HX HYSTERECTOMY  1985    [place] BREAST SURGERY PROCEDURE UNLISTED Right 2015       Family History:  Family History   Problem Relation Age of Onset    Cancer Mother         stomach and liver cancer, in her 62s    Heart Disease Father         MI age 46s   [facility] Cancer Sister         blood    Cancer Brother         leukemia    Cancer Sibling        Social History:  Social History     Tobacco Use    Smoking status: Never Smoker    Smokeless tobacco: Never Used   Substance Use Topics    Alcohol use:  No  Drug use: No       Allergies: Allergies   Allergen Reactions    Codeine Other (comments)     Reports constipation on this in past    Gabapentin Drowsiness    Sulfa (Sulfonamide Antibiotics) Nausea Only         Review of Systems     Review of Systems   Constitutional: Negative for chills and fever. HENT: Negative for congestion, rhinorrhea, sinus pressure and sneezing. Eyes: Negative for visual disturbance. Respiratory: Negative for cough and shortness of breath. Cardiovascular: Negative for chest pain. Gastrointestinal: Positive for abdominal pain, anal bleeding, blood in stool and constipation. Negative for diarrhea, nausea and vomiting. Genitourinary: Positive for flank pain. Negative for dysuria, frequency and urgency. Musculoskeletal: Negative for back pain and neck pain. Skin: Negative for rash. Neurological: Negative for syncope, numbness and headaches. Physical Exam     Physical Exam  Vitals and nursing note reviewed. Constitutional:       General: She is not in acute distress. Appearance: Normal appearance. HENT:      Head: Normocephalic and atraumatic. Right Ear: External ear normal.      Left Ear: External ear normal.      Nose: Nose normal.      Mouth/Throat:      Mouth: Mucous membranes are moist.   Eyes:      Conjunctiva/sclera: Conjunctivae normal.   Cardiovascular:      Rate and Rhythm: Normal rate and regular rhythm. Pulses: Normal pulses. Heart sounds: Normal heart sounds. No murmur heard. Pulmonary:      Effort: Pulmonary effort is normal.      Breath sounds: Normal breath sounds. No wheezing, rhonchi or rales. Abdominal:      General: Abdomen is flat. Tenderness: There is no abdominal tenderness. There is no guarding or rebound. Genitourinary:     Rectum: Guaiac result positive. Comments:  There is a mild amount of gross blood on my glove after the exam strong rectal tone there is no hemorrhoids or masses  Musculoskeletal: General: No swelling or tenderness. Normal range of motion. Cervical back: Normal range of motion and neck supple. Right lower leg: No edema. Left lower leg: No edema. Skin:     General: Skin is warm and dry. Capillary Refill: Capillary refill takes less than 2 seconds. Findings: No rash. Neurological:      General: No focal deficit present. Mental Status: She is alert. Lab and Diagnostic Study Results     Labs -  Recent Results (from the past 24 hour(s))   POC FECAL OCCULT BLOOD    Collection Time: 07/04/22  1:23 PM   Result Value Ref Range    Occult blood, stool (POC) Positive (A) NEG     CBC WITH AUTOMATED DIFF    Collection Time: 07/04/22  1:45 PM   Result Value Ref Range    WBC 5.7 4.6 - 13.2 K/uL    RBC 5.09 4. 20 - 5.30 M/uL    HGB 11.9 (L) 12.0 - 16.0 g/dL    HCT 37.7 35.0 - 45.0 %    MCV 74.1 (L) 78.0 - 100.0 FL    MCH 23.4 (L) 24.0 - 34.0 PG    MCHC 31.6 31.0 - 37.0 g/dL    RDW 15.2 (H) 11.6 - 14.5 %    PLATELET 375 516 - 099 K/uL    MPV 11.7 9.2 - 11.8 FL    NRBC 0.0 0  WBC    ABSOLUTE NRBC 0.00 0.00 - 0.01 K/uL    NEUTROPHILS 61 40 - 73 %    LYMPHOCYTES 32 21 - 52 %    MONOCYTES 6 3 - 10 %    EOSINOPHILS 1 0 - 5 %    BASOPHILS 0 0 - 2 %    IMMATURE GRANULOCYTES 0 0.0 - 0.5 %    ABS. NEUTROPHILS 3.4 1.8 - 8.0 K/UL    ABS. LYMPHOCYTES 1.8 0.9 - 3.6 K/UL    ABS. MONOCYTES 0.3 0.05 - 1.2 K/UL    ABS. EOSINOPHILS 0.0 0.0 - 0.4 K/UL    ABS. BASOPHILS 0.0 0.0 - 0.1 K/UL    ABS. IMM.  GRANS. 0.0 0.00 - 0.04 K/UL    DF AUTOMATED     METABOLIC PANEL, COMPREHENSIVE    Collection Time: 07/04/22  1:45 PM   Result Value Ref Range    Sodium 141 136 - 145 mmol/L    Potassium 3.9 3.5 - 5.5 mmol/L    Chloride 110 100 - 111 mmol/L    CO2 28 21 - 32 mmol/L    Anion gap 3 3.0 - 18 mmol/L    Glucose 103 (H) 74 - 99 mg/dL    BUN 16 7.0 - 18 MG/DL    Creatinine 0.97 0.6 - 1.3 MG/DL    BUN/Creatinine ratio 16 12 - 20      GFR est AA >60 >60 ml/min/1.73m2    GFR est non-AA 55 (L) >60 ml/min/1.73m2    Calcium 9.2 8.5 - 10.1 MG/DL    Bilirubin, total 0.7 0.2 - 1.0 MG/DL    ALT (SGPT) 23 13 - 56 U/L    AST (SGOT) 18 10 - 38 U/L    Alk. phosphatase 63 45 - 117 U/L    Protein, total 6.9 6.4 - 8.2 g/dL    Albumin 3.5 3.4 - 5.0 g/dL    Globulin 3.4 2.0 - 4.0 g/dL    A-G Ratio 1.0 0.8 - 1.7     PROTHROMBIN TIME + INR    Collection Time: 07/04/22  1:45 PM   Result Value Ref Range    Prothrombin time 13.3 11.5 - 15.2 sec    INR 1.0 0.8 - 1.2     URINALYSIS W/ RFLX MICROSCOPIC    Collection Time: 07/04/22  3:00 PM   Result Value Ref Range    Color YELLOW      Appearance CLEAR      Specific gravity 1.011 1.005 - 1.030      pH (UA) 7.5 5.0 - 8.0      Protein Negative NEG mg/dL    Glucose Negative NEG mg/dL    Ketone Negative NEG mg/dL    Bilirubin Negative NEG      Blood Negative NEG      Urobilinogen 0.2 0.2 - 1.0 EU/dL    Nitrites Negative NEG      Leukocyte Esterase Negative NEG           Radiologic Studies -   CT ABD PELV W CONT   Final Result         1. Fluid levels in the colon, would be suggestive of a diarrheal state. 2. Colonic diverticulosis without CT evidence for diverticulitis. Medical Decision Making and ED Course   - I am the first and primary provider for this patient AND AM THE PRIMARY PROVIDER OF RECORD. - I reviewed the vital signs, available nursing notes, past medical history, past surgical history, family history and social history. - Initial assessment performed. The patients presenting problems have been discussed, and the staff are in agreement with the care plan formulated and outlined with them. I have encouraged them to ask questions as they arise throughout their visit. Vital Signs-Reviewed the patient's vital signs. No data found. EKG interpretation: None    Records Reviewed: Nursing Notes and Old Medical Records        ED Course:       ED Course as of 07/05/22 0633   [facility] Jul 04, 2022   [id number] GI paged.  [MIGUELITO]      ED Course User Index  [MIGUELITO] Mabel Prince DO         Provider Notes (Medical Decision Making):     MDM  Number of Diagnoses or Management Options  Hematochezia  Diagnosis management comments: 69-year-old female presenting with rectal bleeding. DDx: UGIB, LGIB, diverticular hemorrhage, aortoenteric fistula, duodenal ulcer, peptic ulcer disease, symptomatic anemia, colitis, proctitis, diverticulitis coagulopathy, etc.    We will check some basic labs and given her pain will do CT scan to assess for intra-abdominal pathology    CT showed evidence of possible diarrheal state and diverticulosis without diverticulitis. The patient is had no recurrence of bleeding here she has had a total of 3 episodes, 2 at home and once here. She had 1 final bowel movement that had very minimal blood that was mostly small amount of stool. I spoke to gastroenterology, Dr. Taco Kaiser, who advised that typically this be managed by IR if it is a diverticular bleed which seems to be most likely. I discussed the possibilities of admission or discharge with the patient. She is very reluctant to be admitted. Advised her we would have her tried to have a bowel movement again and see what happens she was able to pass a small amount of stool with very minimal blood in the toilet and she would rather go home. I discussed with her that the nature of diverticular bleeding can be intermittent and it is reassuring she is not anticoagulated and she is now having continued bleeding however she has been very careful and if she experiences worsening or recurrence of any sort of heavy bleeding in the meantime she would need to return ASAP. She lives nearby and she has a gastroenterologist, Dr. Salome Pedraza, who I encouraged her to follow-up with for reassessment and possibly a colonoscopy if she has not had one in the last 10 years. She agrees to return if worsening. ------------------------------------------------------------------------------------------------------------    At this time, patient is stable and appropriate for discharge home. Patient demonstrates understanding of current diagnoses and is in agreement with the treatment plan. They are advised that while the likelihood of serious underlying condition is low at this point given the evaluation performed today, we cannot fully rule it out. They are advised to immediately return with any new symptoms or worsening of current condition. Any Incidental findings were noted on the patient's discharge paperwork as well as verbally directly to the patient, and the appropriate follow-up was given to the patient as far as instructions on testing needed as well as the timeframe. All questions have been answered. Patient is given educational material regarding their diagnoses, including danger symptoms and when to return to the ED. This note was dictated utilizing Dragon voice recognition software. Unfortunately this leads to occasional typographical errors. I apologize in advance if the situation occurs. If questions occur please do not hesitate to contact me directly. Tavon Hernandez DO        Consultations:       Consultations: -  Dr Castro Eastman -- Gastroenterology        Procedures and Critical Care       Performed by: Tavon Hernandez DO    Procedures             CRITICAL CARE NOTE :  1:07 PM  CRITICAL CARE TIME: none    Tavon Hernandez DO        Disposition         Diagnosis:   1. Hematochezia          Disposition: Discharge    Follow-up Information     Follow up With Specialties Details Why Contact Info    David Gaines MD Family Medicine   [address] [facility] [address] [place] [address]      Mike Mckeon MD Gastroenterology Call in 1 day Call for follow up, discuss Colonoscopy.  [address] [facility] Adriana Espinosa  [phone]      [facility] EMERGENCY DEPT Emergency Medicine  As needed, If symptoms worsen; or [facility] Emergency Department [address] [id number] [phone]          Discharge Medication List as of 7/4/2022  5:33 PM      CONTINUE these medications which have NOT CHANGED    Details   pantoprazole (PROTONIX) 40 mg tablet Take 40 mg by mouth nightly., Historical Med      folic acid (FOLVITE) 1 mg tablet Take 1 mg by mouth daily. , Historical Med      sertraline (ZOLOFT) 25 mg tablet TAKE 1 TABLET BY MOUTH EVERY DAY FOR ANXIETY, Historical Med      rosuvastatin (CRESTOR) 5 mg tablet TAKE 1 TABLET BY MOUTH EVERY NIGHT AT BEDTIME FOR CHOLESTEROL, Historical Med      cyclobenzaprine (FLEXERIL) 5 mg tablet Take 1 Tablet by mouth three (3) times daily as needed for Muscle Spasm(s). , Normal, Disp-20 Tablet, R-0      losartan (COZAAR) 25 mg tablet TAKE 1 TABLET BY MOUTH DAILY, Normal, Disp-90 Tab, R-0      diclofenac (VOLTAREN) 1 % gel Apply 2 g to affected area four (4) times daily. , Normal, Disp-100 g, R-0      acetaminophen (TYLENOL) 500 mg tablet Take 1,000 mg by mouth every six (6) hours as needed for Pain., Historical Med      OTHER [employer] Women's Multivitamin Gummy - 1 gummy daily, Historical Med      Biotin 2,500 mcg cap Take 1 Cap by mouth daily. , Historical Med      psyllium husk (METAMUCIL PO) Take  by mouth daily. , Historical Med      azelastine (ASTELIN) 137 mcg (0.1 %) nasal spray 1 [place] by Both Nostrils route two (2) times a day. Use in each nostril as directed, Normal, Disp-1 Bottle, R-11      fluticasone propionate (FLONASE) 50 mcg/actuation nasal spray 2 Sprays by Both Nostrils route daily. , Normal, Disp-1 Bottle, R-11      loratadine (CLARITIN) 10 mg tablet Take 1 Tab by mouth daily as needed for Allergies.  Cough, runny nose, itching, Normal, Disp-90 Tab, R-3      celecoxib (CELEBREX) 200 mg capsule Take 200 mg by mouth as needed., Historical Med      cholecalciferol (VITAMIN D3) 1,000 unit cap Take 1,000 Units by mouth daily. , Historical Med      solifenacin (VESICARE) 5 mg tablet Take 5 mg by mouth as needed., Historical Med      vit C/vit E ac/lut/copper/zinc (PRESERVISION LUTEIN PO) Take 1 Tab by mouth daily. , Historical Med               Diagnosis     Clinical Impression:   1. Hematochezia        Attestations:    Bertin Almodovar, DO    Please note that this dictation was completed with [employer], the computer voice recognition software. Quite often unanticipated grammatical, syntax, homophones, and other interpretive errors are inadvertently transcribed by the computer software. Please disregard these errors. Please excuse any errors that have escaped final proofreading.   Thank you.      '

## 2022-07-21 ENCOUNTER — TRANSCRIBE ORDER (OUTPATIENT)
Dept: SCHEDULING | Age: 82
End: 2022-07-21

## 2022-07-21 DIAGNOSIS — R10.32 ABDOMINAL PAIN, LEFT LOWER QUADRANT: Primary | ICD-10-CM

## 2022-08-10 ENCOUNTER — HOSPITAL ENCOUNTER (OUTPATIENT)
Dept: VASCULAR SURGERY | Age: 82
Discharge: HOME | End: 2022-08-10
Attending: STUDENT IN AN ORGANIZED HEALTH CARE EDUCATION/TRAINING PROGRAM
Payer: MEDICARE

## 2022-08-10 DIAGNOSIS — R10.32 ABDOMINAL PAIN, LEFT LOWER QUADRANT: ICD-10-CM

## 2022-08-10 LAB
ABDOMINAL MID AORTA VEL: 56 CM/S
ABDOMINAL PROX AORTA VEL: 56.3 CM/S
CELIAC EDV: 16 CM/S
CELIAC PSV: 102.9 CM/S
COMMON HEPATIC EDV: 12.5 CM/S
COMMON HEPATIC PSV: 65.7 CM/S
DIST AORTIC AP: 1.43 CM
DIST AORTIC TRANS: 1.43 CM
DIST SMA EDV: 17 CM/S
DIST SMA PSV: 119.2 CM/S
IMA EDV: 3.7 CM/S
IMA PSV: 104.6 CM/S
MID AORTIC AP: 1.86 CM
MID AORTIC TRANS: 1.99 CM
MID SMA EDV: 11.5 CM/S
MID SMA PSV: 122.9 CM/S
PROX AORTIC AP: 2.23 CM
PROX AORTIC TRANS: 2.24 CM
PROX SMA EDV: 10.2 CM/S
PROX SMA PSV: 98.6 CM/S
SPLENIC EDV: 9.4 CM/S
SPLENIC PSV: 136.7 CM/S

## 2022-08-10 PROCEDURE — 93975 VASCULAR STUDY: CPT

## 2022-09-26 ENCOUNTER — APPOINTMENT (OUTPATIENT)
Dept: CT IMAGING | Age: 82
End: 2022-09-26
Attending: EMERGENCY MEDICINE
Payer: MEDICARE

## 2022-09-26 ENCOUNTER — HOSPITAL ENCOUNTER (EMERGENCY)
Age: 82
Discharge: HOME | End: 2022-09-26
Attending: EMERGENCY MEDICINE
Payer: MEDICARE

## 2022-09-26 VITALS
WEIGHT: 131 LBS | OXYGEN SATURATION: 100 % | HEIGHT: 64 IN | BODY MASS INDEX: 22.36 KG/M2 | SYSTOLIC BLOOD PRESSURE: 140 MMHG | DIASTOLIC BLOOD PRESSURE: 60 MMHG | RESPIRATION RATE: 16 BRPM | HEART RATE: 56 BPM | TEMPERATURE: 98.7 F

## 2022-09-26 DIAGNOSIS — H65.00 ACUTE SEROUS OTITIS MEDIA, RECURRENCE NOT SPECIFIED, UNSPECIFIED LATERALITY: Primary | ICD-10-CM

## 2022-09-26 DIAGNOSIS — G43.001 MIGRAINE WITHOUT AURA AND WITH STATUS MIGRAINOSUS, NOT INTRACTABLE: ICD-10-CM

## 2022-09-26 LAB
ALBUMIN SERPL-MCNC: 3.9 G/DL (ref 3.4–5)
ALBUMIN/GLOB SERPL: 1.1 {RATIO} (ref 0.8–1.7)
ALP SERPL-CCNC: 75 U/L (ref 45–117)
ALT SERPL-CCNC: 19 U/L (ref 13–56)
ANION GAP SERPL CALC-SCNC: 6 MMOL/L (ref 3–18)
APPEARANCE UR: CLEAR
AST SERPL-CCNC: 18 U/L (ref 10–38)
BACTERIA URNS QL MICRO: ABNORMAL /HPF
BASOPHILS # BLD: 0 K/UL (ref 0–0.1)
BASOPHILS NFR BLD: 1 % (ref 0–2)
BILIRUB SERPL-MCNC: 0.9 MG/DL (ref 0.2–1)
BILIRUB UR QL: NEGATIVE
BUN SERPL-MCNC: 17 MG/DL (ref 7–18)
BUN/CREAT SERPL: 17 (ref 12–20)
CALCIUM SERPL-MCNC: 9.5 MG/DL (ref 8.5–10.1)
CHLORIDE SERPL-SCNC: 107 MMOL/L (ref 100–111)
CO2 SERPL-SCNC: 26 MMOL/L (ref 21–32)
COLOR UR: YELLOW
CREAT SERPL-MCNC: 1.02 MG/DL (ref 0.6–1.3)
DIFFERENTIAL METHOD BLD: ABNORMAL
EOSINOPHIL # BLD: 0 K/UL (ref 0–0.4)
EOSINOPHIL NFR BLD: 0 % (ref 0–5)
EPITH CASTS URNS QL MICRO: ABNORMAL /LPF (ref 0–5)
ERYTHROCYTE [DISTWIDTH] IN BLOOD BY AUTOMATED COUNT: 15.4 % (ref 11.6–14.5)
GLOBULIN SER CALC-MCNC: 3.5 G/DL (ref 2–4)
GLUCOSE SERPL-MCNC: 97 MG/DL (ref 74–99)
GLUCOSE UR STRIP.AUTO-MCNC: NEGATIVE MG/DL
HCT VFR BLD AUTO: 41.2 % (ref 35–45)
HGB BLD-MCNC: 12.8 G/DL (ref 12–16)
HGB UR QL STRIP: NEGATIVE
IMM GRANULOCYTES # BLD AUTO: 0 K/UL (ref 0–0.04)
IMM GRANULOCYTES NFR BLD AUTO: 0 % (ref 0–0.5)
KETONES UR QL STRIP.AUTO: NEGATIVE MG/DL
LEUKOCYTE ESTERASE UR QL STRIP.AUTO: ABNORMAL
LYMPHOCYTES # BLD: 2.4 K/UL (ref 0.9–3.6)
LYMPHOCYTES NFR BLD: 43 % (ref 21–52)
MCH RBC QN AUTO: 22.8 PG (ref 24–34)
MCHC RBC AUTO-ENTMCNC: 31.1 G/DL (ref 31–37)
MCV RBC AUTO: 73.4 FL (ref 78–100)
MONOCYTES # BLD: 0.3 K/UL (ref 0.05–1.2)
MONOCYTES NFR BLD: 6 % (ref 3–10)
NEUTS SEG # BLD: 2.9 K/UL (ref 1.8–8)
NEUTS SEG NFR BLD: 51 % (ref 40–73)
NITRITE UR QL STRIP.AUTO: NEGATIVE
NRBC # BLD: 0 K/UL (ref 0–0.01)
NRBC BLD-RTO: 0 PER 100 WBC
PH UR STRIP: 6.5 [PH] (ref 5–8)
PLATELET # BLD AUTO: 259 K/UL (ref 135–420)
PMV BLD AUTO: 10.6 FL (ref 9.2–11.8)
POTASSIUM SERPL-SCNC: 4.1 MMOL/L (ref 3.5–5.5)
PROT SERPL-MCNC: 7.4 G/DL (ref 6.4–8.2)
PROT UR STRIP-MCNC: NEGATIVE MG/DL
RBC # BLD AUTO: 5.61 M/UL (ref 4.2–5.3)
RBC #/AREA URNS HPF: ABNORMAL /HPF (ref 0–5)
SODIUM SERPL-SCNC: 139 MMOL/L (ref 136–145)
SP GR UR REFRACTOMETRY: 1.02 (ref 1–1.03)
TROPONIN-HIGH SENSITIVITY: 6 NG/L (ref 0–54)
UROBILINOGEN UR QL STRIP.AUTO: 1 EU/DL (ref 0.2–1)
WBC # BLD AUTO: 5.7 K/UL (ref 4.6–13.2)
WBC URNS QL MICRO: ABNORMAL /HPF (ref 0–4)

## 2022-09-26 PROCEDURE — 70450 CT HEAD/BRAIN W/O DYE: CPT

## 2022-09-26 PROCEDURE — 85025 COMPLETE CBC W/AUTO DIFF WBC: CPT

## 2022-09-26 PROCEDURE — 93005 ELECTROCARDIOGRAM TRACING: CPT

## 2022-09-26 PROCEDURE — 99284 EMERGENCY DEPT VISIT MOD MDM: CPT

## 2022-09-26 PROCEDURE — 74011250637 HC RX REV CODE- 250/637: Performed by: EMERGENCY MEDICINE

## 2022-09-26 PROCEDURE — 80053 COMPREHEN METABOLIC PANEL: CPT

## 2022-09-26 PROCEDURE — 81001 URINALYSIS AUTO W/SCOPE: CPT

## 2022-09-26 PROCEDURE — 84484 ASSAY OF TROPONIN QUANT: CPT

## 2022-09-26 RX ORDER — AMOXICILLIN 500 MG/1
500 TABLET, FILM COATED ORAL 3 TIMES DAILY
Qty: 30 TABLET | Refills: 0 | Status: SHIPPED | OUTPATIENT
Start: 2022-09-26

## 2022-09-26 RX ORDER — AMOXICILLIN 250 MG/1
500 CAPSULE ORAL
Status: COMPLETED | OUTPATIENT
Start: 2022-09-26 | End: 2022-09-26

## 2022-09-26 RX ORDER — BUTALBITAL, ACETAMINOPHEN AND CAFFEINE 300; 40; 50 MG/1; MG/1; MG/1
1 CAPSULE ORAL
Qty: 10 CAPSULE | Refills: 0 | Status: SHIPPED | OUTPATIENT
Start: 2022-09-26

## 2022-09-26 RX ORDER — OMEPRAZOLE 10 MG/1
10 CAPSULE, DELAYED RELEASE ORAL DAILY
COMMUNITY

## 2022-09-26 RX ORDER — BUTALBITAL, ACETAMINOPHEN AND CAFFEINE 300; 40; 50 MG/1; MG/1; MG/1
1 CAPSULE ORAL
Status: DISCONTINUED | OUTPATIENT
Start: 2022-09-26 | End: 2022-09-27 | Stop reason: HOSPADM

## 2022-09-26 RX ADMIN — BUTALBITA,ACETAMINOPHEN AND CAFFEINE 1 CAPSULE: 50; 300; 40 CAPSULE ORAL at 22:23

## 2022-09-26 RX ADMIN — AMOXICILLIN 500 MG: 250 CAPSULE ORAL at 22:23

## 2022-09-26 NOTE — ED PROVIDER NOTES
Headache   Associated symptoms include dizziness. Acute Neurological Problem  Associated symptoms include headaches. Dysuria   Associated symptoms include frequency. SL Patient is a 78 yo female who presents to the ER with c/o left side headache, dizziness, and urinary frequency x one week. She states her headache is intermittent x 3 days.     Past Medical History:   Diagnosis Date    Arthritis     Back pain of lumbar region with sciatica 12/5/2018    [facility]     Cancer ([facility] 2015    breast cancer    Dyslipidemia     Heart palpitations     Hypertension     Left ventricular hypertrophy     Macular degeneration     Vitamin D deficiency        Past Surgical History:   Procedure Laterality Date    HX BREAST LUMPECTOMY Right     HX HERNIA REPAIR      HX HYSTERECTOMY  1985    MI BREAST SURGERY PROCEDURE UNLISTED Right 2015         Family History:   Problem Relation Age of Onset    Cancer Mother         stomach and liver cancer, in her 62s    Heart Disease Father         MI age 46s   [place] Self Cancer Sister         blood    Cancer Brother         leukemia    Cancer Sibling        Social History     Socioeconomic History    Marital status: UNKNOWN     Spouse name: Not on file    Number of children: Not on file    Years of education: Not on file    Highest education level: Not on file   Occupational History    Not on file   Tobacco Use    Smoking status: Never    Smokeless tobacco: Never   Substance and Sexual Activity    Alcohol use: No    Drug use: No    Sexual activity: Not Currently     Partners: Male   Other Topics Concern    Not on file   Social History Narrative    ** Merged History Encounter **          Social Determinants of Health     Financial Resource Strain: Not on file   Food Insecurity: Not on file   Transportation Needs: Not on file   Physical Activity: Not on file   Stress: Not on file   Social Connections: Not on file   Intimate Partner Violence: Not on file Housing Stability: Not on file         ALLERGIES: Codeine, Gabapentin, and Sulfa (sulfonamide antibiotics)    Review of Systems   Constitutional: Negative. HENT: Negative. Eyes: Negative. Respiratory: Negative. Cardiovascular: Negative. Gastrointestinal: Negative. Endocrine: Negative. Genitourinary:  Positive for frequency. Musculoskeletal: Negative. Skin: Negative. Allergic/Immunologic: Negative. Neurological:  Positive for dizziness and headaches. Hematological: Negative. Psychiatric/Behavioral: Negative. All other systems reviewed and are negative. Vitals:    09/26/22 1610   BP: (!) 145/70   Pulse: 69   Resp: 18   Temp: 98.9 °F (37.2 °C)   SpO2: 100%   Weight: 59.4 kg (131 lb)   Height: 5' 4\" (1.626 m)            Physical Exam  Vitals and nursing note reviewed. Constitutional:       General: She is not in acute distress. Appearance: She is well-developed. She is not diaphoretic. HENT:      Head: Normocephalic. Comments: Left TM: (?) dullness/erythema     Right Ear: Tympanic membrane and external ear normal.      Left Ear: External ear normal.      Mouth/Throat:      Pharynx: No oropharyngeal exudate. Eyes:      General: No scleral icterus. Right eye: No discharge. Left eye: No discharge. Conjunctiva/sclera: Conjunctivae normal.      Pupils: Pupils are equal, round, and reactive to light. Neck:      Thyroid: No thyromegaly. Vascular: No JVD. Trachea: No tracheal deviation. Cardiovascular:      Rate and Rhythm: Normal rate and regular rhythm. Heart sounds: Normal heart sounds. No murmur heard. No friction rub. No gallop. Pulmonary:      Effort: Pulmonary effort is normal. No respiratory distress. Breath sounds: Normal breath sounds. No stridor. No wheezing or rales. Chest:      Chest wall: No tenderness. Abdominal:      General: Bowel sounds are normal. There is no distension.       Palpations: Abdomen is soft. There is no mass. Tenderness: no abdominal tenderness There is no guarding or rebound. Musculoskeletal:         General: No tenderness. Normal range of motion. Cervical back: Normal range of motion and neck supple. Lymphadenopathy:      Cervical: No cervical adenopathy. Skin:     General: Skin is warm and dry. Coloration: Skin is not pale. Findings: No erythema or rash. Neurological:      Mental Status: She is alert and oriented to person, place, and time. Cranial Nerves: No cranial nerve deficit. Motor: No abnormal muscle tone. Coordination: Coordination normal.      Deep Tendon Reflexes: Reflexes normal.        MDM  Number of Diagnoses or Management Options         Procedures    DIF DX: TIA, CVA, hypoglycemia, sinusitis, anxiety    Lab Tests: interpreted by me    EKG: interpreted by me    Hospital course: Patient remained stable    Dx: headache, early OM    Disp: D/C home. F/U PCP in 2 days. Return to ER prn. Rx: amoxicillin, Fioricet. Dictation disclaimer:  Please note that this dictation was completed with Spar[employer], the computer voice recognition software. Quite often unanticipated grammatical, syntax, homophones, and other interpretive errors are inadvertently transcribed by the computer software. Please disregard these errors. Please excuse any errors that have escaped final proofreading.

## 2022-09-27 LAB
ATRIAL RATE: 64 BPM
CALCULATED P AXIS, ECG09: 50 DEGREES
CALCULATED R AXIS, ECG10: 20 DEGREES
CALCULATED T AXIS, ECG11: 26 DEGREES
DIAGNOSIS, 93000: NORMAL
P-R INTERVAL, ECG05: 194 MS
Q-T INTERVAL, ECG07: 404 MS
QRS DURATION, ECG06: 64 MS
QTC CALCULATION (BEZET), ECG08: 416 MS
VENTRICULAR RATE, ECG03: 64 BPM

## 2022-10-27 ENCOUNTER — HOSPITAL ENCOUNTER (OUTPATIENT)
Dept: PHYSICAL THERAPY | Age: 82
End: 2022-10-27

## 2022-11-08 ENCOUNTER — APPOINTMENT (OUTPATIENT)
Dept: PHYSICAL THERAPY | Age: 82
End: 2022-11-08
Payer: MEDICARE

## 2022-11-09 ENCOUNTER — HOSPITAL ENCOUNTER (OUTPATIENT)
Dept: PHYSICAL THERAPY | Age: 82
Discharge: HOME | End: 2022-11-09
Payer: MEDICARE

## 2022-11-09 PROCEDURE — 97162 PT EVAL MOD COMPLEX 30 MIN: CPT

## 2022-11-09 PROCEDURE — 97110 THERAPEUTIC EXERCISES: CPT

## 2022-11-09 PROCEDURE — 97140 MANUAL THERAPY 1/> REGIONS: CPT

## 2022-11-09 NOTE — PROGRESS NOTES
[facility]  [address] [facility] [address]  [place], [address]  [phone] fax    Plan of Care/ Statement of Necessity for Physical Therapy Services    Patient name: Mary Carmen Cornejo Start of Care: 2022   Referral source: Missael Valdovinos MD : 1940    Medical Diagnosis: Neck pain [M54.2]  Vertigo [R42]  Payor: [place] MEDICARE / Plan: VA MEDICARE PART A & B / Product Type: Medicare /  Onset Date:1 month ago    Treatment Diagnosis: Left C/S pain/myofascial pain   Prior Hospitalization: see medical history Provider#: [id number]   Medications: Verified on Patient summary List    Comorbidities: OA; HTN; visually impaired   Prior Level of Function: No c/o HAs, C/S pain      The Plan of Care and following information is based on the information from the initial evaluation. Assessment/ key information: 79y.o. year old female presents with CC of insidious onset of left sided C/S, face, jaw, eye pain and intermittent dizziness (lightheadedness) that began ~ 1 month ago. Signs and symptoms are consistent with myofascial pain and mechanical neck pain. Impairments noted today: increased mm restrictions and (+) allodynia noted in left C/S paraspinals, SCMs, scalenes;decreased left C/S AROM in left rotation and lateral flexion; impaired balance, most likely related to myofascial restrictions along C/S paraspinals. Patient will benefit from physical therapy to address deficits, and ultimately to return patient to prior level of function. Evaluation Complexity History MEDIUM  Complexity : 1-2 comorbidities / personal factors will impact the outcome/ POC ; Examination HIGH Complexity : 4+ Standardized tests and measures addressing body structure, function, activity limitation and / or participation in recreation  ;Presentation MEDIUM Complexity : Evolving with changing characteristics  ; Clinical Decision Making MEDIUM Complexity : FOTO score of 26-74  Overall Complexity Rating: MEDIUM  Problem List: pain affecting function, decrease ROM, decrease strength, decrease ADL/ functional abilitiies, decrease activity tolerance, and decrease flexibility/ joint mobility   Treatment Plan may include any combination of the following: Therapeutic exercise, Neuromuscular reeducation, Manual therapy, Therapeutic activity, and Self care/home management  Patient / Family readiness to learn indicated by: asking questions, trying to perform skills, and interest  Persons(s) to be included in education: patient (P)  Barriers to Learning/Limitations: None  Patient Goal (s): to find relief from the pain  Patient Self Reported Health Status: fair  Rehabilitation Potential: good    Short Term Goals: To be accomplished in 2 weeks:  1. I and compliant with HEP for self management of symptoms. Long Term Goals: To be accomplished in 4 weeks:  1. Improve FOTO to 60 to indicate improved function with daily activities. 2. Increase left C/S rotation and lateral flexion to = right to increase ease with ADLs. 3. Patient will report >=50% improvement with pain and dizziness to increase ease with daily activities. 4. Perform Rhomberg EO on airex x 30\" without LOB to improve static balance for ADLs. Frequency / Duration: Patient to be seen 2 times per week for 4 weeks. Patient/ Caregiver education and instruction: Diagnosis, prognosis, exercises   [x]  Plan of care has been reviewed with PTA    Certification Period: 11/9/22-12/9/22  Gifty Simpson, PT 11/9/2022 3:47 PM    ________________________________________________________________________    I certify that the above Therapy Services are being furnished while the patient is under my care. I agree with the treatment plan and certify that this therapy is necessary.     [de-identified] Signature:____________________  Date:____________Time: _________     Lashay Manuel MD  Please sign and return to In [address] [facility] [address]  [place], [address]  [phone] fax

## 2022-11-09 NOTE — PROGRESS NOTES
PT DAILY TREATMENT NOTE 10-18    Patient Name: Steve Martinez  Date:2022  : 1940  [x]  Patient  Verified  Payor: VA MEDICARE / Plan: VA MEDICARE PART A & B / Product Type: Medicare /    In time:304  Out time:341  Total Treatment Time (min): 37  Visit #: 1 of 8    Medicare/BCBS Only   Total Timed Codes (min):  23 1:1 Treatment Time:  37       Treatment Area: Neck pain [M54.2]  Vertigo [R42]    SUBJECTIVE  Pain Level (0-10 scale): 5  Any medication changes, allergies to medications, adverse drug reactions, diagnosis change, or new procedure performed?: [x] No    [] Yes (see summary sheet for update)  Subjective functional status/changes:   [] No changes reported  See eval    OBJECTIVE  Modality rationale: decrease pain to improve the patients ability to perform ADLs   Min Type Additional Details   10 []  Ice     [x]  heat  []  Ice massage  []  Laser   []  Anodyne Position:supine  Location:left C/s during HEP   [] Skin assessment post-treatment:  []intact []redness- no adverse reaction    []redness - adverse reaction:     14 min [x]Eval                  []Re-Eval       15 min Therapeutic Exercise:  [] See flow sheet :HEP   Rationale: increase ROM to improve the patients ability to perform ADLs with less pain     8 min Manual Therapy:  SOR; left STM C/s paraspinals, SCMS, scalenes   The manual therapy interventions were performed at a separate and distinct time from the therapeutic activities interventions.   Rationale: decrease pain, increase ROM, increase tissue extensibility, and decrease trigger points to relax mm for daily activities   With   [] TE   [] TA   [] neuro   [] other: Patient Education: [x] Review HEP    [] Progressed/Changed HEP based on:   [] positioning   [] body mechanics   [] transfers   [] heat/ice application    [] other:      Other Objective/Functional Measures:      Pain Level (0-10 scale) post treatment: 4    ASSESSMENT/Changes in Function: see POC    Patient will continue to benefit from skilled PT services to modify and progress therapeutic interventions, address functional mobility deficits, address ROM deficits, analyze and address soft tissue restrictions, and analyze and cue movement patterns to attain remaining goals. [x]  See Plan of Care  []  See progress note/recertification  []  See Discharge Summary         Progress towards goals / Updated goals:  Short Term Goals: To be accomplished in 2 weeks:  1. I and compliant with HEP for self management of symptoms. IE: issued HEP  Long Term Goals: To be accomplished in 4 weeks:  1. Improve FOTO to 60 to indicate improved function with daily activities. IE:48  2. Increase left C/S rotation and lateral flexion to = right to increase ease with ADLs. IE: left rot 48; left lateral flex 15; right rot 58; right lateral flex 20  3. Patient will report >=50% improvement with pain and dizziness to increase ease with daily activities. IE:0%  4.  Perform Rhomberg EO on airex x 30\" without LOB to improve static balance for ADLs  IE: unable  PLAN  []  Upgrade activities as tolerated     [x]  Continue plan of care  []  Update interventions per flow sheet       []  Discharge due to:_  []  Other:_      Solo Leavitt, MPT, CMTPT 11/9/2022  4:05 PM    Future Appointments   Date Time Provider Too Serrano   11/11/2022  1:30 PM [facility] PT PHYSICAL THERAPY 1 [facility]   11/14/2022 10:30 AM Allyssa Escobar, PTA [facility]   11/16/2022 10:30 AM Lashay Hoff, PTA [facility]   11/21/2022 10:45 AM Stephanie Szymanski, PT [facility]   11/23/2022 10:30 AM Stephanie Szymanski, PT [facility]   11/29/2022  3:00 PM Lashay Hoff, PTA [facility]   12/1/2022  1:00 PM Lashay Hoff, PTA [facility]   5/22/2023  1:20 PM Ethel Canales MD [facility]

## 2022-11-11 ENCOUNTER — HOSPITAL ENCOUNTER (OUTPATIENT)
Dept: PHYSICAL THERAPY | Age: 82
Discharge: HOME | End: 2022-11-11
Payer: MEDICARE

## 2022-11-11 PROCEDURE — 97140 MANUAL THERAPY 1/> REGIONS: CPT

## 2022-11-11 PROCEDURE — 97110 THERAPEUTIC EXERCISES: CPT

## 2022-11-11 NOTE — PROGRESS NOTES
PHYSICAL THERAPY - DAILY TREATMENT NOTE      Patient Name: Dionisio Begum        Date: 2022  : 1940   YES Patient  Verified  Visit #:     Insurance: Payor: VA MEDICARE / Plan: VA MEDICARE PART A & B / Product Type: Medicare /      In time: 1:40 Out time: 2:25   Total Treatment Time: 45     Medicare/BCBS Time Tracking (below)   Total Timed Codes (min):  35 1:1 Treatment Time:  35     TREATMENT AREA =  Neck pain [M54.2]  Vertigo [R42]    SUBJECTIVE    Pain Level (on 0 to 10 scale):  5   10   Medication Changes/New allergies or changes in medical history, any new surgeries or procedures?     NO    If yes, update Summary List   Subjective Functional Status/Changes:  []  No changes reported       Functional improvements: none reported  Functional impairments: cervical pain with ADLs         OBJECTIVE  Modalities Rationale:     decrease pain to improve patient's ability to perform ADLs   min [] Estim, type/location:                                      []  att     []  unatt     []  w/US     []  w/ice    []  w/heat    min []  Mechanical Traction: type/lbs                   []  pro   []  sup   []  int   []  cont    []  before manual    []  after manual    min []  Ultrasound, settings/location:      min []  Iontophoresis w/ dexamethasone, location:                                               []  take home patch       []  in clinic   10 min []  Ice     [x]  Heat    location/position: Supine cervical    min []  Vasopneumatic Device, press/temp:        min []  Other:    [x] Skin assessment post-treatment (if applicable):    [x]  intact    []  redness- no adverse reaction                  []redness - adverse reaction:      20 min Therapeutic Exercise:  [x]  See flow sheet   Rationale:      increase ROM and increase strength to improve the patients ability to perform ADLs     15 min Manual Therapy: Technique:      [] S/DTM []IASTM []PROM [] Passive Stretching   []manual TPR    []Jt manipulation:Gr I [] II []  III [] IV[] V[]  Treatment Area:  SOR, gentle STM cervical paraspinals, UR, SCM   Rationale:      decrease pain, increase ROM, and increase tissue extensibility to improve patient's ability to perform ADLs    Billed With/As:   [x] TE   [] TA   [] Neuro   [] Self Care Patient Education: [x] Review HEP    [] Progressed/Changed HEP based on:   [] positioning   [] body mechanics   [] transfers   [] heat/ice application    [] other:      Other Objective/Functional Measures:    Patient had poor tolerance to manual interventions and therex due to guarding and complaints of pain. Post Treatment Pain Level (on 0 to 10) scale:   6 / 10     ASSESSMENT    Assessment/Changes in Function:     Patient continues to report left>right cervical pain exacerbated with movement. Reviewed gentle therex HEP to improve compliance. []  See Progress Note/Recertification   Patient will continue to benefit from skilled PT services to modify and progress therapeutic interventions, address functional mobility deficits, address ROM deficits, address strength deficits, analyze and address soft tissue restrictions, analyze and cue movement patterns, analyze and modify body mechanics/ergonomics, and assess and modify postural abnormalities to attain remaining goals. to attain remaining goals. Progress toward goals / Updated goals:    Short Term Goals: To be accomplished in 2 weeks:  1. I and compliant with HEP for self management of symptoms. Long Term Goals: To be accomplished in 4 weeks:  1. Improve FOTO to 60 to indicate improved function with daily activities. 2. Increase left C/S rotation and lateral flexion to = right to increase ease with ADLs. 3. Patient will report >=50% improvement with pain and dizziness to increase ease with daily activities. Current Status: Patient report pain 5-6/10 with movement. 4. Perform Rhomberg EO on airex x 30\" without LOB to improve static balance for ADLs. PLAN    [x]  Upgrade activities as tolerated YES Continue plan of care   []  Discharge due to :    []  Other:      Therapist: Keven Preston PT    Date: 11/11/2022 Time: 2:07 PM     Future Appointments   Date Time Provider Too Serrano   11/14/2022 10:30 AM Amilcar Kelsey, PTA [facility]   11/16/2022 10:30 AM Arley Sidhu, DAVID [facility]   11/21/2022 10:45 AM Milly Glaindo, PT [facility]   11/23/2022 10:30 AM Milly Galindo, PT [facility]   11/29/2022  3:00 PM Arley Sidhu PTA [facility]   12/1/2022  1:00 PM Arley Sidhu, DAVID [facility]   5/22/2023  1:20 PM Giorgio Wynn MD [facility] BS AM[facility]

## 2022-11-14 ENCOUNTER — HOSPITAL ENCOUNTER (OUTPATIENT)
Dept: PHYSICAL THERAPY | Age: 82
Discharge: HOME | End: 2022-11-14
Payer: MEDICARE

## 2022-11-14 PROCEDURE — 97140 MANUAL THERAPY 1/> REGIONS: CPT

## 2022-11-14 PROCEDURE — 97112 NEUROMUSCULAR REEDUCATION: CPT

## 2022-11-14 PROCEDURE — 97110 THERAPEUTIC EXERCISES: CPT

## 2022-11-14 NOTE — PROGRESS NOTES
PT DAILY TREATMENT NOTE     Patient Name: Oliverio Daly  Date:2022  : 1940  [x]  Patient  Verified  Payor: VA MEDICARE / Plan: VA MEDICARE PART A & B / Product Type: Medicare /    In time:10:35  Out time:11:23  Total Treatment Time (min): 48  Visit #: 3 of 8    Medicare/BCBS Only   Total Timed Codes (min):  38 1:1 Treatment Time:  38       Treatment Area: Neck pain [M54.2]  Vertigo [R42]    SUBJECTIVE  Pain Level (0-10 scale): 7/10  Any medication changes, allergies to medications, adverse drug reactions, diagnosis change, or new procedure performed?: [x] No    [] Yes (see summary sheet for update)  Subjective functional status/changes:   [] No changes reported  Pt reports no new complaints of pain. Pt reports compliance with HEP. OBJECTIVE    Modality rationale: decrease inflammation and decrease pain to improve the patients ability to perform ADLs with increased ease.     Min Type Additional Details    [] Estim:  []Unatt       []IFC  []Premod                        []Other:  []w/ice   []w/heat  Position:  Location:    [] Estim: []Att    []TENS instruct  []NMES                    []Other:  []w/US   []w/ice   []w/heat  Position:  Location:    []  Traction: [] Cervical       []Lumbar                       [] Prone          []Supine                       []Intermittent   []Continuous Lbs:  [] before manual  [] after manual    []  Ultrasound: []Continuous   [] Pulsed                           []1MHz   []3MHz W/cm2:  Location:    []  Iontophoresis with dexamethasone         Location: [] Take home patch   [] In clinic   10 []  Ice     [x]  heat  []  Ice massage  []  Laser   []  Anodyne Position:supine  Location:c/s    []  Laser with stim  []  Other:  Position:  Location:    []  Vasopneumatic Device    []  Right     []  Left  Pre-treatment girth:  Post-treatment girth:  Measured at (location):  Pressure:       [] lo [] med [] hi   Temperature: [] lo [] med [] hi   [] Skin assessment post-treatment:  []intact []redness- no adverse reaction    []redness - adverse reaction:       18 min Therapeutic Exercise:  [x] See flow sheet :   Rationale: increase ROM and increase strength to improve the patients ability to perform ADLs with increased ease. 10 min Neuromuscular Re-education:  [x]  See flow sheet :   Rationale: increase strength, improve coordination, and increase proprioception  to improve the patients ability to perform ADLs with increased ease. 10 min Manual Therapy:  SOR, STM to bilateral UT/LS and c/s paraspinals with patient in supine   The manual therapy interventions were performed at a separate and distinct time from the therapeutic activities interventions. Rationale: decrease pain, increase ROM, and increase tissue extensibility to perform ADLs with increased ease. With   [] TE   [] TA   [] neuro   [] other: Patient Education: [x] Review HEP    [] Progressed/Changed HEP based on:   [] positioning   [] body mechanics   [] transfers   [] heat/ice application    [] other:      Other Objective/Functional Measures: Added exercises as per flow sheet. Pain Level (0-10 scale) post treatment: 6/10    ASSESSMENT/Changes in Function: Pt has fair tolerance to PT with significant TTP with manual treatment. Pt demonstrates poor c/s AROM due to pain with movement. Patient will continue to benefit from skilled PT services to modify and progress therapeutic interventions, address functional mobility deficits, address ROM deficits, address strength deficits, analyze and address soft tissue restrictions, analyze and cue movement patterns, analyze and modify body mechanics/ergonomics, and assess and modify postural abnormalities to attain remaining goals. []  See Plan of Care  []  See progress note/recertification  []  See Discharge Summary         Progress towards goals / Updated goals:  Short Term Goals: To be accomplished in 2 weeks:  1.  I and compliant with HEP for self management of symptoms. IE: issued HEP  Current: Pt reports compliance with HEP. 11/14/2022  Long Term Goals: To be accomplished in 4 weeks:  1. Improve FOTO to 60 to indicate improved function with daily activities. IE:48  2. Increase left C/S rotation and lateral flexion to = right to increase ease with ADLs. IE: left rot 48; left lateral flex 15; right rot 58; right lateral flex 20  3. Patient will report >=50% improvement with pain and dizziness to increase ease with daily activities. IE:0%  4.  Perform Rhomberg EO on airex x 30\" without LOB to improve static balance for ADLs  IE: unable    PLAN  []  Upgrade activities as tolerated     [x]  Continue plan of care  []  Update interventions per flow sheet       []  Discharge due to:_  []  Other:_      Smita Montenegro PTA 11/14/2022  10:43 AM    Future Appointments   Date Time Provider [facility]   11/16/2022 10:30 AM Arley Sidhu PTA [facility]   11/21/2022 10:45 AM Milly Galindo, PT [facility]   11/23/2022 10:30 AM Milly Galindo, PT [facility]   11/29/2022  3:00 PM Arley Sidhu PTA [facility]   12/1/2022  1:00 PM Arley Sidhu PTA [facility]   5/22/2023  1:20 PM Cathleen Peabody, MD [facility] BS AMB

## 2022-11-16 ENCOUNTER — HOSPITAL ENCOUNTER (OUTPATIENT)
Dept: PHYSICAL THERAPY | Age: 82
Discharge: HOME | End: 2022-11-16
Payer: MEDICARE

## 2022-11-16 PROCEDURE — 97140 MANUAL THERAPY 1/> REGIONS: CPT

## 2022-11-16 PROCEDURE — 97110 THERAPEUTIC EXERCISES: CPT

## 2022-11-16 NOTE — PROGRESS NOTES
PT DAILY TREATMENT NOTE     Patient Name: Oliverio Daly  Date:2022  : 1940  [x]  Patient  Verified  Payor: [place] MEDICARE / Plan: [place] MEDICARE PART A & B / Product Type: Medicare /    In time:10:39  Out time:11:29  Total Treatment Time (min): 50  Visit #: 4 of 8    Medicare/BCBS Only   Total Timed Codes (min):  40 1:1 Treatment Time:  35       Treatment Area: Neck pain [M54.2]  Vertigo [R42]    SUBJECTIVE  Pain Level (0-10 scale): 5  Any medication changes, allergies to medications, adverse drug reactions, diagnosis change, or new procedure performed?: [x] No    [] Yes (see summary sheet for update)  Subjective functional status/changes:   [] No changes reported  Pt reports she thought she was suppose to have treatment for her TMJ with therapy as well. OBJECTIVE    Modality rationale: decrease pain and increase tissue extensibility to improve the patients ability to perform ADL's.    Min Type Additional Details    [] Estim:  []Unatt       []IFC  []Premod                        []Other:  []w/ice   []w/heat  Position:  Location:    [] Estim: []Att    []TENS instruct  []NMES                    []Other:  []w/US   []w/ice   []w/heat  Position:  Location:    []  Traction: [] Cervical       []Lumbar                       [] Prone          []Supine                       []Intermittent   []Continuous Lbs:  [] before manual  [] after manual    []  Ultrasound: []Continuous   [] Pulsed                           []1MHz   []3MHz W/cm2:  Location:    []  Iontophoresis with dexamethasone         Location: [] Take home patch   [] In clinic   10 []  Ice     [x]  heat  []  Ice massage  []  Laser   []  Anodyne Position:supine w/wedge  Location: C/S    []  Laser with stim  []  Other:  Position:  Location:    []  Vasopneumatic Device    []  Right     []  Left  Pre-treatment girth:  Post-treatment girth:  Measured at (location):  Pressure:       [] lo [] med [] hi   Temperature: [] lo [] med [] hi   [] Skin assessment post-treatment:  []intact []redness- no adverse reaction    []redness - adverse reaction:     20 min Therapeutic Exercise:  [x] See flow sheet :   Rationale: increase ROM, increase strength, and improve coordination to improve the patients ability to perform functional task with ease. 10 min Neuromuscular Re-education:  [x]  See flow sheet :   Rationale: increase strength, improve coordination, and increase proprioception  to improve the patients ability to perform ADL's with ease. 10 min Manual Therapy:  SOR, STM to bilateral UT/LS and c/s paraspinals with patient in supine   The manual therapy interventions were performed at a separate and distinct time from the therapeutic activities interventions. Rationale: decrease pain, increase ROM, increase tissue extensibility, and decrease trigger points to improve functional mobility with ADL's. With   [] TE   [] TA   [] neuro   [] other: Patient Education: [x] Review HEP    [] Progressed/Changed HEP based on:   [] positioning   [] body mechanics   [] transfers   [] heat/ice application    [] other:      Other Objective/Functional Measures:      Pain Level (0-10 scale) post treatment: 4    ASSESSMENT/Changes in Function:   Pt encouraged to increase her awareness of her posture when at home to aid with decreased discomfort and tightness in the B UT's and C/S. Continued mm restrictions and limited mobility noted in the left C/S and UT during manual treatment. Pt reports decreased pain post treatment. Patient will continue to benefit from skilled PT services to modify and progress therapeutic interventions, address functional mobility deficits, address ROM deficits, address strength deficits, analyze and address soft tissue restrictions, analyze and cue movement patterns, analyze and modify body mechanics/ergonomics, and assess and modify postural abnormalities to attain remaining goals.      [x]  See Plan of Care  []  See progress note/recertification  []  See Discharge Summary         Progress towards goals / Updated goals:  Short Term Goals: To be accomplished in 2 weeks:  1. I and compliant with HEP for self management of symptoms. IE: issued HEP  Current: Pt reports compliance with HEP. 11/14/2022  Long Term Goals: To be accomplished in 4 weeks:  1. Improve FOTO to 60 to indicate improved function with daily activities. IE:48  2. Increase left C/S rotation and lateral flexion to = right to increase ease with ADLs. IE: left rot 48; left lateral flex 15; right rot 58; right lateral flex 20  3. Patient will report >=50% improvement with pain and dizziness to increase ease with daily activities. IE:0%  4.  Perform Rhomberg EO on airex x 30\" without LOB to improve static balance for ADLs  IE: unable    PLAN  []  Upgrade activities as tolerated     [x]  Continue plan of care  []  Update interventions per flow sheet       []  Discharge due to:_  []  Other:_      Cris Wu PTA 11/16/2022  10:39 AM    Future Appointments   Date Time Provider Too Serrano   11/21/2022 10:45 AM Misty Strickland, PT [facility]   11/23/2022 10:30 AM Misty Strickland, PT [facility]   11/29/2022  3:00 PM Sweta Gunderson PTA [facility]   12/1/2022  1:00 PM Sweta Gunderson PTA [facility]   5/22/2023  1:20 PM Alvarez Mast MD [id number] [facility]

## 2022-11-21 ENCOUNTER — APPOINTMENT (OUTPATIENT)
Dept: PHYSICAL THERAPY | Age: 82
End: 2022-11-21
Payer: MEDICARE

## 2022-11-29 ENCOUNTER — APPOINTMENT (OUTPATIENT)
Dept: PHYSICAL THERAPY | Age: 82
End: 2022-11-29
Payer: MEDICARE

## 2022-12-01 ENCOUNTER — HOSPITAL ENCOUNTER (OUTPATIENT)
Dept: PHYSICAL THERAPY | Age: 82
Discharge: HOME | End: 2022-12-01
Payer: MEDICARE

## 2022-12-01 PROCEDURE — 97140 MANUAL THERAPY 1/> REGIONS: CPT

## 2022-12-01 PROCEDURE — 97110 THERAPEUTIC EXERCISES: CPT

## 2022-12-01 NOTE — PROGRESS NOTES
PT DAILY TREATMENT NOTE     Patient Name: Daniella Bernardo  Date:2022  : 1940  [x]  Patient  Verified  Payor: VA MEDICARE / Plan: VA MEDICARE PART A & B / Product Type: Medicare /    In time:1:00  Out time: 1:51  Total Treatment Time (min): 51  Visit #: 5 of 8    Medicare/BCBS Only   Total Timed Codes (min):  41 1:1 Treatment Time:  35       Treatment Area: Neck pain [M54.2]  Vertigo [R42]    SUBJECTIVE  Pain Level (0-10 scale): 4  Any medication changes, allergies to medications, adverse drug reactions, diagnosis change, or new procedure performed?: [x] No    [] Yes (see summary sheet for update)  Subjective functional status/changes:   [] No changes reported  Pt reports her pain is getting better but she is still having a lot of \"wooziness\"     OBJECTIVE    Modality rationale: decrease pain and increase tissue extensibility to improve the patients ability to perform ADL's with ease.    Min Type Additional Details    [] Estim:  []Unatt       []IFC  []Premod                        []Other:  []w/ice   []w/heat  Position:  Location:    [] Estim: []Att    []TENS instruct  []NMES                    []Other:  []w/US   []w/ice   []w/heat  Position:  Location:    []  Traction: [] Cervical       []Lumbar                       [] Prone          []Supine                       []Intermittent   []Continuous Lbs:  [] before manual  [] after manual    []  Ultrasound: []Continuous   [] Pulsed                           []1MHz   []3MHz W/cm2:  Location:    []  Iontophoresis with dexamethasone         Location: [] Take home patch   [] In clinic   10 []  Ice     [x]  heat  []  Ice massage  []  Laser   []  Anodyne Position:supine w/wedge  Location: C/S    []  Laser with stim  []  Other:  Position:  Location:    []  Vasopneumatic Device    []  Right     []  Left  Pre-treatment girth:  Post-treatment girth:  Measured at (location):  Pressure:       [] lo [] med [] hi   Temperature: [] lo [] med [] hi   [x] Skin assessment post-treatment:  [x]intact []redness- no adverse reaction    []redness - adverse reaction:       23 min Therapeutic Exercise:  [x] See flow sheet :   Rationale: increase ROM, increase strength, and improve coordination to improve the patients ability to perform functional task with ease. 10 min Neuromuscular Re-education:  [x]  See flow sheet :   Rationale: increase strength, improve coordination, and increase proprioception  to improve the patients ability to improve ADL's with ease. 8 min Manual Therapy:  SOR, STM to bilateral UT/LS and c/s paraspinals with patient in supine   The manual therapy interventions were performed at a separate and distinct time from the therapeutic activities interventions. Rationale: decrease pain, increase ROM, and increase tissue extensibility to improve functional mobility with ADL's. With   [] TE   [] TA   [] neuro   [] other: Patient Education: [x] Review HEP    [] Progressed/Changed HEP based on:   [] positioning   [] body mechanics   [] transfers   [] heat/ice application    [] other:      Other Objective/Functional Measures:      Pain Level (0-10 scale) post treatment: 3    ASSESSMENT/Changes in Function:   The pt has displayed improve left rotation and lateral flexion C/S ROM since [facility] and notes decreased pain. The pt has reported an overall improvement in pain reduction of about 75% but notes no significant changes in dizziness and \"wooziness\". The Pt does display good static balance displaying no LOB during Rhomberg balancing on the airex. The pt reports she has been prescribed meclozine but has not picked up the prescription yet. Plan to incorporate static balance with head turns and eventually progress to dynamic gait activities with head turns to improve vertigo like symptoms.       Patient will continue to benefit from skilled PT services to modify and progress therapeutic interventions, address functional mobility deficits, address ROM deficits, address strength deficits, analyze and address soft tissue restrictions, analyze and cue movement patterns, analyze and modify body mechanics/ergonomics, and assess and modify postural abnormalities to attain remaining goals. [x]  See Plan of Care  []  See progress note/recertification  []  See Discharge Summary         Progress towards goals / Updated goals:  Short Term Goals: To be accomplished in 2 weeks:  1. I and compliant with HEP for self management of symptoms. IE: issued HEP  Current: Pt reports compliance with HEP. 11/14/2022  Long Term Goals: To be accomplished in 4 weeks:  1. Improve FOTO to 60 to indicate improved function with daily activities. IE:48  Current: NV  2. Increase left C/S rotation and lateral flexion to = right to increase ease with ADLs. IE: left rot 48; left lateral flex 15; right rot 58; right lateral flex 20  Current: progressing 12/1/22 left rotation 52 deg, right rot 67 deg, left lat flex 20 deg, right lat flex 25 deg   3. Patient will report >=50% improvement with pain and dizziness to increase ease with daily activities. IE:0%  Current; progressing 12/1/22 75% improvement in pain, 0 improvement in dizziness  4.  Perform Rhomberg EO on airex x 30\" without LOB to improve static balance for ADLs  IE: unable  Current: Met 12/1/22 Rhomber EO 30\" on airex no LOB     PLAN  []  Upgrade activities as tolerated     [x]  Continue plan of care  []  Update interventions per flow sheet       []  Discharge due to:_  []  Other:_      Meena Lyn, PTA 12/1/2022  1:05 PM    Future Appointments   Date Time Provider Too Serrano   5/22/2023  1:20 PM Yaz Araujo MD [facility]

## 2022-12-06 ENCOUNTER — APPOINTMENT (OUTPATIENT)
Dept: PHYSICAL THERAPY | Age: 82
End: 2022-12-06
Payer: MEDICARE

## 2022-12-07 ENCOUNTER — HOSPITAL ENCOUNTER (OUTPATIENT)
Dept: PHYSICAL THERAPY | Age: 82
Discharge: HOME | End: 2022-12-07
Payer: MEDICARE

## 2022-12-07 PROCEDURE — 97110 THERAPEUTIC EXERCISES: CPT

## 2022-12-07 PROCEDURE — 97112 NEUROMUSCULAR REEDUCATION: CPT

## 2022-12-07 PROCEDURE — 97140 MANUAL THERAPY 1/> REGIONS: CPT

## 2022-12-07 NOTE — PROGRESS NOTES
PT DAILY TREATMENT NOTE     Patient Name: Deysi Acevedo  Date:2022  : 1940  [x]  Patient  Verified  Payor: VA MEDICARE / Plan: VA MEDICARE PART A & B / Product Type: Medicare /    In time:1247pm  Out time:136pm  Total Treatment Time (min): 52  Visit #: 6 of 8    Medicare/BCBS Only   Total Timed Codes (min):  49 1:1 Treatment Time:  38       Treatment Area: Neck pain [M54.2]  Vertigo [R42]    SUBJECTIVE  Pain Level (0-10 scale): 6  Any medication changes, allergies to medications, adverse drug reactions, diagnosis change, or new procedure performed?: [x] No    [] Yes (see summary sheet for update)  Subjective functional status/changes:   [] No changes reported  Pt reports she got a referral from her MD for tx of neck pain and vetigo. OBJECTIVE    Modality rationale: decrease pain and increase tissue extensibility to improve the patients ability to manage self care. Min Type Additional Details   10 []  Ice     [x]  heat  []  Ice massage  []  Laser   []  Anodyne Position:supine with wedge  Location: neck   [] Skin assessment post-treatment:  []intact []redness- no adverse reaction    []redness - adverse reaction:      18 min Therapeutic Exercise:  [x] See flow sheet :   Rationale: increase ROM and increase strength to improve the patients ability to manage ADLs with reduced pain. 10 min Neuromuscular Re-education:  [x]  See flow sheet :   Rationale: increase strength, improve coordination, and improve balance  to improve the patients ability to manage ADLs with improved ease. 10 min Manual Therapy:  STM to scalenes, c/s paraspinals, and UT left > right; SOR   The manual therapy interventions were performed at a separate and distinct time from the therapeutic activities interventions. Rationale: decrease pain, increase ROM, and increase tissue extensibility to improve ADL ease.            With   [] TE   [] TA   [] neuro   [] other: Patient Education: [x] Review HEP    [] Progressed/Changed HEP based on:   [] positioning   [] body mechanics   [] transfers   [] heat/ice application    [] other:      Other Objective/Functional Measures:   Smooth pursuits (+) horizontal and vertical B  Saccades (+) horizontal and vertical B  VOR slow (+)  Convergence (--)  No dizziness turning in bed, more with weightbearing. Pain Level (0-10 scale) post treatment: 4    ASSESSMENT/Changes in Function: Pt presents to therapy today with mildly ataxic gait, with widened LEVON and forward lean. Denies sensation disturbance to feet and feels dizzy typically when standing and walking. Reports her doctor discussed with her to do PT for vertigo. Pt has not yet started Meclizine. PT discussed withholding taking Meclizine when doing vestibular PT as the treatments are ineffective in conjunction with this medication. Pt does not like taking medication and was okay with this. Her HEP has been updated to include gaze stability activities. She does demonstrate (+) nystagmus with smooth pursuits, saccades, and VOR slow movements. She is not able to tolerate VOR quick movements and we did not do DHP due to neck pain. Educated pt that we may need to continue working on neck pain first before we progress to more vestibular activities as they may create more neck pain. Her balance is of concern, and will benefit from PT to address. Neck pain has had minimal change in sx severity, with continued myofascial pain of the left scalenes, suboccipitals, and UT. Patient will continue to benefit from skilled PT services to modify and progress therapeutic interventions, address functional mobility deficits, address ROM deficits, address strength deficits, analyze and address soft tissue restrictions, analyze and cue movement patterns, analyze and modify body mechanics/ergonomics, assess and modify postural abnormalities, address imbalance/dizziness, and instruct in home and community integration to attain remaining goals. []  See Plan of Care  [x]  See progress note/recertification  []  See Discharge Summary         Progress towards goals / Updated goals:  Short Term Goals: To be accomplished in 2 weeks:  1. I and compliant with HEP for self management of symptoms. IE: issued HEP  Current: Pt reports compliance with HEP. 11/14/2022  Long Term Goals: To be accomplished in 4 weeks:  1. Improve FOTO to 60 to indicate improved function with daily activities. IE:48  Current: regressed, 45 (12/7/2022)  2. Increase left C/S rotation and lateral flexion to = right to increase ease with ADLs. IE: left rot 48; left lateral flex 15; right rot 58; right lateral flex 20  Current: progressing 12/1/22 left rotation 52 deg, right rot 67 deg, left lat flex 20 deg, right lat flex 25 deg   3. Patient will report >=50% improvement with pain and dizziness to increase ease with daily activities. IE:0%  Current; progressing 12/1/22 75% improvement in pain, 0 improvement in dizziness  4.  Perform Rhomberg EO on airex x 30\" without LOB to improve static balance for ADLs  IE: unable  Current: Met 12/1/22 Rhomber EO 30\" on airex no LOB     PLAN  []  Upgrade activities as tolerated     [x]  Continue plan of care  []  Update interventions per flow sheet       []  Discharge due to:_  []  Other:_      Angelina Prajapati, PT 12/7/2022  12:55 PM    Future Appointments   Date Time Provider Too Martinezi   12/12/2022 11:30 AM Marilyn Hull, PT [facility]   12/14/2022  2:30 PM Fernanda Terrazas, PTA [facility]   12/19/2022  2:00 PM Marilyn Hull, PT [facility]   12/21/2022 12:30 PM Jackie Cunningham, PT [facility]   12/30/2022  1:00 PM Jackie Riosch, PT [facility]   1/3/2023  1:00 PM Jackie Hitch, PT [facility]   1/5/2023  1:00 PM Marilyn Hull, PT [facility]   5/22/2023  1:20 PM Ashish Jarrell MD [facility]

## 2022-12-07 NOTE — PROGRESS NOTES
[facility]  [address] [facility] [address]  [place], [address]  [phone] fax    Continued Plan of Care/ Re-certification for Physical Therapy Services    Patient name: Elena Lr Start of Care: 2022   Referral source: Kevin Griggs MD : 1940   Medical/Treatment Diagnosis: Neck pain [M54.2]  Vertigo [R42]  Payor: VA MEDICARE / Plan: VA MEDICARE PART A & B / Product Type: Medicare /  Onset Date:1 month prior to [facility]     Prior Hospitalization: see medical history Provider#: [id number]   Medications: Verified on Patient Summary List    Comorbidities: OA; HTN; visually impaired   Prior Level of Function: No c/o HAs, C/S pain  Visits from Start of Care: 6    Missed Visits: 2    The Plan of Care and following information is based on the patient's current status:  Short Term Goals: To be accomplished in 2 weeks:  1. I and compliant with HEP for self management of symptoms. IE: issued HEP  Current: Pt reports compliance with HEP. Long Term Goals: To be accomplished in 4 weeks:  1. Improve FOTO to 60 to indicate improved function with daily activities. IE:48  Current: regressed, 45   2. Increase left C/S rotation and lateral flexion to = right to increase ease with ADLs. IE: left rot 48; left lateral flex 15; right rot 58; right lateral flex 20  Current: progressing, left rotation 52 deg, right rot 67 deg, left lat flex 20 deg, right lat flex 25 deg   3. Patient will report >=50% improvement with pain and dizziness to increase ease with daily activities. IE:0%  Current; progressing, 75% improvement in pain, 0 improvement in dizziness  4.  Perform Rhomberg EO on airex x 30\" without LOB to improve static balance for ADLs  IE: unable  Current: Met, Rhomber EO 30\" on airex no LOB     Key functional changes: limited      Problems/ barriers to goal attainment: dizziness and pain     Problem List: pain affecting function, decrease ROM, decrease strength, impaired gait/ balance, decrease ADL/ functional abilitiies, decrease activity tolerance, decrease flexibility/ joint mobility, and decrease transfer abilities    Treatment Plan: Therapeutic exercise, Neuromuscular reeducation, Manual therapy, Therapeutic activity, Self care/home management, Electric stim unattended , Gait training, Ultrasound, Electric stim attended, and Needle insertion w/o injection (1 or 2 muscles)     Patient Goal (s) has been updated and includes: better pain, no dizziness     Goals for this certification period to be accomplished in 4 weeks:  1. Improve FOTO to 60 to indicate improved function with daily activities. Re-cert: regressed, 45   2. Increase left C/S rotation and lateral flexion to = right to increase ease with ADLs. Re-cert: progressing, left rotation 52 deg, right rot 67 deg, left lat flex 20 deg, right lat flex 25 deg   3. Patient will report >=50% improvement with pain and dizziness to increase ease with daily activities. Re-cert:0%  4. Pt will perform MSR on airex with EO and EC without LOB for at least 20 seconds, demonstrating improved stability for ambulation. Re-cert: not tested. Frequency / Duration: Patient to be seen 2 times per week for 4 weeks:    Assessment / Recommendations:Pt presents to therapy today with mildly ataxic gait, with widened LEVON and forward lean. Denies sensation disturbance to feet and feels dizzy typically when standing and walking. Reports her doctor discussed with her to do PT for vertigo. Pt has not yet started Meclizine. PT discussed withholding taking Meclizine when doing vestibular PT as the treatments are ineffective in conjunction with this medication. Pt does not like taking medication and was okay with this. Her HEP has been updated to include gaze stability activities. She does demonstrate (+) nystagmus with smooth pursuits, saccades, and VOR slow movements.  She is not able to tolerate VOR quick movements and we did not do DHP due to neck pain. Educated pt that we may need to continue working on neck pain first before we progress to more vestibular activities as they may create more neck pain. Her balance is of concern, and will benefit from PT to address. Neck pain has had minimal change in sx severity, with continued myofascial pain of the left scalenes, suboccipitals, and UT. Patient will continue to benefit from skilled PT services to modify and progress therapeutic interventions, address functional mobility deficits, address ROM deficits, address strength deficits, analyze and address soft tissue restrictions, analyze and cue movement patterns, analyze and modify body mechanics/ergonomics, assess and modify postural abnormalities, address imbalance/dizziness, and instruct in home and community integration to attain remaining goals. Certification Period: 12/12/2022 -- 1/10/2023    Lizeth Oliva, PT 12/7/2022 3:08 PM    ________________________________________________________________________  I certify that the above Therapy Services are being furnished while the patient is under my care. I agree with the treatment plan and certify that this therapy is necessary. [] I have read the above and request that my patient continue as recommended.   [] I have read the above report and request that my patient continue therapy with the following changes/special instructions: _____________________________________________  [] I have read the above report and request that my patient be discharged from therapy    Physician's Signature:____________Date:_________TIME:________     Eun Hewitt MD  ** Signature, Date and Time must be completed for valid certification **    Please sign and return to [facility] [address] [place] [address] & [facility]  [address]  [place], [address] [place], [place] [address]  [phone] fax

## 2022-12-12 ENCOUNTER — TELEPHONE (OUTPATIENT)
Dept: PHYSICAL THERAPY | Age: 82
End: 2022-12-12

## 2022-12-14 ENCOUNTER — HOSPITAL ENCOUNTER (OUTPATIENT)
Dept: PHYSICAL THERAPY | Age: 82
Discharge: HOME | End: 2022-12-14
Payer: MEDICARE

## 2022-12-14 PROCEDURE — 97140 MANUAL THERAPY 1/> REGIONS: CPT

## 2022-12-14 PROCEDURE — 97110 THERAPEUTIC EXERCISES: CPT

## 2022-12-14 PROCEDURE — 97112 NEUROMUSCULAR REEDUCATION: CPT

## 2022-12-14 NOTE — PROGRESS NOTES
PT DAILY TREATMENT NOTE     Patient Name: Lopez Sanford  Date:2022  : 1940  [x]  Patient  Verified  Payor: [place] MEDICARE / Plan: [place] MEDICARE PART A & B / Product Type: Medicare /    In time:2:30  Out time:3:18  Total Treatment Time (min): 48  Visit #: 1 of 8    Medicare/BCBS Only   Total Timed Codes (min):  38 1:1 Treatment Time:  38       Treatment Area: Neck pain [M54.2]  Vertigo [R42]    SUBJECTIVE  Pain Level (0-10 scale): 5  Any medication changes, allergies to medications, adverse drug reactions, diagnosis change, or new procedure performed?: [x] No    [] Yes (see summary sheet for update)  Subjective functional status/changes:   [] No changes reported  Pt reports she has started doing her updated HEP exercises to improve her dizziness and states the one where she has to follow the pen really increases her dizziness some. OBJECTIVE    Modality rationale: decrease pain and increase tissue extensibility to improve the patients ability to perform ADL's with ease.    Min Type Additional Details    [] Estim:  []Unatt       []IFC  []Premod                        []Other:  []w/ice   []w/heat  Position:  Location:    [] Estim: []Att    []TENS instruct  []NMES                    []Other:  []w/US   []w/ice   []w/heat  Position:  Location:    []  Traction: [] Cervical       []Lumbar                       [] Prone          []Supine                       []Intermittent   []Continuous Lbs:  [] before manual  [] after manual    []  Ultrasound: []Continuous   [] Pulsed                           []1MHz   []3MHz W/cm2:  Location:    []  Iontophoresis with dexamethasone         Location: [] Take home patch   [] In clinic   10 []  Ice     [x]  heat  []  Ice massage  []  Laser   []  Anodyne Position:supine w/wedge  Location: C/S    []  Laser with stim  []  Other:  Position:  Location:    []  Vasopneumatic Device    []  Right     []  Left  Pre-treatment girth:  Post-treatment girth:  Measured at (location):  Pressure:       [] lo [] med [] hi   Temperature: [] lo [] med [] hi   [x] Skin assessment post-treatment:  [x]intact []redness- no adverse reaction    []redness - adverse reaction:     15 min Therapeutic Exercise:  [x] See flow sheet :   Rationale: increase ROM, increase strength, and improve coordination to improve the patients ability to perform functional task with ease. 15 min Neuromuscular Re-education:  [x]  See flow sheet :   Rationale: increase strength, improve coordination, and increase proprioception  to improve the patients ability to perform ADL's with ease. 8 min Manual Therapy:  STM to scalenes, c/s paraspinals, and UT left > right; SOR   The manual therapy interventions were performed at a separate and distinct time from the therapeutic activities interventions. Rationale: decrease pain, increase ROM, and increase tissue extensibility to improve functional mobility with ADL's. With   [] TE   [] TA   [] neuro   [] other: Patient Education: [x] Review HEP    [] Progressed/Changed HEP based on:   [] positioning   [] body mechanics   [] transfers   [] heat/ice application    [] other:      Other Objective/Functional Measures:   Rhomberg 2x30\"  MSR 30\" ea     Pain Level (0-10 scale) post treatment: 3    ASSESSMENT/Changes in Function:   Rhomberg and MSR balancing initiated this visit to continue to improve balance and stability with ADL's. The pt displays no LOB with Rhomberg balance but required intermittent UE use with MSR on both LE's. Continued tenderness in the right scalenes noted during manual. Pt notes decreased pain post heat modality.     Patient will continue to benefit from skilled PT services to modify and progress therapeutic interventions, address functional mobility deficits, address ROM deficits, address strength deficits, analyze and address soft tissue restrictions, analyze and cue movement patterns, analyze and modify body mechanics/ergonomics, and assess and modify postural abnormalities to attain remaining goals. [x]  See Plan of Care  []  See progress note/recertification  []  See Discharge Summary         Progress towards goals / Updated goals:  Goals for this certification period to be accomplished in 4 weeks:  1. Improve FOTO to 60 to indicate improved function with daily activities. Re-cert: regressed, 45   2. Increase left C/S rotation and lateral flexion to = right to increase ease with ADLs. Re-cert: progressing, left rotation 52 deg, right rot 67 deg, left lat flex 20 deg, right lat flex 25 deg   3. Patient will report >=50% improvement with pain and dizziness to increase ease with daily activities. Re-cert:0%  4. Pt will perform MSR on airex with EO and EC without LOB for at least 20 seconds, demonstrating improved stability for ambulation. Re-cert: not tested.     PLAN  []  Upgrade activities as tolerated     [x]  Continue plan of care  []  Update interventions per flow sheet       []  Discharge due to:_  []  Other:_      Darin Gale, PTA 12/14/2022  2:27 PM    Future Appointments   Date Time Provider Too Serrano   12/14/2022  2:30 PM Christina Baughpbras, PTA [facility]   12/19/2022  2:00 PM Jerrald Breath, PT [facility]   12/21/2022 12:30 PM Ian Grullon, PT [facility]   12/30/2022  1:00 PM Ian Grullon, PT [facility]   1/3/2023  1:00 PM Ian Grullon, PT [facility]   1/5/2023  1:00 PM Jerrald Breath, PT [facility]   5/22/2023  1:20 PM iMlena Mohan MD [facility]

## 2022-12-19 ENCOUNTER — HOSPITAL ENCOUNTER (OUTPATIENT)
Dept: PHYSICAL THERAPY | Age: 82
Discharge: HOME | End: 2022-12-19
Payer: MEDICARE

## 2022-12-19 PROCEDURE — 97112 NEUROMUSCULAR REEDUCATION: CPT

## 2022-12-19 PROCEDURE — 97140 MANUAL THERAPY 1/> REGIONS: CPT

## 2022-12-19 NOTE — PROGRESS NOTES
PT DAILY TREATMENT NOTE     Patient Name: Deysi Acevedo  Date:2022  : 1940  [x]  Patient  Verified  Payor: [place] MEDICARE / Plan: [place] MEDICARE PART A & B / Product Type: Medicare /    In time:204pm  ([id number], 1:1) Out time:249pm (239, 1:1)  Total Treatment Time (min): 45  Visit #: 2 of 8    Medicare/BCBS Only   Total Timed Codes (min):  45 1:1 Treatment Time:  30       Treatment Area: Neck pain [M54.2]  Vertigo [R42]    SUBJECTIVE  Pain Level (0-10 scale): 5  Any medication changes, allergies to medications, adverse drug reactions, diagnosis change, or new procedure performed?: [x] No    [] Yes (see summary sheet for update)  Subjective functional status/changes:   [] No changes reported  Reports she has to stop the smooth pursuit exercise due to dizziness at times. OBJECTIVE    Modality rationale: decrease pain and increase tissue extensibility to improve the patients ability to manage ADLs. Min Type Additional Details   10 []  Ice     [x]  heat  []  Ice massage  []  Laser   []  Anodyne Position:supine w/ leg wedge  Location: neck   [] Skin assessment post-treatment:  []intact []redness- no adverse reaction    []redness - adverse reaction:       7 min Therapeutic Exercise:  [x] See flow sheet :   Rationale: increase ROM, increase strength, and improve coordination to improve the patients ability to perform functional task with ease. 15 min Neuromuscular Re-education:  [x]  See flow sheet :   Rationale: increase strength, improve coordination, and increase proprioception  to improve the patients ability to perform ADL's with ease. 8 min Manual Therapy:  STM to scalenes, c/s paraspinals, and UT left > right; SOR   The manual therapy interventions were performed at a separate and distinct time from the therapeutic activities interventions. Rationale: decrease pain, increase ROM, and increase tissue extensibility to improve functional mobility with ADL's.           With   [] TE [] TA   [] neuro   [] other: Patient Education: [x] Review HEP    [] Progressed/Changed HEP based on:   [] positioning   [] body mechanics   [] transfers   [] heat/ice application    [] other:      Other Objective/Functional Measures: exercises per flowsheet     Pain Level (0-10 scale) post treatment: 3    ASSESSMENT/Changes in Function: Demonstrates fair static standing balance, but continues to have impaired gait stability, with reduced trunk rotation, reduced arm swing, and short step length. Encouraged pt to continue to work on the exercises that make her dizzy, as this is the goal of habituation exercises in order to reduce triggers over time. Patient will continue to benefit from skilled PT services to modify and progress therapeutic interventions, address functional mobility deficits, address ROM deficits, address strength deficits, analyze and address soft tissue restrictions, analyze and cue movement patterns, analyze and modify body mechanics/ergonomics, assess and modify postural abnormalities, address imbalance/dizziness, and instruct in home and community integration to attain remaining goals. []  See Plan of Care  []  See progress note/recertification  []  See Discharge Summary         Progress towards goals / Updated goals:  1. Improve FOTO to 60 to indicate improved function with daily activities. Re-cert: regressed, 45   2. Increase left C/S rotation and lateral flexion to = right to increase ease with ADLs. Re-cert: progressing, left rotation 52 deg, right rot 67 deg, left lat flex 20 deg, right lat flex 25 deg   3. Patient will report >=50% improvement with pain and dizziness to increase ease with daily activities. Re-cert:0%  4. Pt will perform MSR on airex with EO and EC without LOB for at least 20 seconds, demonstrating improved stability for ambulation. Re-cert: not tested.     Current: progressing, EO 30 seconds B with increased sway on the right stance limb, EC 15 seconds B (12/19/2022)    PLAN  []  Upgrade activities as tolerated     [x]  Continue plan of care  []  Update interventions per flow sheet       []  Discharge due to:_  []  Other:_      Ramiro Sanchez, PT 12/19/2022  2:07 PM    Future Appointments   Date Time Provider Too Serrano   12/21/2022 12:30 PM Gloria Love, PT [facility]   12/30/2022  1:00 PM Gloria Love, PT [facility]   1/3/2023  1:00 PM Gloria Love, PT [facility]   1/5/2023  1:00 PM Stephanie Davalos, PT [facility]   5/22/2023  1:20 PM Tova Pena MD [facility]

## 2022-12-21 ENCOUNTER — HOSPITAL ENCOUNTER (OUTPATIENT)
Dept: PHYSICAL THERAPY | Age: 82
Discharge: HOME | End: 2022-12-21
Payer: MEDICARE

## 2022-12-21 PROCEDURE — 97110 THERAPEUTIC EXERCISES: CPT

## 2022-12-21 PROCEDURE — 97140 MANUAL THERAPY 1/> REGIONS: CPT

## 2022-12-21 NOTE — PROGRESS NOTES
PT DAILY TREATMENT NOTE 10-18    Patient Name: Sim Range  Date:2022  : 1940  [x]  Patient  Verified  Payor: [place] MEDICARE / Plan: [place] MEDICARE PART A & B / Product Type: Medicare /    In time:1239  Out time:117  Total Treatment Time (min): 38  Visit #: 3 of 8    Medicare/BCBS Only   Total Timed Codes (min):  28 1:1 Treatment Time:  28       Treatment Area: Neck pain [M54.2]  Vertigo [R42]    SUBJECTIVE  Pain Level (0-10 scale): 4  Any medication changes, allergies to medications, adverse drug reactions, diagnosis change, or new procedure performed?: [x] No    [] Yes (see summary sheet for update)  Subjective functional status/changes:   [x] No changes reported      OBJECTIVE  Modality rationale: decrease pain to improve the patients ability to tolerate post exercise soreness   Min Type Additional Details   10 []  Ice     [x]  heat  []  Ice massage  []  Laser   []  Anodyne Position:supine with wedge  Location:C/S   [] Skin assessment post-treatment:  []intact []redness- no adverse reaction    []redness - adverse reaction:         20 min Therapeutic Exercise:  [] See flow sheet :   Rationale: increase ROM and increase strength to improve the patients ability to perform daily activities      8 min Manual Therapy:  STM/MFR left C/S paraspinals, scalenes, SCM   The manual therapy interventions were performed at a separate and distinct time from the therapeutic activities interventions.   Rationale: decrease pain, increase ROM, increase tissue extensibility, decrease trigger points, and decrease dizziness  to perform daily activities   With   [] TE   [] TA   [] neuro   [] other: Patient Education: [x] Review HEP    [] Progressed/Changed HEP based on:   [] positioning   [] body mechanics   [] transfers   [] heat/ice application    [] other:      Other Objective/Functional Measures:      Pain Level (0-10 scale) post treatment: 2.5    ASSESSMENT/Changes in Function: Increased thera-band to green today; tolerate well without increase in symptoms. TC with some exercises as patient was almost 8' late. Patient will continue to benefit from skilled PT services to modify and progress therapeutic interventions, address functional mobility deficits, address strength deficits, analyze and address soft tissue restrictions, and analyze and cue movement patterns to attain remaining goals. []  See Plan of Care  []  See progress note/recertification  []  See Discharge Summary         Progress towards goals / Updated goals:  1. Improve FOTO to 60 to indicate improved function with daily activities. Re-cert: regressed, 45   2. Increase left C/S rotation and lateral flexion to = right to increase ease with ADLs. Re-cert: progressing, left rotation 52 deg, right rot 67 deg, left lat flex 20 deg, right lat flex 25 deg   3. Patient will report >=50% improvement with pain and dizziness to increase ease with daily activities. Re-cert:0%  4. Pt will perform MSR on airex with EO and EC without LOB for at least 20 seconds, demonstrating improved stability for ambulation. Re-cert: not tested.                Current: progressing, EO 30 seconds B with increased sway on the right stance limb, EC 15 seconds B (12/19/2022)    PLAN  [x]  Upgrade activities as tolerated     []  Continue plan of care  []  Update interventions per flow sheet       []  Discharge due to:_  []  Other:_      XAVIER Rodriguez, CMTPT 12/21/2022  8:54 AM    Future Appointments   Date Time Provider Too Serrano   12/21/2022 12:30 PM Mal Reillyer, PT [facility]   12/30/2022  1:00 PM Mal Saenz, PT [facility]   1/3/2023  1:00 PM Mal Saenz, PT [facility]   1/5/2023  1:00 PM Susana Espinosa PT [facility]   5/22/2023  1:20 PM Dearl MD [facility]

## 2022-12-30 ENCOUNTER — HOSPITAL ENCOUNTER (OUTPATIENT)
Dept: PHYSICAL THERAPY | Age: 82
End: 2022-12-30
Payer: MEDICARE

## 2022-12-30 PROCEDURE — 97110 THERAPEUTIC EXERCISES: CPT

## 2022-12-30 PROCEDURE — 97140 MANUAL THERAPY 1/> REGIONS: CPT

## 2022-12-30 NOTE — PROGRESS NOTES
PT DAILY TREATMENT NOTE 10-    Patient Name: Kitty Reid  Date:2022  : 1940  [x]  Patient  Verified  Payor: [place] MEDICARE / Plan: VA MEDICARE PART A & B / Product Type: Medicare /    In time:100  Out time:142  Total Treatment Time (min): 42  Visit #: 4 of 8    Medicare/BCBS Only   Total Timed Codes (min):  32 1:1 Treatment Time:  32       Treatment Area: Neck pain [M54.2]  Vertigo [R42]    SUBJECTIVE  Pain Level (0-10 scale): 4-5  Any medication changes, allergies to medications, adverse drug reactions, diagnosis change, or new procedure performed?: [x] No    [] Yes (see summary sheet for update)  Subjective functional status/changes:   [] No changes reported  It's getting better . OBJECTIVE  Modality rationale: decrease pain to improve the patients ability to tolerate post exercise soreness   Min Type Additional Details   10 []  Ice     [x]  heat  []  Ice massage  []  Laser   []  Anodyne Position: supine with wedge  Location:C/S   [] Skin assessment post-treatment:  []intact []redness- no adverse reaction    []redness - adverse reaction:         16 min Therapeutic Exercise:  [x] See flow sheet :   Rationale: increase ROM and increase strength to improve the patients ability to perform daily activities      8 min Neuromuscular Re-education:  [x]  See flow sheet :   Rationale: increase strength, improve coordination, improve balance, and increase proprioception  to improve the patients stability     8 min Manual Therapy:  SOR; STM left c/S paraspinals and scalenes   The manual therapy interventions were performed at a separate and distinct time from the therapeutic activities interventions.   Rationale: decrease pain, increase ROM, increase tissue extensibility, and decrease trigger points to relax mm for daily activities   With   [] TE   [] TA   [] neuro   [] other: Patient Education: [x] Review HEP    [] Progressed/Changed HEP based on:   [] positioning   [] body mechanics   [] transfers   [] heat/ice application    [] other:      Other Objective/Functional Measures:      Pain Level (0-10 scale) post treatment: 3    ASSESSMENT/Changes in Function: Continues to exhibit trigger points in left C/S paraspinals and scalenes, but tolerance has improved since IE. Will add airex to static balance next visit to start working towards LtG #4. Patient will continue to benefit from skilled PT services to modify and progress therapeutic interventions, address functional mobility deficits, address ROM deficits, address strength deficits, analyze and address soft tissue restrictions, and analyze and cue movement patterns to attain remaining goals. []  See Plan of Care  []  See progress note/recertification  []  See Discharge Summary         Progress towards goals / Updated goals:  1. Improve FOTO to 60 to indicate improved function with daily activities. Re-cert: regressed, 45   2. Increase left C/S rotation and lateral flexion to = right to increase ease with ADLs. Re-cert: progressing, left rotation 52 deg, right rot 67 deg, left lat flex 20 deg, right lat flex 25 deg   3. Patient will report >=50% improvement with pain and dizziness to increase ease with daily activities. Re-cert:0%  4. Pt will perform MSR on airex with EO and EC without LOB for at least 20 seconds, demonstrating improved stability for ambulation. Re-cert: not tested.                Current: progressing, EO 30 seconds B with increased sway on the right stance limb, EC 15 seconds B (12/19/2022); EO MSR without increased sway 12/30/22    PLAN  [x]  Upgrade activities as tolerated     []  Continue plan of care  []  Update interventions per flow sheet       []  Discharge due to:_  []  Other:_      XAVIER Ho, CMTPT 12/30/2022  9:09 AM    Future Appointments   Date Time Provider Too Serrano   12/30/2022  1:00 PM [facility]   1/5/2023  1:00 PM [facility] 1/9/2023  1:00 PM Manuela Johnston PTA [facility]   5/22/2023  1:20 PM Jazlyn Kingsley MD [facility] AM[facility]

## 2023-01-03 ENCOUNTER — APPOINTMENT (OUTPATIENT)
Dept: PHYSICAL THERAPY | Age: 83
End: 2023-01-03
Payer: MEDICARE

## 2023-01-05 ENCOUNTER — HOSPITAL ENCOUNTER (OUTPATIENT)
Dept: PHYSICAL THERAPY | Age: 83
Discharge: HOME | End: 2023-01-05
Payer: MEDICARE

## 2023-01-05 PROCEDURE — 97112 NEUROMUSCULAR REEDUCATION: CPT

## 2023-01-05 PROCEDURE — 97140 MANUAL THERAPY 1/> REGIONS: CPT

## 2023-01-05 PROCEDURE — 97110 THERAPEUTIC EXERCISES: CPT

## 2023-01-05 NOTE — PROGRESS NOTES
PT DAILY TREATMENT NOTE     Patient Name: Alanna Diane  Date:2023  : 1940  [x]  Patient  Verified  Payor: [place] MEDICARE / Plan: VA MEDICARE PART A & B / Product Type: Medicare /    In time:104pm  Out time:157pm  Total Treatment Time (min): 53  Visit #: 5 of 8    Medicare/BCBS Only   Total Timed Codes (min):  43 1:1 Treatment Time:  38       Treatment Area: Neck pain [M54.2]  Vertigo [R42]    SUBJECTIVE  Pain Level (0-10 scale): 5  Any medication changes, allergies to medications, adverse drug reactions, diagnosis change, or new procedure performed?: [x] No    [] Yes (see summary sheet for update)  Subjective functional status/changes:   [x] No changes reported    OBJECTIVE    Modality rationale: decrease pain and increase tissue extensibility to improve the patients ability to manage self care.    Min Type Additional Details    [] Estim:  []Unatt       []IFC  []Premod                        []Other:  []w/ice   []w/heat  Position:  Location:    [] Estim: []Att    []TENS instruct  []NMES                    []Other:  []w/US   []w/ice   []w/heat  Position:  Location:    []  Traction: [] Cervical       []Lumbar                       [] Prone          []Supine                       []Intermittent   []Continuous Lbs:  [] before manual  [] after manual    []  Ultrasound: []Continuous   [] Pulsed                           []1MHz   []3MHz W/cm2:  Location:    []  Iontophoresis with dexamethasone         Location: [] Take home patch   [] In clinic   10 []  Ice     [x]  heat  []  Ice massage  []  Laser   []  Anodyne Position:supine w/ leg wedge  Location: c/s    []  Laser with stim  []  Other:  Position:  Location:    []  Vasopneumatic Device    []  Right     []  Left  Pre-treatment girth:  Post-treatment girth:  Measured at (location):  Pressure:       [] lo [] med [] hi   Temperature: [] lo [] med [] hi   [] Skin assessment post-treatment:  []intact []redness- no adverse reaction    []redness - adverse reaction:      13 min Therapeutic Exercise:  [x] See flow sheet :   Rationale: increase ROM, increase strength, and improve coordination to improve the patients ability to perform functional task with ease. 15 min Neuromuscular Re-education:  [x]  See flow sheet :   Rationale: increase strength, improve coordination, and increase proprioception  to improve the patients ability to perform ADL's with ease. 10 min Manual Therapy:  STM to scalenes, c/s paraspinals, and UT left > right; SOR   The manual therapy interventions were performed at a separate and distinct time from the therapeutic activities interventions. Rationale: decrease pain, increase ROM, and increase tissue extensibility to improve functional mobility with ADL's. With   [] TE   [] TA   [] neuro   [] other: Patient Education: [x] Review HEP    [] Progressed/Changed HEP based on:   [] positioning   [] body mechanics   [] transfers   [] heat/ice application    [] other:      Other Objective/Functional Measures: see PN     Pain Level (0-10 scale) post treatment: 3    ASSESSMENT/Changes in Function: see PN    Patient will continue to benefit from skilled PT services to modify and progress therapeutic interventions, address functional mobility deficits, address ROM deficits, address strength deficits, analyze and address soft tissue restrictions, analyze and cue movement patterns, analyze and modify body mechanics/ergonomics, assess and modify postural abnormalities, address imbalance/dizziness, and instruct in home and community integration to attain remaining goals. []  See Plan of Care  [x]  See progress note/recertification  []  See Discharge Summary         Progress towards goals / Updated goals:  1. Improve FOTO to 60 to indicate improved function with daily activities. Re-cert: regressed, 45   Current: progressing, 56 (1/5/2023)  2.  Increase left C/S rotation and lateral flexion to = right to increase ease with ADLs.  Re-cert: progressing, left rotation 52 deg, right rot 67 deg, left lat flex 20 deg, right lat flex 25 deg   Current: 45 deg left rotation, 52 deg right rotation, 24 deg B lateral flexion. (1/5/2023)  3. Patient will report >=50% improvement with pain and dizziness to increase ease with daily activities. Re-cert:0%   Current: progressing, 50% better with pain, 25% better with dizziness (1/5/2023)  4. Pt will perform MSR on airex with EO and EC without LOB for at least 20 seconds, demonstrating improved stability for ambulation. Re-cert: not tested.                Current: progressing, 30 seconds B  EO MSR without increased sway, EC increased sway 12/30/22        PLAN  []  Upgrade activities as tolerated     [x]  Continue plan of care  []  Update interventions per flow sheet       []  Discharge due to:_  []  Other:_      Bhavesh Cisneros PT 1/5/2023  1:14 PM    Future Appointments   Date Time Provider Too Serrano   1/10/2023  1:00 PM Julita Hylton PTA [facility]   5/22/2023  1:20 PM Nayely Tanner MD [facility]

## 2023-01-06 NOTE — PROGRESS NOTES
[facility]  [address]  [facility], [address]  [phone] fax    Continued Plan of Care/ Re-certification for Physical Therapy Services    Patient name: James Cao Start of Care: 2022   Referral source: Madai Chávez MD : 1940   Medical/Treatment Diagnosis: Neck pain [M54.2]  Vertigo [R42]  Payor: Jacob Pearce / Plan: [place] MED[place]ARE [place] A & B / Product Type: Medicare /  Onset Date:2022     Prior Hospitalization: see medical history Provider#: [id number]   Medications: Verified on Patient Summary List    Comorbidities: Patient will continue to benefit from skilled PT services to modify and progress therapeutic interventions, address functional mobility deficits, address ROM deficits, address strength deficits, analyze and address soft tissue restrictions, analyze and cue movement patterns, analyze and modify body mechanics/ergonomics, assess and modify postural abnormalities, address imbalance/dizziness, and instruct in home and community integration to attain remaining goals. Visits from Start of Care: 11    Missed Visits: 2    The Plan of Care and following information is based on the patient's current status:  1. Improve FOTO to 60 to indicate improved function with daily activities. Re-cert: regressed, 45   Current: progressing, 56   2. Increase left C/S rotation and lateral flexion to = right to increase ease with ADLs. Re-cert: progressing, left rotation 52 deg, right rot 67 deg, left lat flex 20 deg, right lat flex 25 deg   Current: limited change, 45 deg left rotation, 52 deg right rotation, 24 deg B lateral flexion. 3. Patient will report >=50% improvement with pain and dizziness to increase ease with daily activities. Re-cert:0%   Current: progressing, 50% better with pain, 25% better with dizziness   4.  Pt will perform MSR on airex with EO and EC without LOB for at least 20 seconds, demonstrating improved stability for ambulation. Re-cert: not tested. Current: progressing, 30 seconds B  EO MSR without increased sway, EC increased sway     Key functional changes: FOTO 56      Problems/ barriers to goal attainment: cervicogenic and vestibular vertigo     Problem List: pain affecting function, decrease ROM, decrease strength, impaired gait/ balance, decrease ADL/ functional abilitiies, decrease activity tolerance, decrease flexibility/ joint mobility, and decrease transfer abilities    Treatment Plan: Therapeutic exercise, Neuromuscular reeducation, Manual therapy, Therapeutic activity, Self care/home management, Electric stim unattended , Gait training, Electric stim attended, and Needle insertion w/o injection (1 or 2 muscles)     Patient Goal (s) has been updated and includes: no pain, no dizziness     Goals for this certification period to be accomplished in 4 weeks:  1. Improve FOTO to 60 to indicate improved function with daily activities. Re-cert: progressing, 56   2. Increase left C/S rotation and lateral flexion to = right to increase ease with ADLs. Re-cert: limited change, 45 deg left rotation, 52 deg right rotation, 24 deg B lateral flexion. 3. Patient will report >=50% improvement with pain and dizziness to increase ease with daily activities. Re-cert:progressing, 40% better with pain, 25% better with dizziness   4. Pt will perform MSR on airex with EO and EC without LOB for at least 20 seconds, demonstrating improved stability for ambulation. Re-cert: progressing, 30 seconds B  EO MSR without increased sway, EC increased sway     Frequency / Duration: Patient to be seen 2 times per week for 4 weeks:    Assessment / Recommendations:Pt demonstrates slowly improving toleration to manual therapy and static standing stability.  Her dizziness pattern remains largely cervicogenic in nature, with some visual tracking issues, in which she was assigned vestibular exercises at home. She has not been performing all of them as directed due to fear of dizziness and imbalance. She has been re-educated that this is the appropriate response to vestibular exercises and that they need to be performed regularly in order to habituate sx and reduce severity over time. She is advised to perform in sitting or at bedside so that if she experiences a more severe vertigo episode, she may rest until it subsides. PT tx has been largely focused on c/s pain management, postural strengthening, and balance as pt is a fall risk based on her ambulation patterns and has limited c/s ROM with multiple trigger points. Patient will continue to benefit from skilled PT services to modify and progress therapeutic interventions, address functional mobility deficits, address ROM deficits, address strength deficits, analyze and address soft tissue restrictions, analyze and cue movement patterns, analyze and modify body mechanics/ergonomics, assess and modify postural abnormalities, address imbalance/dizziness, and instruct in home and community integration to attain remaining goals. Certification Period: 1/10/2023 -- 2/9/2023    Yandel Loza PT 1/6/2023 9:28 AM    ________________________________________________________________________  I certify that the above Therapy Services are being furnished while the patient is under my care. I agree with the treatment plan and certify that this therapy is necessary. [] I have read the above and request that my patient continue as recommended.   [] I have read the above report and request that my patient continue therapy with the following changes/special instructions: _____________________________________________  [] I have read the above report and request that my patient be discharged from therapy    Physician's Signature:____________Date:_________TIME:________     Shreya Murphy MD  ** Signature, Date and Time must be completed for valid certification **    Please sign and return to [facility] [address] [place] [address] & [facility] [address]  [address]  [phone] fax

## 2023-01-09 ENCOUNTER — APPOINTMENT (OUTPATIENT)
Dept: PHYSICAL THERAPY | Age: 83
End: 2023-01-09
Payer: MEDICARE

## 2023-01-10 ENCOUNTER — HOSPITAL ENCOUNTER (OUTPATIENT)
Dept: PHYSICAL THERAPY | Age: 83
Discharge: HOME | End: 2023-01-10
Payer: MEDICARE

## 2023-01-10 PROCEDURE — 97140 MANUAL THERAPY 1/> REGIONS: CPT

## 2023-01-10 PROCEDURE — 97112 NEUROMUSCULAR REEDUCATION: CPT

## 2023-01-10 PROCEDURE — 97110 THERAPEUTIC EXERCISES: CPT

## 2023-01-10 NOTE — PROGRESS NOTES
PT DAILY TREATMENT NOTE     Patient Name: Oliverio Daly  Date:1/10/2023  : 1940  [x]  Patient  Verified  Payor: [place] MEDICARE / Plan: [place] MEDICARE PART A & B / Product Type: Medicare /    In time:1:05  Out time:1:54  Total Treatment Time (min): 49  Visit #: 1 of 8    Medicare/BCBS Only   Total Timed Codes (min):  39 1:1 Treatment Time:  39       Treatment Area: Neck pain [M54.2]  Vertigo [R42]    SUBJECTIVE  Pain Level (0-10 scale): 5/10  Any medication changes, allergies to medications, adverse drug reactions, diagnosis change, or new procedure performed?: [x] No    [] Yes (see summary sheet for update)  Subjective functional status/changes:   [] No changes reported  \"Pain on the left side. \"    OBJECTIVE    Modality rationale: decrease pain and increase tissue extensibility to improve the patients ability to perform ADL's.    Min Type Additional Details    [] Estim:  []Unatt       []IFC  []Premod                        []Other:  []w/ice   []w/heat  Position:  Location:    [] Estim: []Att    []TENS instruct  []NMES                    []Other:  []w/US   []w/ice   []w/heat  Position:  Location:    []  Traction: [] Cervical       []Lumbar                       [] Prone          []Supine                       []Intermittent   []Continuous Lbs:  [] before manual  [] after manual    []  Ultrasound: []Continuous   [] Pulsed                           []1MHz   []3MHz W/cm2:  Location:    []  Iontophoresis with dexamethasone         Location: [] Take home patch   [] In clinic   10 []  Ice     [x]  heat  []  Ice massage  []  Laser   []  Anodyne Position: Supine with wedge  Location: C/S    []  Laser with stim  []  Other:  Position:  Location:    []  Vasopneumatic Device    []  Right     []  Left  Pre-treatment girth:  Post-treatment girth:  Measured at (location):  Pressure:       [] lo [] med [] hi   Temperature: [] lo [] med [] hi   [] Skin assessment post-treatment:  []intact []redness- no adverse reaction    []redness - adverse reaction:     21 min Therapeutic Exercise:  [x] See flow sheet :   Rationale: increase ROM and increase strength to improve the patients ability to perform ADL's.    8 min Neuromuscular Re-education:  [x]  See flow sheet : Deflated ball series. Rationale: increase strength, improve coordination, and increase proprioception  to improve the patients ability to perform functional activities. 10 min Manual Therapy:  STM Left UT, lev scap, (B) C/S paraspinals and suboccipitals. SOR. UT stretch (B). Pt supine. The manual therapy interventions were performed at a separate and distinct time from the therapeutic activities interventions. Rationale: decrease pain, increase ROM, increase tissue extensibility, and decrease trigger points to improve ease with performing functional activities. With   [x] TE   [] TA   [] neuro   [] other: Patient Education: [x] Review HEP    [] Progressed/Changed HEP based on:   [] positioning   [] body mechanics   [] transfers   [] heat/ice application    [] other:      Other Objective/Functional Measures: Added green t-band ER 10 reps. Pain Level (0-10 scale) post treatment: 1/10 uncomfortable    ASSESSMENT/Changes in Function: Pt reports HEP 1x a day. Pt fully participated in treatment and is motivated. Trigger points noted in C/S musculature, tolerates STM well however does not like DTM. Pt reported a decrease in pain/tension post-treatment. Continue PT to decrease mm/fascia restrictions and improve posture awareness to improve ease with performing functional activities. Patient will continue to benefit from skilled PT services to modify and progress therapeutic interventions, address functional mobility deficits, address ROM deficits, address strength deficits, analyze and address soft tissue restrictions, analyze and cue movement patterns, and analyze and modify body mechanics/ergonomics to attain remaining goals.      [x]  See Plan of Care  []  See progress note/recertification  []  See Discharge Summary         Progress towards goals / Updated goals:  Goals for this certification period to be accomplished in 4 weeks:  1. Improve FOTO to 60 to indicate improved function with daily activities. Re-cert: progressing, 56   2. Increase left C/S rotation and lateral flexion to = right to increase ease with ADLs. Re-cert: limited change, 45 deg left rotation, 52 deg right rotation, 24 deg B lateral flexion. 3. Patient will report >=50% improvement with pain and dizziness to increase ease with daily activities. Re-cert:progressing, 44% better with pain, 25% better with dizziness   4. Pt will perform MSR on airex with EO and EC without LOB for at least 20 seconds, demonstrating improved stability for ambulation.                Re-cert: progressing, 30 seconds B  EO MSR without increased sway, EC increased sway     PLAN  []  Upgrade activities as tolerated     [x]  Continue plan of care  []  Update interventions per flow sheet       []  Discharge due to:_  []  Other:_      Carmitacammy Kaminski, PTA 1/10/2023  1:05 PM    Future Appointments   Date Time Provider Too Serrano   1/13/2023 12:30 PM Kirk Agustín, PTA [facility]   1/18/2023 12:30 PM Naresh Padsamia, PT [facility]   1/20/2023  1:30 PM Kirk Agustín, PTA [facility]   1/23/2023  1:00 PM Kirk Agustín, PTA [facility]   1/25/2023  1:00 PM Naresh Padsamia, PT [facility]   1/30/2023  1:30 PM Israel Coley, PTA [facility]   2/1/2023  1:00 PM Naresh Padsamia, PT [facility]   5/22/2023  1:20 PM Dennis Aaron MD [facility] BS [facility]

## 2023-01-18 ENCOUNTER — APPOINTMENT (OUTPATIENT)
Dept: PHYSICAL THERAPY | Age: 83
End: 2023-01-18
Payer: MEDICARE

## 2023-01-20 ENCOUNTER — APPOINTMENT (OUTPATIENT)
Dept: PHYSICAL THERAPY | Age: 83
End: 2023-01-20
Payer: MEDICARE

## 2023-01-23 ENCOUNTER — APPOINTMENT (OUTPATIENT)
Dept: PHYSICAL THERAPY | Age: 83
End: 2023-01-23
Payer: MEDICARE

## 2023-01-25 ENCOUNTER — APPOINTMENT (OUTPATIENT)
Dept: PHYSICAL THERAPY | Age: 83
End: 2023-01-25
Payer: MEDICARE

## 2023-01-29 ENCOUNTER — HOSPITAL ENCOUNTER (EMERGENCY)
Age: 83
Discharge: HOME | End: 2023-01-29
Attending: EMERGENCY MEDICINE
Payer: MEDICARE

## 2023-01-29 ENCOUNTER — APPOINTMENT (OUTPATIENT)
Dept: CT IMAGING | Age: 83
End: 2023-01-29
Attending: EMERGENCY MEDICINE
Payer: MEDICARE

## 2023-01-29 VITALS
BODY MASS INDEX: 21.68 KG/M2 | HEIGHT: 64 IN | RESPIRATION RATE: 18 BRPM | SYSTOLIC BLOOD PRESSURE: 164 MMHG | HEART RATE: 76 BPM | OXYGEN SATURATION: 100 % | WEIGHT: 127 LBS | TEMPERATURE: 98 F | DIASTOLIC BLOOD PRESSURE: 80 MMHG

## 2023-01-29 DIAGNOSIS — K21.9 GASTROESOPHAGEAL REFLUX DISEASE, UNSPECIFIED WHETHER ESOPHAGITIS PRESENT: ICD-10-CM

## 2023-01-29 DIAGNOSIS — R11.0 NAUSEA WITHOUT VOMITING: ICD-10-CM

## 2023-01-29 DIAGNOSIS — K57.90 DIVERTICULOSIS: ICD-10-CM

## 2023-01-29 DIAGNOSIS — R30.0 DYSURIA: Primary | ICD-10-CM

## 2023-01-29 DIAGNOSIS — K43.9 VENTRAL HERNIA WITHOUT OBSTRUCTION OR GANGRENE: ICD-10-CM

## 2023-01-29 LAB
ANION GAP SERPL CALC-SCNC: 5 MMOL/L (ref 3–18)
APPEARANCE UR: CLEAR
BACTERIA URNS QL MICRO: NEGATIVE /HPF
BASOPHILS # BLD: 0.1 K/UL (ref 0–0.1)
BASOPHILS NFR BLD: 1 % (ref 0–2)
BILIRUB UR QL: NEGATIVE
BUN SERPL-MCNC: 12 MG/DL (ref 7–18)
BUN/CREAT SERPL: 11 (ref 12–20)
CALCIUM SERPL-MCNC: 9.5 MG/DL (ref 8.5–10.1)
CHLORIDE SERPL-SCNC: 105 MMOL/L (ref 100–111)
CO2 SERPL-SCNC: 30 MMOL/L (ref 21–32)
COLOR UR: YELLOW
CREAT SERPL-MCNC: 1.07 MG/DL (ref 0.6–1.3)
DIFFERENTIAL METHOD BLD: ABNORMAL
EOSINOPHIL # BLD: 0 K/UL (ref 0–0.4)
EOSINOPHIL NFR BLD: 0 % (ref 0–5)
EPITH CASTS URNS QL MICRO: NORMAL /LPF (ref 0–5)
ERYTHROCYTE [DISTWIDTH] IN BLOOD BY AUTOMATED COUNT: 14.7 % (ref 11.6–14.5)
GLUCOSE SERPL-MCNC: 100 MG/DL (ref 74–99)
GLUCOSE UR STRIP.AUTO-MCNC: NEGATIVE MG/DL
HCT VFR BLD AUTO: 41.8 % (ref 35–45)
HGB BLD-MCNC: 13 G/DL (ref 12–16)
HGB UR QL STRIP: ABNORMAL
IMM GRANULOCYTES # BLD AUTO: 0 K/UL (ref 0–0.04)
IMM GRANULOCYTES NFR BLD AUTO: 0 % (ref 0–0.5)
KETONES UR QL STRIP.AUTO: NEGATIVE MG/DL
LEUKOCYTE ESTERASE UR QL STRIP.AUTO: NEGATIVE
LYMPHOCYTES # BLD: 1.9 K/UL (ref 0.9–3.6)
LYMPHOCYTES NFR BLD: 36 % (ref 21–52)
MCH RBC QN AUTO: 23 PG (ref 24–34)
MCHC RBC AUTO-ENTMCNC: 31.1 G/DL (ref 31–37)
MCV RBC AUTO: 73.9 FL (ref 78–100)
MONOCYTES # BLD: 0.2 K/UL (ref 0.05–1.2)
MONOCYTES NFR BLD: 4 % (ref 3–10)
NEUTS SEG # BLD: 3.2 K/UL (ref 1.8–8)
NEUTS SEG NFR BLD: 59 % (ref 40–73)
NITRITE UR QL STRIP.AUTO: NEGATIVE
NRBC # BLD: 0 K/UL (ref 0–0.01)
NRBC BLD-RTO: 0 PER 100 WBC
PH UR STRIP: 5.5 (ref 5–8)
PLATELET # BLD AUTO: 261 K/UL (ref 135–420)
PMV BLD AUTO: 11 FL (ref 9.2–11.8)
POTASSIUM SERPL-SCNC: 3.8 MMOL/L (ref 3.5–5.5)
PROT UR STRIP-MCNC: NEGATIVE MG/DL
RBC # BLD AUTO: 5.66 M/UL (ref 4.2–5.3)
RBC #/AREA URNS HPF: NORMAL /HPF (ref 0–5)
SODIUM SERPL-SCNC: 140 MMOL/L (ref 136–145)
SP GR UR REFRACTOMETRY: 1.01 (ref 1–1.03)
UROBILINOGEN UR QL STRIP.AUTO: 0.2 EU/DL (ref 0.2–1)
WBC # BLD AUTO: 5.4 K/UL (ref 4.6–13.2)
WBC URNS QL MICRO: NEGATIVE /HPF (ref 0–4)

## 2023-01-29 PROCEDURE — 74011250637 HC RX REV CODE- 250/637: Performed by: EMERGENCY MEDICINE

## 2023-01-29 PROCEDURE — 74011250636 HC RX REV CODE- 250/636: Performed by: EMERGENCY MEDICINE

## 2023-01-29 PROCEDURE — 93005 ELECTROCARDIOGRAM TRACING: CPT

## 2023-01-29 PROCEDURE — 85025 COMPLETE CBC W/AUTO DIFF WBC: CPT

## 2023-01-29 PROCEDURE — 96374 THER/PROPH/DIAG INJ IV PUSH: CPT

## 2023-01-29 PROCEDURE — 99284 EMERGENCY DEPT VISIT MOD MDM: CPT

## 2023-01-29 PROCEDURE — 81001 URINALYSIS AUTO W/SCOPE: CPT

## 2023-01-29 PROCEDURE — 80048 BASIC METABOLIC PNL TOTAL CA: CPT

## 2023-01-29 PROCEDURE — 74176 CT ABD & PELVIS W/O CONTRAST: CPT

## 2023-01-29 RX ORDER — ONDANSETRON 2 MG/ML
4 INJECTION INTRAMUSCULAR; INTRAVENOUS ONCE
Status: COMPLETED | OUTPATIENT
Start: 2023-01-29 | End: 2023-01-29

## 2023-01-29 RX ORDER — ONDANSETRON 4 MG/1
4 TABLET, FILM COATED ORAL
Qty: 20 TABLET | Refills: 0 | Status: SHIPPED | OUTPATIENT
Start: 2023-01-29

## 2023-01-29 RX ORDER — ACETAMINOPHEN 500 MG
1000 TABLET ORAL
Status: COMPLETED | OUTPATIENT
Start: 2023-01-29 | End: 2023-01-29

## 2023-01-29 RX ORDER — CEPHALEXIN 500 MG/1
500 CAPSULE ORAL 4 TIMES DAILY
Qty: 20 CAPSULE | Refills: 0 | Status: SHIPPED | OUTPATIENT
Start: 2023-01-29 | End: 2023-02-03

## 2023-01-29 RX ORDER — CEPHALEXIN 250 MG/1
500 CAPSULE ORAL
Status: COMPLETED | OUTPATIENT
Start: 2023-01-29 | End: 2023-01-29

## 2023-01-29 RX ADMIN — ACETAMINOPHEN 1000 MG: 500 TABLET ORAL at 17:39

## 2023-01-29 RX ADMIN — CEPHALEXIN 500 MG: 250 CAPSULE ORAL at 20:18

## 2023-01-29 RX ADMIN — SODIUM CHLORIDE 1000 ML: 9 INJECTION, SOLUTION INTRAVENOUS at 17:39

## 2023-01-29 RX ADMIN — ONDANSETRON 4 MG: 2 INJECTION INTRAMUSCULAR; INTRAVENOUS at 20:18

## 2023-01-29 NOTE — ED NOTES
Patient resting comfortably on the stretcher with call bell within reach. Patient has no signs or symptoms of acute distress at this time. Patient has no concerns or questions about their treatment plan.

## 2023-01-30 ENCOUNTER — APPOINTMENT (OUTPATIENT)
Dept: PHYSICAL THERAPY | Age: 83
End: 2023-01-30
Payer: MEDICARE

## 2023-01-30 LAB
ATRIAL RATE: 61 BPM
CALCULATED P AXIS, ECG09: 74 DEGREES
CALCULATED R AXIS, ECG10: 3 DEGREES
CALCULATED T AXIS, ECG11: 14 DEGREES
DIAGNOSIS, 93000: NORMAL
P-R INTERVAL, ECG05: 178 MS
Q-T INTERVAL, ECG07: 412 MS
QRS DURATION, ECG06: 82 MS
QTC CALCULATION (BEZET), ECG08: 414 MS
VENTRICULAR RATE, ECG03: 61 BPM

## 2023-01-30 NOTE — ED NOTES
Bedside shift change report given to Carl Benjamin by Lubna Smith. Report included the following information SBAR, ED Summary and MAR.

## 2023-01-30 NOTE — ED NOTES
I have reviewed discharge instructions with the patient. The patient verbalized understanding. Current Discharge Medication List        START taking these medications    Details   ondansetron hcl (Zofran) 4 mg tablet Take 1 Tablet by mouth every eight (8) hours as needed for Nausea or Vomiting. Qty: 20 Tablet, Refills: 0  Start date: 1/29/2023      cephALEXin (Keflex) 500 mg capsule Take 1 Capsule by mouth four (4) times daily for 5 days.   Qty: 20 Capsule, Refills: 0  Start date: 1/29/2023, End date: 2/3/2023

## 2023-01-30 NOTE — DISCHARGE INSTRUCTIONS
1.  Continue medication as prescribed by your primary care provider. 2.  Return to the emergency department for any worsening or concerning symptoms. 3.  Drink plenty of water and avoid soda, coffee, and alcohol. Avoid acidic drinks and foods.   4.  Make an appointment to follow-up with urology keep your appointment with your gastroenterologist.

## 2023-01-30 NOTE — ED PROVIDER NOTES
EMERGENCY DEPARTMENT HISTORY AND PHYSICAL EXAM      Date: 1/29/2023  Patient Name: Misti Mea      History of Presenting Illness     Chief Complaint   Patient presents with    Bladder Pain    Urinary Frequency       Location/Duration/Severity/Modifying factors   Chief Complaint   Patient presents with    Bladder Pain    Urinary Frequency       HPI:  Misti Mae is a 79 y.o. female with history as listed presents with a concern of dysuria and urinary frequency. The patient denies fever. Associated Symptoms:see ROS      There are no other complaints, changes, or physical findings at this time. PCP: Shannan Hollins MD    Current Outpatient Medications   Medication Sig Dispense Refill    ondansetron hcl (Zofran) 4 mg tablet Take 1 Tablet by mouth every eight (8) hours as needed for Nausea or Vomiting. 20 Tablet 0    cephALEXin (Keflex) 500 mg capsule Take 1 Capsule by mouth four (4) times daily for 5 days. 20 Capsule 0    omeprazole (PRILOSEC) 10 mg capsule Take 10 mg by mouth daily. butalbital-acetaminophen-caff (Fioricet) -40 mg per capsule Take 1 Capsule by mouth every six (6) hours as needed for Headache. (Patient not taking: Reported on 1/29/2023) 10 Capsule 0    pantoprazole (PROTONIX) 40 mg tablet Take 40 mg by mouth nightly. folic acid (FOLVITE) 1 mg tablet Take 1 mg by mouth daily. rosuvastatin (CRESTOR) 5 mg tablet TAKE 1 TABLET BY MOUTH EVERY NIGHT AT BEDTIME FOR CHOLESTEROL (Patient not taking: Reported on 1/29/2023)      cyclobenzaprine (FLEXERIL) 5 mg tablet Take 1 Tablet by mouth three (3) times daily as needed for Muscle Spasm(s). 20 Tablet 0    losartan (COZAAR) 25 mg tablet TAKE 1 TABLET BY MOUTH DAILY 90 Tab 0    diclofenac (VOLTAREN) 1 % gel Apply 2 g to affected area four (4) times daily. 100 g 0    acetaminophen (TYLENOL) 500 mg tablet Take 1,000 mg by mouth every six (6) hours as needed for Pain.       OTHER [employer] Women's Multivitamin Gummy - 1 gummy daily      Biotin 2,500 mcg cap Take 1 Cap by mouth daily. psyllium husk (METAMUCIL PO) Take  by mouth daily. celecoxib (CELEBREX) 200 mg capsule Take 200 mg by mouth as needed. cholecalciferol (VITAMIN D3) 25 mcg (1,000 unit) cap Take 1,000 Units by mouth daily. vit C/vit E ac/lut/copper/zinc (PRESERVISION LUTEIN PO) Take 1 Tab by mouth daily. Past History     Past Medical History:  Past Medical History:   Diagnosis Date    Arthritis     Back pain of lumbar region with sciatica 12/5/2018    Breast cancer ([facility]     Cancer ([facility] [address] 2015    breast cancer    Dyslipidemia     Heart palpitations     Hypertension     Left ventricular hypertrophy     Macular degeneration     Vitamin D deficiency        Past Surgical History:  Past Surgical History:   Procedure Laterality Date    HX BREAST LUMPECTOMY Right     HX HERNIA REPAIR      HX HYSTERECTOMY  1985    GA BREAST SURGERY PROCEDURE UNLISTED Right 2015       Family History:  Family History   Problem Relation Age of Onset    Cancer Mother         stomach and liver cancer, in her 62s    Heart Disease Father         MI age 46s    Cancer Sister         blood    Cancer Brother         leukemia    Cancer Sibling        Social History:  Social History     Tobacco Use    Smoking status: Never    Smokeless tobacco: Never   Substance Use Topics    Alcohol use: No    Drug use: No       Allergies: Allergies   Allergen Reactions    Codeine Other (comments)     Reports constipation on this in past    Gabapentin Drowsiness    Sulfa (Sulfonamide Antibiotics) Nausea Only         Review of Systems     Review of Systems    Physical Exam     Physical Exam  Vitals reviewed. Constitutional:       Appearance: Normal appearance. She is not ill-appearing or toxic-appearing. Cardiovascular:      Heart sounds: Normal heart sounds, S1 normal and S2 normal. No murmur heard.   Pulmonary:      Effort: Pulmonary effort is normal.      Breath sounds: Normal breath sounds and air entry. Neurological:      Mental Status: She is alert and oriented to person, place, and time. Sensory: Sensation is intact. Motor: Motor function is intact. Coordination: Coordination is intact. Lab and Diagnostic Study Results     Labs -  Recent Results (from the past 24 hour(s))   URINALYSIS W/ RFLX MICROSCOPIC    Collection Time: 01/29/23 12:45 PM   Result Value Ref Range    Color YELLOW      Appearance CLEAR      Specific gravity 1.010 1.005 - 1.030      pH (UA) 5.5 5.0 - 8.0      Protein Negative NEG mg/dL    Glucose Negative NEG mg/dL    Ketone Negative NEG mg/dL    Bilirubin Negative NEG      Blood TRACE (A) NEG      Urobilinogen 0.2 0.2 - 1.0 EU/dL    Nitrites Negative NEG      Leukocyte Esterase Negative NEG     URINE MICROSCOPIC ONLY    Collection Time: 01/29/23 12:45 PM   Result Value Ref Range    WBC Negative 0 - 4 /hpf    RBC 0 to 3 0 - 5 /hpf    Epithelial cells FEW 0 - 5 /lpf    Bacteria Negative NEG /hpf   CBC WITH AUTOMATED DIFF    Collection Time: 01/29/23  1:30 PM   Result Value Ref Range    WBC 5.4 4.6 - 13.2 K/uL    RBC 5.66 (H) 4.20 - 5.30 M/uL    HGB 13.0 12.0 - 16.0 g/dL    HCT 41.8 35.0 - 45.0 %    MCV 73.9 (L) 78.0 - 100.0 FL    MCH 23.0 (L) 24.0 - 34.0 PG    MCHC 31.1 31.0 - 37.0 g/dL    RDW 14.7 (H) 11.6 - 14.5 %    PLATELET 741 195 - 129 K/uL    MPV 11.0 9.2 - 11.8 FL    NRBC 0.0 0  WBC    ABSOLUTE NRBC 0.00 0.00 - 0.01 K/uL    NEUTROPHILS 59 40 - 73 %    LYMPHOCYTES 36 21 - 52 %    MONOCYTES 4 3 - 10 %    EOSINOPHILS 0 0 - 5 %    BASOPHILS 1 0 - 2 %    IMMATURE GRANULOCYTES 0 0.0 - 0.5 %    ABS. NEUTROPHILS 3.2 1.8 - 8.0 K/UL    ABS. LYMPHOCYTES 1.9 0.9 - 3.6 K/UL    ABS. MONOCYTES 0.2 0.05 - 1.2 K/UL    ABS. EOSINOPHILS 0.0 0.0 - 0.4 K/UL    ABS. BASOPHILS 0.1 0.0 - 0.1 K/UL    ABS. IMM.  GRANS. 0.0 0.00 - 0.04 K/UL    DF AUTOMATED     METABOLIC PANEL, BASIC    Collection Time: 01/29/23  1:30 PM   Result Value Ref Range    Sodium 140 136 - 145 mmol/L    Potassium 3.8 3.5 - 5.5 mmol/L    Chloride 105 100 - 111 mmol/L    CO2 30 21 - 32 mmol/L    Anion gap 5 3.0 - 18 mmol/L    Glucose 100 (H) 74 - 99 mg/dL    BUN 12 7.0 - 18 MG/DL    Creatinine 1.07 0.6 - 1.3 MG/DL    BUN/Creatinine ratio 11 (L) 12 - 20      eGFR 52 (L) >60 ml/min/1.73m2    Calcium 9.5 8.5 - 10.1 MG/DL   EKG, 12 LEAD, INITIAL    Collection Time: 01/29/23  2:25 PM   Result Value Ref Range    Ventricular Rate 61 BPM    Atrial Rate 61 BPM    P-R Interval 178 ms    QRS Duration 82 ms    Q-T Interval 412 ms    QTC Calculation (Bezet) 414 ms    Calculated P Axis 74 degrees    Calculated R Axis 3 degrees    Calculated T Axis 14 degrees    Diagnosis       Sinus rhythm with sinus arrhythmia with occasional premature ventricular   complexes  Possible Left atrial enlargement  Borderline ECG  When compared with ECG of 26-SEP-2022 16:15,  premature ventricular complexes are now present  aberrant conduction is no longer present           Radiologic Studies -   CT ABD PELV WO CONT   Final Result      No clearly acute findings. Colonic diverticulosis. Small fat-containing ventral hernias. Hysterectomy. Atherosclerosis. See additional details above. Procedures and Critical Care       Performed by: Emilia Moreno MD    Procedures         Emilia Moreno MD    Medical Decision Making and ED Course   - I am the first and primary provider for this patient AND AM THE PRIMARY PROVIDER OF RECORD. - I reviewed the vital signs, available nursing notes, past medical history, past surgical history, family history and social history. - Initial assessment performed. The patients presenting problems have been discussed, and the staff are in agreement with the care plan formulated and outlined with them. I have encouraged them to ask questions as they arise throughout their visit. Vital Signs-Reviewed the patient's vital signs.     Patient Vitals for the past 12 hrs:   BP SpO2 01/29/23 1744 (!) 164/80 100 %         Provider Notes (Medical Decision Making):     MDM  Number of Diagnoses or Management Options  Diverticulosis  Dysuria  Gastroesophageal reflux disease, unspecified whether esophagitis present  Nausea without vomiting  Ventral hernia without obstruction or gangrene  Diagnosis management comments: She was signed out to me from Dr. Jorge Landaverde to follow-up results of CT of abdomen pelvis. Patient reported she has been experiencing dysuria and gradually worsening. The patient admitted to having increased intake of sodas. The analysis was concerning for urinary tract infection. The patient will be discharged home with a prescription for Keflex. The patient also will be treated for GERD and nausea. CT results were reviewed with the patient. She is to follow-up with her primary care provider. The patient is to continue medications as prescribed. She is to return if any worsening or concerning symptoms. At the time of disposition, the patient stable and in no acute distress or obvious discomfort. ED Course:       ED Course as of 01/30/23 0534   Alison Edward Jan 29, 2023 2008 Patient was signed out to me from Dr. Jorge Landaverde to follow-up results of CT scan. Patient stated she has been experiencing dysuria that is gradually worsening. She admits to drinking sodas and ginger ale frequently. We did discuss that she can switch over to water and occasionally drinks sodas if needed. The patient also has GERD and has a pending appointment for colonoscopy with her gastroenterologist.  She is to continue avoiding spicy and greasy foods as well as acidic drinks and foods. The patient would likely benefit from Zofran for now since she is reporting that she has a slight nausea. Patient also would likely benefit from a brief prescription of Keflex with the dysuria and trace hematuria. She might need to follow-up with the urologist.  The patient will be discharged.   She is stable and in no acute distress or obvious discomfort. [facility]      ED Course User Betty Escobar MD     ------------------------------------------------------------------------------------------------------------        Consultations:       Consultations:       Disposition         Discharged      Diagnosis     Clinical Impression:   1. Dysuria    2. Gastroesophageal reflux disease, unspecified whether esophagitis present    3. Nausea without vomiting    4. Diverticulosis    5.  Ventral hernia without obstruction or gangrene        Attestations:    Brown Phillips MD

## 2023-02-01 ENCOUNTER — APPOINTMENT (OUTPATIENT)
Dept: PHYSICAL THERAPY | Age: 83
End: 2023-02-01

## 2023-02-15 ENCOUNTER — TRANSCRIBE ORDERS (OUTPATIENT)
Facility: HOSPITAL | Age: 83
End: 2023-02-15

## 2023-02-15 DIAGNOSIS — Z12.31 VISIT FOR SCREENING MAMMOGRAM: Primary | ICD-10-CM

## 2023-03-03 ENCOUNTER — OFFICE VISIT (OUTPATIENT)
Age: 83
End: 2023-03-03
Payer: MEDICARE

## 2023-03-03 VITALS
BODY MASS INDEX: 21.51 KG/M2 | HEIGHT: 64 IN | WEIGHT: 126 LBS | HEART RATE: 87 BPM | SYSTOLIC BLOOD PRESSURE: 120 MMHG | DIASTOLIC BLOOD PRESSURE: 70 MMHG | OXYGEN SATURATION: 98 %

## 2023-03-03 DIAGNOSIS — E78.2 MIXED HYPERLIPIDEMIA: ICD-10-CM

## 2023-03-03 DIAGNOSIS — R01.1 MURMUR: ICD-10-CM

## 2023-03-03 DIAGNOSIS — I10 ESSENTIAL HYPERTENSION: ICD-10-CM

## 2023-03-03 DIAGNOSIS — Z85.3 HISTORY OF BREAST CANCER: ICD-10-CM

## 2023-03-03 DIAGNOSIS — R60.9 EDEMA, UNSPECIFIED TYPE: Primary | ICD-10-CM

## 2023-03-03 PROBLEM — D50.9 MICROCYTIC ANEMIA: Status: ACTIVE | Noted: 2023-03-03

## 2023-03-03 PROBLEM — R51.9 HEADACHE: Status: ACTIVE | Noted: 2023-03-03

## 2023-03-03 PROBLEM — D53.1 MEGALOBLASTIC ANEMIA DUE TO VITAMIN B12 DEFICIENCY: Status: ACTIVE | Noted: 2023-03-03

## 2023-03-03 PROBLEM — D50.9 IRON DEFICIENCY ANEMIA: Status: ACTIVE | Noted: 2023-03-03

## 2023-03-03 PROBLEM — R30.0 DYSURIA: Status: ACTIVE | Noted: 2023-03-03

## 2023-03-03 PROCEDURE — 99214 OFFICE O/P EST MOD 30 MIN: CPT | Performed by: INTERNAL MEDICINE

## 2023-03-03 PROCEDURE — 1036F TOBACCO NON-USER: CPT | Performed by: INTERNAL MEDICINE

## 2023-03-03 PROCEDURE — 1123F ACP DISCUSS/DSCN MKR DOCD: CPT | Performed by: INTERNAL MEDICINE

## 2023-03-03 PROCEDURE — 1090F PRES/ABSN URINE INCON ASSESS: CPT | Performed by: INTERNAL MEDICINE

## 2023-03-03 PROCEDURE — 3078F DIAST BP <80 MM HG: CPT | Performed by: INTERNAL MEDICINE

## 2023-03-03 PROCEDURE — 3074F SYST BP LT 130 MM HG: CPT | Performed by: INTERNAL MEDICINE

## 2023-03-03 PROCEDURE — G8420 CALC BMI NORM PARAMETERS: HCPCS | Performed by: INTERNAL MEDICINE

## 2023-03-03 PROCEDURE — G8399 PT W/DXA RESULTS DOCUMENT: HCPCS | Performed by: INTERNAL MEDICINE

## 2023-03-03 PROCEDURE — G8427 DOCREV CUR MEDS BY ELIG CLIN: HCPCS | Performed by: INTERNAL MEDICINE

## 2023-03-03 PROCEDURE — G8484 FLU IMMUNIZE NO ADMIN: HCPCS | Performed by: INTERNAL MEDICINE

## 2023-03-03 RX ORDER — NITROFURANTOIN MACROCRYSTALS 100 MG/1
1 CAPSULE ORAL PRN
COMMUNITY

## 2023-03-03 RX ORDER — ONDANSETRON 4 MG/1
4 TABLET, FILM COATED ORAL EVERY 8 HOURS PRN
COMMUNITY

## 2023-03-03 ASSESSMENT — PATIENT HEALTH QUESTIONNAIRE - PHQ9
1. LITTLE INTEREST OR PLEASURE IN DOING THINGS: 0
SUM OF ALL RESPONSES TO PHQ9 QUESTIONS 1 & 2: 0
2. FEELING DOWN, DEPRESSED OR HOPELESS: 0
SUM OF ALL RESPONSES TO PHQ QUESTIONS 1-9: 0

## 2023-03-03 ASSESSMENT — ENCOUNTER SYMPTOMS
SORE THROAT: 0
SHORTNESS OF BREATH: 0
ABDOMINAL PAIN: 0
COUGH: 0
NAUSEA: 0
ABDOMINAL DISTENTION: 0
VOMITING: 0

## 2023-03-03 ASSESSMENT — ANXIETY QUESTIONNAIRES
2. NOT BEING ABLE TO STOP OR CONTROL WORRYING: 0
GAD7 TOTAL SCORE: 0
1. FEELING NERVOUS, ANXIOUS, OR ON EDGE: 0
5. BEING SO RESTLESS THAT IT IS HARD TO SIT STILL: 0
4. TROUBLE RELAXING: 0
3. WORRYING TOO MUCH ABOUT DIFFERENT THINGS: 0
6. BECOMING EASILY ANNOYED OR IRRITABLE: 0
7. FEELING AFRAID AS IF SOMETHING AWFUL MIGHT HAPPEN: 0

## 2023-03-03 NOTE — PROGRESS NOTES
Hugo Mercado presents today for   Chief Complaint   Patient presents with    Follow-up     Add on: leg swelling    Edema     Right leg, foot, and ankle        Luzmariadeo Blackmaneker preferred language for health care discussion is english/other. Is someone accompanying this pt? no    Is the patient using any DME equipment during OV? no    Depression Screening:  Depression: Not at risk    PHQ-2 Score: 0        Learning Assessment:  Who is the primary learner? Patient    What is the preferred language for health care of the primary learner? ENGLISH    How does the primary learner prefer to learn new concepts? DEMONSTRATION    Answered By patient    Relationship to Learner SELF           Pt currently taking Anticoagulant therapy? no    Pt currently taking Antiplatelet therapy ? no      Coordination of Care:  1. Have you been to the ER, urgent care clinic since your last visit? Hospitalized since your last visit? no    2. Have you seen or consulted any other health care providers outside of the [address] [place] [address] since your last visit? Include any pap smears or colon screening.  no

## 2023-03-03 NOTE — PROGRESS NOTES
03/03/23     Tessie Campbell  is a 79 y.o. female     Chief Complaint   Patient presents with    Follow-up     Add on: leg swelling    Edema     Right leg, foot, and ankle        Edema  Pertinent negatives include no abdominal pain, arthralgias, chest pain, chills, congestion, coughing, fatigue, fever, headaches, nausea, rash, sore throat, vomiting or weakness. Patient presents for an add-on office visit. She was initially referred here by her PCP to establish care with a local cardiologist.  The patient recently relocated to the area from [place], [place]. She states she was being followed by a cardiologist for heart palpitations and what sounds like left ventricular hypertrophy. She does not have a prior history of hypertension. She does have a history of dyslipidemia, right-sided breast cancer, status post lumpectomy without radiation or chemotherapy. She has been maintained on an aromatase inhibitor. Patient underwent an echocardiogram through our office in December 2019 which showed preserved LV function, EF 60 to 65% with mild concentric LVH, mild tricuspid regurgitation but normal PA pressure. She underwent a pharmacologic nuclear stress test in December 2020 which is a normal low risk study. No evidence of ischemia or infarction, ejection fraction calculated greater than 70%. Patient returns today complaining of increased swelling in both lower extremities, right greater than left for the past 2 to 3 weeks. She will notice this off and on. The swelling usually goes up to her shins. She denies any increased shortness of breath, no orthopnea or PND. No chest pain, heart palpitations, dizziness, nor syncope.     Past Medical History:   Diagnosis Date    Arthritis     Back pain of lumbar region with sciatica 12/5/2018    Breast cancer ([facility] [address]     Cancer ([facility] [address] 2015    breast cancer    Dyslipidemia     Heart palpitations     Hypertension     Left ventricular hypertrophy     Macular degeneration     Vitamin D deficiency      Current Outpatient Medications   Medication Sig Dispense Refill    nitrofurantoin (MACRODANTIN) 100 MG capsule Take 1 capsule by mouth as needed      METAMUCIL FIBER PO Take by mouth daily      Multiple Vitamin (MULTIVITAMIN ADULT PO) Take by mouth daily      Multiple Vitamins-Minerals (PRESERVISION/LUTEIN PO) Take 1 tablet by mouth daily      ondansetron (ZOFRAN) 4 MG tablet Take 4 mg by mouth every 8 hours as needed for Nausea or Vomiting      acetaminophen (TYLENOL) 500 MG tablet Take 1,000 mg by mouth every 6 hours as needed      Biotin 2.5 MG CAPS Take 1 capsule by mouth daily      celecoxib (CELEBREX) 200 MG capsule Take 200 mg by mouth as needed      vitamin D 25 MCG (1000 UT) CAPS Take 1,000 Units by mouth daily      cyclobenzaprine (FLEXERIL) 5 MG tablet Take 5 mg by mouth 3 times daily as needed      diclofenac sodium (VOLTAREN) 1 % GEL Apply 2 g topically 4 times daily      losartan (COZAAR) 25 MG tablet TAKE 1 TABLET BY MOUTH DAILY      pantoprazole (PROTONIX) 40 MG tablet Take 40 mg by mouth daily      rosuvastatin (CRESTOR) 5 MG tablet TAKE 1 TABLET BY MOUTH EVERY NIGHT AT BEDTIME FOR CHOLESTEROL       No current facility-administered medications for this visit. Allergies   Allergen Reactions    Codeine Other (See Comments)     Reports constipation on this in past    Gabapentin      Other reaction(s): Drowsiness    Sulfa Antibiotics Nausea Only     Social History     Tobacco Use    Smoking status: Never    Smokeless tobacco: Never   Vaping Use    Vaping Use: Never used   Substance Use Topics    Alcohol use: No    Drug use: No     Family History   Problem Relation Age of Onset    Cancer Brother         leukemia    Cancer Sibling     Cancer Sister         blood    Heart Disease Father         MI age 46s    Cancer Mother         stomach and liver cancer, in her 62s         Review of Systems   Constitutional:  Negative for chills, fatigue and fever.    HENT: Negative for congestion, hearing loss, nosebleeds and sore throat. Eyes:  Negative for visual disturbance. Respiratory:  Negative for cough and shortness of breath. Cardiovascular:  Positive for leg swelling. Negative for chest pain and palpitations. Gastrointestinal:  Negative for abdominal distention, abdominal pain, nausea and vomiting. Endocrine: Negative for cold intolerance and heat intolerance. Genitourinary:  Negative for dysuria. Musculoskeletal:  Negative for arthralgias. Skin:  Negative for rash. Neurological:  Negative for dizziness, syncope, weakness and headaches. Hematological:  Does not bruise/bleed easily. Psychiatric/Behavioral:  Negative for suicidal ideas. /70 (Site: Left Upper Arm, Position: Sitting, Cuff Size: Medium Adult)   Pulse 87   Ht 5' 4\" (1.626 m)   Wt 126 lb (57.2 kg)   SpO2 98%   BMI 21.63 kg/m²     Objective:   Physical Exam  Constitutional:       General: She is not in acute distress. HENT:      Head: Normocephalic. Neck:      Vascular: No carotid bruit or JVD. Cardiovascular:      Rate and Rhythm: Normal rate and regular rhythm. No extrasystoles are present. Heart sounds: Murmur heard. Midsystolic murmur is present with a grade of 2/6 at the upper right sternal border and lower right sternal border. No gallop. Pulmonary:      Effort: Pulmonary effort is normal.      Breath sounds: No wheezing, rhonchi or rales. Abdominal:      General: Bowel sounds are normal. There is no distension. Palpations: Abdomen is soft. Tenderness: There is no abdominal tenderness. Musculoskeletal:         General: No swelling or deformity. Skin:     General: Skin is warm and dry. Findings: No rash. Neurological:      General: No focal deficit present. Mental Status: She is alert and oriented to person, place, and time.    Psychiatric:         Mood and Affect: Mood normal.         Behavior: Behavior normal.     January 29, 2023 EKG: Normal sinus rhythm with sinus arrhythmia, borderline left atrial enlargement, occasional PVC. Compared to the previous EKG, the PVC is new. Assessment / Plan:     Lower extremity swelling. I suspect the patient is having dependent edema or possibly venous insufficiency. I have recommended she keep her legs elevated is much as possible and work in compression stockings if she is to be seated or standing for long periods of time. Lastly, would like to repeat an echocardiogram to reevaluate her LV and RV function and ensure she does not have any significant pulmonary hypertension. Cardiac murmur. Patient's murmur sounds suspicious for aortic valve sclerosis. An echocardiogram will be ordered as described above. Essential hypertension. This now appears to be reasonably well controlled on losartan as monotherapy. Dyslipidemia. Patient has been managed on with Crestor 5 mg daily. This is followed regularly by her PCP. Breast cancer. According to the patient's was early-stage and was treated with surgery alone with adjuvant oral therapy with an aromatase inhibitor. She was never treated with an Adriamycin-based chemotherapeutic regimen. Follow-up in 6 months, sooner if needed.     Seema Srivastava MD

## 2023-03-22 ENCOUNTER — HOSPITAL ENCOUNTER (OUTPATIENT)
Facility: HOSPITAL | Age: 83
Discharge: HOME | End: 2023-03-25
Payer: MEDICARE

## 2023-03-22 LAB
ALBUMIN SERPL-MCNC: 3.6 G/DL (ref 3.4–5)
ALBUMIN/GLOB SERPL: 1.1 (ref 0.8–1.7)
ALP SERPL-CCNC: 68 U/L (ref 45–117)
ALT SERPL-CCNC: 17 U/L (ref 13–56)
AST SERPL-CCNC: 14 U/L (ref 10–38)
BILIRUB DIRECT SERPL-MCNC: 0.2 MG/DL (ref 0–0.2)
BILIRUB SERPL-MCNC: 1.3 MG/DL (ref 0.2–1)
GLOBULIN SER CALC-MCNC: 3.2 G/DL (ref 2–4)
PROT SERPL-MCNC: 6.8 G/DL (ref 6.4–8.2)

## 2023-03-22 PROCEDURE — 80074 ACUTE HEPATITIS PANEL: CPT

## 2023-03-22 PROCEDURE — 82390 ASSAY OF CERULOPLASMIN: CPT

## 2023-03-22 PROCEDURE — 36415 COLL VENOUS BLD VENIPUNCTURE: CPT

## 2023-03-22 PROCEDURE — 86706 HEP B SURFACE ANTIBODY: CPT

## 2023-03-22 PROCEDURE — 80076 HEPATIC FUNCTION PANEL: CPT

## 2023-03-22 PROCEDURE — 86708 HEPATITIS A ANTIBODY: CPT

## 2023-03-23 LAB
-: NORMAL
CERULOPLASMIN SERPL-MCNC: 26.2 MG/DL (ref 19–39)
HAV AB SER QL IA: NEGATIVE
HAV IGM SER QL: NEGATIVE
HBV CORE IGM SER QL: NEGATIVE
HBV SURFACE AB SER QL IA: NEGATIVE
HBV SURFACE AB SERPL IA-ACNC: <3.1 MIU/ML
HBV SURFACE AG SER QL: 0.15 INDEX
HBV SURFACE AG SER QL: NEGATIVE
HCV AB SER IA-ACNC: 0.05 INDEX
HCV AB SERPL QL IA: NEGATIVE
Lab: ABNORMAL
Lab: NORMAL

## 2023-03-30 ENCOUNTER — TELEPHONE (OUTPATIENT)
Age: 83
End: 2023-03-30

## 2023-03-30 NOTE — TELEPHONE ENCOUNTER
----- Message from Stefan Walters MD sent at 3/28/2023  4:07 PM EDT -----  Please let the patient know that her echocardiogram was unremarkable. She had normal heart function and no evidence of pulmonary hypertension.  ----- Message -----  From: Mati Jauregui LPN  Sent: [id number]   1:00 PM EDT  To: Stefan Walters MD    Per your note-Lower extremity swelling. I suspect the patient is having dependent edema or possibly venous insufficiency. I have recommended she keep her legs elevated is much as possible and work in compression stockings if she is to be seated or standing for long periods of time. Lastly, would like to repeat an echocardiogram to reevaluate her LV and RV function and ensure she does not have any significant pulmonary hypertension.

## 2023-03-30 NOTE — TELEPHONE ENCOUNTER
Patient made aware of echo results and Dr. Argenis Serrato remarks. No questions or concerns at present.

## 2023-04-18 ENCOUNTER — HOSPITAL ENCOUNTER (OUTPATIENT)
Facility: HOSPITAL | Age: 83
Discharge: HOME | End: 2023-04-21
Payer: MEDICARE

## 2023-04-18 DIAGNOSIS — Z12.31 VISIT FOR SCREENING MAMMOGRAM: ICD-10-CM

## 2023-04-18 PROCEDURE — 77063 BREAST TOMOSYNTHESIS BI: CPT

## 2023-05-05 ENCOUNTER — HOSPITAL ENCOUNTER (EMERGENCY)
Facility: HOSPITAL | Age: 83
Discharge: HOME | End: 2023-05-05
Attending: EMERGENCY MEDICINE
Payer: MEDICARE

## 2023-05-05 VITALS
HEIGHT: 64 IN | WEIGHT: 126 LBS | OXYGEN SATURATION: 100 % | TEMPERATURE: 98.1 F | DIASTOLIC BLOOD PRESSURE: 69 MMHG | HEART RATE: 65 BPM | RESPIRATION RATE: 17 BRPM | BODY MASS INDEX: 21.51 KG/M2 | SYSTOLIC BLOOD PRESSURE: 149 MMHG

## 2023-05-05 DIAGNOSIS — R30.0 DYSURIA: Primary | ICD-10-CM

## 2023-05-05 DIAGNOSIS — N30.00 ACUTE CYSTITIS WITHOUT HEMATURIA: ICD-10-CM

## 2023-05-05 LAB
APPEARANCE UR: CLEAR
BILIRUB UR QL: NEGATIVE
COLOR UR: YELLOW
GLUCOSE UR STRIP.AUTO-MCNC: NEGATIVE MG/DL
HGB UR QL STRIP: NEGATIVE
KETONES UR QL STRIP.AUTO: NEGATIVE MG/DL
LEUKOCYTE ESTERASE UR QL STRIP.AUTO: NEGATIVE
NITRITE UR QL STRIP.AUTO: NEGATIVE
PH UR STRIP: 7 (ref 5–8)
PROT UR STRIP-MCNC: NEGATIVE MG/DL
SP GR UR REFRACTOMETRY: 1.02 (ref 1–1.03)
UROBILINOGEN UR QL STRIP.AUTO: 1 EU/DL (ref 0.2–1)

## 2023-05-05 PROCEDURE — 81003 URINALYSIS AUTO W/O SCOPE: CPT

## 2023-05-05 PROCEDURE — 87086 URINE CULTURE/COLONY COUNT: CPT

## 2023-05-05 PROCEDURE — 99283 EMERGENCY DEPT VISIT LOW MDM: CPT

## 2023-05-05 RX ORDER — CEPHALEXIN 500 MG/1
500 CAPSULE ORAL 2 TIMES DAILY
Qty: 10 CAPSULE | Refills: 0 | Status: SHIPPED | OUTPATIENT
Start: 2023-05-05 | End: 2023-05-10

## 2023-05-05 RX ORDER — PHENAZOPYRIDINE HYDROCHLORIDE 100 MG/1
100 TABLET, FILM COATED ORAL 2 TIMES DAILY PRN
Qty: 4 TABLET | Refills: 0 | Status: SHIPPED | OUTPATIENT
Start: 2023-05-05 | End: 2023-05-07

## 2023-05-05 ASSESSMENT — PAIN - FUNCTIONAL ASSESSMENT: PAIN_FUNCTIONAL_ASSESSMENT: 0-10

## 2023-05-05 ASSESSMENT — ENCOUNTER SYMPTOMS
DIARRHEA: 0
SHORTNESS OF BREATH: 0
COUGH: 0
CHEST TIGHTNESS: 0
NAUSEA: 0
ABDOMINAL PAIN: 0
VOMITING: 0

## 2023-05-05 ASSESSMENT — LIFESTYLE VARIABLES
HOW OFTEN DO YOU HAVE A DRINK CONTAINING ALCOHOL: NEVER
HOW MANY STANDARD DRINKS CONTAINING ALCOHOL DO YOU HAVE ON A TYPICAL DAY: PATIENT DOES NOT DRINK

## 2023-05-05 ASSESSMENT — PAIN DESCRIPTION - LOCATION: LOCATION: VAGINA

## 2023-05-05 NOTE — DISCHARGE INSTRUCTIONS
Begin taking keflex twice a day for 5 days. Take pyridium up to twice a day as needed for urinary pain. Follow-up with PCP within the next few days in order to be re-evaluated. Return to the ED with any new or worsening symptoms.

## 2023-05-05 NOTE — ED PROVIDER NOTES
EMERGENCY DEPARTMENT HISTORY AND PHYSICAL EXAM    8:22 PM      Date: 5/5/2023  Patient Name: Robin Haddad    History of Presenting Illness     Chief Complaint   Patient presents with    Urinary Pain    Vaginal Pain         History Provided By: the patient. Additional History (Context): Robin Haddad is a 79 y.o. female with history of arthritis, breast cancer, hypertension, dyslipidemia presenting to the emergency department due to urinary frequency. Patient states that she noticed the symptoms starting about a week ago. Also admits to painful urination. Patient states that when she is urinating she feels she is having vaginal pressure. Denies any vaginal pain, discharge, or bleeding. Denies any fever or chills. Denies any chest pain or shortness of breath. Patient states that she has had UTIs in the past.  States that this feels similar to prior, but this 1 feels slightly worse due to her having burning with urination. PCP: Freeman Linares MD    No current facility-administered medications for this encounter.      Current Outpatient Medications   Medication Sig Dispense Refill    cephALEXin (KEFLEX) 500 MG capsule Take 1 capsule by mouth 2 times daily for 5 days 10 capsule 0    phenazopyridine (PYRIDIUM) 100 MG tablet Take 1 tablet by mouth 2 times daily as needed for Pain 4 tablet 0    nitrofurantoin (MACRODANTIN) 100 MG capsule Take 1 capsule by mouth as needed      METAMUCIL FIBER PO Take by mouth daily      Multiple Vitamin (MULTIVITAMIN ADULT PO) Take by mouth daily      Multiple Vitamins-Minerals (PRESERVISION/LUTEIN PO) Take 1 tablet by mouth daily      ondansetron (ZOFRAN) 4 MG tablet Take 4 mg by mouth every 8 hours as needed for Nausea or Vomiting      acetaminophen (TYLENOL) 500 MG tablet Take 1,000 mg by mouth every 6 hours as needed      Biotin 2.5 MG CAPS Take 1 capsule by mouth daily      celecoxib (CELEBREX) 200 MG capsule Take 200 mg by mouth as needed      vitamin D 25

## 2023-05-06 LAB
BACTERIA SPEC CULT: NORMAL
SERVICE CMNT-IMP: NORMAL

## 2023-07-26 ENCOUNTER — HOSPITAL ENCOUNTER (EMERGENCY)
Facility: HOSPITAL | Age: 83
Discharge: HOME | End: 2023-07-26
Attending: EMERGENCY MEDICINE
Payer: MEDICARE

## 2023-07-26 VITALS
TEMPERATURE: 98.6 F | WEIGHT: 123 LBS | SYSTOLIC BLOOD PRESSURE: 130 MMHG | BODY MASS INDEX: 21 KG/M2 | HEIGHT: 64 IN | RESPIRATION RATE: 16 BRPM | HEART RATE: 76 BPM | OXYGEN SATURATION: 100 % | DIASTOLIC BLOOD PRESSURE: 78 MMHG

## 2023-07-26 DIAGNOSIS — R35.89 POLYURIA: Primary | ICD-10-CM

## 2023-07-26 DIAGNOSIS — R30.0 DYSURIA: ICD-10-CM

## 2023-07-26 LAB
ALBUMIN SERPL-MCNC: 3.9 G/DL (ref 3.4–5)
ALBUMIN/GLOB SERPL: 1 (ref 0.8–1.7)
ALP SERPL-CCNC: 77 U/L (ref 45–117)
ALT SERPL-CCNC: 18 U/L (ref 13–56)
ANION GAP SERPL CALC-SCNC: 4 MMOL/L (ref 3–18)
APPEARANCE UR: CLEAR
AST SERPL-CCNC: 16 U/L (ref 10–38)
BACTERIA URNS QL MICRO: NEGATIVE /HPF
BASOPHILS # BLD: 0 K/UL (ref 0–0.1)
BASOPHILS NFR BLD: 1 % (ref 0–2)
BILIRUB SERPL-MCNC: 0.9 MG/DL (ref 0.2–1)
BILIRUB UR QL: NEGATIVE
BUN SERPL-MCNC: 15 MG/DL (ref 7–18)
BUN/CREAT SERPL: 15 (ref 12–20)
CALCIUM SERPL-MCNC: 9.2 MG/DL (ref 8.5–10.1)
CHLORIDE SERPL-SCNC: 108 MMOL/L (ref 100–111)
CO2 SERPL-SCNC: 30 MMOL/L (ref 21–32)
COLOR UR: YELLOW
CREAT SERPL-MCNC: 0.99 MG/DL (ref 0.6–1.3)
DIFFERENTIAL METHOD BLD: ABNORMAL
EOSINOPHIL # BLD: 0 K/UL (ref 0–0.4)
EOSINOPHIL NFR BLD: 1 % (ref 0–5)
EPITH CASTS URNS QL MICRO: NORMAL /LPF (ref 0–5)
ERYTHROCYTE [DISTWIDTH] IN BLOOD BY AUTOMATED COUNT: 15.1 % (ref 11.6–14.5)
GLOBULIN SER CALC-MCNC: 3.9 G/DL (ref 2–4)
GLUCOSE BLD STRIP.AUTO-MCNC: 73 MG/DL (ref 70–110)
GLUCOSE SERPL-MCNC: 89 MG/DL (ref 74–99)
GLUCOSE UR STRIP.AUTO-MCNC: NEGATIVE MG/DL
HCT VFR BLD AUTO: 39.1 % (ref 35–45)
HGB BLD-MCNC: 12.3 G/DL (ref 12–16)
HGB UR QL STRIP: NEGATIVE
IMM GRANULOCYTES # BLD AUTO: 0 K/UL (ref 0–0.04)
IMM GRANULOCYTES NFR BLD AUTO: 0 % (ref 0–0.5)
KETONES UR QL STRIP.AUTO: NEGATIVE MG/DL
LEUKOCYTE ESTERASE UR QL STRIP.AUTO: ABNORMAL
LYMPHOCYTES # BLD: 2.8 K/UL (ref 0.9–3.6)
LYMPHOCYTES NFR BLD: 52 % (ref 21–52)
MCH RBC QN AUTO: 23.5 PG (ref 24–34)
MCHC RBC AUTO-ENTMCNC: 31.5 G/DL (ref 31–37)
MCV RBC AUTO: 74.6 FL (ref 78–100)
MONOCYTES # BLD: 0.3 K/UL (ref 0.05–1.2)
MONOCYTES NFR BLD: 6 % (ref 3–10)
NEUTS SEG # BLD: 2.2 K/UL (ref 1.8–8)
NEUTS SEG NFR BLD: 41 % (ref 40–73)
NITRITE UR QL STRIP.AUTO: NEGATIVE
NRBC # BLD: 0 K/UL (ref 0–0.01)
NRBC BLD-RTO: 0 PER 100 WBC
PH UR STRIP: 6 (ref 5–8)
PLATELET # BLD AUTO: 232 K/UL (ref 135–420)
PMV BLD AUTO: 10.9 FL (ref 9.2–11.8)
POTASSIUM SERPL-SCNC: 3.8 MMOL/L (ref 3.5–5.5)
PROT SERPL-MCNC: 7.8 G/DL (ref 6.4–8.2)
PROT UR STRIP-MCNC: NEGATIVE MG/DL
RBC # BLD AUTO: 5.24 M/UL (ref 4.2–5.3)
RBC #/AREA URNS HPF: NEGATIVE /HPF (ref 0–5)
SODIUM SERPL-SCNC: 142 MMOL/L (ref 136–145)
SP GR UR REFRACTOMETRY: 1.02 (ref 1–1.03)
UROBILINOGEN UR QL STRIP.AUTO: 0.2 EU/DL (ref 0.2–1)
WBC # BLD AUTO: 5.3 K/UL (ref 4.6–13.2)
WBC URNS QL MICRO: NORMAL /HPF (ref 0–4)

## 2023-07-26 PROCEDURE — 99283 EMERGENCY DEPT VISIT LOW MDM: CPT

## 2023-07-26 PROCEDURE — 81001 URINALYSIS AUTO W/SCOPE: CPT

## 2023-07-26 PROCEDURE — 80053 COMPREHEN METABOLIC PANEL: CPT

## 2023-07-26 PROCEDURE — 87086 URINE CULTURE/COLONY COUNT: CPT

## 2023-07-26 PROCEDURE — 82962 GLUCOSE BLOOD TEST: CPT

## 2023-07-26 PROCEDURE — 85025 COMPLETE CBC W/AUTO DIFF WBC: CPT

## 2023-07-26 ASSESSMENT — ENCOUNTER SYMPTOMS
VOMITING: 0
SORE THROAT: 0
DIARRHEA: 0
SHORTNESS OF BREATH: 0
ABDOMINAL PAIN: 0

## 2023-07-26 ASSESSMENT — LIFESTYLE VARIABLES
HOW MANY STANDARD DRINKS CONTAINING ALCOHOL DO YOU HAVE ON A TYPICAL DAY: PATIENT DOES NOT DRINK
HOW OFTEN DO YOU HAVE A DRINK CONTAINING ALCOHOL: NEVER
HOW OFTEN DO YOU HAVE A DRINK CONTAINING ALCOHOL: NEVER

## 2023-07-26 ASSESSMENT — PAIN SCALES - GENERAL: PAINLEVEL_OUTOF10: 8

## 2023-07-26 ASSESSMENT — PAIN DESCRIPTION - LOCATION: LOCATION: PELVIS

## 2023-07-26 ASSESSMENT — PAIN - FUNCTIONAL ASSESSMENT: PAIN_FUNCTIONAL_ASSESSMENT: 0-10

## 2023-07-26 NOTE — ED PROVIDER NOTES
Reviewed   URINALYSIS - Abnormal; Notable for the following components:       Result Value    Leukocyte Esterase, Urine TRACE (*)     All other components within normal limits   CBC WITH AUTO DIFFERENTIAL - Abnormal; Notable for the following components:    MCV 74.6 (*)     MCH 23.5 (*)     RDW 15.1 (*)     All other components within normal limits   COMPREHENSIVE METABOLIC PANEL - Abnormal; Notable for the following components:    Est, Glom Filt Rate 57 (*)     All other components within normal limits   CULTURE, URINE   URINALYSIS, MICRO   POCT GLUCOSE       Radiologic Studies -   No orders to display         The laboratory results, imaging results, and other diagnostic exams were reviewed in the EMR. Medical Decision Making   I am the first provider for this patient. I reviewed the vital signs, available nursing notes, past medical history, past surgical history, family history and social history. Vital Signs-Reviewed the patient's vital signs. Records Reviewed: Personally, on initial evaluation    MDM:   DDX includes but is not limited to: UTI, Hyperglycemia, Electrolyte abnormality    Will obtain lab work and imaging for further evaluation of patients complaint. Will continue to monitor and evaluate patient while in the ED. Orders as below:  Orders Placed This Encounter   Procedures    Culture, Urine    Urinalysis    Urinalysis, Micro    CBC with Auto Differential    Comprehensive Metabolic Panel    POC GLUCOSE    POCT Glucose    Insert peripheral IV        Merlene Grooms presents with complaint of dysuria and polyuria x 1 week. On exam no abdominal tenderness. Patient afebrile not tachycardic.  UA shows no WBC and RBC in urine. Will send urine culture, but does not require abx at this time. Normal glucose. Normal electrolytes. Discussed need for continued PCP follow-up and management. At time of discharge, pt non-toxic appearing in NAD.  Pt stable for prompt outpatient follow-up with PCP

## 2023-07-27 LAB
BACTERIA SPEC CULT: NORMAL
SERVICE CMNT-IMP: NORMAL

## 2023-08-01 ENCOUNTER — HOSPITAL ENCOUNTER (OUTPATIENT)
Facility: HOSPITAL | Age: 83
Discharge: HOME | End: 2023-08-04
Payer: MEDICARE

## 2023-08-01 DIAGNOSIS — M81.0 SENILE OSTEOPOROSIS: ICD-10-CM

## 2023-08-01 PROCEDURE — 77080 DXA BONE DENSITY AXIAL: CPT

## 2023-09-29 ENCOUNTER — HOSPITAL ENCOUNTER (OUTPATIENT)
Facility: HOSPITAL | Age: 83
End: 2023-09-29
Payer: MEDICARE

## 2023-09-29 DIAGNOSIS — R11.0 NAUSEA: ICD-10-CM

## 2023-09-29 DIAGNOSIS — R13.10 DYSPHAGIA, UNSPECIFIED TYPE: ICD-10-CM

## 2023-09-29 PROCEDURE — 2500000003 HC RX 250 WO HCPCS: Performed by: NURSE PRACTITIONER

## 2023-09-29 PROCEDURE — 74240 X-RAY XM UPR GI TRC 1CNTRST: CPT

## 2023-09-29 PROCEDURE — 6370000000 HC RX 637 (ALT 250 FOR IP): Performed by: NURSE PRACTITIONER

## 2023-09-29 RX ADMIN — BARIUM SULFATE 334 G: 980 POWDER, FOR SUSPENSION ORAL at 11:45

## 2023-09-29 RX ADMIN — BARIUM SULFATE 100 ML: 960 POWDER, FOR SUSPENSION ORAL at 11:46

## 2023-09-29 RX ADMIN — ANTACID/ANTIFLATULENT 1 EACH: 380; 550; 10; 10 GRANULE, EFFERVESCENT ORAL at 11:45

## 2023-11-08 ENCOUNTER — HOSPITAL ENCOUNTER (OUTPATIENT)
Facility: HOSPITAL | Age: 83
Discharge: HOME | End: 2023-11-10
Attending: FAMILY MEDICINE
Payer: MEDICARE

## 2023-11-08 DIAGNOSIS — R42 DIZZINESS: ICD-10-CM

## 2023-11-08 LAB
VAS LEFT CCA DIST EDV: 11.2 CM/S
VAS LEFT CCA DIST PSV: 70.9 CM/S
VAS LEFT CCA MID EDV: 16.03 CM/S
VAS LEFT CCA MID PSV: 95.02 CM/S
VAS LEFT CCA PROX EDV: 25.3 CM/S
VAS LEFT CCA PROX PSV: 127.6 CM/S
VAS LEFT ECA EDV: 11.15 CM/S
VAS LEFT ECA PSV: 67.7 CM/S
VAS LEFT ICA DIST EDV: 20.2 CM/S
VAS LEFT ICA DIST PSV: 89.1 CM/S
VAS LEFT ICA MID EDV: 21.8 CM/S
VAS LEFT ICA MID PSV: 93.8 CM/S
VAS LEFT ICA PROX EDV: 17.6 CM/S
VAS LEFT ICA PROX PSV: 77.4 CM/S
VAS LEFT ICA/CCA PSV: 1.32 NO UNITS
VAS LEFT SUBCLAVIAN PROX EDV: 0 CM/S
VAS LEFT SUBCLAVIAN PROX PSV: 136.8 CM/S
VAS LEFT VERTEBRAL EDV: 8.93 CM/S
VAS LEFT VERTEBRAL PSV: 50.3 CM/S
VAS RIGHT CCA DIST EDV: 6.3 CM/S
VAS RIGHT CCA DIST PSV: 74.1 CM/S
VAS RIGHT CCA MID EDV: 17.53 CM/S
VAS RIGHT CCA MID PSV: 123.93 CM/S
VAS RIGHT CCA PROX EDV: 17.5 CM/S
VAS RIGHT CCA PROX PSV: 129.9 CM/S
VAS RIGHT ECA EDV: 6.34 CM/S
VAS RIGHT ECA PSV: 71.1 CM/S
VAS RIGHT ICA DIST EDV: 24.8 CM/S
VAS RIGHT ICA DIST PSV: 135.2 CM/S
VAS RIGHT ICA MID EDV: 20.4 CM/S
VAS RIGHT ICA MID PSV: 80.9 CM/S
VAS RIGHT ICA PROX EDV: 11.5 CM/S
VAS RIGHT ICA PROX PSV: 65.9 CM/S
VAS RIGHT ICA/CCA PSV: 1.8 NO UNITS
VAS RIGHT SUBCLAVIAN PROX EDV: 0 CM/S
VAS RIGHT SUBCLAVIAN PROX PSV: 125.2 CM/S
VAS RIGHT VERTEBRAL EDV: 8.93 CM/S
VAS RIGHT VERTEBRAL PSV: 55.5 CM/S

## 2023-11-08 PROCEDURE — 93880 EXTRACRANIAL BILAT STUDY: CPT

## 2023-11-08 PROCEDURE — 93880 EXTRACRANIAL BILAT STUDY: CPT | Performed by: INTERNAL MEDICINE

## 2023-12-04 ENCOUNTER — HOSPITAL ENCOUNTER (EMERGENCY)
Facility: HOSPITAL | Age: 83
Discharge: HOME | End: 2023-12-04
Attending: EMERGENCY MEDICINE
Payer: MEDICARE

## 2023-12-04 VITALS
HEIGHT: 64 IN | BODY MASS INDEX: 21.51 KG/M2 | SYSTOLIC BLOOD PRESSURE: 173 MMHG | DIASTOLIC BLOOD PRESSURE: 74 MMHG | RESPIRATION RATE: 19 BRPM | WEIGHT: 126 LBS | TEMPERATURE: 98.6 F | HEART RATE: 62 BPM | OXYGEN SATURATION: 100 %

## 2023-12-04 DIAGNOSIS — U07.1 COVID-19: Primary | ICD-10-CM

## 2023-12-04 LAB
ALBUMIN SERPL-MCNC: 3.5 G/DL (ref 3.4–5)
ALBUMIN/GLOB SERPL: 0.8 (ref 0.8–1.7)
ALP SERPL-CCNC: 79 U/L (ref 45–117)
ALT SERPL-CCNC: 32 U/L (ref 13–56)
ANION GAP SERPL CALC-SCNC: 5 MMOL/L (ref 3–18)
AST SERPL-CCNC: 26 U/L (ref 10–38)
BASOPHILS # BLD: 0 K/UL (ref 0–0.1)
BASOPHILS NFR BLD: 1 % (ref 0–2)
BILIRUB SERPL-MCNC: 0.7 MG/DL (ref 0.2–1)
BUN SERPL-MCNC: 12 MG/DL (ref 7–18)
BUN/CREAT SERPL: 13 (ref 12–20)
CALCIUM SERPL-MCNC: 9.2 MG/DL (ref 8.5–10.1)
CHLORIDE SERPL-SCNC: 106 MMOL/L (ref 100–111)
CO2 SERPL-SCNC: 29 MMOL/L (ref 21–32)
CREAT SERPL-MCNC: 0.93 MG/DL (ref 0.6–1.3)
DIFFERENTIAL METHOD BLD: ABNORMAL
EKG ATRIAL RATE: 74 BPM
EKG DIAGNOSIS: NORMAL
EKG P AXIS: -15 DEGREES
EKG P-R INTERVAL: 178 MS
EKG Q-T INTERVAL: 376 MS
EKG QRS DURATION: 76 MS
EKG QTC CALCULATION (BAZETT): 417 MS
EKG R AXIS: 49 DEGREES
EKG T AXIS: 18 DEGREES
EKG VENTRICULAR RATE: 74 BPM
EOSINOPHIL # BLD: 0 K/UL (ref 0–0.4)
EOSINOPHIL NFR BLD: 1 % (ref 0–5)
ERYTHROCYTE [DISTWIDTH] IN BLOOD BY AUTOMATED COUNT: 15.3 % (ref 11.6–14.5)
FLUAV AG NPH QL IA: NEGATIVE
FLUBV AG NOSE QL IA: NEGATIVE
GLOBULIN SER CALC-MCNC: 4.3 G/DL (ref 2–4)
GLUCOSE SERPL-MCNC: 95 MG/DL (ref 74–99)
HCT VFR BLD AUTO: 40.2 % (ref 35–45)
HGB BLD-MCNC: 12.6 G/DL (ref 12–16)
IMM GRANULOCYTES # BLD AUTO: 0 K/UL (ref 0–0.04)
IMM GRANULOCYTES NFR BLD AUTO: 0 % (ref 0–0.5)
LYMPHOCYTES # BLD: 2 K/UL (ref 0.9–3.6)
LYMPHOCYTES NFR BLD: 42 % (ref 21–52)
MCH RBC QN AUTO: 23.3 PG (ref 24–34)
MCHC RBC AUTO-ENTMCNC: 31.3 G/DL (ref 31–37)
MCV RBC AUTO: 74.4 FL (ref 78–100)
MONOCYTES # BLD: 0.3 K/UL (ref 0.05–1.2)
MONOCYTES NFR BLD: 6 % (ref 3–10)
NEUTS SEG # BLD: 2.4 K/UL (ref 1.8–8)
NEUTS SEG NFR BLD: 50 % (ref 40–73)
NRBC # BLD: 0 K/UL (ref 0–0.01)
NRBC BLD-RTO: 0 PER 100 WBC
PLATELET # BLD AUTO: 250 K/UL (ref 135–420)
PMV BLD AUTO: 11 FL (ref 9.2–11.8)
POTASSIUM SERPL-SCNC: 3.9 MMOL/L (ref 3.5–5.5)
PROT SERPL-MCNC: 7.8 G/DL (ref 6.4–8.2)
RBC # BLD AUTO: 5.4 M/UL (ref 4.2–5.3)
SARS-COV-2 RDRP RESP QL NAA+PROBE: DETECTED
SODIUM SERPL-SCNC: 140 MMOL/L (ref 136–145)
SOURCE: ABNORMAL
WBC # BLD AUTO: 4.7 K/UL (ref 4.6–13.2)

## 2023-12-04 PROCEDURE — 85025 COMPLETE CBC W/AUTO DIFF WBC: CPT

## 2023-12-04 PROCEDURE — 93005 ELECTROCARDIOGRAM TRACING: CPT | Performed by: EMERGENCY MEDICINE

## 2023-12-04 PROCEDURE — 99284 EMERGENCY DEPT VISIT MOD MDM: CPT

## 2023-12-04 PROCEDURE — 80053 COMPREHEN METABOLIC PANEL: CPT

## 2023-12-04 PROCEDURE — 93010 ELECTROCARDIOGRAM REPORT: CPT | Performed by: INTERNAL MEDICINE

## 2023-12-04 PROCEDURE — 87635 SARS-COV-2 COVID-19 AMP PRB: CPT

## 2023-12-04 PROCEDURE — 87804 INFLUENZA ASSAY W/OPTIC: CPT

## 2023-12-04 ASSESSMENT — ENCOUNTER SYMPTOMS
CHOKING: 0
VOMITING: 0
SHORTNESS OF BREATH: 0
DIARRHEA: 0
STRIDOR: 0
APNEA: 0
CHEST TIGHTNESS: 0
ABDOMINAL PAIN: 0
COUGH: 1
NAUSEA: 0
WHEEZING: 0

## 2023-12-04 ASSESSMENT — PAIN - FUNCTIONAL ASSESSMENT: PAIN_FUNCTIONAL_ASSESSMENT: NONE - DENIES PAIN

## 2023-12-04 NOTE — ED TRIAGE NOTES
Pt arrives with complaint of chest pain, wet cough with phlegm, congestion since Friday and progressively getting worse. Per pt, chest hurts worse when coughing. Pt states she hasn't had her COVID booster.

## 2023-12-04 NOTE — ED PROVIDER NOTES
MCV 74.4 (L) 78.0 - 100.0 FL    MCH 23.3 (L) 24.0 - 34.0 PG    MCHC 31.3 31.0 - 37.0 g/dL    RDW 15.3 (H) 11.6 - 14.5 %    Platelets 044 045 - 924 K/uL    MPV 11.0 9.2 - 11.8 FL    Nucleated RBCs 0.0 0  WBC    nRBC 0.00 0.00 - 0.01 K/uL    Neutrophils % 50 40 - 73 %    Lymphocytes % 42 21 - 52 %    Monocytes % 6 3 - 10 %    Eosinophils % 1 0 - 5 %    Basophils % 1 0 - 2 %    Immature Granulocytes 0 0.0 - 0.5 %    Neutrophils Absolute 2.4 1.8 - 8.0 K/UL    Lymphocytes Absolute 2.0 0.9 - 3.6 K/UL    Monocytes Absolute 0.3 0.05 - 1.2 K/UL    Eosinophils Absolute 0.0 0.0 - 0.4 K/UL    Basophils Absolute 0.0 0.0 - 0.1 K/UL    Absolute Immature Granulocyte 0.0 0.00 - 0.04 K/UL    Differential Type AUTOMATED     Comprehensive Metabolic Panel    Collection Time: 12/04/23  2:35 PM   Result Value Ref Range    Sodium 140 136 - 145 mmol/L    Potassium 3.9 3.5 - 5.5 mmol/L    Chloride 106 100 - 111 mmol/L    CO2 29 21 - 32 mmol/L    Anion Gap 5 3.0 - 18 mmol/L    Glucose 95 74 - 99 mg/dL    BUN 12 7.0 - 18 MG/DL    Creatinine 0.93 0.6 - 1.3 MG/DL    Bun/Cre Ratio 13 12 - 20      Est, Glom Filt Rate >60 >60 ml/min/1.73m2    Calcium 9.2 8.5 - 10.1 MG/DL    Total Bilirubin PENDING MG/DL    ALT 32 13 - 56 U/L    AST 26 10 - 38 U/L    Alk Phosphatase PENDING U/L    Total Protein PENDING g/dL    Albumin 3.5 3.4 - 5.0 g/dL    Globulin PENDING g/dL    Albumin/Globulin Ratio PENDING     COVID-19, Rapid    Collection Time: 12/04/23  2:38 PM    Specimen: Nasopharyngeal   Result Value Ref Range    Source Nasopharyngeal      SARS-CoV-2, Rapid Detected (AA) NOTD     Rapid influenza A/B antigens    Collection Time: 12/04/23  2:38 PM    Specimen: Nasal Washing   Result Value Ref Range    Influenza A Ag Negative NEG      Influenza B Ag Negative NEG           Radiologic Studies -   Non-plain film images such as CT, Ultrasound and MRI are read by the radiologist. Plain radiographic images are visualized and preliminarily interpreted by

## 2024-02-29 ENCOUNTER — TELEPHONE (OUTPATIENT)
Age: 84
End: 2024-02-29

## 2024-02-29 ENCOUNTER — CLINICAL DOCUMENTATION (OUTPATIENT)
Age: 84
End: 2024-02-29

## 2024-02-29 ENCOUNTER — OFFICE VISIT (OUTPATIENT)
Age: 84
End: 2024-02-29
Payer: MEDICARE

## 2024-02-29 VITALS
WEIGHT: 121 LBS | HEIGHT: 64 IN | RESPIRATION RATE: 18 BRPM | DIASTOLIC BLOOD PRESSURE: 68 MMHG | TEMPERATURE: 97.5 F | SYSTOLIC BLOOD PRESSURE: 136 MMHG | BODY MASS INDEX: 20.66 KG/M2 | OXYGEN SATURATION: 99 % | HEART RATE: 78 BPM

## 2024-02-29 DIAGNOSIS — F41.9 ANXIETY: Primary | ICD-10-CM

## 2024-02-29 DIAGNOSIS — C21.0 ANAL CANCER (HCC): Primary | ICD-10-CM

## 2024-02-29 DIAGNOSIS — C21.1 CARCINOMA OF ANAL CANAL (HCC): ICD-10-CM

## 2024-02-29 PROCEDURE — 1090F PRES/ABSN URINE INCON ASSESS: CPT | Performed by: COLON & RECTAL SURGERY

## 2024-02-29 PROCEDURE — G8420 CALC BMI NORM PARAMETERS: HCPCS | Performed by: COLON & RECTAL SURGERY

## 2024-02-29 PROCEDURE — 1036F TOBACCO NON-USER: CPT | Performed by: COLON & RECTAL SURGERY

## 2024-02-29 PROCEDURE — G8484 FLU IMMUNIZE NO ADMIN: HCPCS | Performed by: COLON & RECTAL SURGERY

## 2024-02-29 PROCEDURE — G8399 PT W/DXA RESULTS DOCUMENT: HCPCS | Performed by: COLON & RECTAL SURGERY

## 2024-02-29 PROCEDURE — 3078F DIAST BP <80 MM HG: CPT | Performed by: COLON & RECTAL SURGERY

## 2024-02-29 PROCEDURE — 99204 OFFICE O/P NEW MOD 45 MIN: CPT | Performed by: COLON & RECTAL SURGERY

## 2024-02-29 PROCEDURE — 3075F SYST BP GE 130 - 139MM HG: CPT | Performed by: COLON & RECTAL SURGERY

## 2024-02-29 PROCEDURE — 1123F ACP DISCUSS/DSCN MKR DOCD: CPT | Performed by: COLON & RECTAL SURGERY

## 2024-02-29 PROCEDURE — G8428 CUR MEDS NOT DOCUMENT: HCPCS | Performed by: COLON & RECTAL SURGERY

## 2024-02-29 RX ORDER — DIAZEPAM 5 MG/1
5 TABLET ORAL ONCE
Qty: 1 TABLET | Refills: 0 | Status: SHIPPED | OUTPATIENT
Start: 2024-02-29 | End: 2024-02-29

## 2024-02-29 RX ORDER — COLESTIPOL HYDROCHLORIDE 5 G/5G
5 GRANULE, FOR SUSPENSION ORAL 2 TIMES DAILY
COMMUNITY

## 2024-02-29 NOTE — TELEPHONE ENCOUNTER
[facility] Oncology Services  Breast & Colorectal Oncology Nurse Navigator Encounter    Name: Adama Mora  Age: 83 y.o.  : 1940      Encounter type:  [x]Initial Navigator Encounter  []Patient Initiated  []Navigator Follow-up  []Other:     Narrative:     Date/Time:      2024 at 12:20pm  Attempted to reach patient by telephone regarding script sent to pharmacy on file. Left HIPPA compliant message requesting a return call. Will attempt to reach patient again.          Interdisciplinary Team:    Nurse Navigator: REX Baker BSN, RN  Breast & Colorectal Cancer Nurse Navigator    [facility]  [address] [place] [address]  Office number [phone]  Cell number [phone]  [email or web address]  Good Help to Those in Need®

## 2024-02-29 NOTE — PROGRESS NOTES
[facility] Oncology Services  Breast & Colorectal Oncology Nurse Navigator Encounter    Name: Adama Mora  Age: 83 y.o.  : 1940  Diagnosis & Date: Anal Cancer 2024    Encounter type:  [x]Initial Navigator Encounter  []Patient Initiated  []Navigator Follow-up  []Other:     Narrative:   Patient was seen in office for surgical consultation by Dr. Moore. Plan of care reviewed. Refer to Luis Miguel (referral, office notes fax to office and they call patient for an appointment) PET/CT ordered, and follow up in 6 months. Patient expressed concerns of how long she had to live. I explained at this time, lets take things one step at a time by first scheduling the PET/CT so the providers can have another direction of care. We scheduled PET while in my office, prep instructions printed and given. Patient states, she's claustrophobic, I informed her I will relay this information to Dr. Moore and call her with an update. I gave her my contact info which included my cell number to call me should she have any urgent needs. I sent message to Dr. Moore regarding patient concern.       Reminded that I am available as needed for any needs/issues/questions that arise and encouraged to call; My contact info provided. All questions answered.  Will follow up with Adama Mora  to discuss needs and plan of care.    Interdisciplinary Team:  Surgeon:Dr. Moore  6mth Appointment Date:2024    Med-Onc:Dr. Bolanos  Appointment Date: pending      Imaging:PET/CT  Date Ordered:2024  Date complete: 2024        Nurse Navigator: REX Baker BSN, RN  Breast & Colorectal Cancer Nurse Navigator    [facility] Services  [address] [place] [address]  Office number [phone]  Cell number [phone]  [email or web address]  Good Help to Those in Need®

## 2024-02-29 NOTE — PROGRESS NOTES
HPI: Adama Mora is a 83 y.o. female presenting with chief complain of newly diagnosed anal cancer.  She has been having some blood per rectum over the last month.  Some discomfort with bowel movements.  Some mucus soiling.  She normally moves her bowels once daily.  She has some occasional constipation.  She denies fecal incontinence.  She has no family history of colon cancer.  She had a colonoscopy 7 years ago in [place] which was negative.  Recent flexible sigmoidoscopy by Dr. Gurrola revealed a tumor at the anorectal junction.  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    Past Medical History:   Diagnosis Date    Arthritis     Back pain of lumbar region with sciatica 12/5/2018    Breast cancer (HCC)     right    Cancer (HCC) 2015    breast cancer    Dyslipidemia     Heart palpitations     Hypertension     Left ventricular hypertrophy     Macular degeneration     Vitamin D deficiency        Past Surgical History:   Procedure Laterality Date    BREAST LUMPECTOMY Right     BREAST SURGERY Right 2015    HERNIA REPAIR      umbilical    HYSTERECTOMY (CERVIX STATUS UNKNOWN)  1985    HYSTERECTOMY, TOTAL ABDOMINAL (CERVIX REMOVED)         Family History   Problem Relation Age of Onset    Cancer Brother         leukemia    Cancer Sibling     Cancer Sister         blood    Heart Disease Father         MI age 50s    Cancer Mother         stomach and liver cancer, in her 60s       Social History     Socioeconomic History    Marital status:    Tobacco Use    Smoking status: Never    Smokeless tobacco: Never   Vaping Use    Vaping Use: Never used   Substance and Sexual Activity    Alcohol use: No    Drug use: No   Social History Narrative    ** Merged History Encounter **            Current Outpatient Medications   Medication Instructions    acetaminophen (TYLENOL) 1,000 mg, Oral, EVERY 6 HOURS PRN    Biotin 2.5 MG CAPS 1 capsule, Oral, DAILY    celecoxib (CELEBREX) 200 mg, Oral, PRN    colestipol

## 2024-02-29 NOTE — PATIENT INSTRUCTIONS
Appointment for Dr. Bolanos     Pet scan central scheduling will call to schedule also ok to call them    Return to office 6 months

## 2024-03-11 ENCOUNTER — HOSPITAL ENCOUNTER (OUTPATIENT)
Facility: HOSPITAL | Age: 84
Setting detail: RECURRING SERIES
Discharge: HOME | End: 2024-03-14
Payer: MEDICARE

## 2024-03-11 PROCEDURE — 99205 OFFICE O/P NEW HI 60 MIN: CPT | Performed by: RADIOLOGY

## 2024-03-11 PROCEDURE — 99214 OFFICE O/P EST MOD 30 MIN: CPT

## 2024-03-15 ENCOUNTER — HOSPITAL ENCOUNTER (OUTPATIENT)
Facility: HOSPITAL | Age: 84
End: 2024-03-15
Attending: INTERNAL MEDICINE
Payer: MEDICARE

## 2024-03-15 DIAGNOSIS — C50.919 MALIGNANT NEOPLASM OF FEMALE BREAST, UNSPECIFIED ESTROGEN RECEPTOR STATUS, UNSPECIFIED LATERALITY, UNSPECIFIED SITE OF BREAST (HCC): ICD-10-CM

## 2024-03-15 PROCEDURE — 36569 INSJ PICC 5 YR+ W/O IMAGING: CPT

## 2024-03-15 NOTE — PROGRESS NOTES
History and Physical    Patient: Adama Mora           Sex: female       DOA: 3/15/2024  YOB: 1940      Age:  83 y.o.     LOS:  LOS: 0 days        HPI:     Adama Mora is a 83 y.o. female with h/o anal cancer presents for a PICC line.    Past Medical History:   Diagnosis Date    Arthritis     Back pain of lumbar region with sciatica 12/5/2018    Breast cancer (HCC)     right    Cancer (HCC) 2015    breast cancer    Dyslipidemia     Heart palpitations     Hypertension     Left ventricular hypertrophy     Macular degeneration     Vitamin D deficiency        Past Surgical History:   Procedure Laterality Date    BREAST LUMPECTOMY Right     BREAST SURGERY Right 2015    HEMORRHOID SURGERY      HERNIA REPAIR      umbilical    HYSTERECTOMY (CERVIX STATUS UNKNOWN)  1985    HYSTERECTOMY, TOTAL ABDOMINAL (CERVIX REMOVED)         Family History   Problem Relation Age of Onset    Cancer Brother         leukemia    Cancer Sibling     Cancer Sister         blood    Heart Disease Father         MI age 50s    Cancer Mother         stomach and liver cancer, in her 60s       Social History     Socioeconomic History    Marital status:    Tobacco Use    Smoking status: Never    Smokeless tobacco: Never   Vaping Use    Vaping Use: Never used   Substance and Sexual Activity    Alcohol use: No    Drug use: No   Social History Narrative    ** Merged History Encounter **            Prior to Admission medications    Medication Sig Start Date End Date Taking? Authorizing Provider   colestipol (COLESTID) 5 g packet Take 5 g by mouth 2 times daily    Riley Cox MD   METAMUCIL FIBER PO Take by mouth daily    Riley Cox MD   Multiple Vitamins-Minerals (PRESERVISION/LUTEIN PO) Take 1 tablet by mouth daily    Riley Cox MD   ondansetron (ZOFRAN) 4 MG tablet Take 1 tablet by mouth every 8 hours as needed for Nausea or Vomiting    Riley Cox MD   acetaminophen

## 2024-03-15 NOTE — PROCEDURES
INTERVENTIONAL RADIOLOGY POST PICC LINE NOTE     March 15, 2024       8:02 AM     Preoperative Diagnosis:   IV Access    Postoperative Diagnosis:  Same.    :  ANTON DANIELLE    Assistant:  None.    Attending Physician: Dr. Christine    Type of Anesthesia:  1% local lidocaine    Procedure/Description: 4 [place] citizen single lumen power injectable right upper extremity PICC Line.    Findings:  Right 4 [place] citizen single lumen power injectable catheter placed. Tip at the SVC/RA junction. OK to use. Length 40 cm.    Estimated blood Loss: Minimal    Specimen Removed: None    Drains: None     Complications:  None.    Condition:  Stable.    Discharge Plan:  discharge home

## 2024-03-22 ENCOUNTER — HOSPITAL ENCOUNTER (OUTPATIENT)
Facility: HOSPITAL | Age: 84
Discharge: HOME | End: 2024-03-22
Attending: COLON & RECTAL SURGERY
Payer: MEDICARE

## 2024-03-22 DIAGNOSIS — C21.1 CARCINOMA OF ANAL CANAL (HCC): ICD-10-CM

## 2024-03-22 PROCEDURE — 78815 PET IMAGE W/CT SKULL-THIGH: CPT

## 2024-03-22 PROCEDURE — A9609 HC RX DIAGNOSTIC RADIOPHARMACEUTICAL: HCPCS | Performed by: COLON & RECTAL SURGERY

## 2024-03-22 PROCEDURE — 3430000000 HC RX DIAGNOSTIC RADIOPHARMACEUTICAL: Performed by: COLON & RECTAL SURGERY

## 2024-03-22 RX ORDER — FLUDEOXYGLUCOSE F-18 500 MCI/ML
12.81 INJECTION INTRAVENOUS
Status: COMPLETED | OUTPATIENT
Start: 2024-03-22 | End: 2024-03-22

## 2024-03-22 RX ADMIN — FLUDEOXYGLUCOSE F-18 12.81 MILLICURIE: 500 INJECTION INTRAVENOUS at 10:56

## 2024-03-25 ENCOUNTER — HOSPITAL ENCOUNTER (OUTPATIENT)
Facility: HOSPITAL | Age: 84
Setting detail: RECURRING SERIES
Discharge: HOME | End: 2024-03-28
Attending: RADIOLOGY
Payer: MEDICARE

## 2024-03-25 DIAGNOSIS — C21.0 MALIGNANT NEOPLASM OF ANUS (HCC): Primary | ICD-10-CM

## 2024-03-25 PROCEDURE — 77263 THER RADIOLOGY TX PLNG CPLX: CPT | Performed by: RADIOLOGY

## 2024-03-25 RX ORDER — DIATRIZOATE MEGLUMINE AND DIATRIZOATE SODIUM 660; 100 MG/ML; MG/ML
LIQUID ORAL; RECTAL
Qty: 30 ML | Refills: 0
Start: 2024-03-25 | End: 2024-03-26

## 2024-04-02 ENCOUNTER — HOSPITAL ENCOUNTER (OUTPATIENT)
Facility: HOSPITAL | Age: 84
Setting detail: RECURRING SERIES
Discharge: HOME | End: 2024-04-05
Attending: RADIOLOGY
Payer: MEDICARE

## 2024-04-02 PROCEDURE — 77338 DESIGN MLC DEVICE FOR IMRT: CPT

## 2024-04-02 PROCEDURE — 77300 RADIATION THERAPY DOSE PLAN: CPT

## 2024-04-02 PROCEDURE — 77301 RADIOTHERAPY DOSE PLAN IMRT: CPT

## 2024-04-03 ENCOUNTER — HOSPITAL ENCOUNTER (OUTPATIENT)
Facility: HOSPITAL | Age: 84
Setting detail: RECURRING SERIES
Discharge: HOME | End: 2024-04-06
Attending: RADIOLOGY
Payer: MEDICARE

## 2024-04-03 LAB
RAD ONC ARIA COURSE FIRST TREATMENT DATE: NORMAL
RAD ONC ARIA COURSE ID: NORMAL
RAD ONC ARIA COURSE INTENT: NORMAL
RAD ONC ARIA COURSE LAST TREATMENT DATE: NORMAL
RAD ONC ARIA COURSE SESSION NUMBER: 1
RAD ONC ARIA COURSE START DATE: NORMAL
RAD ONC ARIA COURSE TREATMENT ELAPSED DAYS: 0
RAD ONC ARIA PLAN FRACTIONS TREATED TO DATE: 1
RAD ONC ARIA PLAN ID: NORMAL
RAD ONC ARIA PLAN PRESCRIBED DOSE PER FRACTION: 1.8 GY
RAD ONC ARIA PLAN PRIMARY REFERENCE POINT: NORMAL
RAD ONC ARIA PLAN TOTAL FRACTIONS PRESCRIBED: 28
RAD ONC ARIA PLAN TOTAL PRESCRIBED DOSE: 5040 CGY
RAD ONC ARIA REFERENCE POINT DOSAGE GIVEN TO DATE: 1.8 GY
RAD ONC ARIA REFERENCE POINT ID: NORMAL
RAD ONC ARIA REFERENCE POINT SESSION DOSAGE GIVEN: 1.8 GY

## 2024-04-03 PROCEDURE — 77014 CHG CT GUIDANCE RADIATION THERAPY FLDS PLACEMENT: CPT | Performed by: RADIOLOGY

## 2024-04-03 PROCEDURE — 77386 HC NTSTY MODUL RAD TX DLVR CPLX: CPT

## 2024-04-04 ENCOUNTER — HOSPITAL ENCOUNTER (OUTPATIENT)
Facility: HOSPITAL | Age: 84
Setting detail: RECURRING SERIES
Discharge: HOME | End: 2024-04-07
Attending: RADIOLOGY
Payer: MEDICARE

## 2024-04-04 LAB
RAD ONC ARIA COURSE FIRST TREATMENT DATE: NORMAL
RAD ONC ARIA COURSE ID: NORMAL
RAD ONC ARIA COURSE INTENT: NORMAL
RAD ONC ARIA COURSE LAST TREATMENT DATE: NORMAL
RAD ONC ARIA COURSE SESSION NUMBER: 2
RAD ONC ARIA COURSE START DATE: NORMAL
RAD ONC ARIA COURSE TREATMENT ELAPSED DAYS: 1
RAD ONC ARIA PLAN FRACTIONS TREATED TO DATE: 2
RAD ONC ARIA PLAN ID: NORMAL
RAD ONC ARIA PLAN PRESCRIBED DOSE PER FRACTION: 1.8 GY
RAD ONC ARIA PLAN PRIMARY REFERENCE POINT: NORMAL
RAD ONC ARIA PLAN TOTAL FRACTIONS PRESCRIBED: 28
RAD ONC ARIA PLAN TOTAL PRESCRIBED DOSE: 5040 CGY
RAD ONC ARIA REFERENCE POINT DOSAGE GIVEN TO DATE: 3.6 GY
RAD ONC ARIA REFERENCE POINT ID: NORMAL
RAD ONC ARIA REFERENCE POINT SESSION DOSAGE GIVEN: 1.8 GY

## 2024-04-04 PROCEDURE — 77385 HC NTSTY MODUL RAD TX DLVR SMPL: CPT

## 2024-04-04 PROCEDURE — 77014 CHG CT GUIDANCE RADIATION THERAPY FLDS PLACEMENT: CPT | Performed by: RADIOLOGY

## 2024-04-05 ENCOUNTER — HOSPITAL ENCOUNTER (OUTPATIENT)
Facility: HOSPITAL | Age: 84
Setting detail: RECURRING SERIES
Discharge: HOME | End: 2024-04-08
Attending: RADIOLOGY
Payer: MEDICARE

## 2024-04-05 LAB
RAD ONC ARIA COURSE FIRST TREATMENT DATE: NORMAL
RAD ONC ARIA COURSE ID: NORMAL
RAD ONC ARIA COURSE INTENT: NORMAL
RAD ONC ARIA COURSE LAST TREATMENT DATE: NORMAL
RAD ONC ARIA COURSE SESSION NUMBER: 3
RAD ONC ARIA COURSE START DATE: NORMAL
RAD ONC ARIA COURSE TREATMENT ELAPSED DAYS: 2
RAD ONC ARIA PLAN FRACTIONS TREATED TO DATE: 3
RAD ONC ARIA PLAN ID: NORMAL
RAD ONC ARIA PLAN PRESCRIBED DOSE PER FRACTION: 1.8 GY
RAD ONC ARIA PLAN PRIMARY REFERENCE POINT: NORMAL
RAD ONC ARIA PLAN TOTAL FRACTIONS PRESCRIBED: 28
RAD ONC ARIA PLAN TOTAL PRESCRIBED DOSE: 5040 CGY
RAD ONC ARIA REFERENCE POINT DOSAGE GIVEN TO DATE: 5.4 GY
RAD ONC ARIA REFERENCE POINT ID: NORMAL
RAD ONC ARIA REFERENCE POINT SESSION DOSAGE GIVEN: 1.8 GY

## 2024-04-05 PROCEDURE — G6002 STEREOSCOPIC X-RAY GUIDANCE: HCPCS | Performed by: RADIOLOGY

## 2024-04-05 PROCEDURE — 77385 HC NTSTY MODUL RAD TX DLVR SMPL: CPT

## 2024-04-08 ENCOUNTER — HOSPITAL ENCOUNTER (OUTPATIENT)
Facility: HOSPITAL | Age: 84
Setting detail: RECURRING SERIES
Discharge: HOME | End: 2024-04-11
Attending: RADIOLOGY
Payer: MEDICARE

## 2024-04-08 LAB
RAD ONC ARIA COURSE FIRST TREATMENT DATE: NORMAL
RAD ONC ARIA COURSE ID: NORMAL
RAD ONC ARIA COURSE INTENT: NORMAL
RAD ONC ARIA COURSE LAST TREATMENT DATE: NORMAL
RAD ONC ARIA COURSE SESSION NUMBER: 4
RAD ONC ARIA COURSE START DATE: NORMAL
RAD ONC ARIA COURSE TREATMENT ELAPSED DAYS: 5
RAD ONC ARIA PLAN FRACTIONS TREATED TO DATE: 4
RAD ONC ARIA PLAN ID: NORMAL
RAD ONC ARIA PLAN PRESCRIBED DOSE PER FRACTION: 1.8 GY
RAD ONC ARIA PLAN PRIMARY REFERENCE POINT: NORMAL
RAD ONC ARIA PLAN TOTAL FRACTIONS PRESCRIBED: 28
RAD ONC ARIA PLAN TOTAL PRESCRIBED DOSE: 5040 CGY
RAD ONC ARIA REFERENCE POINT DOSAGE GIVEN TO DATE: 7.2 GY
RAD ONC ARIA REFERENCE POINT ID: NORMAL
RAD ONC ARIA REFERENCE POINT SESSION DOSAGE GIVEN: 1.8 GY

## 2024-04-08 PROCEDURE — 77385 HC NTSTY MODUL RAD TX DLVR SMPL: CPT

## 2024-04-08 PROCEDURE — G6002 STEREOSCOPIC X-RAY GUIDANCE: HCPCS | Performed by: RADIOLOGY

## 2024-04-09 ENCOUNTER — HOSPITAL ENCOUNTER (OUTPATIENT)
Facility: HOSPITAL | Age: 84
Setting detail: RECURRING SERIES
Discharge: HOME | End: 2024-04-12
Attending: RADIOLOGY
Payer: MEDICARE

## 2024-04-09 LAB
RAD ONC ARIA COURSE FIRST TREATMENT DATE: NORMAL
RAD ONC ARIA COURSE ID: NORMAL
RAD ONC ARIA COURSE INTENT: NORMAL
RAD ONC ARIA COURSE LAST TREATMENT DATE: NORMAL
RAD ONC ARIA COURSE SESSION NUMBER: 5
RAD ONC ARIA COURSE START DATE: NORMAL
RAD ONC ARIA COURSE TREATMENT ELAPSED DAYS: 6
RAD ONC ARIA PLAN FRACTIONS TREATED TO DATE: 5
RAD ONC ARIA PLAN ID: NORMAL
RAD ONC ARIA PLAN PRESCRIBED DOSE PER FRACTION: 1.8 GY
RAD ONC ARIA PLAN PRIMARY REFERENCE POINT: NORMAL
RAD ONC ARIA PLAN TOTAL FRACTIONS PRESCRIBED: 28
RAD ONC ARIA PLAN TOTAL PRESCRIBED DOSE: 5040 CGY
RAD ONC ARIA REFERENCE POINT DOSAGE GIVEN TO DATE: 9 GY
RAD ONC ARIA REFERENCE POINT ID: NORMAL
RAD ONC ARIA REFERENCE POINT SESSION DOSAGE GIVEN: 1.8 GY

## 2024-04-09 PROCEDURE — G6002 STEREOSCOPIC X-RAY GUIDANCE: HCPCS | Performed by: RADIOLOGY

## 2024-04-09 PROCEDURE — 77385 HC NTSTY MODUL RAD TX DLVR SMPL: CPT

## 2024-04-09 PROCEDURE — 77336 RADIATION PHYSICS CONSULT: CPT

## 2024-04-10 ENCOUNTER — HOSPITAL ENCOUNTER (OUTPATIENT)
Facility: HOSPITAL | Age: 84
Setting detail: RECURRING SERIES
Discharge: HOME | End: 2024-04-13
Attending: RADIOLOGY
Payer: MEDICARE

## 2024-04-10 LAB
RAD ONC ARIA COURSE FIRST TREATMENT DATE: NORMAL
RAD ONC ARIA COURSE ID: NORMAL
RAD ONC ARIA COURSE INTENT: NORMAL
RAD ONC ARIA COURSE LAST TREATMENT DATE: NORMAL
RAD ONC ARIA COURSE SESSION NUMBER: 6
RAD ONC ARIA COURSE START DATE: NORMAL
RAD ONC ARIA COURSE TREATMENT ELAPSED DAYS: 7
RAD ONC ARIA PLAN FRACTIONS TREATED TO DATE: 6
RAD ONC ARIA PLAN ID: NORMAL
RAD ONC ARIA PLAN PRESCRIBED DOSE PER FRACTION: 1.8 GY
RAD ONC ARIA PLAN PRIMARY REFERENCE POINT: NORMAL
RAD ONC ARIA PLAN TOTAL FRACTIONS PRESCRIBED: 28
RAD ONC ARIA PLAN TOTAL PRESCRIBED DOSE: 5040 CGY
RAD ONC ARIA REFERENCE POINT DOSAGE GIVEN TO DATE: 10.8 GY
RAD ONC ARIA REFERENCE POINT ID: NORMAL
RAD ONC ARIA REFERENCE POINT SESSION DOSAGE GIVEN: 1.8 GY

## 2024-04-10 PROCEDURE — 77385 HC NTSTY MODUL RAD TX DLVR SMPL: CPT

## 2024-04-10 PROCEDURE — 77014 CHG CT GUIDANCE RADIATION THERAPY FLDS PLACEMENT: CPT | Performed by: RADIOLOGY

## 2024-04-11 ENCOUNTER — HOSPITAL ENCOUNTER (OUTPATIENT)
Facility: HOSPITAL | Age: 84
Setting detail: RECURRING SERIES
Discharge: HOME | End: 2024-04-14
Attending: RADIOLOGY
Payer: MEDICARE

## 2024-04-11 LAB
RAD ONC ARIA COURSE FIRST TREATMENT DATE: NORMAL
RAD ONC ARIA COURSE ID: NORMAL
RAD ONC ARIA COURSE INTENT: NORMAL
RAD ONC ARIA COURSE LAST TREATMENT DATE: NORMAL
RAD ONC ARIA COURSE SESSION NUMBER: 7
RAD ONC ARIA COURSE START DATE: NORMAL
RAD ONC ARIA COURSE TREATMENT ELAPSED DAYS: 8
RAD ONC ARIA PLAN FRACTIONS TREATED TO DATE: 7
RAD ONC ARIA PLAN ID: NORMAL
RAD ONC ARIA PLAN PRESCRIBED DOSE PER FRACTION: 1.8 GY
RAD ONC ARIA PLAN PRIMARY REFERENCE POINT: NORMAL
RAD ONC ARIA PLAN TOTAL FRACTIONS PRESCRIBED: 28
RAD ONC ARIA PLAN TOTAL PRESCRIBED DOSE: 5040 CGY
RAD ONC ARIA REFERENCE POINT DOSAGE GIVEN TO DATE: 12.6 GY
RAD ONC ARIA REFERENCE POINT ID: NORMAL
RAD ONC ARIA REFERENCE POINT SESSION DOSAGE GIVEN: 1.8 GY

## 2024-04-11 PROCEDURE — 77385 HC NTSTY MODUL RAD TX DLVR SMPL: CPT

## 2024-04-11 PROCEDURE — G6002 STEREOSCOPIC X-RAY GUIDANCE: HCPCS | Performed by: RADIOLOGY

## 2024-04-12 ENCOUNTER — HOSPITAL ENCOUNTER (OUTPATIENT)
Facility: HOSPITAL | Age: 84
Setting detail: RECURRING SERIES
Discharge: HOME | End: 2024-04-15
Attending: RADIOLOGY
Payer: MEDICARE

## 2024-04-12 LAB
RAD ONC ARIA COURSE FIRST TREATMENT DATE: NORMAL
RAD ONC ARIA COURSE ID: NORMAL
RAD ONC ARIA COURSE INTENT: NORMAL
RAD ONC ARIA COURSE LAST TREATMENT DATE: NORMAL
RAD ONC ARIA COURSE SESSION NUMBER: 8
RAD ONC ARIA COURSE START DATE: NORMAL
RAD ONC ARIA COURSE TREATMENT ELAPSED DAYS: 9
RAD ONC ARIA PLAN FRACTIONS TREATED TO DATE: 8
RAD ONC ARIA PLAN ID: NORMAL
RAD ONC ARIA PLAN PRESCRIBED DOSE PER FRACTION: 1.8 GY
RAD ONC ARIA PLAN PRIMARY REFERENCE POINT: NORMAL
RAD ONC ARIA PLAN TOTAL FRACTIONS PRESCRIBED: 28
RAD ONC ARIA PLAN TOTAL PRESCRIBED DOSE: 5040 CGY
RAD ONC ARIA REFERENCE POINT DOSAGE GIVEN TO DATE: 14.4 GY
RAD ONC ARIA REFERENCE POINT ID: NORMAL
RAD ONC ARIA REFERENCE POINT SESSION DOSAGE GIVEN: 1.8 GY

## 2024-04-12 PROCEDURE — 77385 HC NTSTY MODUL RAD TX DLVR SMPL: CPT

## 2024-04-15 ENCOUNTER — HOSPITAL ENCOUNTER (OUTPATIENT)
Facility: HOSPITAL | Age: 84
Setting detail: RECURRING SERIES
Discharge: HOME | End: 2024-04-18
Attending: RADIOLOGY
Payer: MEDICARE

## 2024-04-15 LAB
RAD ONC ARIA COURSE FIRST TREATMENT DATE: NORMAL
RAD ONC ARIA COURSE ID: NORMAL
RAD ONC ARIA COURSE INTENT: NORMAL
RAD ONC ARIA COURSE LAST TREATMENT DATE: NORMAL
RAD ONC ARIA COURSE SESSION NUMBER: 9
RAD ONC ARIA COURSE START DATE: NORMAL
RAD ONC ARIA COURSE TREATMENT ELAPSED DAYS: 12
RAD ONC ARIA PLAN FRACTIONS TREATED TO DATE: 9
RAD ONC ARIA PLAN ID: NORMAL
RAD ONC ARIA PLAN PRESCRIBED DOSE PER FRACTION: 1.8 GY
RAD ONC ARIA PLAN PRIMARY REFERENCE POINT: NORMAL
RAD ONC ARIA PLAN TOTAL FRACTIONS PRESCRIBED: 28
RAD ONC ARIA PLAN TOTAL PRESCRIBED DOSE: 5040 CGY
RAD ONC ARIA REFERENCE POINT DOSAGE GIVEN TO DATE: 16.2 GY
RAD ONC ARIA REFERENCE POINT ID: NORMAL
RAD ONC ARIA REFERENCE POINT SESSION DOSAGE GIVEN: 1.8 GY

## 2024-04-15 PROCEDURE — 77385 HC NTSTY MODUL RAD TX DLVR SMPL: CPT

## 2024-04-15 PROCEDURE — G6002 STEREOSCOPIC X-RAY GUIDANCE: HCPCS | Performed by: RADIOLOGY

## 2024-04-16 ENCOUNTER — HOSPITAL ENCOUNTER (OUTPATIENT)
Facility: HOSPITAL | Age: 84
Setting detail: RECURRING SERIES
Discharge: HOME | End: 2024-04-19
Attending: RADIOLOGY
Payer: MEDICARE

## 2024-04-16 LAB
RAD ONC ARIA COURSE FIRST TREATMENT DATE: NORMAL
RAD ONC ARIA COURSE ID: NORMAL
RAD ONC ARIA COURSE INTENT: NORMAL
RAD ONC ARIA COURSE LAST TREATMENT DATE: NORMAL
RAD ONC ARIA COURSE SESSION NUMBER: 10
RAD ONC ARIA COURSE START DATE: NORMAL
RAD ONC ARIA COURSE TREATMENT ELAPSED DAYS: 13
RAD ONC ARIA PLAN FRACTIONS TREATED TO DATE: 10
RAD ONC ARIA PLAN ID: NORMAL
RAD ONC ARIA PLAN PRESCRIBED DOSE PER FRACTION: 1.8 GY
RAD ONC ARIA PLAN PRIMARY REFERENCE POINT: NORMAL
RAD ONC ARIA PLAN TOTAL FRACTIONS PRESCRIBED: 28
RAD ONC ARIA PLAN TOTAL PRESCRIBED DOSE: 5040 CGY
RAD ONC ARIA REFERENCE POINT DOSAGE GIVEN TO DATE: 18 GY
RAD ONC ARIA REFERENCE POINT ID: NORMAL
RAD ONC ARIA REFERENCE POINT SESSION DOSAGE GIVEN: 1.8 GY

## 2024-04-16 PROCEDURE — 77336 RADIATION PHYSICS CONSULT: CPT

## 2024-04-16 PROCEDURE — G6002 STEREOSCOPIC X-RAY GUIDANCE: HCPCS | Performed by: RADIOLOGY

## 2024-04-16 PROCEDURE — 77385 HC NTSTY MODUL RAD TX DLVR SMPL: CPT

## 2024-04-17 ENCOUNTER — HOSPITAL ENCOUNTER (OUTPATIENT)
Facility: HOSPITAL | Age: 84
Setting detail: RECURRING SERIES
Discharge: HOME | End: 2024-04-20
Attending: RADIOLOGY
Payer: MEDICARE

## 2024-04-17 LAB
RAD ONC ARIA COURSE FIRST TREATMENT DATE: NORMAL
RAD ONC ARIA COURSE ID: NORMAL
RAD ONC ARIA COURSE INTENT: NORMAL
RAD ONC ARIA COURSE LAST TREATMENT DATE: NORMAL
RAD ONC ARIA COURSE SESSION NUMBER: 11
RAD ONC ARIA COURSE START DATE: NORMAL
RAD ONC ARIA COURSE TREATMENT ELAPSED DAYS: 14
RAD ONC ARIA PLAN FRACTIONS TREATED TO DATE: 11
RAD ONC ARIA PLAN ID: NORMAL
RAD ONC ARIA PLAN PRESCRIBED DOSE PER FRACTION: 1.8 GY
RAD ONC ARIA PLAN PRIMARY REFERENCE POINT: NORMAL
RAD ONC ARIA PLAN TOTAL FRACTIONS PRESCRIBED: 28
RAD ONC ARIA PLAN TOTAL PRESCRIBED DOSE: 5040 CGY
RAD ONC ARIA REFERENCE POINT DOSAGE GIVEN TO DATE: 19.8 GY
RAD ONC ARIA REFERENCE POINT ID: NORMAL
RAD ONC ARIA REFERENCE POINT SESSION DOSAGE GIVEN: 1.8 GY

## 2024-04-17 PROCEDURE — 77385 HC NTSTY MODUL RAD TX DLVR SMPL: CPT

## 2024-04-18 ENCOUNTER — HOSPITAL ENCOUNTER (OUTPATIENT)
Facility: HOSPITAL | Age: 84
Setting detail: RECURRING SERIES
Discharge: HOME | End: 2024-04-21
Attending: RADIOLOGY
Payer: MEDICARE

## 2024-04-18 LAB
RAD ONC ARIA COURSE FIRST TREATMENT DATE: NORMAL
RAD ONC ARIA COURSE ID: NORMAL
RAD ONC ARIA COURSE INTENT: NORMAL
RAD ONC ARIA COURSE LAST TREATMENT DATE: NORMAL
RAD ONC ARIA COURSE SESSION NUMBER: 12
RAD ONC ARIA COURSE START DATE: NORMAL
RAD ONC ARIA COURSE TREATMENT ELAPSED DAYS: 15
RAD ONC ARIA PLAN FRACTIONS TREATED TO DATE: 12
RAD ONC ARIA PLAN ID: NORMAL
RAD ONC ARIA PLAN PRESCRIBED DOSE PER FRACTION: 1.8 GY
RAD ONC ARIA PLAN PRIMARY REFERENCE POINT: NORMAL
RAD ONC ARIA PLAN TOTAL FRACTIONS PRESCRIBED: 28
RAD ONC ARIA PLAN TOTAL PRESCRIBED DOSE: 5040 CGY
RAD ONC ARIA REFERENCE POINT DOSAGE GIVEN TO DATE: 21.6 GY
RAD ONC ARIA REFERENCE POINT ID: NORMAL
RAD ONC ARIA REFERENCE POINT SESSION DOSAGE GIVEN: 1.8 GY

## 2024-04-18 PROCEDURE — 77385 HC NTSTY MODUL RAD TX DLVR SMPL: CPT

## 2024-04-18 PROCEDURE — G6002 STEREOSCOPIC X-RAY GUIDANCE: HCPCS | Performed by: RADIOLOGY

## 2024-04-19 ENCOUNTER — HOSPITAL ENCOUNTER (OUTPATIENT)
Facility: HOSPITAL | Age: 84
Setting detail: RECURRING SERIES
Discharge: HOME | End: 2024-04-22
Attending: RADIOLOGY
Payer: MEDICARE

## 2024-04-19 LAB
RAD ONC ARIA COURSE FIRST TREATMENT DATE: NORMAL
RAD ONC ARIA COURSE ID: NORMAL
RAD ONC ARIA COURSE INTENT: NORMAL
RAD ONC ARIA COURSE LAST TREATMENT DATE: NORMAL
RAD ONC ARIA COURSE SESSION NUMBER: 13
RAD ONC ARIA COURSE START DATE: NORMAL
RAD ONC ARIA COURSE TREATMENT ELAPSED DAYS: 16
RAD ONC ARIA PLAN FRACTIONS TREATED TO DATE: 13
RAD ONC ARIA PLAN ID: NORMAL
RAD ONC ARIA PLAN PRESCRIBED DOSE PER FRACTION: 1.8 GY
RAD ONC ARIA PLAN PRIMARY REFERENCE POINT: NORMAL
RAD ONC ARIA PLAN TOTAL FRACTIONS PRESCRIBED: 28
RAD ONC ARIA PLAN TOTAL PRESCRIBED DOSE: 5040 CGY
RAD ONC ARIA REFERENCE POINT DOSAGE GIVEN TO DATE: 23.4 GY
RAD ONC ARIA REFERENCE POINT ID: NORMAL
RAD ONC ARIA REFERENCE POINT SESSION DOSAGE GIVEN: 1.8 GY

## 2024-04-19 PROCEDURE — 77385 HC NTSTY MODUL RAD TX DLVR SMPL: CPT

## 2024-04-22 ENCOUNTER — HOSPITAL ENCOUNTER (OUTPATIENT)
Facility: HOSPITAL | Age: 84
Setting detail: RECURRING SERIES
Discharge: HOME | End: 2024-04-25
Attending: RADIOLOGY
Payer: MEDICARE

## 2024-04-22 LAB
RAD ONC ARIA COURSE FIRST TREATMENT DATE: NORMAL
RAD ONC ARIA COURSE ID: NORMAL
RAD ONC ARIA COURSE INTENT: NORMAL
RAD ONC ARIA COURSE LAST TREATMENT DATE: NORMAL
RAD ONC ARIA COURSE SESSION NUMBER: 14
RAD ONC ARIA COURSE START DATE: NORMAL
RAD ONC ARIA COURSE TREATMENT ELAPSED DAYS: 19
RAD ONC ARIA PLAN FRACTIONS TREATED TO DATE: 14
RAD ONC ARIA PLAN ID: NORMAL
RAD ONC ARIA PLAN PRESCRIBED DOSE PER FRACTION: 1.8 GY
RAD ONC ARIA PLAN PRIMARY REFERENCE POINT: NORMAL
RAD ONC ARIA PLAN TOTAL FRACTIONS PRESCRIBED: 28
RAD ONC ARIA PLAN TOTAL PRESCRIBED DOSE: 5040 CGY
RAD ONC ARIA REFERENCE POINT DOSAGE GIVEN TO DATE: 25.2 GY
RAD ONC ARIA REFERENCE POINT ID: NORMAL
RAD ONC ARIA REFERENCE POINT SESSION DOSAGE GIVEN: 1.8 GY

## 2024-04-22 PROCEDURE — 77385 HC NTSTY MODUL RAD TX DLVR SMPL: CPT

## 2024-04-22 PROCEDURE — G6002 STEREOSCOPIC X-RAY GUIDANCE: HCPCS | Performed by: RADIOLOGY

## 2024-04-23 ENCOUNTER — HOSPITAL ENCOUNTER (OUTPATIENT)
Facility: HOSPITAL | Age: 84
Setting detail: RECURRING SERIES
Discharge: HOME | End: 2024-04-26
Attending: RADIOLOGY
Payer: MEDICARE

## 2024-04-23 LAB
RAD ONC ARIA COURSE FIRST TREATMENT DATE: NORMAL
RAD ONC ARIA COURSE ID: NORMAL
RAD ONC ARIA COURSE INTENT: NORMAL
RAD ONC ARIA COURSE LAST TREATMENT DATE: NORMAL
RAD ONC ARIA COURSE SESSION NUMBER: 15
RAD ONC ARIA COURSE START DATE: NORMAL
RAD ONC ARIA COURSE TREATMENT ELAPSED DAYS: 20
RAD ONC ARIA PLAN FRACTIONS TREATED TO DATE: 15
RAD ONC ARIA PLAN ID: NORMAL
RAD ONC ARIA PLAN PRESCRIBED DOSE PER FRACTION: 1.8 GY
RAD ONC ARIA PLAN PRIMARY REFERENCE POINT: NORMAL
RAD ONC ARIA PLAN TOTAL FRACTIONS PRESCRIBED: 28
RAD ONC ARIA PLAN TOTAL PRESCRIBED DOSE: 5040 CGY
RAD ONC ARIA REFERENCE POINT DOSAGE GIVEN TO DATE: 27 GY
RAD ONC ARIA REFERENCE POINT ID: NORMAL
RAD ONC ARIA REFERENCE POINT SESSION DOSAGE GIVEN: 1.8 GY

## 2024-04-23 PROCEDURE — 77385 HC NTSTY MODUL RAD TX DLVR SMPL: CPT

## 2024-04-23 PROCEDURE — 77336 RADIATION PHYSICS CONSULT: CPT

## 2024-04-23 PROCEDURE — G6002 STEREOSCOPIC X-RAY GUIDANCE: HCPCS | Performed by: RADIOLOGY

## 2024-04-24 ENCOUNTER — HOSPITAL ENCOUNTER (OUTPATIENT)
Facility: HOSPITAL | Age: 84
Setting detail: RECURRING SERIES
Discharge: HOME | End: 2024-04-27
Attending: RADIOLOGY
Payer: MEDICARE

## 2024-04-24 LAB
RAD ONC ARIA COURSE FIRST TREATMENT DATE: NORMAL
RAD ONC ARIA COURSE ID: NORMAL
RAD ONC ARIA COURSE INTENT: NORMAL
RAD ONC ARIA COURSE LAST TREATMENT DATE: NORMAL
RAD ONC ARIA COURSE SESSION NUMBER: 16
RAD ONC ARIA COURSE START DATE: NORMAL
RAD ONC ARIA COURSE TREATMENT ELAPSED DAYS: 21
RAD ONC ARIA PLAN FRACTIONS TREATED TO DATE: 16
RAD ONC ARIA PLAN ID: NORMAL
RAD ONC ARIA PLAN PRESCRIBED DOSE PER FRACTION: 1.8 GY
RAD ONC ARIA PLAN PRIMARY REFERENCE POINT: NORMAL
RAD ONC ARIA PLAN TOTAL FRACTIONS PRESCRIBED: 28
RAD ONC ARIA PLAN TOTAL PRESCRIBED DOSE: 5040 CGY
RAD ONC ARIA REFERENCE POINT DOSAGE GIVEN TO DATE: 28.8 GY
RAD ONC ARIA REFERENCE POINT ID: NORMAL
RAD ONC ARIA REFERENCE POINT SESSION DOSAGE GIVEN: 1.8 GY

## 2024-04-24 PROCEDURE — G6002 STEREOSCOPIC X-RAY GUIDANCE: HCPCS | Performed by: RADIOLOGY

## 2024-04-24 PROCEDURE — 77385 HC NTSTY MODUL RAD TX DLVR SMPL: CPT

## 2024-04-25 ENCOUNTER — HOSPITAL ENCOUNTER (OUTPATIENT)
Facility: HOSPITAL | Age: 84
Setting detail: RECURRING SERIES
Discharge: HOME | End: 2024-04-28
Attending: RADIOLOGY
Payer: MEDICARE

## 2024-04-25 LAB
RAD ONC ARIA COURSE FIRST TREATMENT DATE: NORMAL
RAD ONC ARIA COURSE ID: NORMAL
RAD ONC ARIA COURSE INTENT: NORMAL
RAD ONC ARIA COURSE LAST TREATMENT DATE: NORMAL
RAD ONC ARIA COURSE SESSION NUMBER: 17
RAD ONC ARIA COURSE START DATE: NORMAL
RAD ONC ARIA COURSE TREATMENT ELAPSED DAYS: 22
RAD ONC ARIA PLAN FRACTIONS TREATED TO DATE: 17
RAD ONC ARIA PLAN ID: NORMAL
RAD ONC ARIA PLAN PRESCRIBED DOSE PER FRACTION: 1.8 GY
RAD ONC ARIA PLAN PRIMARY REFERENCE POINT: NORMAL
RAD ONC ARIA PLAN TOTAL FRACTIONS PRESCRIBED: 28
RAD ONC ARIA PLAN TOTAL PRESCRIBED DOSE: 5040 CGY
RAD ONC ARIA REFERENCE POINT DOSAGE GIVEN TO DATE: 30.6 GY
RAD ONC ARIA REFERENCE POINT ID: NORMAL
RAD ONC ARIA REFERENCE POINT SESSION DOSAGE GIVEN: 1.8 GY

## 2024-04-25 PROCEDURE — 77385 HC NTSTY MODUL RAD TX DLVR SMPL: CPT

## 2024-04-25 PROCEDURE — 77386 HC NTSTY MODUL RAD TX DLVR CPLX: CPT

## 2024-04-25 PROCEDURE — G6002 STEREOSCOPIC X-RAY GUIDANCE: HCPCS | Performed by: RADIOLOGY

## 2024-04-26 ENCOUNTER — HOSPITAL ENCOUNTER (OUTPATIENT)
Facility: HOSPITAL | Age: 84
Setting detail: RECURRING SERIES
Discharge: HOME | End: 2024-04-29
Attending: RADIOLOGY
Payer: MEDICARE

## 2024-04-26 LAB
RAD ONC ARIA COURSE FIRST TREATMENT DATE: NORMAL
RAD ONC ARIA COURSE ID: NORMAL
RAD ONC ARIA COURSE INTENT: NORMAL
RAD ONC ARIA COURSE LAST TREATMENT DATE: NORMAL
RAD ONC ARIA COURSE SESSION NUMBER: 18
RAD ONC ARIA COURSE START DATE: NORMAL
RAD ONC ARIA COURSE TREATMENT ELAPSED DAYS: 23
RAD ONC ARIA PLAN FRACTIONS TREATED TO DATE: 18
RAD ONC ARIA PLAN ID: NORMAL
RAD ONC ARIA PLAN PRESCRIBED DOSE PER FRACTION: 1.8 GY
RAD ONC ARIA PLAN PRIMARY REFERENCE POINT: NORMAL
RAD ONC ARIA PLAN TOTAL FRACTIONS PRESCRIBED: 28
RAD ONC ARIA PLAN TOTAL PRESCRIBED DOSE: 5040 CGY
RAD ONC ARIA REFERENCE POINT DOSAGE GIVEN TO DATE: 32.4 GY
RAD ONC ARIA REFERENCE POINT ID: NORMAL
RAD ONC ARIA REFERENCE POINT SESSION DOSAGE GIVEN: 1.8 GY

## 2024-04-26 PROCEDURE — G6002 STEREOSCOPIC X-RAY GUIDANCE: HCPCS | Performed by: RADIOLOGY

## 2024-04-26 PROCEDURE — 77386 HC NTSTY MODUL RAD TX DLVR CPLX: CPT

## 2024-04-26 PROCEDURE — 77385 HC NTSTY MODUL RAD TX DLVR SMPL: CPT

## 2024-04-29 ENCOUNTER — HOSPITAL ENCOUNTER (OUTPATIENT)
Facility: HOSPITAL | Age: 84
Setting detail: RECURRING SERIES
Discharge: HOME | End: 2024-05-02
Attending: RADIOLOGY
Payer: MEDICARE

## 2024-04-29 LAB
RAD ONC ARIA COURSE FIRST TREATMENT DATE: NORMAL
RAD ONC ARIA COURSE ID: NORMAL
RAD ONC ARIA COURSE INTENT: NORMAL
RAD ONC ARIA COURSE LAST TREATMENT DATE: NORMAL
RAD ONC ARIA COURSE SESSION NUMBER: 19
RAD ONC ARIA COURSE START DATE: NORMAL
RAD ONC ARIA COURSE TREATMENT ELAPSED DAYS: 26
RAD ONC ARIA PLAN FRACTIONS TREATED TO DATE: 19
RAD ONC ARIA PLAN ID: NORMAL
RAD ONC ARIA PLAN PRESCRIBED DOSE PER FRACTION: 1.8 GY
RAD ONC ARIA PLAN PRIMARY REFERENCE POINT: NORMAL
RAD ONC ARIA PLAN TOTAL FRACTIONS PRESCRIBED: 28
RAD ONC ARIA PLAN TOTAL PRESCRIBED DOSE: 5040 CGY
RAD ONC ARIA REFERENCE POINT DOSAGE GIVEN TO DATE: 34.2 GY
RAD ONC ARIA REFERENCE POINT ID: NORMAL
RAD ONC ARIA REFERENCE POINT SESSION DOSAGE GIVEN: 1.8 GY

## 2024-04-29 PROCEDURE — 77385 HC NTSTY MODUL RAD TX DLVR SMPL: CPT

## 2024-04-29 PROCEDURE — 77386 HC NTSTY MODUL RAD TX DLVR CPLX: CPT

## 2024-04-29 PROCEDURE — G6002 STEREOSCOPIC X-RAY GUIDANCE: HCPCS | Performed by: RADIOLOGY

## 2024-04-30 ENCOUNTER — HOSPITAL ENCOUNTER (OUTPATIENT)
Facility: HOSPITAL | Age: 84
Setting detail: RECURRING SERIES
Discharge: HOME | End: 2024-05-03
Attending: RADIOLOGY
Payer: MEDICARE

## 2024-04-30 LAB
RAD ONC ARIA COURSE FIRST TREATMENT DATE: NORMAL
RAD ONC ARIA COURSE ID: NORMAL
RAD ONC ARIA COURSE INTENT: NORMAL
RAD ONC ARIA COURSE LAST TREATMENT DATE: NORMAL
RAD ONC ARIA COURSE SESSION NUMBER: 20
RAD ONC ARIA COURSE START DATE: NORMAL
RAD ONC ARIA COURSE TREATMENT ELAPSED DAYS: 27
RAD ONC ARIA PLAN FRACTIONS TREATED TO DATE: 20
RAD ONC ARIA PLAN ID: NORMAL
RAD ONC ARIA PLAN PRESCRIBED DOSE PER FRACTION: 1.8 GY
RAD ONC ARIA PLAN PRIMARY REFERENCE POINT: NORMAL
RAD ONC ARIA PLAN TOTAL FRACTIONS PRESCRIBED: 28
RAD ONC ARIA PLAN TOTAL PRESCRIBED DOSE: 5040 CGY
RAD ONC ARIA REFERENCE POINT DOSAGE GIVEN TO DATE: 36 GY
RAD ONC ARIA REFERENCE POINT ID: NORMAL
RAD ONC ARIA REFERENCE POINT SESSION DOSAGE GIVEN: 1.8 GY

## 2024-04-30 PROCEDURE — 77336 RADIATION PHYSICS CONSULT: CPT

## 2024-04-30 PROCEDURE — 77386 HC NTSTY MODUL RAD TX DLVR CPLX: CPT

## 2024-04-30 PROCEDURE — G6002 STEREOSCOPIC X-RAY GUIDANCE: HCPCS | Performed by: RADIOLOGY

## 2024-04-30 PROCEDURE — 77385 HC NTSTY MODUL RAD TX DLVR SMPL: CPT

## 2024-05-01 ENCOUNTER — HOSPITAL ENCOUNTER (OUTPATIENT)
Facility: HOSPITAL | Age: 84
Setting detail: RECURRING SERIES
Discharge: HOME | End: 2024-05-04
Attending: RADIOLOGY
Payer: MEDICARE

## 2024-05-01 LAB
RAD ONC ARIA COURSE FIRST TREATMENT DATE: NORMAL
RAD ONC ARIA COURSE ID: NORMAL
RAD ONC ARIA COURSE INTENT: NORMAL
RAD ONC ARIA COURSE LAST TREATMENT DATE: NORMAL
RAD ONC ARIA COURSE SESSION NUMBER: 21
RAD ONC ARIA COURSE START DATE: NORMAL
RAD ONC ARIA COURSE TREATMENT ELAPSED DAYS: 28
RAD ONC ARIA PLAN FRACTIONS TREATED TO DATE: 21
RAD ONC ARIA PLAN ID: NORMAL
RAD ONC ARIA PLAN PRESCRIBED DOSE PER FRACTION: 1.8 GY
RAD ONC ARIA PLAN PRIMARY REFERENCE POINT: NORMAL
RAD ONC ARIA PLAN TOTAL FRACTIONS PRESCRIBED: 28
RAD ONC ARIA PLAN TOTAL PRESCRIBED DOSE: 5040 CGY
RAD ONC ARIA REFERENCE POINT DOSAGE GIVEN TO DATE: 37.8 GY
RAD ONC ARIA REFERENCE POINT ID: NORMAL
RAD ONC ARIA REFERENCE POINT SESSION DOSAGE GIVEN: 1.8 GY

## 2024-05-01 PROCEDURE — 77386 HC NTSTY MODUL RAD TX DLVR CPLX: CPT

## 2024-05-01 PROCEDURE — 77014 CHG CT GUIDANCE RADIATION THERAPY FLDS PLACEMENT: CPT | Performed by: RADIOLOGY

## 2024-05-02 ENCOUNTER — HOSPITAL ENCOUNTER (OUTPATIENT)
Facility: HOSPITAL | Age: 84
Setting detail: RECURRING SERIES
Discharge: HOME | End: 2024-05-05
Attending: RADIOLOGY
Payer: MEDICARE

## 2024-05-02 LAB
RAD ONC ARIA COURSE FIRST TREATMENT DATE: NORMAL
RAD ONC ARIA COURSE ID: NORMAL
RAD ONC ARIA COURSE INTENT: NORMAL
RAD ONC ARIA COURSE LAST TREATMENT DATE: NORMAL
RAD ONC ARIA COURSE SESSION NUMBER: 22
RAD ONC ARIA COURSE START DATE: NORMAL
RAD ONC ARIA COURSE TREATMENT ELAPSED DAYS: 29
RAD ONC ARIA PLAN FRACTIONS TREATED TO DATE: 22
RAD ONC ARIA PLAN ID: NORMAL
RAD ONC ARIA PLAN PRESCRIBED DOSE PER FRACTION: 1.8 GY
RAD ONC ARIA PLAN PRIMARY REFERENCE POINT: NORMAL
RAD ONC ARIA PLAN TOTAL FRACTIONS PRESCRIBED: 28
RAD ONC ARIA PLAN TOTAL PRESCRIBED DOSE: 5040 CGY
RAD ONC ARIA REFERENCE POINT DOSAGE GIVEN TO DATE: 39.6 GY
RAD ONC ARIA REFERENCE POINT ID: NORMAL
RAD ONC ARIA REFERENCE POINT SESSION DOSAGE GIVEN: 1.8 GY

## 2024-05-02 PROCEDURE — G6002 STEREOSCOPIC X-RAY GUIDANCE: HCPCS | Performed by: RADIOLOGY

## 2024-05-02 PROCEDURE — 77386 HC NTSTY MODUL RAD TX DLVR CPLX: CPT

## 2024-05-03 ENCOUNTER — HOSPITAL ENCOUNTER (OUTPATIENT)
Facility: HOSPITAL | Age: 84
Setting detail: RECURRING SERIES
Discharge: HOME | End: 2024-05-06
Attending: RADIOLOGY
Payer: MEDICARE

## 2024-05-03 LAB
RAD ONC ARIA COURSE FIRST TREATMENT DATE: NORMAL
RAD ONC ARIA COURSE ID: NORMAL
RAD ONC ARIA COURSE INTENT: NORMAL
RAD ONC ARIA COURSE LAST TREATMENT DATE: NORMAL
RAD ONC ARIA COURSE SESSION NUMBER: 23
RAD ONC ARIA COURSE START DATE: NORMAL
RAD ONC ARIA COURSE TREATMENT ELAPSED DAYS: 30
RAD ONC ARIA PLAN FRACTIONS TREATED TO DATE: 23
RAD ONC ARIA PLAN ID: NORMAL
RAD ONC ARIA PLAN PRESCRIBED DOSE PER FRACTION: 1.8 GY
RAD ONC ARIA PLAN PRIMARY REFERENCE POINT: NORMAL
RAD ONC ARIA PLAN TOTAL FRACTIONS PRESCRIBED: 28
RAD ONC ARIA PLAN TOTAL PRESCRIBED DOSE: 5040 CGY
RAD ONC ARIA REFERENCE POINT DOSAGE GIVEN TO DATE: 41.4 GY
RAD ONC ARIA REFERENCE POINT ID: NORMAL
RAD ONC ARIA REFERENCE POINT SESSION DOSAGE GIVEN: 1.8 GY

## 2024-05-03 PROCEDURE — G6002 STEREOSCOPIC X-RAY GUIDANCE: HCPCS | Performed by: RADIOLOGY

## 2024-05-03 PROCEDURE — 77386 HC NTSTY MODUL RAD TX DLVR CPLX: CPT

## 2024-05-06 ENCOUNTER — HOSPITAL ENCOUNTER (OUTPATIENT)
Facility: HOSPITAL | Age: 84
Setting detail: RECURRING SERIES
Discharge: HOME | End: 2024-05-09
Attending: RADIOLOGY
Payer: MEDICARE

## 2024-05-06 LAB
RAD ONC ARIA COURSE FIRST TREATMENT DATE: NORMAL
RAD ONC ARIA COURSE ID: NORMAL
RAD ONC ARIA COURSE INTENT: NORMAL
RAD ONC ARIA COURSE LAST TREATMENT DATE: NORMAL
RAD ONC ARIA COURSE SESSION NUMBER: 24
RAD ONC ARIA COURSE TREATMENT ELAPSED DAYS: 33
RAD ONC ARIA PLAN FRACTIONS TREATED TO DATE: 24
RAD ONC ARIA PLAN ID: NORMAL
RAD ONC ARIA PLAN PRESCRIBED DOSE PER FRACTION: 1.8 GY
RAD ONC ARIA PLAN PRIMARY REFERENCE POINT: NORMAL
RAD ONC ARIA PLAN TOTAL FRACTIONS PRESCRIBED: 28
RAD ONC ARIA PLAN TOTAL PRESCRIBED DOSE: 5040 CGY
RAD ONC ARIA REFERENCE POINT DOSAGE GIVEN TO DATE: 43.2 GY
RAD ONC ARIA REFERENCE POINT ID: NORMAL
RAD ONC ARIA REFERENCE POINT SESSION DOSAGE GIVEN: 1.8 GY

## 2024-05-06 PROCEDURE — G6002 STEREOSCOPIC X-RAY GUIDANCE: HCPCS | Performed by: RADIOLOGY

## 2024-05-06 PROCEDURE — 77386 HC NTSTY MODUL RAD TX DLVR CPLX: CPT

## 2024-05-07 ENCOUNTER — HOSPITAL ENCOUNTER (OUTPATIENT)
Facility: HOSPITAL | Age: 84
Setting detail: RECURRING SERIES
Discharge: HOME | End: 2024-05-10
Attending: RADIOLOGY
Payer: MEDICARE

## 2024-05-07 LAB
RAD ONC ARIA COURSE FIRST TREATMENT DATE: NORMAL
RAD ONC ARIA COURSE ID: NORMAL
RAD ONC ARIA COURSE INTENT: NORMAL
RAD ONC ARIA COURSE LAST TREATMENT DATE: NORMAL
RAD ONC ARIA COURSE SESSION NUMBER: 25
RAD ONC ARIA COURSE START DATE: NORMAL
RAD ONC ARIA COURSE TREATMENT ELAPSED DAYS: 34
RAD ONC ARIA PLAN FRACTIONS TREATED TO DATE: 25
RAD ONC ARIA PLAN ID: NORMAL
RAD ONC ARIA PLAN PRESCRIBED DOSE PER FRACTION: 1.8 GY
RAD ONC ARIA PLAN PRIMARY REFERENCE POINT: NORMAL
RAD ONC ARIA PLAN TOTAL FRACTIONS PRESCRIBED: 28
RAD ONC ARIA PLAN TOTAL PRESCRIBED DOSE: 5040 CGY
RAD ONC ARIA REFERENCE POINT DOSAGE GIVEN TO DATE: 45 GY
RAD ONC ARIA REFERENCE POINT ID: NORMAL
RAD ONC ARIA REFERENCE POINT SESSION DOSAGE GIVEN: 1.8 GY

## 2024-05-07 PROCEDURE — 77386 HC NTSTY MODUL RAD TX DLVR CPLX: CPT

## 2024-05-07 PROCEDURE — 77336 RADIATION PHYSICS CONSULT: CPT

## 2024-05-07 PROCEDURE — G6002 STEREOSCOPIC X-RAY GUIDANCE: HCPCS | Performed by: RADIOLOGY

## 2024-05-08 ENCOUNTER — HOSPITAL ENCOUNTER (OUTPATIENT)
Facility: HOSPITAL | Age: 84
Setting detail: RECURRING SERIES
Discharge: HOME | End: 2024-05-11
Attending: RADIOLOGY
Payer: MEDICARE

## 2024-05-08 LAB
RAD ONC ARIA COURSE FIRST TREATMENT DATE: NORMAL
RAD ONC ARIA COURSE ID: NORMAL
RAD ONC ARIA COURSE INTENT: NORMAL
RAD ONC ARIA COURSE LAST TREATMENT DATE: NORMAL
RAD ONC ARIA COURSE SESSION NUMBER: 26
RAD ONC ARIA COURSE START DATE: NORMAL
RAD ONC ARIA COURSE TREATMENT ELAPSED DAYS: 35
RAD ONC ARIA PLAN FRACTIONS TREATED TO DATE: 26
RAD ONC ARIA PLAN ID: NORMAL
RAD ONC ARIA PLAN PRESCRIBED DOSE PER FRACTION: 1.8 GY
RAD ONC ARIA PLAN PRIMARY REFERENCE POINT: NORMAL
RAD ONC ARIA PLAN TOTAL FRACTIONS PRESCRIBED: 28
RAD ONC ARIA PLAN TOTAL PRESCRIBED DOSE: 5040 CGY
RAD ONC ARIA REFERENCE POINT DOSAGE GIVEN TO DATE: 46.8 GY
RAD ONC ARIA REFERENCE POINT ID: NORMAL
RAD ONC ARIA REFERENCE POINT SESSION DOSAGE GIVEN: 1.8 GY

## 2024-05-08 PROCEDURE — 77386 HC NTSTY MODUL RAD TX DLVR CPLX: CPT

## 2024-05-08 PROCEDURE — 77014 CHG CT GUIDANCE RADIATION THERAPY FLDS PLACEMENT: CPT | Performed by: RADIOLOGY

## 2024-05-09 ENCOUNTER — HOSPITAL ENCOUNTER (OUTPATIENT)
Facility: HOSPITAL | Age: 84
Setting detail: RECURRING SERIES
Discharge: HOME | End: 2024-05-12
Attending: RADIOLOGY
Payer: MEDICARE

## 2024-05-09 LAB
RAD ONC ARIA COURSE FIRST TREATMENT DATE: NORMAL
RAD ONC ARIA COURSE ID: NORMAL
RAD ONC ARIA COURSE INTENT: NORMAL
RAD ONC ARIA COURSE LAST TREATMENT DATE: NORMAL
RAD ONC ARIA COURSE SESSION NUMBER: 27
RAD ONC ARIA COURSE START DATE: NORMAL
RAD ONC ARIA COURSE TREATMENT ELAPSED DAYS: 36
RAD ONC ARIA PLAN FRACTIONS TREATED TO DATE: 27
RAD ONC ARIA PLAN ID: NORMAL
RAD ONC ARIA PLAN PRESCRIBED DOSE PER FRACTION: 1.8 GY
RAD ONC ARIA PLAN PRIMARY REFERENCE POINT: NORMAL
RAD ONC ARIA PLAN TOTAL FRACTIONS PRESCRIBED: 28
RAD ONC ARIA PLAN TOTAL PRESCRIBED DOSE: 5040 CGY
RAD ONC ARIA REFERENCE POINT DOSAGE GIVEN TO DATE: 48.6 GY
RAD ONC ARIA REFERENCE POINT ID: NORMAL
RAD ONC ARIA REFERENCE POINT SESSION DOSAGE GIVEN: 1.8 GY

## 2024-05-09 PROCEDURE — 77386 HC NTSTY MODUL RAD TX DLVR CPLX: CPT

## 2024-05-09 PROCEDURE — G6002 STEREOSCOPIC X-RAY GUIDANCE: HCPCS | Performed by: RADIOLOGY

## 2024-05-10 ENCOUNTER — HOSPITAL ENCOUNTER (OUTPATIENT)
Facility: HOSPITAL | Age: 84
Setting detail: RECURRING SERIES
Discharge: HOME | End: 2024-05-13
Attending: RADIOLOGY
Payer: MEDICARE

## 2024-05-10 ENCOUNTER — APPOINTMENT (OUTPATIENT)
Facility: HOSPITAL | Age: 84
End: 2024-05-10
Attending: RADIOLOGY
Payer: MEDICARE

## 2024-05-10 LAB
RAD ONC ARIA COURSE FIRST TREATMENT DATE: NORMAL
RAD ONC ARIA COURSE ID: NORMAL
RAD ONC ARIA COURSE INTENT: NORMAL
RAD ONC ARIA COURSE LAST TREATMENT DATE: NORMAL
RAD ONC ARIA COURSE SESSION NUMBER: 28
RAD ONC ARIA COURSE START DATE: NORMAL
RAD ONC ARIA COURSE TREATMENT ELAPSED DAYS: 37
RAD ONC ARIA PLAN FRACTIONS TREATED TO DATE: 28
RAD ONC ARIA PLAN ID: NORMAL
RAD ONC ARIA PLAN PRESCRIBED DOSE PER FRACTION: 1.8 GY
RAD ONC ARIA PLAN PRIMARY REFERENCE POINT: NORMAL
RAD ONC ARIA PLAN TOTAL FRACTIONS PRESCRIBED: 28
RAD ONC ARIA PLAN TOTAL PRESCRIBED DOSE: 5040 CGY
RAD ONC ARIA REFERENCE POINT DOSAGE GIVEN TO DATE: 50.4 GY
RAD ONC ARIA REFERENCE POINT ID: NORMAL
RAD ONC ARIA REFERENCE POINT SESSION DOSAGE GIVEN: 1.8 GY

## 2024-05-10 PROCEDURE — 77386 HC NTSTY MODUL RAD TX DLVR CPLX: CPT

## 2024-05-10 PROCEDURE — G6002 STEREOSCOPIC X-RAY GUIDANCE: HCPCS | Performed by: RADIOLOGY

## 2024-05-10 PROCEDURE — 77336 RADIATION PHYSICS CONSULT: CPT

## 2024-05-13 ENCOUNTER — HOSPITAL ENCOUNTER (EMERGENCY)
Facility: HOSPITAL | Age: 84
Discharge: HOME | End: 2024-05-13
Attending: STUDENT IN AN ORGANIZED HEALTH CARE EDUCATION/TRAINING PROGRAM
Payer: MEDICARE

## 2024-05-13 ENCOUNTER — APPOINTMENT (OUTPATIENT)
Facility: HOSPITAL | Age: 84
End: 2024-05-13
Payer: MEDICARE

## 2024-05-13 VITALS
TEMPERATURE: 98.7 F | DIASTOLIC BLOOD PRESSURE: 70 MMHG | HEIGHT: 64 IN | BODY MASS INDEX: 21 KG/M2 | WEIGHT: 123 LBS | SYSTOLIC BLOOD PRESSURE: 143 MMHG | HEART RATE: 73 BPM | RESPIRATION RATE: 17 BRPM | OXYGEN SATURATION: 100 %

## 2024-05-13 DIAGNOSIS — L58.9 RADIATION DERMATITIS: ICD-10-CM

## 2024-05-13 DIAGNOSIS — N30.00 ACUTE CYSTITIS WITHOUT HEMATURIA: Primary | ICD-10-CM

## 2024-05-13 LAB
ALBUMIN SERPL-MCNC: 3.2 G/DL (ref 3.4–5)
ALBUMIN/GLOB SERPL: 0.8 (ref 0.8–1.7)
ALP SERPL-CCNC: 110 U/L (ref 45–117)
ALT SERPL-CCNC: 73 U/L (ref 13–56)
ANION GAP SERPL CALC-SCNC: 5 MMOL/L (ref 3–18)
APPEARANCE UR: ABNORMAL
AST SERPL-CCNC: 64 U/L (ref 10–38)
BACTERIA URNS QL MICRO: ABNORMAL /HPF
BASOPHILS # BLD: 0 K/UL (ref 0–0.1)
BASOPHILS NFR BLD: 0 % (ref 0–2)
BILIRUB SERPL-MCNC: 0.8 MG/DL (ref 0.2–1)
BILIRUB UR QL: ABNORMAL
BUN SERPL-MCNC: 7 MG/DL (ref 7–18)
BUN/CREAT SERPL: 8 (ref 12–20)
CALCIUM SERPL-MCNC: 9.3 MG/DL (ref 8.5–10.1)
CHLORIDE SERPL-SCNC: 100 MMOL/L (ref 100–111)
CO2 SERPL-SCNC: 30 MMOL/L (ref 21–32)
COLOR UR: ABNORMAL
CREAT SERPL-MCNC: 0.84 MG/DL (ref 0.6–1.3)
DIFFERENTIAL METHOD BLD: ABNORMAL
EOSINOPHIL # BLD: 0 K/UL (ref 0–0.4)
EOSINOPHIL NFR BLD: 0 % (ref 0–5)
EPITH CASTS URNS QL MICRO: ABNORMAL /LPF (ref 0–5)
ERYTHROCYTE [DISTWIDTH] IN BLOOD BY AUTOMATED COUNT: 18.4 % (ref 11.6–14.5)
GLOBULIN SER CALC-MCNC: 4.2 G/DL (ref 2–4)
GLUCOSE SERPL-MCNC: 115 MG/DL (ref 74–99)
GLUCOSE UR STRIP.AUTO-MCNC: NEGATIVE MG/DL
HCT VFR BLD AUTO: 31.8 % (ref 35–45)
HGB BLD-MCNC: 10.1 G/DL (ref 12–16)
HGB UR QL STRIP: ABNORMAL
IMM GRANULOCYTES # BLD AUTO: 0 K/UL (ref 0–0.04)
IMM GRANULOCYTES NFR BLD AUTO: 1 % (ref 0–0.5)
KETONES UR QL STRIP.AUTO: ABNORMAL MG/DL
LEUKOCYTE ESTERASE UR QL STRIP.AUTO: ABNORMAL
LIPASE SERPL-CCNC: 24 U/L (ref 13–75)
LYMPHOCYTES # BLD: 0.5 K/UL (ref 0.9–3.6)
LYMPHOCYTES NFR BLD: 21 % (ref 21–52)
MCH RBC QN AUTO: 23.6 PG (ref 24–34)
MCHC RBC AUTO-ENTMCNC: 31.8 G/DL (ref 31–37)
MCV RBC AUTO: 74.3 FL (ref 78–100)
MONOCYTES # BLD: 0.8 K/UL (ref 0.05–1.2)
MONOCYTES NFR BLD: 33 % (ref 3–10)
MUCOUS THREADS URNS QL MICRO: ABNORMAL /LPF
NEUTS SEG # BLD: 1.1 K/UL (ref 1.8–8)
NEUTS SEG NFR BLD: 45 % (ref 40–73)
NITRITE UR QL STRIP.AUTO: NEGATIVE
NRBC # BLD: 0 K/UL (ref 0–0.01)
NRBC BLD-RTO: 0 PER 100 WBC
PH UR STRIP: 6 (ref 5–8)
PLATELET # BLD AUTO: 74 K/UL (ref 135–420)
PMV BLD AUTO: 9.5 FL (ref 9.2–11.8)
POTASSIUM SERPL-SCNC: 2.8 MMOL/L (ref 3.5–5.5)
PROT SERPL-MCNC: 7.4 G/DL (ref 6.4–8.2)
PROT UR STRIP-MCNC: 30 MG/DL
RBC # BLD AUTO: 4.28 M/UL (ref 4.2–5.3)
RBC #/AREA URNS HPF: ABNORMAL /HPF (ref 0–5)
SODIUM SERPL-SCNC: 135 MMOL/L (ref 136–145)
SP GR UR REFRACTOMETRY: 1.02 (ref 1–1.03)
UROBILINOGEN UR QL STRIP.AUTO: 2 EU/DL (ref 0.2–1)
WBC # BLD AUTO: 2.4 K/UL (ref 4.6–13.2)
WBC URNS QL MICRO: ABNORMAL /HPF (ref 0–4)

## 2024-05-13 PROCEDURE — 83690 ASSAY OF LIPASE: CPT

## 2024-05-13 PROCEDURE — 96366 THER/PROPH/DIAG IV INF ADDON: CPT

## 2024-05-13 PROCEDURE — 99284 EMERGENCY DEPT VISIT MOD MDM: CPT

## 2024-05-13 PROCEDURE — 87086 URINE CULTURE/COLONY COUNT: CPT

## 2024-05-13 PROCEDURE — 85025 COMPLETE CBC W/AUTO DIFF WBC: CPT

## 2024-05-13 PROCEDURE — 74018 RADEX ABDOMEN 1 VIEW: CPT

## 2024-05-13 PROCEDURE — 87088 URINE BACTERIA CULTURE: CPT

## 2024-05-13 PROCEDURE — 2580000003 HC RX 258: Performed by: STUDENT IN AN ORGANIZED HEALTH CARE EDUCATION/TRAINING PROGRAM

## 2024-05-13 PROCEDURE — 6370000000 HC RX 637 (ALT 250 FOR IP): Performed by: STUDENT IN AN ORGANIZED HEALTH CARE EDUCATION/TRAINING PROGRAM

## 2024-05-13 PROCEDURE — 87186 SC STD MICRODIL/AGAR DIL: CPT

## 2024-05-13 PROCEDURE — 80053 COMPREHEN METABOLIC PANEL: CPT

## 2024-05-13 PROCEDURE — 6360000002 HC RX W HCPCS: Performed by: STUDENT IN AN ORGANIZED HEALTH CARE EDUCATION/TRAINING PROGRAM

## 2024-05-13 PROCEDURE — 96375 TX/PRO/DX INJ NEW DRUG ADDON: CPT

## 2024-05-13 PROCEDURE — 81001 URINALYSIS AUTO W/SCOPE: CPT

## 2024-05-13 PROCEDURE — 96365 THER/PROPH/DIAG IV INF INIT: CPT

## 2024-05-13 RX ORDER — POTASSIUM CHLORIDE 7.45 MG/ML
10 INJECTION INTRAVENOUS
Status: COMPLETED | OUTPATIENT
Start: 2024-05-13 | End: 2024-05-13

## 2024-05-13 RX ORDER — 0.9 % SODIUM CHLORIDE 0.9 %
1000 INTRAVENOUS SOLUTION INTRAVENOUS ONCE
Status: COMPLETED | OUTPATIENT
Start: 2024-05-13 | End: 2024-05-13

## 2024-05-13 RX ORDER — CEPHALEXIN 500 MG/1
500 CAPSULE ORAL 4 TIMES DAILY
Qty: 28 CAPSULE | Refills: 0 | Status: SHIPPED | OUTPATIENT
Start: 2024-05-13 | End: 2024-05-20

## 2024-05-13 RX ORDER — LIDOCAINE HYDROCHLORIDE 20 MG/ML
JELLY TOPICAL
Status: COMPLETED | OUTPATIENT
Start: 2024-05-13 | End: 2024-05-13

## 2024-05-13 RX ADMIN — POTASSIUM CHLORIDE 10 MEQ: 7.46 INJECTION, SOLUTION INTRAVENOUS at 20:07

## 2024-05-13 RX ADMIN — POTASSIUM BICARBONATE 40 MEQ: 391 TABLET, EFFERVESCENT ORAL at 20:08

## 2024-05-13 RX ADMIN — LIDOCAINE HYDROCHLORIDE: 20 JELLY TOPICAL at 20:10

## 2024-05-13 RX ADMIN — WATER 1000 MG: 1 INJECTION INTRAMUSCULAR; INTRAVENOUS; SUBCUTANEOUS at 19:56

## 2024-05-13 RX ADMIN — SODIUM CHLORIDE 1000 ML: 9 INJECTION, SOLUTION INTRAVENOUS at 20:06

## 2024-05-13 RX ADMIN — LIDOCAINE HYDROCHLORIDE: 20 JELLY TOPICAL at 22:07

## 2024-05-13 NOTE — ED TRIAGE NOTES
Pt wheeled to triage c/o weakness, nausea, diarrhea, dysuria, blisters on buttock from cancer treatment, and \"just sick\". Last day of radiation was Friday. Pt has finished cancer tx for rectal CA. [place] [facility]

## 2024-05-13 NOTE — ED PROVIDER NOTES
with the below findings:        Interpretation per the Radiologist below, if available at the time of this note:    XR ABDOMEN (KUB) (SINGLE AP VIEW)   Final Result   Nonobstructive bowel gas pattern.      Moderate stool retention.            ED BEDSIDE ULTRASOUND:   Performed by ED Physician - none    LABS:  Labs Reviewed   CBC WITH AUTO DIFFERENTIAL - Abnormal; Notable for the following components:       Result Value    WBC 2.4 (*)     Hemoglobin 10.1 (*)     Hematocrit 31.8 (*)     MCV 74.3 (*)     MCH 23.6 (*)     RDW 18.4 (*)     Platelets 74 (*)     Monocytes % 33 (*)     Immature Granulocytes % 1 (*)     Neutrophils Absolute 1.1 (*)     Lymphocytes Absolute 0.5 (*)     All other components within normal limits   COMPREHENSIVE METABOLIC PANEL - Abnormal; Notable for the following components:    Sodium 135 (*)     Potassium 2.8 (*)     Glucose 115 (*)     BUN/Creatinine Ratio 8 (*)     ALT 73 (*)     AST 64 (*)     Albumin 3.2 (*)     Globulin 4.2 (*)     All other components within normal limits   URINALYSIS - Abnormal; Notable for the following components:    Protein, UA 30 (*)     Ketones, Urine TRACE (*)     Bilirubin, Urine SMALL (*)     Blood, Urine TRACE (*)     Urobilinogen, Urine 2.0 (*)     Leukocyte Esterase, Urine MODERATE (*)     All other components within normal limits   URINALYSIS, MICRO - Abnormal; Notable for the following components:    BACTERIA, URINE FEW (*)     Mucus, UA 4+ (*)     All other components within normal limits   CULTURE, URINE   LIPASE       All other labs were within normal range or not returned as of this dictation.    EMERGENCY DEPARTMENT COURSE and DIFFERENTIAL DIAGNOSIS/MDM:   Vitals:    Vitals:    05/13/24 1646 05/13/24 2130   BP: 139/69 (!) 143/70   Pulse: 92 73   Resp: 18 17   Temp: 99.1 °F (37.3 °C) 98.7 °F (37.1 °C)   TempSrc: Oral Oral   SpO2: 98% 100%   Weight: 55.8 kg (123 lb)    Height: 1.626 m (5' 4\")        Medical Decision Making  Amount and/or Complexity of

## 2024-05-13 NOTE — ED NOTES
PT presents to the emergency department c/o generalized weakness, painful urination, n/v/d and blisters on the rectum. PT states she has just finished her cancer treatments of radiation and chemotherapy. PT is awake, alert, and oriented x4 at this time. PT resting in position of comfort. Family at bedside. Call bell in reach.

## 2024-05-14 RX ORDER — LIDOCAINE 50 MG/G
OINTMENT TOPICAL
Qty: 50 G | Refills: 0 | Status: SHIPPED | OUTPATIENT
Start: 2024-05-14

## 2024-05-14 NOTE — ED NOTES
Pt ambulates to the bathroom. Pt stated pain is a lot better after the lidocaine. She would like to some to go home with. Will discuss with provider.

## 2024-05-14 NOTE — ED NOTES
Assumed care of PT. Cardiac monitor in place. Pt awake, alert, and orientated. PT voiced no concerns and is not in distress at this time. Breathing even and unlabored. Call bell within reach. Allergies verified and medications given.

## 2024-05-14 NOTE — DISCHARGE INSTRUCTIONS
To help with the blistering discussed and discomfort would recommend using a diaper rash cream such as zinc oxide to help act as a barrier to help protect the area.  For pain you can use topical lidocaine over the area of discomfort.    As for your bathroom issues I would recommend increasing fiber and water intake.  Stop taking Imodium and diarrhea medications.  If this does not help consider trying Colace or MiraLAX to see if this helps.

## 2024-05-16 LAB
BACTERIA SPEC CULT: ABNORMAL
BACTERIA SPEC CULT: ABNORMAL
CC UR VC: ABNORMAL
SERVICE CMNT-IMP: ABNORMAL

## 2024-05-17 ENCOUNTER — CLINICAL DOCUMENTATION (OUTPATIENT)
Age: 84
End: 2024-05-17

## 2024-05-17 NOTE — PROGRESS NOTES
[facility]  Breast & Colorectal Oncology Nurse Navigator Encounter    Name: Adama Mora  Age: 83 y.o.  : 1940  Diagnosis & Date:Squamous cell carcinoma 24    Encounter type:  []Initial Navigator Encounter  []Patient Initiated  [x]Navigator Follow-up  []Other:     Narrative:   Follow up.  History of right breast cancer ER/[place]+/HER2-, underwent lumpectomy with lymph node dissection in . Did not receive recommended post op radiation.   Newly diagnosed squamous cell carcinoma in February. PICC line placed. Concomitant chemotherapy and external beam radiation therapy completed.    Current plan of care discussed,   Reminded that I am available as needed for any needs/issues/questions that arise and encouraged to call; My contact info provided. All questions answered.  Will follow up with Adama Mora  to discuss needs and plan of care.    Interdisciplinary Team:  Surgeon:Dr. Moore  6mth Appointment Date:2024    Med-Onc:Dr. Bolanos  Last Appointment Date:24  PICC line: 3/15/24  Chemotherapy Regimen:Mitomycin/5FU   Chemotherapy Start Date:3/22/24  Chemotherapy Completion Date:24    Rad-Onc:Dr. Mcdonough  F/U Appointment Date:24  Radiation Type/Site:Anal  Radiation Start Date: 4/3/24  Radiation Completion Date: 5/10/24      Nurse Navigator: REX Baker BSN, RN  Breast & Colorectal Cancer Nurse Navigator    [facility]  [address] [place] [address]  Office number [phone]  Cell number [phone]  [email or web address]  Good Help to Those in Need®

## 2024-07-11 ENCOUNTER — HOSPITAL ENCOUNTER (OUTPATIENT)
Facility: HOSPITAL | Age: 84
Setting detail: RECURRING SERIES
Discharge: HOME | End: 2024-07-14
Attending: RADIOLOGY
Payer: MEDICARE

## 2024-07-11 PROCEDURE — 99213 OFFICE O/P EST LOW 20 MIN: CPT

## 2024-07-11 PROCEDURE — 99024 POSTOP FOLLOW-UP VISIT: CPT | Performed by: RADIOLOGY

## 2024-07-16 ENCOUNTER — APPOINTMENT (OUTPATIENT)
Facility: HOSPITAL | Age: 84
End: 2024-07-16
Attending: RADIOLOGY
Payer: MEDICARE

## 2024-08-01 ENCOUNTER — TELEPHONE (OUTPATIENT)
Age: 84
End: 2024-08-01

## 2024-08-01 NOTE — TELEPHONE ENCOUNTER
Patient has complaints of frequent loose stools and rectal irritation. Patient would like to know if theres anything she can do in the meantime leading up to her appointment to help with these symptoms.

## 2024-08-02 NOTE — TELEPHONE ENCOUNTER
Let patient know she can start Metamucil fiber powder once a day. Also, she can use Desitin or A&D ointment to the rectal area to help soothe if irritated. Patient will try these things and call us back if needed.

## 2024-08-09 ENCOUNTER — OFFICE VISIT (OUTPATIENT)
Age: 84
End: 2024-08-09

## 2024-08-09 VITALS
HEART RATE: 82 BPM | BODY MASS INDEX: 20.66 KG/M2 | SYSTOLIC BLOOD PRESSURE: 102 MMHG | OXYGEN SATURATION: 98 % | WEIGHT: 121 LBS | HEIGHT: 64 IN | DIASTOLIC BLOOD PRESSURE: 40 MMHG

## 2024-08-09 DIAGNOSIS — R60.0 BILATERAL EDEMA OF LOWER EXTREMITY: Primary | ICD-10-CM

## 2024-08-09 RX ORDER — FUROSEMIDE 20 MG/1
20 TABLET ORAL DAILY PRN
Qty: 30 TABLET | Refills: 5 | Status: SHIPPED | OUTPATIENT
Start: 2024-08-09

## 2024-08-09 ASSESSMENT — PATIENT HEALTH QUESTIONNAIRE - PHQ9
SUM OF ALL RESPONSES TO PHQ QUESTIONS 1-9: 0
SUM OF ALL RESPONSES TO PHQ9 QUESTIONS 1 & 2: 0
2. FEELING DOWN, DEPRESSED OR HOPELESS: NOT AT ALL
1. LITTLE INTEREST OR PLEASURE IN DOING THINGS: NOT AT ALL
SUM OF ALL RESPONSES TO PHQ QUESTIONS 1-9: 0

## 2024-08-09 NOTE — PATIENT INSTRUCTIONS
Take lasix 20 mg as needed for edema. Do not take more than 3 days in a row. Please do not take lasix if your systolic blood pressure (top number) is less than 100.

## 2024-08-09 NOTE — PROGRESS NOTES
Adama Mora presents today for   Chief Complaint   Patient presents with    Follow-up     Overdue f/u last seen . Add on c/o leg edema and hx of chemo/radiation     Edema     Bilateral leg edema constant lately         Adama Mora preferred language for health care discussion is english/other.    Is someone accompanying this pt? no    Is the patient using any DME equipment during OV? no    Depression Screening:  Depression: Not at risk (8/9/2024)    PHQ-2     PHQ-2 Score: 0        Learning Assessment:  Who is the primary learner? Patient    What is the preferred language for health care of the primary learner? ENGLISH    How does the primary learner prefer to learn new concepts? DEMONSTRATION    Answered By patient    Relationship to Learner SELF           Pt currently taking Anticoagulant therapy? no    Pt currently taking Antiplatelet therapy ? no      Coordination of Care:  1. Have you been to the ER, urgent care clinic since your last visit? Hospitalized since your last visit? yes    2. Have you seen or consulted any other health care providers outside of the [facility] since your last visit? Include any pap smears or colon screening. no

## 2024-08-09 NOTE — PROGRESS NOTES
Adama Mora    Chief Complaint   Patient presents with    Follow-up     Overdue f/u last seen . Add on c/o leg edema and hx of chemo/radiation     Edema     Bilateral leg edema constant lately         HPI    Adama Mora is a 83 y.o. AAF, presents as an add on urgent visit for edema.    She has been following with my partner, his notes reviewed. She has been c/o LE edema even when saw him last year but feels worsening over the last month. By end of the day- says legs will be double the size they are now. They are visably puffing out over her sucks but nonpitting. She is wearing compression stockings without much help. Otherwise no SOB, no CP. Thinks she feels sensation of her heart beat but not necessarily pounding or racing.    Past Medical History:   Diagnosis Date    Arthritis     Back pain of lumbar region with sciatica 12/5/2018    Breast cancer (HCC)     right    Cancer (HCC) 2015    breast cancer    Dyslipidemia     Heart palpitations     Hypertension     Left ventricular hypertrophy     Macular degeneration     Vitamin D deficiency        Past Surgical History:   Procedure Laterality Date    BREAST LUMPECTOMY Right     BREAST SURGERY Right 2015    HEMORRHOID SURGERY      HERNIA REPAIR      umbilical    HYSTERECTOMY (CERVIX STATUS UNKNOWN)  1985    HYSTERECTOMY, TOTAL ABDOMINAL (CERVIX REMOVED)         Current Outpatient Medications   Medication Sig Dispense Refill    lidocaine (XYLOCAINE) 5 % ointment Apply topically as needed. 50 g 0    Zinc Oxide 10 % OINT Apply 1 Application topically 4 times daily as needed (skin irritation) 85 g 0    colestipol (COLESTID) 5 g packet Take 5 g by mouth 2 times daily      METAMUCIL FIBER PO Take by mouth daily      Multiple Vitamins-Minerals (PRESERVISION/LUTEIN PO) Take 1 tablet by mouth daily      ondansetron (ZOFRAN) 4 MG tablet Take 1 tablet by mouth every 8 hours as needed for Nausea or Vomiting      acetaminophen (TYLENOL) 500 MG tablet Take

## 2024-08-22 ENCOUNTER — HOSPITAL ENCOUNTER (OUTPATIENT)
Facility: HOSPITAL | Age: 84
Discharge: HOME | End: 2024-08-22
Attending: INTERNAL MEDICINE
Payer: MEDICARE

## 2024-08-22 DIAGNOSIS — C21.1 PRIMARY CANCER OF ANAL CANAL (HCC): ICD-10-CM

## 2024-08-22 DIAGNOSIS — C50.919 MALIGNANT NEOPLASM OF FEMALE BREAST, UNSPECIFIED ESTROGEN RECEPTOR STATUS, UNSPECIFIED LATERALITY, UNSPECIFIED SITE OF BREAST (HCC): ICD-10-CM

## 2024-08-22 LAB — CREAT UR-MCNC: 0.8 MG/DL (ref 0.6–1.3)

## 2024-08-22 PROCEDURE — 74177 CT ABD & PELVIS W/CONTRAST: CPT

## 2024-08-22 PROCEDURE — 82565 ASSAY OF CREATININE: CPT

## 2024-08-22 PROCEDURE — 6360000004 HC RX CONTRAST MEDICATION: Performed by: INTERNAL MEDICINE

## 2024-08-22 RX ORDER — IOPAMIDOL 612 MG/ML
100 INJECTION, SOLUTION INTRAVASCULAR
Status: COMPLETED | OUTPATIENT
Start: 2024-08-22 | End: 2024-08-22

## 2024-08-22 RX ADMIN — IOPAMIDOL 100 ML: 612 INJECTION, SOLUTION INTRAVENOUS at 13:16

## 2024-08-22 RX ADMIN — DIATRIZOATE MEGLUMINE AND DIATRIZOATE SODIUM 30 ML: 660; 100 LIQUID ORAL; RECTAL at 11:40

## 2024-08-26 ENCOUNTER — OFFICE VISIT (OUTPATIENT)
Age: 84
End: 2024-08-26
Payer: MEDICARE

## 2024-08-26 VITALS
OXYGEN SATURATION: 99 % | RESPIRATION RATE: 16 BRPM | DIASTOLIC BLOOD PRESSURE: 60 MMHG | HEIGHT: 64 IN | TEMPERATURE: 97.7 F | WEIGHT: 119 LBS | SYSTOLIC BLOOD PRESSURE: 122 MMHG | BODY MASS INDEX: 20.32 KG/M2 | HEART RATE: 86 BPM

## 2024-08-26 DIAGNOSIS — C21.0 ANAL CANCER (HCC): Primary | ICD-10-CM

## 2024-08-26 PROCEDURE — 1090F PRES/ABSN URINE INCON ASSESS: CPT | Performed by: COLON & RECTAL SURGERY

## 2024-08-26 PROCEDURE — G8420 CALC BMI NORM PARAMETERS: HCPCS | Performed by: COLON & RECTAL SURGERY

## 2024-08-26 PROCEDURE — 3074F SYST BP LT 130 MM HG: CPT | Performed by: COLON & RECTAL SURGERY

## 2024-08-26 PROCEDURE — 1036F TOBACCO NON-USER: CPT | Performed by: COLON & RECTAL SURGERY

## 2024-08-26 PROCEDURE — G8427 DOCREV CUR MEDS BY ELIG CLIN: HCPCS | Performed by: COLON & RECTAL SURGERY

## 2024-08-26 PROCEDURE — 3078F DIAST BP <80 MM HG: CPT | Performed by: COLON & RECTAL SURGERY

## 2024-08-26 PROCEDURE — 1123F ACP DISCUSS/DSCN MKR DOCD: CPT | Performed by: COLON & RECTAL SURGERY

## 2024-08-26 PROCEDURE — 99214 OFFICE O/P EST MOD 30 MIN: CPT | Performed by: COLON & RECTAL SURGERY

## 2024-08-26 PROCEDURE — G8399 PT W/DXA RESULTS DOCUMENT: HCPCS | Performed by: COLON & RECTAL SURGERY

## 2024-08-26 RX ORDER — ATORVASTATIN CALCIUM 20 MG/1
20 TABLET, FILM COATED ORAL DAILY
COMMUNITY

## 2024-08-26 RX ORDER — TRAMADOL HYDROCHLORIDE 50 MG/1
1 TABLET ORAL
COMMUNITY
Start: 2024-03-11

## 2024-08-26 RX ORDER — TRAZODONE HYDROCHLORIDE 50 MG/1
50 TABLET, FILM COATED ORAL NIGHTLY
COMMUNITY

## 2024-08-26 RX ORDER — OXYCODONE AND ACETAMINOPHEN 5; 325 MG/1; MG/1
TABLET ORAL
COMMUNITY
Start: 2024-05-15

## 2024-08-26 NOTE — PROGRESS NOTES
Subjective: She completed chemoradiation in May.  She is having diarrhea and loose stools.    Past medical history and ROS were reviewed and unchanged.     Rectum: Normal external perianal exam  Digital exam with good tone, no mass and no stenosis    Recent CT of the abdomen pelvis does not show any evidence of metastatic disease, there are chronic residual changes in the anorectal region from prior chemoradiation    Assessment / Plan    Anal cancer status post nigra protocol, 3 months  No recurrence on digital exam  Will reexamine in 3 months time digitally, hopefully she will tolerate anoscopy at that time  If she cannot tolerate anoscopy at that time we will schedule EUA    30 minutes was spent in patient care.      The diagnoses and plan were discussed with patient.  All questions answered.  Plan of care agreed to by all concerned.

## 2024-09-03 ENCOUNTER — TELEPHONE (OUTPATIENT)
Age: 84
End: 2024-09-03

## 2024-09-26 ENCOUNTER — OFFICE VISIT (OUTPATIENT)
Age: 84
End: 2024-09-26
Payer: MEDICARE

## 2024-09-26 VITALS
HEART RATE: 83 BPM | BODY MASS INDEX: 20.32 KG/M2 | WEIGHT: 119 LBS | SYSTOLIC BLOOD PRESSURE: 122 MMHG | DIASTOLIC BLOOD PRESSURE: 68 MMHG | OXYGEN SATURATION: 98 % | HEIGHT: 64 IN

## 2024-09-26 DIAGNOSIS — I10 ESSENTIAL (PRIMARY) HYPERTENSION: Primary | ICD-10-CM

## 2024-09-26 DIAGNOSIS — R60.0 BILATERAL EDEMA OF LOWER EXTREMITY: ICD-10-CM

## 2024-09-26 DIAGNOSIS — E78.5 HYPERLIPIDEMIA, UNSPECIFIED HYPERLIPIDEMIA TYPE: ICD-10-CM

## 2024-09-26 PROCEDURE — 1036F TOBACCO NON-USER: CPT | Performed by: NURSE PRACTITIONER

## 2024-09-26 PROCEDURE — 1123F ACP DISCUSS/DSCN MKR DOCD: CPT | Performed by: NURSE PRACTITIONER

## 2024-09-26 PROCEDURE — G8399 PT W/DXA RESULTS DOCUMENT: HCPCS | Performed by: NURSE PRACTITIONER

## 2024-09-26 PROCEDURE — 99213 OFFICE O/P EST LOW 20 MIN: CPT | Performed by: NURSE PRACTITIONER

## 2024-09-26 PROCEDURE — 3074F SYST BP LT 130 MM HG: CPT | Performed by: NURSE PRACTITIONER

## 2024-09-26 PROCEDURE — G8427 DOCREV CUR MEDS BY ELIG CLIN: HCPCS | Performed by: NURSE PRACTITIONER

## 2024-09-26 PROCEDURE — 1090F PRES/ABSN URINE INCON ASSESS: CPT | Performed by: NURSE PRACTITIONER

## 2024-09-26 PROCEDURE — 3078F DIAST BP <80 MM HG: CPT | Performed by: NURSE PRACTITIONER

## 2024-09-26 PROCEDURE — G8420 CALC BMI NORM PARAMETERS: HCPCS | Performed by: NURSE PRACTITIONER

## 2024-09-26 RX ORDER — DOXYCYCLINE HYCLATE 50 MG/1
50 CAPSULE ORAL
COMMUNITY
Start: 2024-09-12

## 2024-09-26 ASSESSMENT — ENCOUNTER SYMPTOMS
CHEST TIGHTNESS: 0
WHEEZING: 0
VOMITING: 0
NAUSEA: 0
DIARRHEA: 0
COUGH: 0
SHORTNESS OF BREATH: 0
BLOOD IN STOOL: 0
ABDOMINAL DISTENTION: 0
CONSTIPATION: 0

## 2024-09-26 ASSESSMENT — PATIENT HEALTH QUESTIONNAIRE - PHQ9
SUM OF ALL RESPONSES TO PHQ QUESTIONS 1-9: 0
SUM OF ALL RESPONSES TO PHQ QUESTIONS 1-9: 0
SUM OF ALL RESPONSES TO PHQ9 QUESTIONS 1 & 2: 0
2. FEELING DOWN, DEPRESSED OR HOPELESS: NOT AT ALL
SUM OF ALL RESPONSES TO PHQ QUESTIONS 1-9: 0
SUM OF ALL RESPONSES TO PHQ QUESTIONS 1-9: 0
1. LITTLE INTEREST OR PLEASURE IN DOING THINGS: NOT AT ALL

## 2024-10-25 ENCOUNTER — HOSPITAL ENCOUNTER (EMERGENCY)
Facility: HOSPITAL | Age: 84
Discharge: HOME | End: 2024-10-25
Attending: EMERGENCY MEDICINE
Payer: MEDICARE

## 2024-10-25 VITALS
TEMPERATURE: 98.7 F | WEIGHT: 120 LBS | DIASTOLIC BLOOD PRESSURE: 59 MMHG | HEART RATE: 68 BPM | RESPIRATION RATE: 16 BRPM | OXYGEN SATURATION: 96 % | BODY MASS INDEX: 20.49 KG/M2 | SYSTOLIC BLOOD PRESSURE: 124 MMHG | HEIGHT: 64 IN

## 2024-10-25 DIAGNOSIS — N95.2 VAGINAL ATROPHY: Primary | ICD-10-CM

## 2024-10-25 LAB
ANION GAP SERPL CALC-SCNC: 6 MMOL/L (ref 3–18)
APPEARANCE UR: CLEAR
BASOPHILS # BLD: 0 K/UL (ref 0–0.1)
BASOPHILS NFR BLD: 1 % (ref 0–2)
BILIRUB UR QL: NEGATIVE
BUN SERPL-MCNC: 12 MG/DL (ref 7–18)
BUN/CREAT SERPL: 13 (ref 12–20)
CALCIUM SERPL-MCNC: 9.1 MG/DL (ref 8.5–10.1)
CHLORIDE SERPL-SCNC: 107 MMOL/L (ref 100–111)
CO2 SERPL-SCNC: 29 MMOL/L (ref 21–32)
COLOR UR: YELLOW
CREAT SERPL-MCNC: 0.91 MG/DL (ref 0.6–1.3)
DIFFERENTIAL METHOD BLD: ABNORMAL
EOSINOPHIL # BLD: 0 K/UL (ref 0–0.4)
EOSINOPHIL NFR BLD: 0 % (ref 0–5)
ERYTHROCYTE [DISTWIDTH] IN BLOOD BY AUTOMATED COUNT: 14.9 % (ref 11.6–14.5)
EST. AVERAGE GLUCOSE BLD GHB EST-MCNC: 114 MG/DL
GLUCOSE SERPL-MCNC: 99 MG/DL (ref 74–99)
GLUCOSE UR STRIP.AUTO-MCNC: NEGATIVE MG/DL
HBA1C MFR BLD: 5.6 % (ref 4.2–5.6)
HCT VFR BLD AUTO: 35.5 % (ref 35–45)
HGB BLD-MCNC: 11.6 G/DL (ref 12–16)
HGB UR QL STRIP: NEGATIVE
IMM GRANULOCYTES # BLD AUTO: 0 K/UL (ref 0–0.04)
IMM GRANULOCYTES NFR BLD AUTO: 0 % (ref 0–0.5)
KETONES UR QL STRIP.AUTO: NEGATIVE MG/DL
LEUKOCYTE ESTERASE UR QL STRIP.AUTO: NEGATIVE
LYMPHOCYTES # BLD: 1.2 K/UL (ref 0.9–3.6)
LYMPHOCYTES NFR BLD: 34 % (ref 21–52)
MCH RBC QN AUTO: 24.5 PG (ref 24–34)
MCHC RBC AUTO-ENTMCNC: 32.7 G/DL (ref 31–37)
MCV RBC AUTO: 75.1 FL (ref 78–100)
MONOCYTES # BLD: 0.3 K/UL (ref 0.05–1.2)
MONOCYTES NFR BLD: 9 % (ref 3–10)
NEUTS SEG # BLD: 1.9 K/UL (ref 1.8–8)
NEUTS SEG NFR BLD: 56 % (ref 40–73)
NITRITE UR QL STRIP.AUTO: NEGATIVE
NRBC # BLD: 0 K/UL (ref 0–0.01)
NRBC BLD-RTO: 0 PER 100 WBC
PH UR STRIP: 6 (ref 5–8)
PLATELET # BLD AUTO: 203 K/UL (ref 135–420)
PMV BLD AUTO: 10.5 FL (ref 9.2–11.8)
POTASSIUM SERPL-SCNC: 3.4 MMOL/L (ref 3.5–5.5)
PROT UR STRIP-MCNC: NEGATIVE MG/DL
RBC # BLD AUTO: 4.73 M/UL (ref 4.2–5.3)
SERVICE CMNT-IMP: NORMAL
SODIUM SERPL-SCNC: 142 MMOL/L (ref 136–145)
SP GR UR REFRACTOMETRY: 1.02 (ref 1–1.03)
UROBILINOGEN UR QL STRIP.AUTO: 0.2 EU/DL (ref 0.2–1)
WBC # BLD AUTO: 3.4 K/UL (ref 4.6–13.2)
WET PREP GENITAL: NORMAL

## 2024-10-25 PROCEDURE — 83036 HEMOGLOBIN GLYCOSYLATED A1C: CPT

## 2024-10-25 PROCEDURE — 87210 SMEAR WET MOUNT SALINE/INK: CPT

## 2024-10-25 PROCEDURE — 6370000000 HC RX 637 (ALT 250 FOR IP)

## 2024-10-25 PROCEDURE — 87086 URINE CULTURE/COLONY COUNT: CPT

## 2024-10-25 PROCEDURE — 99283 EMERGENCY DEPT VISIT LOW MDM: CPT

## 2024-10-25 PROCEDURE — 80048 BASIC METABOLIC PNL TOTAL CA: CPT

## 2024-10-25 PROCEDURE — 85025 COMPLETE CBC W/AUTO DIFF WBC: CPT

## 2024-10-25 PROCEDURE — 81003 URINALYSIS AUTO W/O SCOPE: CPT

## 2024-10-25 RX ORDER — POTASSIUM CHLORIDE 1500 MG/1
20 TABLET, EXTENDED RELEASE ORAL ONCE
Status: COMPLETED | OUTPATIENT
Start: 2024-10-25 | End: 2024-10-25

## 2024-10-25 RX ORDER — POTASSIUM CHLORIDE 750 MG/1
20 TABLET, EXTENDED RELEASE ORAL 2 TIMES DAILY
Qty: 2 TABLET | Refills: 0 | Status: SHIPPED | OUTPATIENT
Start: 2024-10-25 | End: 2024-10-26

## 2024-10-25 RX ADMIN — POTASSIUM BICARBONATE 20 MEQ: 782 TABLET, EFFERVESCENT ORAL at 15:46

## 2024-10-25 RX ADMIN — POTASSIUM CHLORIDE 20 MEQ: 1500 TABLET, EXTENDED RELEASE ORAL at 15:33

## 2024-10-25 ASSESSMENT — PAIN SCALES - GENERAL: PAINLEVEL_OUTOF10: 6

## 2024-10-25 ASSESSMENT — PAIN - FUNCTIONAL ASSESSMENT: PAIN_FUNCTIONAL_ASSESSMENT: 0-10

## 2024-10-25 NOTE — ED PROVIDER NOTES
EMERGENCY DEPARTMENT HISTORY AND PHYSICAL EXAM    3:42 PM      Date: 10/25/2024  Patient Name: Adama Mora    History of Presenting Illness     Chief Complaint   Patient presents with    Dysuria       History From: Patient    Adama Mora is a 83 y.o. female PMH HTM, HLD,  anal cancer s/p chemotherapy and radiation (last session in May 2024) presenting to [facility] ED for urinary concerns. For the past few months, the patient has been experiencing polyuria, nocturia. She admits that this is a common symptom of her radiation. Also endorses dysuria, and fishy vaginal smell, which are new symptoms over the past few weeks. Denies hematuria, vaginal discharge, sexual activity. The patient has also been having loose stools for the past few weeks which she believes is a result of the radiation, and she follows with Dr. Gurrola (Ju?) for this and has an appointment with him on 11/1/24. As a result of the incontinence, she wears feminine pads, but notes that they are abrasive to her genitals.         Nursing Notes were all reviewed and agreed with or any disagreements were addressed in the HPI.    PCP: Gia Medrano MD    Current Facility-Administered Medications   Medication Dose Route Frequency Provider Last Rate Last Admin    potassium bicarb-citric acid (EFFER-K) effervescent tablet 20 mEq  20 mEq Oral Once Sara Craft MD         Current Outpatient Medications   Medication Sig Dispense Refill    potassium chloride (KLOR-CON M) 10 MEQ extended release tablet Take 2 tablets by mouth 2 times daily for 1 day 2 tablet 0    doxycycline (VIBRAMYCIN) 50 MG capsule Take 1 capsule by mouth      Multiple Vitamins-Minerals (ONE-A-DAY WOMENS PO) Take by mouth      oxyCODONE-acetaminophen (PERCOCET) 5-325 MG per tablet Take by mouth.      traMADol (ULTRAM) 50 MG tablet Take 1 tablet by mouth.      atorvastatin (LIPITOR) 20 MG tablet Take 1 tablet by mouth daily      traZODone (DESYREL) 50 MG

## 2024-10-25 NOTE — DISCHARGE INSTRUCTIONS
Make sure that you get over the counter water-based vaginal gel for vaginal dryness. You can also switch from feminine hygiene pads to adult diapers and put a little petroleum jelly in crotch area of the diaper so that it will be less abrasive for you. Lastly, instead of using a washcloth with soap and water to clean your genitals while bathing, put soap and water on your hand and use your hand to gently wash your genitals

## 2024-10-25 NOTE — ED TRIAGE NOTES
Ambulatory to QC with c/o urinary frequency, low back \"achy\" and \"it feels hot when I urinate\". Onset several days. Denies N/V/D or constipation.

## 2024-10-26 LAB
BACTERIA SPEC CULT: NORMAL
SERVICE CMNT-IMP: NORMAL

## 2024-11-13 ENCOUNTER — HOSPITAL ENCOUNTER (OUTPATIENT)
Facility: HOSPITAL | Age: 84
Discharge: HOME | End: 2024-11-16
Payer: MEDICARE

## 2024-11-13 DIAGNOSIS — M54.16 RADICULOPATHY, LUMBAR REGION: ICD-10-CM

## 2024-11-13 PROCEDURE — 72148 MRI LUMBAR SPINE W/O DYE: CPT

## 2024-11-25 ENCOUNTER — HOSPITAL ENCOUNTER (EMERGENCY)
Facility: HOSPITAL | Age: 84
Discharge: HOME | End: 2024-11-25
Attending: EMERGENCY MEDICINE
Payer: MEDICARE

## 2024-11-25 ENCOUNTER — APPOINTMENT (OUTPATIENT)
Facility: HOSPITAL | Age: 84
End: 2024-11-25
Payer: MEDICARE

## 2024-11-25 ENCOUNTER — APPOINTMENT (OUTPATIENT)
Facility: HOSPITAL | Age: 84
End: 2024-11-25
Attending: EMERGENCY MEDICINE
Payer: MEDICARE

## 2024-11-25 VITALS
WEIGHT: 120 LBS | HEIGHT: 64 IN | OXYGEN SATURATION: 98 % | SYSTOLIC BLOOD PRESSURE: 138 MMHG | DIASTOLIC BLOOD PRESSURE: 73 MMHG | TEMPERATURE: 98.4 F | HEART RATE: 67 BPM | BODY MASS INDEX: 20.49 KG/M2 | RESPIRATION RATE: 20 BRPM

## 2024-11-25 DIAGNOSIS — R07.9 CHEST PAIN, UNSPECIFIED TYPE: ICD-10-CM

## 2024-11-25 DIAGNOSIS — J06.9 UPPER RESPIRATORY TRACT INFECTION, UNSPECIFIED TYPE: ICD-10-CM

## 2024-11-25 DIAGNOSIS — R52 BODY ACHES: Primary | ICD-10-CM

## 2024-11-25 LAB
ANION GAP SERPL CALC-SCNC: 5 MMOL/L (ref 3–18)
BASOPHILS # BLD: 0 K/UL (ref 0–0.1)
BASOPHILS NFR BLD: 1 % (ref 0–2)
BUN SERPL-MCNC: 10 MG/DL (ref 7–18)
BUN/CREAT SERPL: 10 (ref 12–20)
CALCIUM SERPL-MCNC: 8.8 MG/DL (ref 8.5–10.1)
CHLORIDE SERPL-SCNC: 108 MMOL/L (ref 100–111)
CO2 SERPL-SCNC: 28 MMOL/L (ref 21–32)
CREAT SERPL-MCNC: 0.96 MG/DL (ref 0.6–1.3)
D DIMER PPP FEU-MCNC: <0.27 UG/ML(FEU)
DIFFERENTIAL METHOD BLD: ABNORMAL
EOSINOPHIL # BLD: 0 K/UL (ref 0–0.4)
EOSINOPHIL NFR BLD: 0 % (ref 0–5)
ERYTHROCYTE [DISTWIDTH] IN BLOOD BY AUTOMATED COUNT: 15.5 % (ref 11.6–14.5)
FLUAV RNA SPEC QL NAA+PROBE: NOT DETECTED
FLUBV RNA SPEC QL NAA+PROBE: NOT DETECTED
GLUCOSE SERPL-MCNC: 95 MG/DL (ref 74–99)
HCT VFR BLD AUTO: 35.1 % (ref 35–45)
HGB BLD-MCNC: 11.2 G/DL (ref 12–16)
IMM GRANULOCYTES # BLD AUTO: 0 K/UL (ref 0–0.04)
IMM GRANULOCYTES NFR BLD AUTO: 0 % (ref 0–0.5)
LYMPHOCYTES # BLD: 1.1 K/UL (ref 0.9–3.6)
LYMPHOCYTES NFR BLD: 27 % (ref 21–52)
MCH RBC QN AUTO: 24.3 PG (ref 24–34)
MCHC RBC AUTO-ENTMCNC: 31.9 G/DL (ref 31–37)
MCV RBC AUTO: 76.1 FL (ref 78–100)
MONOCYTES # BLD: 0.5 K/UL (ref 0.05–1.2)
MONOCYTES NFR BLD: 12 % (ref 3–10)
NEUTS SEG # BLD: 2.5 K/UL (ref 1.8–8)
NEUTS SEG NFR BLD: 61 % (ref 40–73)
NRBC # BLD: 0 K/UL (ref 0–0.01)
NRBC BLD-RTO: 0 PER 100 WBC
NT PRO BNP: 176 PG/ML (ref 0–1800)
PLATELET # BLD AUTO: 170 K/UL (ref 135–420)
PMV BLD AUTO: 10.4 FL (ref 9.2–11.8)
POTASSIUM SERPL-SCNC: 3.3 MMOL/L (ref 3.5–5.5)
RBC # BLD AUTO: 4.61 M/UL (ref 4.2–5.3)
SARS-COV-2 RNA RESP QL NAA+PROBE: NOT DETECTED
SODIUM SERPL-SCNC: 141 MMOL/L (ref 136–145)
SOURCE: NORMAL
TROPONIN I SERPL HS-MCNC: 10 NG/L (ref 0–54)
WBC # BLD AUTO: 4.1 K/UL (ref 4.6–13.2)

## 2024-11-25 PROCEDURE — 80048 BASIC METABOLIC PNL TOTAL CA: CPT

## 2024-11-25 PROCEDURE — 71046 X-RAY EXAM CHEST 2 VIEWS: CPT

## 2024-11-25 PROCEDURE — 87636 SARSCOV2 & INF A&B AMP PRB: CPT

## 2024-11-25 PROCEDURE — 83880 ASSAY OF NATRIURETIC PEPTIDE: CPT

## 2024-11-25 PROCEDURE — 84484 ASSAY OF TROPONIN QUANT: CPT

## 2024-11-25 PROCEDURE — 99285 EMERGENCY DEPT VISIT HI MDM: CPT

## 2024-11-25 PROCEDURE — 85379 FIBRIN DEGRADATION QUANT: CPT

## 2024-11-25 PROCEDURE — 93005 ELECTROCARDIOGRAM TRACING: CPT | Performed by: EMERGENCY MEDICINE

## 2024-11-25 PROCEDURE — 85025 COMPLETE CBC W/AUTO DIFF WBC: CPT

## 2024-11-25 PROCEDURE — 6370000000 HC RX 637 (ALT 250 FOR IP): Performed by: EMERGENCY MEDICINE

## 2024-11-25 RX ORDER — BENZONATATE 200 MG/1
200 CAPSULE ORAL 2 TIMES DAILY PRN
Qty: 14 CAPSULE | Refills: 0 | Status: SHIPPED | OUTPATIENT
Start: 2024-11-25 | End: 2024-12-02

## 2024-11-25 RX ORDER — FUROSEMIDE 40 MG/1
20 TABLET ORAL
Status: COMPLETED | OUTPATIENT
Start: 2024-11-25 | End: 2024-11-25

## 2024-11-25 RX ORDER — AZITHROMYCIN 250 MG/1
500 TABLET, FILM COATED ORAL
Status: COMPLETED | OUTPATIENT
Start: 2024-11-25 | End: 2024-11-25

## 2024-11-25 RX ORDER — POTASSIUM CHLORIDE 1500 MG/1
20 TABLET, EXTENDED RELEASE ORAL
Status: COMPLETED | OUTPATIENT
Start: 2024-11-25 | End: 2024-11-25

## 2024-11-25 RX ORDER — BENZONATATE 100 MG/1
200 CAPSULE ORAL
Status: COMPLETED | OUTPATIENT
Start: 2024-11-25 | End: 2024-11-25

## 2024-11-25 RX ORDER — AZITHROMYCIN 250 MG/1
250 TABLET, FILM COATED ORAL DAILY
Qty: 4 TABLET | Refills: 0 | Status: SHIPPED | OUTPATIENT
Start: 2024-11-26 | End: 2024-11-30

## 2024-11-25 RX ADMIN — FUROSEMIDE 20 MG: 40 TABLET ORAL at 14:52

## 2024-11-25 RX ADMIN — AZITHROMYCIN DIHYDRATE 500 MG: 250 TABLET, FILM COATED ORAL at 14:52

## 2024-11-25 RX ADMIN — BENZONATATE 200 MG: 100 CAPSULE ORAL at 14:52

## 2024-11-25 RX ADMIN — POTASSIUM CHLORIDE 20 MEQ: 1500 TABLET, EXTENDED RELEASE ORAL at 14:52

## 2024-11-25 ASSESSMENT — PAIN DESCRIPTION - LOCATION: LOCATION: CHEST;HEAD

## 2024-11-25 ASSESSMENT — PAIN - FUNCTIONAL ASSESSMENT
PAIN_FUNCTIONAL_ASSESSMENT: 0-10
PAIN_FUNCTIONAL_ASSESSMENT: ACTIVITIES ARE NOT PREVENTED

## 2024-11-25 ASSESSMENT — PAIN DESCRIPTION - ORIENTATION: ORIENTATION: MID

## 2024-11-25 ASSESSMENT — PAIN DESCRIPTION - PAIN TYPE: TYPE: ACUTE PAIN

## 2024-11-25 ASSESSMENT — PAIN DESCRIPTION - DESCRIPTORS: DESCRIPTORS: HEAVINESS;ACHING

## 2024-11-25 ASSESSMENT — PAIN SCALES - GENERAL
PAINLEVEL_OUTOF10: 7
PAINLEVEL_OUTOF10: 8

## 2024-11-25 NOTE — ED TRIAGE NOTES
Ambulatory pt c/o cough, fatigue, nasal congestion, body aches and headache x 2-3 days. Coughing worsens when laying down  Hx of rectal cancer with chemo / radiation therapy

## 2024-11-25 NOTE — ED PROVIDER NOTES
[facility] EMERGENCY DEPT  EMERGENCY DEPARTMENT ENCOUNTER      Pt Name: Adama Mora  MRN: [id number]  Birthdate 1940  Date of evaluation: 11/25/2024  Provider: Ilya De Guzman MD    CHIEF COMPLAINT       Chief Complaint   Patient presents with    Cough    Generalized Body Aches       HPI:  Adama Mora is a 83 y.o. female who presents to the emergency department pt c/o cough, body aches, sob when lying flat.  Mild mid chest pain w pressure x 2 days.   Mild leg swelling.      HPI    Nursing Notes were reviewed.    REVIEW OF SYSTEMS    (2-9 systems for level 4, 10 or more for level 5)     Review of Systems    Except as noted above the remainder of the review of systems was reviewed and negative.       PAST MEDICAL HISTORY     Past Medical History:   Diagnosis Date    Arthritis     Back pain of lumbar region with sciatica 12/5/2018    Breast cancer (HCC)     right    Cancer (HCC) 2015    breast cancer    Dyslipidemia     Heart palpitations     Hypertension     Left ventricular hypertrophy     Macular degeneration     Vitamin D deficiency          SURGICAL HISTORY       Past Surgical History:   Procedure Laterality Date    BREAST LUMPECTOMY Right     BREAST SURGERY Right 2015    HEMORRHOID SURGERY      HERNIA REPAIR      umbilical    HYSTERECTOMY (CERVIX STATUS UNKNOWN)  1985    HYSTERECTOMY, TOTAL ABDOMINAL (CERVIX REMOVED)           CURRENT MEDICATIONS       Previous Medications    ACETAMINOPHEN (TYLENOL) 500 MG TABLET    Take 2 tablets by mouth every 6 hours as needed    ATORVASTATIN (LIPITOR) 20 MG TABLET    Take 1 tablet by mouth daily    BIOTIN 2.5 MG CAPS    Take 1 capsule by mouth daily    CELECOXIB (CELEBREX) 200 MG CAPSULE    Take 1 capsule by mouth as needed    COLESTIPOL (COLESTID) 5 G PACKET    Take 5 g by mouth 2 times daily    CYCLOBENZAPRINE (FLEXERIL) 5 MG TABLET    Take 1 tablet by mouth 3 times daily as needed    DICLOFENAC SODIUM (VOLTAREN) 1 % GEL    Apply 2 g topically 4 times daily

## 2024-11-26 LAB
EKG ATRIAL RATE: 79 BPM
EKG DIAGNOSIS: NORMAL
EKG P AXIS: 77 DEGREES
EKG P-R INTERVAL: 208 MS
EKG Q-T INTERVAL: 370 MS
EKG QRS DURATION: 70 MS
EKG QTC CALCULATION (BAZETT): 534 MS
EKG R AXIS: 3 DEGREES
EKG T AXIS: 36 DEGREES
EKG VENTRICULAR RATE: 125 BPM

## 2024-11-26 PROCEDURE — 93010 ELECTROCARDIOGRAM REPORT: CPT | Performed by: INTERNAL MEDICINE

## 2024-12-02 ENCOUNTER — TELEPHONE (OUTPATIENT)
Age: 84
End: 2024-12-02

## 2024-12-02 ENCOUNTER — OFFICE VISIT (OUTPATIENT)
Age: 84
End: 2024-12-02
Payer: MEDICARE

## 2024-12-02 VITALS
HEART RATE: 88 BPM | HEIGHT: 64 IN | TEMPERATURE: 98.2 F | DIASTOLIC BLOOD PRESSURE: 74 MMHG | SYSTOLIC BLOOD PRESSURE: 121 MMHG | RESPIRATION RATE: 17 BRPM | WEIGHT: 118 LBS | BODY MASS INDEX: 20.14 KG/M2 | OXYGEN SATURATION: 98 %

## 2024-12-02 DIAGNOSIS — C21.0 ANAL CANCER (HCC): Primary | ICD-10-CM

## 2024-12-02 PROCEDURE — 1090F PRES/ABSN URINE INCON ASSESS: CPT | Performed by: COLON & RECTAL SURGERY

## 2024-12-02 PROCEDURE — G8399 PT W/DXA RESULTS DOCUMENT: HCPCS | Performed by: COLON & RECTAL SURGERY

## 2024-12-02 PROCEDURE — 1126F AMNT PAIN NOTED NONE PRSNT: CPT | Performed by: COLON & RECTAL SURGERY

## 2024-12-02 PROCEDURE — 99213 OFFICE O/P EST LOW 20 MIN: CPT | Performed by: COLON & RECTAL SURGERY

## 2024-12-02 PROCEDURE — G8428 CUR MEDS NOT DOCUMENT: HCPCS | Performed by: COLON & RECTAL SURGERY

## 2024-12-02 PROCEDURE — G8484 FLU IMMUNIZE NO ADMIN: HCPCS | Performed by: COLON & RECTAL SURGERY

## 2024-12-02 PROCEDURE — 3074F SYST BP LT 130 MM HG: CPT | Performed by: COLON & RECTAL SURGERY

## 2024-12-02 PROCEDURE — 1124F ACP DISCUSS-NO DSCNMKR DOCD: CPT | Performed by: COLON & RECTAL SURGERY

## 2024-12-02 PROCEDURE — 1036F TOBACCO NON-USER: CPT | Performed by: COLON & RECTAL SURGERY

## 2024-12-02 PROCEDURE — G8420 CALC BMI NORM PARAMETERS: HCPCS | Performed by: COLON & RECTAL SURGERY

## 2024-12-02 PROCEDURE — 3078F DIAST BP <80 MM HG: CPT | Performed by: COLON & RECTAL SURGERY

## 2024-12-02 NOTE — PATIENT INSTRUCTIONS
Perianal Dermatitis  Discontinue use of all wipes  Can use Balneol (or generic equivalent) on toilet paper for softer texture  [place], lotion-based soaps (e.g. Dove) to the perianal area when bathing  Avoid excessive scrubbing, 1-2 swipes then rinse off  [place] topical lotions may be helpful  Desitin, A&D ointment or generic zinc oxide

## 2024-12-02 NOTE — PROGRESS NOTES
Subjective: Moving her bowels.  Taking Metamucil.    Past medical history and ROS were reviewed and unchanged.     Digital exam attempted but aborted    Assessment / Plan    Anorectal junction squamous cell cancer status post nigra protocol, now approximately 7 months out  Unable to tolerate exam in the office  Schedule EUA    20 minutes was spent in patient care.      The diagnoses and plan were discussed with patient.  All questions answered.  Plan of care agreed to by all concerned.

## 2024-12-02 NOTE — TELEPHONE ENCOUNTER
[facility]  Breast & Colorectal Oncology Nurse Navigator Encounter    Name: Adama Mora  Age: 83 y.o.  : 1940      Encounter type:  []Initial Navigator Encounter  [x]Patient Initiated  []Navigator Follow-up  []Other:     Narrative:   Received call regarding possible rescheduling appointment with Dr. Moore. Patient states, she's not feeling well with a cold. She went to the ER on 24 for cold and diagnosed with a respiratory infection. She's going to call back in an hour to decide if she's actually coming in for appointment or reschedule.         Interdisciplinary Team:    Nurse Navigator: REX Baker BSN, RN  Breast & Colorectal Cancer Nurse Navigator    [facility]  [address] [place] [address]  Office number [phone]  Cell number [phone]  [email or web address]  Good Help to Those in Need®

## 2024-12-02 NOTE — TELEPHONE ENCOUNTER
SIVAI:    Pt asked for my phone number so that she can speak with her family regarding who can drive her home from the EUA. I gave several dates that are available. I also advised the pt that I will call jack by end of the week if she has not called me back by then.

## 2024-12-10 NOTE — TELEPHONE ENCOUNTER
Pt called back to set surgery date. I gave the pt the option of a 5 AM or 6:30 AM arrival time on 01/10/25. Pt stated that was to early. I gave the pt an arrival time of 7:30 AM on 01/07/25. Pt was not sure if the person that could bring her is ok with that early of an arrival. Pt asked if she could call back with which date and time would work best. I advised her that was fine. Pt stated that she will call back shortly. Pt also asked for the prep information to be ready for  on 12/12/24 since she will be downstairs from the clinic that day. I told her that would be fine.     Once pt calls back with the date and time she would like surgery, I will then fill out the needed prep information.

## 2024-12-11 ENCOUNTER — PREP FOR PROCEDURE (OUTPATIENT)
Age: 84
End: 2024-12-11

## 2024-12-11 DIAGNOSIS — C21.0 ANAL CANCER (HCC): ICD-10-CM

## 2025-01-06 NOTE — PERIOP NOTE
Instructions for your surgery at [facility]      Today's Date:  1/6/2025      Patient's Name:  Adama Mora           Surgery Date:  1/17/25              Please enter the main entrance of the hospital and check-in at the front security desk located in the lobby. They will direct you to the area to report for your surgery.     Do NOT eat or drink anything, including candy, gum, or ice chips after midnight prior to your surgery, unless you have specific instructions from your surgeon or anesthesia provider to do so.  Brush your teeth before coming to the hospital. You may swish with water, but do not swallow.  No smoking/Vaping/E-Cigarettes 24 hours prior to the day of surgery.  No alcohol 24 hours prior to the day of surgery.  No recreational drugs for one week prior to the day of surgery.  Bring Photo ID, Insurance information, and Co-pay if required on day of surgery.  Bring in pertinent legal documents, such as, Medical Power of , DNR, Advance Directive, etc.  Leave all valuables, including money/purse, at home.  Remove all jewelry, including ALL body piercings, nail polish, acrylic nails, and makeup (including mascara); no lotions, powders, deodorant, or perfume/cologne/after shave on the skin.  Follow instruction for Hibiclens washes and CHG wipes from surgeon's office.   Glasses and dentures may be worn to the hospital. They must be removed prior to surgery. Please bring case/container for glasses or dentures.   Contact lenses should not be worn on day of surgery.   Call your doctor's office if symptoms of a cold or illness develop within 24-48 hours prior to your surgery.  Call your doctor's office if you have any questions concerning insurance or co-pays.  15. AN ADULT (relative or friend 18 years or older) MUST DRIVE YOU HOME AFTER YOUR SURGERY.  16. Please make arrangements for a responsible adult (18 years or older) to be with you for 24 hours after your surgery.

## 2025-01-09 ENCOUNTER — HOSPITAL ENCOUNTER (OUTPATIENT)
Facility: HOSPITAL | Age: 85
Setting detail: RECURRING SERIES
Discharge: HOME | End: 2025-01-12
Attending: RADIOLOGY
Payer: MEDICARE

## 2025-01-09 PROCEDURE — 99214 OFFICE O/P EST MOD 30 MIN: CPT

## 2025-01-16 ENCOUNTER — ANESTHESIA EVENT (OUTPATIENT)
Facility: HOSPITAL | Age: 85
End: 2025-01-16
Payer: MEDICARE

## 2025-01-17 ENCOUNTER — HOSPITAL ENCOUNTER (OUTPATIENT)
Facility: HOSPITAL | Age: 85
Setting detail: OUTPATIENT SURGERY
Discharge: HOME | End: 2025-01-17
Attending: COLON & RECTAL SURGERY | Admitting: COLON & RECTAL SURGERY
Payer: MEDICARE

## 2025-01-17 ENCOUNTER — ANESTHESIA (OUTPATIENT)
Facility: HOSPITAL | Age: 85
End: 2025-01-17
Payer: MEDICARE

## 2025-01-17 VITALS
DIASTOLIC BLOOD PRESSURE: 59 MMHG | HEART RATE: 66 BPM | WEIGHT: 119 LBS | SYSTOLIC BLOOD PRESSURE: 117 MMHG | BODY MASS INDEX: 20.32 KG/M2 | TEMPERATURE: 98.2 F | RESPIRATION RATE: 14 BRPM | HEIGHT: 64 IN | OXYGEN SATURATION: 100 %

## 2025-01-17 LAB
ANION GAP SERPL CALC-SCNC: 5 MMOL/L (ref 3–18)
BUN SERPL-MCNC: 15 MG/DL (ref 7–18)
BUN/CREAT SERPL: 14 (ref 12–20)
CALCIUM SERPL-MCNC: 9.3 MG/DL (ref 8.5–10.1)
CHLORIDE SERPL-SCNC: 108 MMOL/L (ref 100–111)
CO2 SERPL-SCNC: 27 MMOL/L (ref 21–32)
CREAT SERPL-MCNC: 1.04 MG/DL (ref 0.6–1.3)
GLUCOSE SERPL-MCNC: 90 MG/DL (ref 74–99)
POTASSIUM SERPL-SCNC: 4.1 MMOL/L (ref 3.5–5.5)
SODIUM SERPL-SCNC: 140 MMOL/L (ref 136–145)

## 2025-01-17 PROCEDURE — 3600000012 HC SURGERY LEVEL 2 ADDTL 15MIN: Performed by: COLON & RECTAL SURGERY

## 2025-01-17 PROCEDURE — 3700000000 HC ANESTHESIA ATTENDED CARE: Performed by: COLON & RECTAL SURGERY

## 2025-01-17 PROCEDURE — 6360000002 HC RX W HCPCS: Performed by: COLON & RECTAL SURGERY

## 2025-01-17 PROCEDURE — 2709999900 HC NON-CHARGEABLE SUPPLY: Performed by: COLON & RECTAL SURGERY

## 2025-01-17 PROCEDURE — 3600000002 HC SURGERY LEVEL 2 BASE: Performed by: COLON & RECTAL SURGERY

## 2025-01-17 PROCEDURE — 6360000002 HC RX W HCPCS

## 2025-01-17 PROCEDURE — 7100000001 HC PACU RECOVERY - ADDTL 15 MIN: Performed by: COLON & RECTAL SURGERY

## 2025-01-17 PROCEDURE — 7100000000 HC PACU RECOVERY - FIRST 15 MIN: Performed by: COLON & RECTAL SURGERY

## 2025-01-17 PROCEDURE — 3700000001 HC ADD 15 MINUTES (ANESTHESIA): Performed by: COLON & RECTAL SURGERY

## 2025-01-17 PROCEDURE — 7100000010 HC PHASE II RECOVERY - FIRST 15 MIN: Performed by: COLON & RECTAL SURGERY

## 2025-01-17 PROCEDURE — 80048 BASIC METABOLIC PNL TOTAL CA: CPT

## 2025-01-17 PROCEDURE — 45905 DILATION OF ANAL SPHINCTER: CPT | Performed by: COLON & RECTAL SURGERY

## 2025-01-17 PROCEDURE — 2580000003 HC RX 258: Performed by: NURSE ANESTHETIST, CERTIFIED REGISTERED

## 2025-01-17 PROCEDURE — 6370000000 HC RX 637 (ALT 250 FOR IP): Performed by: NURSE ANESTHETIST, CERTIFIED REGISTERED

## 2025-01-17 RX ORDER — SODIUM CHLORIDE, SODIUM LACTATE, POTASSIUM CHLORIDE, CALCIUM CHLORIDE 600; 310; 30; 20 MG/100ML; MG/100ML; MG/100ML; MG/100ML
INJECTION, SOLUTION INTRAVENOUS CONTINUOUS
Status: DISCONTINUED | OUTPATIENT
Start: 2025-01-17 | End: 2025-01-17 | Stop reason: HOSPADM

## 2025-01-17 RX ORDER — FENTANYL CITRATE 50 UG/ML
25 INJECTION, SOLUTION INTRAMUSCULAR; INTRAVENOUS EVERY 5 MIN PRN
Status: DISCONTINUED | OUTPATIENT
Start: 2025-01-17 | End: 2025-01-17 | Stop reason: HOSPADM

## 2025-01-17 RX ORDER — LIDOCAINE HYDROCHLORIDE 10 MG/ML
1 INJECTION, SOLUTION EPIDURAL; INFILTRATION; INTRACAUDAL; PERINEURAL
Status: DISCONTINUED | OUTPATIENT
Start: 2025-01-17 | End: 2025-01-17 | Stop reason: HOSPADM

## 2025-01-17 RX ORDER — PROPOFOL 10 MG/ML
INJECTION, EMULSION INTRAVENOUS
Status: DISCONTINUED | OUTPATIENT
Start: 2025-01-17 | End: 2025-01-17 | Stop reason: SDUPTHER

## 2025-01-17 RX ORDER — FAMOTIDINE 20 MG/1
20 TABLET, FILM COATED ORAL ONCE
Status: COMPLETED | OUTPATIENT
Start: 2025-01-17 | End: 2025-01-17

## 2025-01-17 RX ORDER — NALOXONE HYDROCHLORIDE 0.4 MG/ML
INJECTION, SOLUTION INTRAMUSCULAR; INTRAVENOUS; SUBCUTANEOUS PRN
Status: DISCONTINUED | OUTPATIENT
Start: 2025-01-17 | End: 2025-01-17 | Stop reason: HOSPADM

## 2025-01-17 RX ORDER — ONDANSETRON 2 MG/ML
4 INJECTION INTRAMUSCULAR; INTRAVENOUS
Status: DISCONTINUED | OUTPATIENT
Start: 2025-01-17 | End: 2025-01-17 | Stop reason: HOSPADM

## 2025-01-17 RX ORDER — FENTANYL CITRATE 50 UG/ML
50 INJECTION, SOLUTION INTRAMUSCULAR; INTRAVENOUS EVERY 5 MIN PRN
Status: DISCONTINUED | OUTPATIENT
Start: 2025-01-17 | End: 2025-01-17 | Stop reason: HOSPADM

## 2025-01-17 RX ORDER — LIDOCAINE HYDROCHLORIDE 20 MG/ML
INJECTION, SOLUTION EPIDURAL; INFILTRATION; INTRACAUDAL; PERINEURAL
Status: DISCONTINUED | OUTPATIENT
Start: 2025-01-17 | End: 2025-01-17 | Stop reason: SDUPTHER

## 2025-01-17 RX ADMIN — SODIUM CHLORIDE, POTASSIUM CHLORIDE, SODIUM LACTATE AND CALCIUM CHLORIDE: 600; 310; 30; 20 INJECTION, SOLUTION INTRAVENOUS at 09:01

## 2025-01-17 RX ADMIN — PROPOFOL 20 MG: 10 INJECTION, EMULSION INTRAVENOUS at 11:34

## 2025-01-17 RX ADMIN — PROPOFOL 20 MG: 10 INJECTION, EMULSION INTRAVENOUS at 11:36

## 2025-01-17 RX ADMIN — PROPOFOL 20 MG: 10 INJECTION, EMULSION INTRAVENOUS at 11:43

## 2025-01-17 RX ADMIN — PROPOFOL 25 MCG/KG/MIN: 10 INJECTION, EMULSION INTRAVENOUS at 11:33

## 2025-01-17 RX ADMIN — LIDOCAINE HYDROCHLORIDE 60 MG: 20 INJECTION, SOLUTION EPIDURAL; INFILTRATION; INTRACAUDAL; PERINEURAL at 11:33

## 2025-01-17 RX ADMIN — PROPOFOL 20 MG: 10 INJECTION, EMULSION INTRAVENOUS at 11:39

## 2025-01-17 RX ADMIN — FAMOTIDINE 20 MG: 20 TABLET, FILM COATED ORAL at 08:59

## 2025-01-17 ASSESSMENT — PAIN SCALES - GENERAL
PAINLEVEL_OUTOF10: 0

## 2025-01-17 ASSESSMENT — PAIN - FUNCTIONAL ASSESSMENT
PAIN_FUNCTIONAL_ASSESSMENT: ACTIVITIES ARE NOT PREVENTED

## 2025-01-17 NOTE — H&P
Component Value Date    WBC 4.1 (L) 11/25/2024    HGB 11.2 (L) 11/25/2024    HCT 35.1 11/25/2024    MCV 76.1 (L) 11/25/2024     11/25/2024     Lab Results   Component Value Date/Time     11/25/2024 01:40 PM    K 3.3 11/25/2024 01:40 PM     11/25/2024 01:40 PM    CO2 28 11/25/2024 01:40 PM    BUN 10 11/25/2024 01:40 PM    CREATININE 0.96 11/25/2024 01:40 PM    GLUCOSE 95 11/25/2024 01:40 PM    CALCIUM 8.8 11/25/2024 01:40 PM      No results found for: \"MG\"  Lab Results   Component Value Date    CALCIUM 8.8 11/25/2024       Assessment / Plan    EUA, possible biopsy    The diagnoses and plan were discussed with the patient. All questions answered.  Plan of care agreed to by all concerned.

## 2025-01-17 NOTE — ANESTHESIA PRE PROCEDURE
(119 lb)   12/02/24 53.5 kg (118 lb)   11/25/24 54.4 kg (120 lb)     Body mass index is 20.43 kg/m².    CBC:   Lab Results   Component Value Date/Time    WBC 4.1 11/25/2024 01:40 PM    RBC 4.61 11/25/2024 01:40 PM    HGB 11.2 11/25/2024 01:40 PM    HCT 35.1 11/25/2024 01:40 PM    MCV 76.1 11/25/2024 01:40 PM    RDW 15.5 11/25/2024 01:40 PM     11/25/2024 01:40 PM       CMP:   Lab Results   Component Value Date/Time     01/17/2025 08:43 AM    K 4.1 01/17/2025 08:43 AM     01/17/2025 08:43 AM    CO2 27 01/17/2025 08:43 AM    BUN 15 01/17/2025 08:43 AM    CREATININE 1.04 01/17/2025 08:43 AM    GFRAA >60 09/26/2022 04:43 PM    AGRATIO 1.1 09/26/2022 04:43 PM    LABGLOM 53 01/17/2025 08:43 AM    LABGLOM >60 12/04/2023 02:35 PM    LABGLOM 52 01/29/2023 01:30 PM    GLUCOSE 90 01/17/2025 08:43 AM    CALCIUM 9.3 01/17/2025 08:43 AM    BILITOT 0.8 05/13/2024 06:30 PM    ALKPHOS 110 05/13/2024 06:30 PM    ALKPHOS 75 09/26/2022 04:43 PM    AST 64 05/13/2024 06:30 PM    ALT 73 05/13/2024 06:30 PM       POC Tests: No results for input(s): \"POCGLU\", \"POCNA\", \"POCK\", \"POCCL\", \"POCBUN\", \"POCHEMO\", \"POCHCT\" in the last 72 hours.    Coags:   Lab Results   Component Value Date/Time    PROTIME 13.3 07/04/2022 01:45 PM    INR 1.0 07/04/2022 01:45 PM       HCG (If Applicable): No results found for: \"PREGTESTUR\", \"PREGSERUM\", \"HCG\", \"HCGQUANT\"     ABGs: No results found for: \"PHART\", \"PO2ART\", \"WRJ9NIF\", \"QZT3VCX\", \"BEART\", \"J5CXEHME\"     Type & Screen (If Applicable):  No results found for: \"ABORH\", \"LABANTI\"    Drug/Infectious Status (If Applicable):  Lab Results   Component Value Date/Time    HEPCAB .05 03/22/2023 12:37 PM       COVID-19 Screening (If Applicable):   Lab Results   Component Value Date/Time    COVID19 Not detected 11/25/2024 01:00 PM           Anesthesia Evaluation  Patient summary reviewed  Airway: Mallampati: II  TM distance: >3 FB   Neck ROM: full  Mouth opening: > = 3 FB   Dental:

## 2025-01-17 NOTE — ANESTHESIA POSTPROCEDURE EVALUATION
Department of Anesthesiology  Postprocedure Note    Patient: Adama Mora  MRN: [id number]  YOB: 1940  Date of evaluation: 1/17/2025    Procedure Summary       Date: 01/17/25 Room / Location: [facility] MAIN 07 / [facility] MAIN OR    Anesthesia Start: 1125 Anesthesia Stop: 1158    Procedure: EXAMINATION UNDER ANESTHESIA; ANAL DILATION (Rectum) Diagnosis:       Anal cancer (HCC)      (Anal cancer (HCC) [C21.0])    Surgeons: Donta Moore MD Responsible Provider: Edgar Mao Jr., MD    Anesthesia Type: MAC ASA Status: 3            Anesthesia Type: MAC    Marian Phase I: Marian Score: 10    Marian Phase II: Marian Score: 10    Anesthesia Post Evaluation    Patient location during evaluation: bedside  Airway patency: patent  Cardiovascular status: hemodynamically stable  Respiratory status: acceptable  Hydration status: stable  Pain management: adequate    No notable events documented.

## 2025-01-17 NOTE — OP NOTE
Operative Note      Patient: Adama Mora  YOB: 1940  MRN: [id number]    Date of Procedure: 1/17/2025    Pre-Op Diagnosis Codes:      * Anal cancer (HCC) [C21.0]    Post-Op Diagnosis:  Anal cancer, anal stenosis       Procedure(s):  EXAMINATION UNDER ANESTHESIA; ANAL DILATION    Surgeon(s):  Donta Moore MD    Assistant:   Surgical Assistant: Kiki Damian    Anesthesia: Monitor Anesthesia Care    Estimated Blood Loss (mL): Minimal    Complications: None    Specimens:   * No specimens in log *    Implants:  * No implants in log *      Drains: * No LDAs found *    Findings:  Infection Present At Time Of Surgery (PATOS) (choose all levels that have infection present):  No infection present  Other Findings: No recurrence, stenosis    Detailed Description of Procedure:     The patient was palpated by the holding of brought to the operating room.  She was placed in the prone jackknife position.  Sedation was administered by anesthesia.  Buttocks were taped apart and perianal area was prepped and draped in the usual sterile fashion.  40 mL as a local anesthetic was injected.  She had a very stenosed anus.  This was initially dilated with the index finger.  Subsequently was dilated with both a small and medium Fajardo retractor.  The anorectal area was inspected circumferentially.  There was no evidence of ulceration or tumor recurrence.  Hemostasis was assured.  Dressings were applied.    The patient tolerated the procedure well.  All instrument sponge and needle counts were correct at the end of the case x 2.  The patient awoke from anesthesia and was transported the PACU in stable condition.      Electronically signed by Donta Moore MD on 1/17/2025 at 11:53 AM

## 2025-02-10 ENCOUNTER — OFFICE VISIT (OUTPATIENT)
Age: 85
End: 2025-02-10
Payer: MEDICARE

## 2025-02-10 VITALS
BODY MASS INDEX: 20.66 KG/M2 | DIASTOLIC BLOOD PRESSURE: 70 MMHG | HEART RATE: 65 BPM | WEIGHT: 121 LBS | SYSTOLIC BLOOD PRESSURE: 126 MMHG | HEIGHT: 64 IN | OXYGEN SATURATION: 99 %

## 2025-02-10 DIAGNOSIS — E78.2 MIXED HYPERLIPIDEMIA: ICD-10-CM

## 2025-02-10 DIAGNOSIS — I10 ESSENTIAL HYPERTENSION: ICD-10-CM

## 2025-02-10 DIAGNOSIS — R60.9 DEPENDENT EDEMA: Primary | ICD-10-CM

## 2025-02-10 DIAGNOSIS — Z85.3 HISTORY OF BREAST CANCER: ICD-10-CM

## 2025-02-10 DIAGNOSIS — C21.0 ANAL CANCER (HCC): ICD-10-CM

## 2025-02-10 PROBLEM — E55.9 VITAMIN D DEFICIENCY: Status: ACTIVE | Noted: 2025-02-10

## 2025-02-10 PROBLEM — C21.1 PRIMARY MALIGNANT NEOPLASM OF ANAL CANAL (HCC): Status: ACTIVE | Noted: 2024-02-13

## 2025-02-10 PROBLEM — D50.8 OTHER IRON DEFICIENCY ANEMIAS: Status: ACTIVE | Noted: 2025-02-10

## 2025-02-10 PROBLEM — C50.011 MALIGNANT NEOPLASM OF NIPPLE AND AREOLA, RIGHT FEMALE BREAST (HCC): Status: ACTIVE | Noted: 2025-02-10

## 2025-02-10 PROBLEM — G89.3 CHRONIC PAIN DUE TO MALIGNANT NEOPLASTIC DISEASE: Status: ACTIVE | Noted: 2025-02-10

## 2025-02-10 PROCEDURE — G8427 DOCREV CUR MEDS BY ELIG CLIN: HCPCS | Performed by: INTERNAL MEDICINE

## 2025-02-10 PROCEDURE — G8399 PT W/DXA RESULTS DOCUMENT: HCPCS | Performed by: INTERNAL MEDICINE

## 2025-02-10 PROCEDURE — 1090F PRES/ABSN URINE INCON ASSESS: CPT | Performed by: INTERNAL MEDICINE

## 2025-02-10 PROCEDURE — 3078F DIAST BP <80 MM HG: CPT | Performed by: INTERNAL MEDICINE

## 2025-02-10 PROCEDURE — 99214 OFFICE O/P EST MOD 30 MIN: CPT | Performed by: INTERNAL MEDICINE

## 2025-02-10 PROCEDURE — 1160F RVW MEDS BY RX/DR IN RCRD: CPT | Performed by: INTERNAL MEDICINE

## 2025-02-10 PROCEDURE — 1036F TOBACCO NON-USER: CPT | Performed by: INTERNAL MEDICINE

## 2025-02-10 PROCEDURE — 1126F AMNT PAIN NOTED NONE PRSNT: CPT | Performed by: INTERNAL MEDICINE

## 2025-02-10 PROCEDURE — 1124F ACP DISCUSS-NO DSCNMKR DOCD: CPT | Performed by: INTERNAL MEDICINE

## 2025-02-10 PROCEDURE — 1159F MED LIST DOCD IN RCRD: CPT | Performed by: INTERNAL MEDICINE

## 2025-02-10 PROCEDURE — 3074F SYST BP LT 130 MM HG: CPT | Performed by: INTERNAL MEDICINE

## 2025-02-10 PROCEDURE — G8420 CALC BMI NORM PARAMETERS: HCPCS | Performed by: INTERNAL MEDICINE

## 2025-02-10 PROCEDURE — 93000 ELECTROCARDIOGRAM COMPLETE: CPT | Performed by: INTERNAL MEDICINE

## 2025-02-10 ASSESSMENT — ANXIETY QUESTIONNAIRES
6. BECOMING EASILY ANNOYED OR IRRITABLE: NOT AT ALL
4. TROUBLE RELAXING: NOT AT ALL
1. FEELING NERVOUS, ANXIOUS, OR ON EDGE: NOT AT ALL
5. BEING SO RESTLESS THAT IT IS HARD TO SIT STILL: NOT AT ALL
GAD7 TOTAL SCORE: 0
2. NOT BEING ABLE TO STOP OR CONTROL WORRYING: NOT AT ALL
3. WORRYING TOO MUCH ABOUT DIFFERENT THINGS: NOT AT ALL
7. FEELING AFRAID AS IF SOMETHING AWFUL MIGHT HAPPEN: NOT AT ALL

## 2025-02-10 ASSESSMENT — ENCOUNTER SYMPTOMS
ABDOMINAL PAIN: 0
COUGH: 0
VOMITING: 0
NAUSEA: 0
ABDOMINAL DISTENTION: 0
SHORTNESS OF BREATH: 0
SORE THROAT: 0

## 2025-02-13 RX ORDER — FUROSEMIDE 20 MG/1
TABLET ORAL
Qty: 30 TABLET | Refills: 5 | Status: SHIPPED | OUTPATIENT
Start: 2025-02-13

## 2025-02-14 ENCOUNTER — TRANSCRIBE ORDERS (OUTPATIENT)
Facility: HOSPITAL | Age: 85
End: 2025-02-14

## 2025-02-14 DIAGNOSIS — C50.919 MALIGNANT NEOPLASM OF FEMALE BREAST, UNSPECIFIED ESTROGEN RECEPTOR STATUS, UNSPECIFIED LATERALITY, UNSPECIFIED SITE OF BREAST (HCC): ICD-10-CM

## 2025-02-14 DIAGNOSIS — C21.1 PRIMARY CANCER OF ANAL CANAL (HCC): Primary | ICD-10-CM

## 2025-03-14 ENCOUNTER — TRANSCRIBE ORDERS (OUTPATIENT)
Facility: HOSPITAL | Age: 85
End: 2025-03-14

## 2025-03-14 DIAGNOSIS — Z12.31 ENCOUNTER FOR SCREENING MAMMOGRAM FOR MALIGNANT NEOPLASM OF BREAST: Primary | ICD-10-CM

## 2025-03-24 ENCOUNTER — HOSPITAL ENCOUNTER (OUTPATIENT)
Facility: HOSPITAL | Age: 85
Discharge: HOME | End: 2025-03-27
Payer: MEDICARE

## 2025-03-24 DIAGNOSIS — C21.1 PRIMARY CANCER OF ANAL CANAL (HCC): ICD-10-CM

## 2025-03-24 DIAGNOSIS — C50.919 MALIGNANT NEOPLASM OF FEMALE BREAST, UNSPECIFIED ESTROGEN RECEPTOR STATUS, UNSPECIFIED LATERALITY, UNSPECIFIED SITE OF BREAST: ICD-10-CM

## 2025-03-24 LAB — CREAT UR-MCNC: 1 MG/DL (ref 0.6–1.3)

## 2025-03-24 PROCEDURE — 82565 ASSAY OF CREATININE: CPT

## 2025-03-24 PROCEDURE — 6360000004 HC RX CONTRAST MEDICATION: Performed by: PHYSICIAN ASSISTANT

## 2025-03-24 PROCEDURE — 74177 CT ABD & PELVIS W/CONTRAST: CPT

## 2025-03-24 RX ORDER — DIATRIZOATE MEGLUMINE AND DIATRIZOATE SODIUM 660; 100 MG/ML; MG/ML
30 SOLUTION ORAL; RECTAL
Status: DISCONTINUED | OUTPATIENT
Start: 2025-03-24 | End: 2025-03-28 | Stop reason: HOSPADM

## 2025-03-24 RX ORDER — IOPAMIDOL 612 MG/ML
100 INJECTION, SOLUTION INTRAVASCULAR
Status: COMPLETED | OUTPATIENT
Start: 2025-03-24 | End: 2025-03-24

## 2025-03-24 RX ADMIN — IOPAMIDOL 100 ML: 612 INJECTION, SOLUTION INTRAVENOUS at 14:30

## 2025-03-24 RX ADMIN — DIATRIZOATE MEGLUMINE AND DIATRIZOATE SODIUM 30 ML: 660; 100 LIQUID ORAL; RECTAL at 12:46

## 2025-03-28 ENCOUNTER — APPOINTMENT (OUTPATIENT)
Facility: HOSPITAL | Age: 85
End: 2025-03-28
Payer: MEDICARE

## 2025-05-16 ENCOUNTER — HOSPITAL ENCOUNTER (OUTPATIENT)
Facility: HOSPITAL | Age: 85
Discharge: HOME | End: 2025-05-19
Payer: MEDICARE

## 2025-05-16 ENCOUNTER — APPOINTMENT (OUTPATIENT)
Facility: HOSPITAL | Age: 85
End: 2025-05-16
Payer: MEDICARE

## 2025-05-16 DIAGNOSIS — Z12.31 ENCOUNTER FOR SCREENING MAMMOGRAM FOR MALIGNANT NEOPLASM OF BREAST: ICD-10-CM

## 2025-05-16 PROCEDURE — 77063 BREAST TOMOSYNTHESIS BI: CPT

## 2025-05-20 ENCOUNTER — HOSPITAL ENCOUNTER (OUTPATIENT)
Facility: HOSPITAL | Age: 85
Setting detail: OUTPATIENT SURGERY
Discharge: HOME | End: 2025-05-20
Attending: INTERNAL MEDICINE | Admitting: INTERNAL MEDICINE
Payer: MEDICARE

## 2025-05-20 ENCOUNTER — ANESTHESIA EVENT (OUTPATIENT)
Facility: HOSPITAL | Age: 85
End: 2025-05-20
Payer: MEDICARE

## 2025-05-20 ENCOUNTER — ANESTHESIA (OUTPATIENT)
Facility: HOSPITAL | Age: 85
End: 2025-05-20
Payer: MEDICARE

## 2025-05-20 VITALS
TEMPERATURE: 97.4 F | RESPIRATION RATE: 13 BRPM | WEIGHT: 122 LBS | BODY MASS INDEX: 20.94 KG/M2 | SYSTOLIC BLOOD PRESSURE: 133 MMHG | HEART RATE: 60 BPM | DIASTOLIC BLOOD PRESSURE: 72 MMHG | OXYGEN SATURATION: 98 %

## 2025-05-20 PROCEDURE — 3700000000 HC ANESTHESIA ATTENDED CARE: Performed by: INTERNAL MEDICINE

## 2025-05-20 PROCEDURE — 2709999900 HC NON-CHARGEABLE SUPPLY: Performed by: INTERNAL MEDICINE

## 2025-05-20 PROCEDURE — 7100000010 HC PHASE II RECOVERY - FIRST 15 MIN: Performed by: INTERNAL MEDICINE

## 2025-05-20 PROCEDURE — 7100000000 HC PACU RECOVERY - FIRST 15 MIN: Performed by: INTERNAL MEDICINE

## 2025-05-20 PROCEDURE — 3600007502: Performed by: INTERNAL MEDICINE

## 2025-05-20 ASSESSMENT — PAIN - FUNCTIONAL ASSESSMENT
PAIN_FUNCTIONAL_ASSESSMENT: 0-10
PAIN_FUNCTIONAL_ASSESSMENT: 0-10

## 2025-05-20 NOTE — ANESTHESIA PRE PROCEDURE
Department of Anesthesiology  Preprocedure Note       Name:  Adama Mora   Age:  84 y.o.  :  1940                                          MRN:  [id number]         Date:  2025      Surgeon: Surgeon(s):  Igor Toscano MD    Procedure: Procedure(s):  FLEXIBLE SIGMOIDOSCOPY    Medications prior to admission:   Prior to Admission medications    Medication Sig Start Date End Date Taking? Authorizing Provider   furosemide (LASIX) 20 MG tablet TAKE 1 TABLET BY MOUTH DAILY AS NEEDED. DO NOT TAKE MORE THAN 3 DAYS CONSECUTIVELY 25   Jessica Arthur APRN - NP   potassium chloride (KLOR-CON M) 10 MEQ extended release tablet Take 2 tablets by mouth 2 times daily for 1 day 10/25/24 2/10/25  Sara Craft MD   doxycycline (VIBRAMYCIN) 50 MG capsule Take 1 capsule by mouth 24   Riley Cox MD   Multiple Vitamins-Minerals (ONE-A-DAY WOMENS PO) Take by mouth    Riley Cox MD   oxyCODONE-acetaminophen (PERCOCET) 5-325 MG per tablet Take 1 tablet by mouth every 4 hours as needed for Pain. 5/15/24   Riley Cox MD   traMADol (ULTRAM) 50 MG tablet Take 1 tablet by mouth. 3/11/24   Riley Cox MD   atorvastatin (LIPITOR) 20 MG tablet Take 1 tablet by mouth daily    Riley Cox MD   traZODone (DESYREL) 50 MG tablet Take 1 tablet by mouth nightly    Riley Cox MD   lidocaine (XYLOCAINE) 5 % ointment Apply topically as needed. 24   Shabbir Betancourt MD   Zinc Oxide 10 % OINT Apply 1 Application topically 4 times daily as needed (skin irritation) 24   Gloria Bowers MD   colestipol (COLESTID) 5 g packet Take 5 g by mouth 2 times daily    Riley Cox MD   METAMUCIL FIBER PO Take by mouth daily    Riley Cox MD   Multiple Vitamins-Minerals (PRESERVISION/LUTEIN PO) Take 1 tablet by mouth daily    Riley Cox MD   ondansetron (ZOFRAN) 4 MG tablet Take 1 tablet by mouth every 8 hours as needed

## 2025-05-20 NOTE — H&P
[phone]    GASTROENTEROLOGY Pre-Procedure H and P      Impression/Plan:   1. This patient is consented for a flexible sigmoidoscopy for hx rectal cancer    Chief Complaint: hx rectal cancer      HPI:  Adama Mora is a 84 y.o. female who is being is having a flexible sigmoidoscopy for hx rectal cancer  PMH:   Past Medical History:   Diagnosis Date    Arthritis     Back pain of lumbar region with sciatica 12/5/2018    Breast cancer (HCC) 2015    right    Cancer (HCC) 2015    breast cancer    Depression     Dyslipidemia     Edema of both feet     recently started on diuretic (Nov)    Heart palpitations     Hypertension     Left ventricular hypertrophy     Macular degeneration     Rectal cancer (HCC) 2024    had chemo and radiation April - May 2024    Vitamin D deficiency        PSH:   Past Surgical History:   Procedure Laterality Date    BREAST LUMPECTOMY Right     BREAST SURGERY Right 2015    COLONOSCOPY      HEMORRHOID SURGERY      HERNIA REPAIR      umbilical    HYSTERECTOMY (CERVIX STATUS UNKNOWN)  1985    HYSTERECTOMY, TOTAL ABDOMINAL (CERVIX REMOVED)      RECTAL EXAM N/A 1/17/2025    EXAMINATION UNDER ANESTHESIA; ANAL DILATION performed by Donta Moore MD at [facility] MAIN OR       Social HX:   Social History     Socioeconomic History    Marital status:      Spouse name: Not on file    Number of children: Not on file    Years of education: Not on file    Highest education level: Not on file   Occupational History    Not on file   Tobacco Use    Smoking status: Never    Smokeless tobacco: Never   Vaping Use    Vaping status: Never Used   Substance and Sexual Activity    Alcohol use: No    Drug use: No    Sexual activity: Not on file   Other Topics Concern    Not on file   Social History Narrative    ** Merged History Encounter **          Social Drivers of Health     Financial Resource Strain: Not on file   Food Insecurity: Not on file   Transportation Needs: Not on file   Physical Activity:

## 2025-05-21 NOTE — ANESTHESIA POSTPROCEDURE EVALUATION
Department of Anesthesiology  Postprocedure Note    Patient: Adama Mora  MRN: [id number]  YOB: 1940  Date of evaluation: 5/21/2025    Procedure Summary       Date: 05/20/25 Room / Location: [facility] ENDO 02 / [facility] ENDOSCOPY    Anesthesia Start: 1251 Anesthesia Stop: 1302    Procedure: FLEXIBLE SIGMOIDOSCOPY (Abdomen) Diagnosis:       Bright red blood per rectum      History of rectal or anal cancer      Change in bowel habits      (Bright red blood per rectum [K62.5])      (History of rectal or anal cancer [Z85.048])      (Change in bowel habits [R19.4])    Surgeons: Igor Toscano MD Responsible Provider: Carlos Deluca MD    Anesthesia Type: MAC ASA Status: 2            Anesthesia Type: MAC    Marian Phase I: Marian Score: 9    Marian Phase II: Marian Score: 10    Anesthesia Post Evaluation    Patient location during evaluation: bedside  Patient participation: complete - patient participated  Level of consciousness: awake  Pain score: 0  Airway patency: patent  Nausea & Vomiting: no nausea  Cardiovascular status: hemodynamically stable  Respiratory status: acceptable  Hydration status: euvolemic  Pain management: satisfactory to patient        No notable events documented.

## 2025-07-10 ENCOUNTER — HOSPITAL ENCOUNTER (OUTPATIENT)
Facility: HOSPITAL | Age: 85
Setting detail: RECURRING SERIES
Discharge: HOME | End: 2025-07-13
Attending: RADIOLOGY
Payer: MEDICARE

## 2025-07-10 PROCEDURE — 99213 OFFICE O/P EST LOW 20 MIN: CPT

## 2025-07-10 PROCEDURE — 99213 OFFICE O/P EST LOW 20 MIN: CPT | Performed by: RADIOLOGY

## 2025-08-22 ENCOUNTER — HOSPITAL ENCOUNTER (EMERGENCY)
Age: 85
Discharge: HOME | End: 2025-08-22
Payer: MEDICARE

## 2025-08-22 VITALS
OXYGEN SATURATION: 99 % | DIASTOLIC BLOOD PRESSURE: 85 MMHG | HEIGHT: 64 IN | SYSTOLIC BLOOD PRESSURE: 162 MMHG | RESPIRATION RATE: 20 BRPM | WEIGHT: 120 LBS | BODY MASS INDEX: 20.49 KG/M2 | HEART RATE: 76 BPM | TEMPERATURE: 97.4 F

## 2025-08-22 DIAGNOSIS — M25.50 POLYARTHRALGIA: Primary | ICD-10-CM

## 2025-08-22 LAB
ALBUMIN SERPL-MCNC: 4 G/DL (ref 3.4–5)
ALBUMIN/GLOB SERPL: 1.1 (ref 1–1.9)
ALP SERPL-CCNC: 86 U/L (ref 45–117)
ALT SERPL-CCNC: 11 U/L (ref 10–35)
ANION GAP SERPL CALC-SCNC: 13 MMOL/L (ref 7–16)
APPEARANCE UR: CLEAR
AST SERPL-CCNC: 27 U/L (ref 10–38)
BACTERIA URNS QL MICRO: NEGATIVE /HPF
BASOPHILS # BLD: 0.03 K/UL (ref 0–0.1)
BASOPHILS NFR BLD: 0.6 % (ref 0–2)
BILIRUB SERPL-MCNC: 0.9 MG/DL (ref 0.2–1)
BILIRUB UR QL: NEGATIVE
BUN SERPL-MCNC: 13 MG/DL (ref 6–23)
BUN/CREAT SERPL: 12
CALCIUM SERPL-MCNC: 9.8 MG/DL (ref 8.5–10.1)
CHLORIDE SERPL-SCNC: 100 MMOL/L (ref 98–107)
CO2 SERPL-SCNC: 23 MMOL/L (ref 21–32)
COLOR UR: YELLOW
CREAT SERPL-MCNC: 1.07 MG/DL (ref 0.6–1.3)
DIFFERENTIAL METHOD BLD: ABNORMAL
EOSINOPHIL # BLD: 0.02 K/UL (ref 0–0.4)
EOSINOPHIL NFR BLD: 0.4 % (ref 0–5)
EPITH CASTS URNS QL MICRO: NORMAL /LPF (ref 0–5)
ERYTHROCYTE [DISTWIDTH] IN BLOOD BY AUTOMATED COUNT: 14.7 % (ref 11.6–14.5)
GLOBULIN SER CALC-MCNC: 3.8 G/DL (ref 2.3–3.5)
GLUCOSE SERPL-MCNC: 91 MG/DL (ref 74–108)
GLUCOSE UR STRIP.AUTO-MCNC: NEGATIVE MG/DL
HCT VFR BLD AUTO: 41.3 % (ref 35–45)
HGB BLD-MCNC: 13.2 G/DL (ref 12–16)
HGB UR QL STRIP: ABNORMAL
IMM GRANULOCYTES # BLD AUTO: 0.01 K/UL (ref 0–0.04)
IMM GRANULOCYTES NFR BLD AUTO: 0.2 % (ref 0–0.5)
KETONES UR QL STRIP.AUTO: NEGATIVE MG/DL
LEUKOCYTE ESTERASE UR QL STRIP.AUTO: NEGATIVE
LYMPHOCYTES # BLD: 2.07 K/UL (ref 0.9–3.6)
LYMPHOCYTES NFR BLD: 42.2 % (ref 21–52)
MCH RBC QN AUTO: 24.4 PG (ref 24–34)
MCHC RBC AUTO-ENTMCNC: 32 G/DL (ref 31–37)
MCV RBC AUTO: 76.2 FL (ref 78–100)
MONOCYTES # BLD: 0.31 K/UL (ref 0.05–1.2)
MONOCYTES NFR BLD: 6.3 % (ref 3–10)
NEUTS SEG # BLD: 2.46 K/UL (ref 1.8–8)
NEUTS SEG NFR BLD: 50.3 % (ref 40–73)
NITRITE UR QL STRIP.AUTO: NEGATIVE
NRBC # BLD: 0 K/UL (ref 0–0.01)
NRBC BLD-RTO: 0 PER 100 WBC
PH UR STRIP: 7 (ref 5–8)
PLATELET # BLD AUTO: 171 K/UL (ref 135–420)
PMV BLD AUTO: 10.7 FL (ref 9.2–11.8)
POTASSIUM SERPL-SCNC: 4.7 MMOL/L (ref 3.5–5.5)
PROT SERPL-MCNC: 7.8 G/DL (ref 6.4–8.2)
PROT UR STRIP-MCNC: NEGATIVE MG/DL
RBC # BLD AUTO: 5.42 M/UL (ref 4.2–5.3)
RBC #/AREA URNS HPF: NORMAL /HPF (ref 0–5)
SODIUM SERPL-SCNC: 137 MMOL/L (ref 136–145)
SP GR UR REFRACTOMETRY: 1.01 (ref 1–1.03)
UROBILINOGEN UR QL STRIP.AUTO: 0.2 EU/DL (ref 0.2–1)
WBC # BLD AUTO: 4.9 K/UL (ref 4.6–13.2)
WBC URNS QL MICRO: NORMAL /HPF (ref 0–4)

## 2025-08-22 PROCEDURE — 87086 URINE CULTURE/COLONY COUNT: CPT

## 2025-08-22 PROCEDURE — 81001 URINALYSIS AUTO W/SCOPE: CPT

## 2025-08-22 PROCEDURE — 96372 THER/PROPH/DIAG INJ SC/IM: CPT

## 2025-08-22 PROCEDURE — 81003 URINALYSIS AUTO W/O SCOPE: CPT

## 2025-08-22 PROCEDURE — 80053 COMPREHEN METABOLIC PANEL: CPT

## 2025-08-22 PROCEDURE — 99284 EMERGENCY DEPT VISIT MOD MDM: CPT

## 2025-08-22 PROCEDURE — 85025 COMPLETE CBC W/AUTO DIFF WBC: CPT

## 2025-08-22 PROCEDURE — 6360000002 HC RX W HCPCS

## 2025-08-22 RX ORDER — KETOROLAC TROMETHAMINE 15 MG/ML
15 INJECTION, SOLUTION INTRAMUSCULAR; INTRAVENOUS
Status: COMPLETED | OUTPATIENT
Start: 2025-08-22 | End: 2025-08-22

## 2025-08-22 RX ADMIN — KETOROLAC TROMETHAMINE 15 MG: 15 INJECTION, SOLUTION INTRAMUSCULAR; INTRAVENOUS at 16:18

## 2025-08-22 ASSESSMENT — PAIN DESCRIPTION - LOCATION
LOCATION: LEG
LOCATION: LEG

## 2025-08-22 ASSESSMENT — PAIN - FUNCTIONAL ASSESSMENT
PAIN_FUNCTIONAL_ASSESSMENT: 0-10
PAIN_FUNCTIONAL_ASSESSMENT: 0-10

## 2025-08-22 ASSESSMENT — PAIN DESCRIPTION - ORIENTATION
ORIENTATION: LEFT;RIGHT
ORIENTATION: RIGHT;LEFT

## 2025-08-22 ASSESSMENT — PAIN SCALES - GENERAL
PAINLEVEL_OUTOF10: 7
PAINLEVEL_OUTOF10: 8

## 2025-08-22 ASSESSMENT — PAIN DESCRIPTION - DESCRIPTORS: DESCRIPTORS: CRAMPING

## 2025-08-24 LAB
BACTERIA SPEC CULT: NORMAL
CC UR VC: NORMAL
SERVICE CMNT-IMP: NORMAL

## (undated) DEVICE — SYRINGE MED 25GA 3ML L5/8IN SUBQ PLAS W/ DETACH NDL SFTY

## (undated) DEVICE — CANNULA ORIG TL CLR W FOAM CUSHIONS AND 14FT SUPL TB 3 CHN

## (undated) DEVICE — UNDERPAD INCONT W23XL36IN STD BLU POLYPR BK FLUF SFT

## (undated) DEVICE — Device

## (undated) DEVICE — ENDOSCOPY PUMP TUBING/ CAP SET: Brand: ERBE

## (undated) DEVICE — TAPE,CLOTH/SILK,CURAD,3"X10YD,LF,40/CS: Brand: CURAD

## (undated) DEVICE — POSITIONER HD REST FOAM CMFRT TCH

## (undated) DEVICE — SYRINGE MED 10ML LUERLOCK TIP W/O SFTY DISP

## (undated) DEVICE — GAUZE,SPONGE,4"X4",16PLY,STRL,LF,10/TRAY: Brand: MEDLINE

## (undated) DEVICE — STERILE POLYISOPRENE POWDER-FREE SURGICAL GLOVES: Brand: PROTEXIS

## (undated) DEVICE — CANNULA NSL AD TBNG L14FT STD PVC O2 CRV CONN NONFLARED NSL

## (undated) DEVICE — GOWN PLASTIC FILM THMBHKS UNIV BLUE: Brand: CARDINAL HEALTH

## (undated) DEVICE — SYRINGE MED 10ML TRNSLUC BRL PLUNG BLK MRK POLYPR CTRL

## (undated) DEVICE — CATHETER SUCT TR FL TIP 14FR W/ O CTRL

## (undated) DEVICE — MEDI-VAC NON-CONDUCTIVE SUCTION TUBING: Brand: CARDINAL HEALTH

## (undated) DEVICE — SHEET, T, LAPAROTOMY, STERILE: Brand: MEDLINE

## (undated) DEVICE — SOLUTION IRRIG 1000ML H2O STRL BLT

## (undated) DEVICE — SOLUTION IRRIG 1000ML 0.9% SOD CHL USP POUR PLAS BTL

## (undated) DEVICE — ELECTRODE PT RET AD L9FT HI MOIST COND ADH HYDRGEL CORDED

## (undated) DEVICE — SYRINGE 20ML LL S/C 50

## (undated) DEVICE — SNARE POLYP M W27MMXL240CM OVL STIFF DISP CAPTIVATOR

## (undated) DEVICE — CLEAN UP KIT: Brand: MEDLINE INDUSTRIES, INC.

## (undated) DEVICE — YANKAUER,SMOOTH HANDLE,HIGH CAPACITY: Brand: MEDLINE INDUSTRIES, INC.

## (undated) DEVICE — SOLUTION ANTISEP H PEROXIDE 3% 8 OZ LF

## (undated) DEVICE — SOLUTION SCRB 4OZ 10% PVP I POVIDONE IOD TOP PAINT EXIDINE

## (undated) DEVICE — FORCEPS BX L240CM JAW DIA2.8MM L CAP W/ NDL MIC MESH TOOTH

## (undated) DEVICE — LINER SUCT CANSTR 3000CC PLAS SFT PRE ASSEMB W/OUT TBNG W/

## (undated) DEVICE — SYRINGE MED 50ML LUERSLIP TIP